# Patient Record
Sex: FEMALE | Race: WHITE | NOT HISPANIC OR LATINO | Employment: OTHER | URBAN - METROPOLITAN AREA
[De-identification: names, ages, dates, MRNs, and addresses within clinical notes are randomized per-mention and may not be internally consistent; named-entity substitution may affect disease eponyms.]

---

## 2018-06-04 ENCOUNTER — RECORDS - HEALTHEAST (OUTPATIENT)
Dept: LAB | Facility: CLINIC | Age: 73
End: 2018-06-04

## 2018-06-05 LAB
ANION GAP SERPL CALCULATED.3IONS-SCNC: 10 MMOL/L (ref 5–18)
BUN SERPL-MCNC: 24 MG/DL (ref 8–28)
CALCIUM SERPL-MCNC: 10 MG/DL (ref 8.5–10.5)
CHLORIDE BLD-SCNC: 106 MMOL/L (ref 98–107)
CO2 SERPL-SCNC: 22 MMOL/L (ref 22–31)
CREAT SERPL-MCNC: 0.79 MG/DL (ref 0.6–1.1)
GFR SERPL CREATININE-BSD FRML MDRD: >60 ML/MIN/1.73M2
GLUCOSE BLD-MCNC: 78 MG/DL (ref 70–125)
HGB BLD-MCNC: 11.2 G/DL (ref 12–16)
POTASSIUM BLD-SCNC: 4.4 MMOL/L (ref 3.5–5)
SODIUM SERPL-SCNC: 138 MMOL/L (ref 136–145)

## 2018-06-22 ENCOUNTER — RECORDS - HEALTHEAST (OUTPATIENT)
Dept: LAB | Facility: CLINIC | Age: 73
End: 2018-06-22

## 2018-06-25 LAB
ANION GAP SERPL CALCULATED.3IONS-SCNC: 8 MMOL/L (ref 5–18)
BUN SERPL-MCNC: 18 MG/DL (ref 8–28)
CALCIUM SERPL-MCNC: 9.8 MG/DL (ref 8.5–10.5)
CHLORIDE BLD-SCNC: 108 MMOL/L (ref 98–107)
CO2 SERPL-SCNC: 24 MMOL/L (ref 22–31)
CREAT SERPL-MCNC: 0.71 MG/DL (ref 0.6–1.1)
ERYTHROCYTE [DISTWIDTH] IN BLOOD BY AUTOMATED COUNT: 14.6 % (ref 11–14.5)
GFR SERPL CREATININE-BSD FRML MDRD: >60 ML/MIN/1.73M2
GLUCOSE BLD-MCNC: 88 MG/DL (ref 70–125)
HCT VFR BLD AUTO: 38.9 % (ref 35–47)
HGB BLD-MCNC: 12.1 G/DL (ref 12–16)
MCH RBC QN AUTO: 28.5 PG (ref 27–34)
MCHC RBC AUTO-ENTMCNC: 31.1 G/DL (ref 32–36)
MCV RBC AUTO: 92 FL (ref 80–100)
PLATELET # BLD AUTO: 301 THOU/UL (ref 140–440)
PMV BLD AUTO: 11 FL (ref 8.5–12.5)
POTASSIUM BLD-SCNC: 3.9 MMOL/L (ref 3.5–5)
RBC # BLD AUTO: 4.24 MILL/UL (ref 3.8–5.4)
SODIUM SERPL-SCNC: 140 MMOL/L (ref 136–145)
WBC: 7.2 THOU/UL (ref 4–11)

## 2018-07-20 ENCOUNTER — RECORDS - HEALTHEAST (OUTPATIENT)
Dept: LAB | Facility: CLINIC | Age: 73
End: 2018-07-20

## 2018-07-20 LAB
ANION GAP SERPL CALCULATED.3IONS-SCNC: 11 MMOL/L (ref 5–18)
BUN SERPL-MCNC: 15 MG/DL (ref 8–28)
CALCIUM SERPL-MCNC: 9.6 MG/DL (ref 8.5–10.5)
CHLORIDE BLD-SCNC: 103 MMOL/L (ref 98–107)
CO2 SERPL-SCNC: 21 MMOL/L (ref 22–31)
CREAT SERPL-MCNC: 0.62 MG/DL (ref 0.6–1.1)
ERYTHROCYTE [DISTWIDTH] IN BLOOD BY AUTOMATED COUNT: 14.7 % (ref 11–14.5)
GFR SERPL CREATININE-BSD FRML MDRD: >60 ML/MIN/1.73M2
GLUCOSE BLD-MCNC: 97 MG/DL (ref 70–125)
HCT VFR BLD AUTO: 41.4 % (ref 35–47)
HGB BLD-MCNC: 12.6 G/DL (ref 12–16)
MCH RBC QN AUTO: 26.5 PG (ref 27–34)
MCHC RBC AUTO-ENTMCNC: 30.4 G/DL (ref 32–36)
MCV RBC AUTO: 87 FL (ref 80–100)
PLATELET # BLD AUTO: 485 THOU/UL (ref 140–440)
PMV BLD AUTO: 11.2 FL (ref 8.5–12.5)
POTASSIUM BLD-SCNC: 4.3 MMOL/L (ref 3.5–5)
RBC # BLD AUTO: 4.75 MILL/UL (ref 3.8–5.4)
SODIUM SERPL-SCNC: 135 MMOL/L (ref 136–145)
WBC: 21.5 THOU/UL (ref 4–11)

## 2018-07-21 ENCOUNTER — RECORDS - HEALTHEAST (OUTPATIENT)
Dept: LAB | Facility: CLINIC | Age: 73
End: 2018-07-21

## 2018-07-21 LAB
ALBUMIN UR-MCNC: ABNORMAL MG/DL
APPEARANCE UR: ABNORMAL
BASOPHILS # BLD AUTO: 0 THOU/UL (ref 0–0.2)
BASOPHILS NFR BLD AUTO: 0 % (ref 0–2)
BILIRUB UR QL STRIP: NEGATIVE
COLOR UR AUTO: YELLOW
EOSINOPHIL # BLD AUTO: 0.2 THOU/UL (ref 0–0.4)
EOSINOPHIL NFR BLD AUTO: 1 % (ref 0–6)
ERYTHROCYTE [DISTWIDTH] IN BLOOD BY AUTOMATED COUNT: 14.7 % (ref 11–14.5)
GLUCOSE UR STRIP-MCNC: NEGATIVE MG/DL
HCT VFR BLD AUTO: 34.1 % (ref 35–47)
HGB BLD-MCNC: 11.2 G/DL (ref 12–16)
HGB UR QL STRIP: ABNORMAL
KETONES UR STRIP-MCNC: ABNORMAL MG/DL
LEUKOCYTE ESTERASE UR QL STRIP: ABNORMAL
LYMPHOCYTES # BLD AUTO: 2.3 THOU/UL (ref 0.8–4.4)
LYMPHOCYTES NFR BLD AUTO: 18 % (ref 20–40)
MCH RBC QN AUTO: 27.5 PG (ref 27–34)
MCHC RBC AUTO-ENTMCNC: 32.8 G/DL (ref 32–36)
MCV RBC AUTO: 84 FL (ref 80–100)
MONOCYTES # BLD AUTO: 0.9 THOU/UL (ref 0–0.9)
MONOCYTES NFR BLD AUTO: 8 % (ref 2–10)
NEUTROPHILS # BLD AUTO: 9.1 THOU/UL (ref 2–7.7)
NEUTROPHILS NFR BLD AUTO: 73 % (ref 50–70)
NITRATE UR QL: NEGATIVE
PH UR STRIP: 6 [PH] (ref 4.5–8)
PLATELET # BLD AUTO: 401 THOU/UL (ref 140–440)
PMV BLD AUTO: 10.7 FL (ref 8.5–12.5)
RBC # BLD AUTO: 4.08 MILL/UL (ref 3.8–5.4)
SP GR UR STRIP: 1.04 (ref 1–1.03)
UROBILINOGEN UR STRIP-ACNC: ABNORMAL
WBC: 12.6 THOU/UL (ref 4–11)

## 2018-07-22 LAB — BACTERIA SPEC CULT: ABNORMAL

## 2018-07-24 ENCOUNTER — RECORDS - HEALTHEAST (OUTPATIENT)
Dept: LAB | Facility: CLINIC | Age: 73
End: 2018-07-24

## 2018-07-24 LAB — WBC: 8.5 THOU/UL (ref 4–11)

## 2018-07-27 ENCOUNTER — ASSISTED LIVING VISIT (OUTPATIENT)
Dept: GERIATRICS | Facility: CLINIC | Age: 73
End: 2018-07-27
Payer: COMMERCIAL

## 2018-07-27 DIAGNOSIS — G35 MULTIPLE SCLEROSIS, PRIMARY PROGRESSIVE (H): ICD-10-CM

## 2018-07-27 DIAGNOSIS — T83.511D URINARY TRACT INFECTION ASSOCIATED WITH INDWELLING URETHRAL CATHETER, SUBSEQUENT ENCOUNTER: ICD-10-CM

## 2018-07-27 DIAGNOSIS — G82.50 SPASTIC QUADRIPLEGIA (H): ICD-10-CM

## 2018-07-27 DIAGNOSIS — D64.89 ANEMIA DUE TO OTHER CAUSE, NOT CLASSIFIED: ICD-10-CM

## 2018-07-27 DIAGNOSIS — N31.9 NEUROGENIC BLADDER: ICD-10-CM

## 2018-07-27 DIAGNOSIS — Z76.89 ENCOUNTER TO ESTABLISH CARE: Primary | ICD-10-CM

## 2018-07-27 DIAGNOSIS — N39.0 URINARY TRACT INFECTION ASSOCIATED WITH INDWELLING URETHRAL CATHETER, SUBSEQUENT ENCOUNTER: ICD-10-CM

## 2018-07-27 DIAGNOSIS — R53.81 PHYSICAL DECONDITIONING: ICD-10-CM

## 2018-07-27 DIAGNOSIS — L89.322 DECUBITUS ULCER OF LEFT BUTTOCK, STAGE 2 (H): ICD-10-CM

## 2018-07-27 DIAGNOSIS — K59.03 DRUG-INDUCED CONSTIPATION: ICD-10-CM

## 2018-07-27 DIAGNOSIS — M80.00XD AGE-RELATED OSTEOPOROSIS WITH CURRENT PATHOLOGICAL FRACTURE WITH ROUTINE HEALING, SUBSEQUENT ENCOUNTER: ICD-10-CM

## 2018-07-27 DIAGNOSIS — S82.141D CLOSED FRACTURE OF RIGHT TIBIAL PLATEAU WITH ROUTINE HEALING, SUBSEQUENT ENCOUNTER: ICD-10-CM

## 2018-07-27 PROBLEM — S82.141A CLOSED FRACTURE OF RIGHT TIBIAL PLATEAU: Status: ACTIVE | Noted: 2018-07-09

## 2018-07-27 PROBLEM — Z74.09 IMMOBILITY: Status: ACTIVE | Noted: 2018-06-08

## 2018-07-27 PROBLEM — R68.89 HEAT INTOLERANCE: Status: ACTIVE | Noted: 2018-05-26

## 2018-07-27 PROBLEM — S72.001A CLOSED FRACTURE OF NECK OF RIGHT FEMUR (H): Status: ACTIVE | Noted: 2018-05-20

## 2018-07-27 PROBLEM — D62 ACUTE BLOOD LOSS ANEMIA: Status: ACTIVE | Noted: 2018-06-19

## 2018-07-27 PROBLEM — K59.09 CHRONIC CONSTIPATION: Status: ACTIVE | Noted: 2018-07-12

## 2018-07-27 RX ORDER — CALCIUM CARBONATE 500 MG/1
1 TABLET, CHEWABLE ORAL DAILY
COMMUNITY
End: 2018-08-01

## 2018-07-27 RX ORDER — POLYETHYLENE GLYCOL 3350 17 G/17G
17 POWDER, FOR SOLUTION ORAL DAILY PRN
COMMUNITY
End: 2019-07-30

## 2018-07-27 RX ORDER — PYRIDOXINE HCL (VITAMIN B6) 100 MG
1 TABLET ORAL DAILY
COMMUNITY
End: 2018-08-01

## 2018-07-27 RX ORDER — LAXATIVE 927; 618 MG/3.7G; MG/3.7G
SUPPOSITORY RECTAL DAILY PRN
COMMUNITY
End: 2022-11-20

## 2018-07-27 RX ORDER — MULTIVITAMIN WITH FOLIC ACID 400 MCG
1 TABLET ORAL DAILY
COMMUNITY
End: 2018-08-01

## 2018-07-27 RX ORDER — AMOXICILLIN 250 MG
1 CAPSULE ORAL 2 TIMES DAILY
COMMUNITY
End: 2018-07-30

## 2018-07-27 RX ORDER — CETIRIZINE HYDROCHLORIDE 10 MG/1
10 TABLET ORAL DAILY PRN
COMMUNITY
End: 2019-01-11

## 2018-07-27 NOTE — Clinical Note
Kenney Dumas, This patient requested the new shingles vaccination as well as reclast injections for osteoporosis. Can you please let me know what you think on those? Thanks,  Kimberlee

## 2018-07-27 NOTE — PROGRESS NOTES
[facility]  PRIMARY CARE PROVIDER AND CLINIC:  Kimberlee Castro [address] / [place] [address]  Chief Complaint   Patient presents with     [facility] Care     [facility] Medical Record Number:  [id number]    HPI:    Cristina Stevens is a 73 year old  (1945),admitted to the [facility]  from [facility].  Admitted to this facility for  medical management and nursing care.  HPI information obtained from: facility chart records, facility staff, patient report, [employer] chart review and Care [facility] chart review.     Cristina Stevens is a 72 y.o. female with a past medical history of multiple sclerosis, neurogenic bladder with chronic ruby catheter and frequent UTI's. She was admitted to [facility] 5/19-5/26/2018 with a right femoral neck fracture and underwent hemiarthroplasty on 5/20. She discharged to [facility] but due to the elevated temperature of the building was sent back to the ED for placement. TCU stay from 5/29-7/2/18 and was discharged back to [facility] where she developed right leg pain and found to have closed fracture of right tibial plateau and was hospitalized from 7/8-7/10/18 then returned to [place] from 7/10-7/24/18 for rehab. She was admitted to [facility] care suites for further medical management.     She was seen in the ED frequently in the past several weeks:   6/2/18-ED for increased right hip pain: sent back to TCU with nonsteroidal course  6/14/18--ED for UTI, was started on cipro which was changed to amoxicillin to cover enteroccocus.   7/7/18- ED visit for increased right knee pain. Xrays were negative. Flexeril was prescribed.     Ms. Stevens was visited today while in her room, her care attendant is present for the visit. Her main concern today is the newly acquired pressure ulcer to the buttocks area. The pain to the RLE is present but is improving. She has  been trying to take the oxycodone only twice daily. She has been having regular bowel movements. Edwards is in place and states that she is currently on abx for UTI. RLE spasms have improved after receiving botox injections by PM&R. She would like to start reclast injections.    CODE STATUS/ADVANCE DIRECTIVES DISCUSSION:   DNR  Patient's living condition: lives in an assisted living facility    ALLERGIES:Macrobid [nitrofuran derivatives] and Sulfa drugs  PAST MEDICAL HISTORY:  has a past medical history of Acute blood loss anemia (6/19/2018); Anemia (9/25/2012); At risk for impaired skin integrity (12/14/2014); Chronic constipation (7/12/2018); Chronic pain (9/25/2012); Closed fracture of neck of right femur (H) (5/20/2018); Closed fracture of right tibial plateau (7/9/2018); Frequent UTI (9/25/2012); Heat intolerance (5/26/2018); Immobility (6/8/2018); Kidney stone (6/19/2014); MDRO (multiple drug resistant organisms) resistance (5/31/2013); Multiple sclerosis (H) (5/10/2011); Neurogenic bladder (6/8/2018); Neuromuscular respiratory weakness (9/26/2014); OAB (overactive bladder) (6/2/2017); Osteoporosis (6/16/2006); Scoliosis associated with other condition (9/10/2014); Screening for osteoporosis (7/10/2018); 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  PAST SURGICAL HISTORY:  has no past surgical history on file.  FAMILY HISTORY: family history is not on file.  SOCIAL HISTORY:  reports that she quit smoking about 37 years ago. She has never used smokeless tobacco.    Post Discharge Medication Reconciliation Status: discharge medications reconciled, continue medications without change.  Current Outpatient Prescriptions   Medication Sig Dispense Refill     Acetaminophen (TYLENOL PO) Take 1,000 mg by mouth 3 times daily       Ascorbic Acid (VITAMIN C PO) Take 500 mg by mouth daily       ASPIRIN EC PO Take 325 mg by mouth daily       baclofen (LIORESAL) 10 MG tablet Take 5 tablets by mouth 2  times daily Also give 5mg po q4h prn       calcium carbonate (BRENDA-GEST ANTACID) 500 MG chewable tablet Take 1 chew tab by mouth daily       cetirizine (ZYRTEC) 10 MG tablet Take 10 mg by mouth daily as needed for allergies       Cholecalciferol (VITAMIN D-3 PO) Take 4,000 Units by mouth daily       CIPROFLOXACIN PO Take 500 mg by mouth every 12 hours       CO2-Releasing (-TWO) SUPP Place rectally daily as needed       Cranberry (CRANBERRY CONCENTRATE) 500 MG CAPS Take 1 capsule by mouth daily       IBUPROFEN PO Take 400 mg by mouth daily Also give 400mg po qday prn       Mirabegron (MYRBETRIQ PO) Take 25 mg by mouth daily       Multiple Vitamin (TAB-A-DAWSON) TABS Take 1 tablet by mouth daily       oxyCODONE IR (ROXICODONE) 5 MG tablet Take 1 tablet (5 mg) by mouth every 3 hours as needed for severe pain 6 tablet 0     polyethylene glycol (MIRALAX/GLYCOLAX) powder Take 17 g by mouth daily as needed for constipation       Probiotic Product (PROBIOTIC FORMULA) CAPS Take 1 capsule by mouth daily       senna-docusate (SENOKOT-S;PERICOLACE) 8.6-50 MG per tablet Take 1 tablet by mouth 2 times daily And BID  tablet      UNABLE TO FIND 100 mg daily MEDICATION NAME: Trimethoprim HCL 50mg/5ml soln -       VITAMIN D, CHOLECALCIFEROL, PO Take 5,000 Units by mouth every 7 days       ARMODAFINIL PO Take 150 mg by mouth daily         ROS:  10 point ROS of systems including Constitutional, Eyes, Respiratory, Cardiovascular, Gastroenterology, Genitourinary, Integumentary, Muscularskeletal, Psychiatric were all negative except for pertinent positives noted in my HPI.    Exam:  GENERAL APPEARANCE:  Alert, in no distress, pleasant, cooperative, oriented x 4  EYES:  EOM, lids, pupils and irises normal, sclera clear and conjunctiva normal, no discharge or mattering on lids or lashes noted  ENT:  Mouth normal, moist mucous membranes, nose without drainage or crusting, external ears without lesions, hearing acuity intact, speaks  softly.  NECK:  Nontender, supple, symmetrical  RESP:  respiratory effort and palpation of chest normal, no chest wall tenderness, no respiratory distress, Lung sounds clear, patient is on room air  CV:  Palpation and auscultation of heart done, rate and rhythm regular, no murmur, no rub or gallop. Edema trace in RLE. VASCULAR: warm extremities.   ABDOMEN:  normal bowel sounds, soft, nontender.  M/S:   Gait and station wheelchair bound, tenderness to right ankle and knee. R knee slightly contracted. Global weakness, able to move arms slightly and minimally wiggle toes.   : catheter in place. Clear yellow urine.   SKIN:  Inspection and palpation of skin and subcutaneous tissue: skin o warm, dry and intact without rashes  NEURO: cranial nerves 2-12 grossly intact, no facial asymmetry, no speech deficits and able to follow directions, moves all extremities symmetrically  PSYCH:  insight and judgement intact, memory intact, affect and mood normal    Lab/Diagnostic data:    Last Basic Metabolic Panel:          ASSESSMENT/PLAN:  ((U12.342D) Closed fracture of right tibial plateau with routine healing, subsequent encounter (primary encounter diagnosis)  Comment: has been decreasing the amount of oxycodone. Will need repeat xrays at the clinic prior to follow up ortho appt.   Plan: ibuprofen 400 mg daily and daily PRN (ortho okay with this). Oxycodone 5 mg q3h PRN, tylenol 1000 mg TID. Asa 325 mg daily for prophylaxis. Mobilize as tolerated in the brace. Follow up with ortho as scheduled in three weeks.       (L89.322) Decubitus ulcer of left buttock, stage 2  Comment: secondary to immobility and more time in bed after sustaining fracture.   Plan: mepilex to ulcer, change every three days. [facility] RN to monitor.     (G35) Multiple sclerosis, primary progressive (H)  Comment: chronic, progressive. Severe. Wheelchair bound and requires assistance with all cares. Has a van that PCA's drive to get her to appts and out in the  community. Previously followed by [facility] and now follows with [facility]. She is unsure of last appt but believes she is due for appt which was put off due to fractures and recent hospitalizations.   Plan: Follow up with neurology. Staff to assist with cares.     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 Richardson, Had a botox injection to right thigh on 7/17, spasms and pain have improved.  Plan: follow up with Dr. Richardson as scheduled. botox injections every 6 months. Continue with baclofen 5 mg BID and q4h PRN.     (N31.9) Neurogenic bladder  Comment: ruby last changed out one week ago. Will need monthly cath changes.   Plan: good wendi care given frequent UTI's. [facility] RN to change out catheter every 30 days.     (T83.511D,  N39.0) Urinary tract infection associated with indwelling urethral catheter, subsequent encounter  Comment: frequent. On trimethoprim prophylaxis.     Plan: continue with cipro 500 mg q12h--last dose today. Restart Trimetherine 100 mg now that cipro is complete. Monitor for further s/s of UTI.     (D64.89) Anemia due to other cause, not classified  Comment: due to chronic disease. HGB prior to right hip fx was around 12 which decreased to 9.2 post op. Now 11.2.  Plan: repeat HGB on next lab day to ensure stability.     (M80.00XD) Age-related osteoporosis with current pathological fracture with routine healing, subsequent encounter  Comment: interested in reclast injections. Will reach out to pharmacy about recommendations. May need to have further dexa scan completed.   Plan: continue with vitamin D 4000 units daily and calcium carbonate 500 mg daily.     (K59.03) Drug-induced constipation  Comment: having regular bowel movements.   Plan: continue with senna-docsate 1 tab BID and BID PRN    (R53.81) Physical deconditioning  Comment: secondary to underlying medical conditions and recent fractures.  Plan: [facility]  PT/OT    Orders:  HGB on next lab day.     Total time with a new patient visit in the assisted livin minutes including discussions about the POC and care coordination with the patient, caregiver and teaching staff about wound care and mepilex. Greater than 50% of total time spent with counseling and coordinating care due to chart review, new resident to assisted living, new pressure ulcer, pain contoll.    Electronically signed by:  CAROL Dupont CNP

## 2018-07-27 NOTE — LETTER
7/27/2018        RE: Cristina Stevens  [facility]  [address]  [place] [address]        [facility]  PRIMARY CARE PROVIDER AND CLINIC:  Kimberlee Castro [address] [facility] [address] / [place] [address]  Chief Complaint   Patient presents with     [facility] Care     [place] Medical Record Number:  [id number]    HPI:    Cristina Stevens is a 73 year old  (1945),ad[facility] the [facility]  Admitted to this facility for  medical management and nursing care.  HPI information obtained from: facility chart records, facility staff, patient report, [facility] chart review and Care Everywhere [facility] chart review.     Cristina Stevens is a 72 y.o. female with a past medical history of multiple sclerosis, neurogenic bladder with chronic ruby catheter and frequent UTI's. She was admitted to [facility] 5/19-5/26/2018 with a right femoral neck fracture and underwent hemiarthroplasty on 5/20. She discharged to [facility] but due to the elevated temperature of the building was sent back to the ED for placement. TCU stay from 5/29-7/2/18 and was discharged back to [facility] where she developed right leg pain and found to have closed fracture of right tibial plateau and was hospitalized from 7/8-7/10/18 then returned to [place] from 7/10-7/24/18 for rehab. She was admitted to [facility] care suites for further medical management.     She was seen in the ED frequently in the past several weeks:   6/2/18-ED for increased right hip pain: sent back to TCU with nonsteroidal course  6/14/18--ED for UTI, was started on cipro which was changed to amoxicillin to cover enteroccocus.   7/7/18- ED visit for increased right knee pain. Xrays were negative. Flexeril was prescribed.     Ms. Stevens was visited today while in her room, her care attendant is present for the visit. Her main concern  today is the newly acquired pressure ulcer to the buttocks area. The pain to the RLE is present but is improving. She has been trying to take the oxycodone only twice daily. She has been having regular bowel movements. Edwards is in place and states that she is currently on abx for UTI. RLE spasms have improved after receiving botox injections by PM&R. She would like to start reclast injections.    CODE STATUS/ADVANCE DIRECTIVES DISCUSSION:   DNR  Patient's living condition: lives in an assisted living facility    ALLERGIES:Macrobid [nitrofuran derivatives] and Sulfa drugs  PAST MEDICAL HISTORY:  has a past medical history of Acute blood loss anemia (6/19/2018); Anemia (9/25/2012); At risk for impaired skin integrity (12/14/2014); Chronic constipation (7/12/2018); Chronic pain (9/25/2012); Closed fracture of neck of right femur (H) (5/20/2018); Closed fracture of right tibial plateau (7/9/2018); Frequent UTI (9/25/2012); Heat intolerance (5/26/2018); Immobility (6/8/2018); Kidney stone (6/19/2014); MDRO (multiple drug resistant organisms) resistance (5/31/2013); Multiple sclerosis (H) (5/10/2011); Neurogenic bladder (6/8/2018); Neuromuscular respiratory weakness (9/26/2014); OAB (overactive bladder) (6/2/2017); Osteoporosis (6/16/2006); Scoliosis associated with other condition (9/10/2014); Screening for osteoporosis (7/10/2018); 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  PAST SURGICAL HISTORY:  has no past surgical history on file.  FAMILY HISTORY: family history is not on file.  SOCIAL HISTORY:  reports that she quit smoking about 37 years ago. She has never used smokeless tobacco.    Post Discharge Medication Reconciliation Status: discharge medications reconciled, continue medications without change.  Current Outpatient Prescriptions   Medication Sig Dispense Refill     Acetaminophen (TYLENOL PO) Take 1,000 mg by mouth 3 times daily       Ascorbic Acid (VITAMIN C PO) Take 500 mg by mouth  daily       ASPIRIN EC PO Take 325 mg by mouth daily       baclofen (LIORESAL) 10 MG tablet Take 5 tablets by mouth 2 times daily Also give 5mg po q4h prn       calcium carbonate (BRENDA-GEST ANTACID) 500 MG chewable tablet Take 1 chew tab by mouth daily       cetirizine (ZYRTEC) 10 MG tablet Take 10 mg by mouth daily as needed for allergies       Cholecalciferol (VITAMIN D-3 PO) Take 4,000 Units by mouth daily       CIPROFLOXACIN PO Take 500 mg by mouth every 12 hours       CO2-Releasing (-TWO) SUPP Place rectally daily as needed       Cranberry (CRANBERRY CONCENTRATE) 500 MG CAPS Take 1 capsule by mouth daily       IBUPROFEN PO Take 400 mg by mouth daily Also give 400mg po qday prn       Mirabegron (MYRBETRIQ PO) Take 25 mg by mouth daily       Multiple Vitamin (TAB-A-DAWSON) TABS Take 1 tablet by mouth daily       oxyCODONE IR (ROXICODONE) 5 MG tablet Take 1 tablet (5 mg) by mouth every 3 hours as needed for severe pain 6 tablet 0     polyethylene glycol (MIRALAX/GLYCOLAX) powder Take 17 g by mouth daily as needed for constipation       Probiotic Product (PROBIOTIC FORMULA) CAPS Take 1 capsule by mouth daily       senna-docusate (SENOKOT-S;PERICOLACE) 8.6-50 MG per tablet Take 1 tablet by mouth 2 times daily And BID  tablet      UNABLE TO FIND 100 mg daily MEDICATION NAME: Trimethoprim HCL 50mg/5ml soln -       VITAMIN D, CHOLECALCIFEROL, PO Take 5,000 Units by mouth every 7 days       ARMODAFINIL PO Take 150 mg by mouth daily         ROS:  10 point ROS of systems including Constitutional, Eyes, Respiratory, Cardiovascular, Gastroenterology, Genitourinary, Integumentary, Muscularskeletal, Psychiatric were all negative except for pertinent positives noted in my HPI.    Exam:  GENERAL APPEARANCE:  Alert, in no distress, pleasant, cooperative, oriented x 4  EYES:  EOM, lids, pupils and irises normal, sclera clear and conjunctiva normal, no discharge or mattering on lids or lashes noted  ENT:  Mouth normal,  moist mucous membranes, nose without drainage or crusting, external ears without lesions, hearing acuity intact, speaks softly.  NECK:  Nontender, supple, symmetrical  RESP:  respiratory effort and palpation of chest normal, no chest wall tenderness, no respiratory distress, Lung sounds clear, patient is on room air  CV:  Palpation and auscultation of heart done, rate and rhythm regular, no murmur, no rub or gallop. Edema trace in RLE. VASCULAR: warm extremities.   ABDOMEN:  normal bowel sounds, soft, nontender.  M/S:   Gait and station wheelchair bound, tenderness to right ankle and knee. R knee slightly contracted. Global weakness, able to move arms slightly and minimally wiggle toes.   : catheter in place. Clear yellow urine.   SKIN:  Inspection and palpation of skin and subcutaneous tissue: skin o warm, dry and intact without rashes  NEURO: cranial nerves 2-12 grossly intact, no facial asymmetry, no speech deficits and able to follow directions, moves all extremities symmetrically  PSYCH:  insight and judgement intact, memory intact, affect and mood normal    Lab/Diagnostic data:    Last Basic Metabolic Panel:          ASSESSMENT/PLAN:  ((S82.649D) Closed fracture of right tibial plateau with routine healing, subsequent encounter (primary encounter diagnosis)  Comment: has been decreasing the amount of oxycodone. Will need repeat xrays at the clinic prior to follow up ortho appt.   Plan: ibuprofen 400 mg daily and daily PRN (ortho okay with this). Oxycodone 5 mg q3h PRN, tylenol 1000 mg TID. Asa 325 mg daily for prophylaxis. Mobilize as tolerated in the brace. Follow up with ortho as scheduled in three weeks.       (L89.322) Decubitus ulcer of left buttock, stage 2  Comment: secondary to immobility and more time in bed after sustaining fracture.   Plan: mepilex to ulcer, change every three days. [facility] RN to monitor.     (G35) Multiple sclerosis, primary progressive (H)  Comment: chronic, progressive. Severe.  Wheelchair bound and requires assistance with all cares. Has a van that PCA's drive to get her to appts and out in the community. Previously followed by [facility] and now follows with [facility]. She is unsure of last appt but believes she is due for appt which was put off due to fractures and recent hospitalizations.   Plan: Follow up with neurology. Staff to assist with cares.     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 Richardson, Had a botox injection to right thigh on 7/17, spasms and pain have improved.  Plan: follow up with Dr. Richardson as scheduled. botox injections every 6 months. Continue with baclofen 5 mg BID and q4h PRN.     (N31.9) Neurogenic bladder  Comment: ruby last changed out one week ago. Will need monthly cath changes.   Plan: good wendi care given frequent UTI's. [facility] RN to change out catheter every 30 days.     (T83.511D,  N39.0) Urinary tract infection associated with indwelling urethral catheter, subsequent encounter  Comment: frequent. On trimethoprim prophylaxis.     Plan: continue with cipro 500 mg q12h--last dose today. Restart Trimetherine 100 mg now that cipro is complete. Monitor for further s/s of UTI.     (D64.89) Anemia due to other cause, not classified  Comment: due to chronic disease. HGB prior to right hip fx was around 12 which decreased to 9.2 post op. Now 11.2.  Plan: repeat HGB on next lab day to ensure stability.     (M80.00XD) Age-related osteoporosis with current pathological fracture with routine healing, subsequent encounter  Comment: interested in reclast injections. Will reach out to pharmacy about recommendations. May need to have further dexa scan completed.   Plan: continue with vitamin D 4000 units daily and calcium carbonate 500 mg daily.     (K59.03) Drug-induced constipation  Comment: having regular bowel movements.   Plan: continue with senna-docsate 1 tab BID and BID PRN    (R53.81) Physical  deconditioning  Comment: secondary to underlying medical conditions and recent fractures.  Plan: [facility] PT/OT    Orders:  HGB on next lab day.     Total time with a new patient visit in the assisted livin minutes including discussions about the POC and care coordination with the patient, caregiver and teaching staff about wound care and mepilex. Greater than 50% of total time spent with counseling and coordinating care due to chart review, new resident to assisted living, new pressure ulcer, pain contoll.    Electronically signed by:  CAROL Dupont CNP                  Sincerely,        CAROL Dupont CNP

## 2018-07-30 ENCOUNTER — RECORDS - HEALTHEAST (OUTPATIENT)
Dept: LAB | Facility: CLINIC | Age: 73
End: 2018-07-30

## 2018-07-30 RX ORDER — AMOXICILLIN 250 MG
1 CAPSULE ORAL 2 TIMES DAILY
Qty: 100 TABLET | COMMUNITY
Start: 2018-07-30 | End: 2018-08-27

## 2018-07-30 RX ORDER — OXYCODONE HYDROCHLORIDE 5 MG/1
2.5 TABLET ORAL 2 TIMES DAILY PRN
Qty: 6 TABLET | Refills: 0 | COMMUNITY
Start: 2018-07-30 | End: 2019-07-23

## 2018-07-31 LAB — HGB BLD-MCNC: 11.4 G/DL (ref 12–16)

## 2018-08-04 ENCOUNTER — TELEPHONE (OUTPATIENT)
Dept: GERIATRICS | Facility: CLINIC | Age: 73
End: 2018-08-04

## 2018-08-04 DIAGNOSIS — R30.0 DYSURIA: Primary | ICD-10-CM

## 2018-08-04 NOTE — TELEPHONE ENCOUNTER
8/4/2018     Situation:  -home care patient with new onset of dysuria per home care nurse  Background:  -patient with frequent UTIs  Assessment:  -dysuria with abd pain  Plan:  -OK for UA/C per home care nurse     Mariela Anand CNP   [facility]  [phone] cell

## 2018-08-14 VITALS
RESPIRATION RATE: 20 BRPM | DIASTOLIC BLOOD PRESSURE: 92 MMHG | HEART RATE: 71 BPM | TEMPERATURE: 97.2 F | SYSTOLIC BLOOD PRESSURE: 155 MMHG

## 2018-08-14 NOTE — PROGRESS NOTES
[facility]    Chief Complaint   Patient presents with     KELLEY       [facility] Record Number:  [id number]    HPI:    Cristina Stevens is a 73 year old  (1945), who is being seen today for an episodic care visit at [facility] .      HPI information obtained from: facility chart records, facility staff and patient report.    Today's concern is:  Multiple sclerosis, primary progressive (H)  Spastic quadriplegia (H)  Spasms are controlled with current baclofen and botox injections.     Neurogenic bladder  Need for prophylaxis against urinary tract infection  Chronic ruby catheter in place. Had bypassing earlier this week. Catheter was flushed Monday, then changed by home care on Tuesday which improved bypassing. Denies fever, body aches, or chills.     Age-related osteoporosis with current pathological fracture with routine healing, subsequent encounter  Closed fracture of right tibial plateau with routine healing, subsequent encounter  Would like to receive a reclast infusion. Spoke with geriatric pharmacist who did not recommend this medication as resident is non weight bearing. Resident states there was a physician in a prior TCU that recommended it may help with current fractures. She would like to pursue this.      BP Readings from Last 3 Encounters:   08/14/18 (!) 155/92   05/10/11 131/76     Pulse Readings from Last 4 Encounters:   08/14/18 71   05/10/11 64     ALLERGIES: Macrobid [nitrofuran derivatives] and Sulfa drugs  Past Medical, Surgical, Family and Social History reviewed and updated in EPIC.    Current Outpatient Prescriptions   Medication Sig Dispense Refill     ARMODAFINIL PO Take 150 mg by mouth daily       ascorbic acid (VITAMIN C) 500 MG tablet TAKE 1 TABLET BY MOUTH ONCE DAILY 31 tablet 11     aspirin 325 MG EC tablet TAKE 1 TABLET BY MOUTH ONCE DAILY 31 tablet 11     baclofen (LIORESAL) 10 MG tablet TAKE ONE-HALF TABLET (5MG) BY  MOUTH TWICE DAILY;AMD TAKE ONE-HALF TABLET (5MG) BY MOUTH EVERY 4 HOURS AS NEEDED 31 tablet 11     BRENDA-GEST ANTACID 500 MG chewable tablet TAKE 1 TABLET BY MOUTH ONCE DAILY 31 tablet 11     cetirizine (ZYRTEC) 10 MG tablet Take 10 mg by mouth daily as needed for allergies       Cholecalciferol (VITAMIN D3) 2000 units CAPS TAKE TWO CAPSULES (4000 UNITS) BY MOUTH DAILY 62 capsule 11     CO2-Releasing (-TWO) SUPP Place rectally daily as needed       CRANBERRY CONCENTRATE 500 MG CAPS TAKE 1 CAPSULE BY MOUTH ONCE DAILY 31 capsule 11     ibuprofen (ADVIL/MOTRIN) 400 MG tablet TAKE 1 TABLET BY MOUTH ONCE DAILY;TAKE 1 TABLET BY MOUTH ONCE DAILY AS NEEDED 31 tablet 11     Multiple Vitamin (TAB-A-DAWSON) TABS TAKE 1 TABLET BY MOUTH ONCE DAILY 31 tablet 11     MYRBETRIQ 25 MG 24 hr tablet TAKE 1 TABLET BY MOUTH ONCE DAILY 31 tablet 11     oxyCODONE IR (ROXICODONE) 5 MG tablet Take 1 tablet (5 mg) by mouth every 3 hours as needed for severe pain 6 tablet 0     polyethylene glycol (MIRALAX/GLYCOLAX) powder Take 17 g by mouth daily as needed for constipation       Probiotic Product (PROBIOTIC FORMULA) CAPS Take 1 capsule by mouth daily       senna-docusate (SENOKOT-S;PERICOLACE) 8.6-50 MG per tablet Take 1 tablet by mouth 2 times daily And BID  tablet      trimethoprim (TRIMPEX) 100 MG tablet TAKE 1 TABLET BY MOUTH ONCE DAILY 31 tablet 11     VITAMIN D, CHOLECALCIFEROL, PO Take 5,000 Units by mouth every 7 days       Medications reviewed:  Medications reconciled to facility chart and changes were made to reflect current medications as identified as above med list. Below are the changes that were made:   Medications stopped since last EPIC medication reconciliation:   There are no discontinued medications.    Medications started since last [facility] medication reconciliation:  No orders of the defined types were placed in this encounter.    REVIEW OF SYSTEMS:  4 point ROS including Respiratory, CV, GI and , other than that  noted in the HPI,  is negative    Physical Exam:  BP (!) 155/92  Pulse 71  Temp 97.2  F (36.2  C)  Resp 20  GENERAL APPEARANCE:  Alert, in no distress, pleasant, cooperative, oriented x 4  EYES:  EOM, lids, pupils and irises normal, sclera clear and conjunctiva normal, no discharge or mattering on lids or lashes noted  ENT:  Mouth normal, moist mucous membranes, nose without drainage or crusting, external ears without lesions, hearing acuity intact, speaks softly.  NECK:  Nontender, supple, symmetrical  RESP:  respiratory effort and palpation of chest normal, no chest wall tenderness, no respiratory distress, Lung sounds clear, patient is on room air  CV:  Palpation and auscultation of heart done, rate and rhythm regular, no murmur, no rub or gallop. Edema trace in RLE. VASCULAR: warm extremities.   ABDOMEN:  normal bowel sounds, soft, nontender.  M/S:   Gait and station wheelchair bound, tenderness to right ankle and knee. R knee slightly contracted. Global weakness, able to move arms, minimally wiggle toes.    : catheter in place. Clear yellow urine.   SKIN:  Inspection and palpation of skin and subcutaneous tissue: skin warm, dry and intact without rashes  NEURO: cranial nerves 2-12 grossly intact, no facial asymmetry, no speech deficits and able to follow directions, moves all extremities symmetrically  PSYCH:  insight and judgement intact, memory intact, affect and mood normal    Recent Labs:   Last Basic Metabolic Panel:          Assessment/Plan:  (G35) Multiple sclerosis, primary progressive (H)  (primary encounter diagnosis)  (G82.50) Spastic quadriplegia (H)  Comment: followed by PM&R MD, Dr. Richardson, Had a botox injection to right thigh on 7/17, spasms and pain have improved.  Plan: follow up with Dr. Richardson as scheduled. botox injections every 6 months. Continue with baclofen 5 mg BID and q4h PRN.     (N31.9) Neurogenic bladder  (Z29.8) Need for prophylaxis against urinary tract infection  Comment:  Bypassing improved with flushing and changing of catheter so likely sediment that was causing the bypassing. No need for UA as the bypassing has resolved and there are no s/s of UTI.   Plan: good wendi care given frequent UTI's. [facility] RN to change out catheter every 30 days. Continue with trimethoprim prophylaxis.     (M80.00XD) Age-related osteoporosis with current pathological fracture with routine healing, subsequent encounter  Comment/Plan: would like reclast infusion to prevent further fractures. Will consult with MD for further recommendations.     (S82.141D) Closed fracture of right tibial plateau with routine healing, subsequent encounter  Comment: has been decreasing the amount of oxycodone as pain has been improving. Will need repeat xrays at the clinic prior to follow up ortho appt next week.   Plan: ibuprofen 400 mg daily and daily PRN (ortho okay with this). Oxycodone 5 mg q3h PRN, tylenol 1000 mg TID. Asa 325 mg daily for prophylaxis. Mobilize as tolerated in the brace. Follow up with ortho as scheduled in next week.         Electronically signed by  CAROL Dupont CNP

## 2018-08-15 ENCOUNTER — ASSISTED LIVING VISIT (OUTPATIENT)
Dept: GERIATRICS | Facility: CLINIC | Age: 73
End: 2018-08-15
Payer: COMMERCIAL

## 2018-08-15 DIAGNOSIS — G82.50 SPASTIC QUADRIPLEGIA (H): ICD-10-CM

## 2018-08-15 DIAGNOSIS — M80.00XD AGE-RELATED OSTEOPOROSIS WITH CURRENT PATHOLOGICAL FRACTURE WITH ROUTINE HEALING, SUBSEQUENT ENCOUNTER: ICD-10-CM

## 2018-08-15 DIAGNOSIS — N31.9 NEUROGENIC BLADDER: ICD-10-CM

## 2018-08-15 DIAGNOSIS — S82.141D CLOSED FRACTURE OF RIGHT TIBIAL PLATEAU WITH ROUTINE HEALING, SUBSEQUENT ENCOUNTER: ICD-10-CM

## 2018-08-15 DIAGNOSIS — Z29.89 NEED FOR PROPHYLAXIS AGAINST URINARY TRACT INFECTION: ICD-10-CM

## 2018-08-15 DIAGNOSIS — G35 MULTIPLE SCLEROSIS, PRIMARY PROGRESSIVE (H): Primary | ICD-10-CM

## 2018-08-15 NOTE — LETTER
8/15/2018        RE: Cristina Stevens  [facility]  [address] [place] [id number]        [facility]    Chief Complaint   Patient presents with     KELLEY       [facility] Medical Record Number:  [id number]    HPI:    Cristina Stevens is a 73 year old  (1945), who is being seen today for an episodic care visit at [facility] .      HPI information obtained from: facility chart records, facility staff and patient report.    Today's concern is:  Multiple sclerosis, primary progressive (H)  Spastic quadriplegia (H)  Spasms are controlled with current baclofen and botox injections.     Neurogenic bladder  Need for prophylaxis against urinary tract infection  Chronic ruby catheter in place. Had bypassing earlier this week. Catheter was flushed Monday, then changed by home care on Tuesday which improved bypassing. Denies fever, body aches, or chills.     Age-related osteoporosis with current pathological fracture with routine healing, subsequent encounter  Closed fracture of right tibial plateau with routine healing, subsequent encounter  Would like to receive a reclast infusion. Spoke with geriatric pharmacist who did not recommend this medication as resident is non weight bearing. Resident states there was a physician in a prior TCU that recommended it may help with current fractures. She would like to pursue this.      BP Readings from Last 3 Encounters:   08/14/18 (!) 155/92   05/10/11 131/76     Pulse Readings from Last 4 Encounters:   08/14/18 71   05/10/11 64     ALLERGIES: Macrobid [nitrofuran derivatives] and Sulfa drugs  Past Medical, Surgical, Family and Social History reviewed and updated in [facility].    Current Outpatient Prescriptions   Medication Sig Dispense Refill     ARMODAFINIL PO Take 150 mg by mouth daily       ascorbic acid (VITAMIN C) 500 MG tablet TAKE 1 TABLET BY MOUTH ONCE DAILY 31 tablet 11     aspirin 325 MG EC tablet  TAKE 1 TABLET BY MOUTH ONCE DAILY 31 tablet 11     baclofen (LIORESAL) 10 MG tablet TAKE ONE-HALF TABLET (5MG) BY MOUTH TWICE DAILY;AMD TAKE ONE-HALF TABLET (5MG) BY MOUTH EVERY 4 HOURS AS NEEDED 31 tablet 11     BRENDA-GEST ANTACID 500 MG chewable tablet TAKE 1 TABLET BY MOUTH ONCE DAILY 31 tablet 11     cetirizine (ZYRTEC) 10 MG tablet Take 10 mg by mouth daily as needed for allergies       Cholecalciferol (VITAMIN D3) 2000 units CAPS TAKE TWO CAPSULES (4000 UNITS) BY MOUTH DAILY 62 capsule 11     CO2-Releasing (-TWO) SUPP Place rectally daily as needed       CRANBERRY CONCENTRATE 500 MG CAPS TAKE 1 CAPSULE BY MOUTH ONCE DAILY 31 capsule 11     ibuprofen (ADVIL/MOTRIN) 400 MG tablet TAKE 1 TABLET BY MOUTH ONCE DAILY;TAKE 1 TABLET BY MOUTH ONCE DAILY AS NEEDED 31 tablet 11     Multiple Vitamin (TAB-A-DAWSON) TABS TAKE 1 TABLET BY MOUTH ONCE DAILY 31 tablet 11     MYRBETRIQ 25 MG 24 hr tablet TAKE 1 TABLET BY MOUTH ONCE DAILY 31 tablet 11     oxyCODONE IR (ROXICODONE) 5 MG tablet Take 1 tablet (5 mg) by mouth every 3 hours as needed for severe pain 6 tablet 0     polyethylene glycol (MIRALAX/GLYCOLAX) powder Take 17 g by mouth daily as needed for constipation       Probiotic Product (PROBIOTIC FORMULA) CAPS Take 1 capsule by mouth daily       senna-docusate (SENOKOT-S;PERICOLACE) 8.6-50 MG per tablet Take 1 tablet by mouth 2 times daily And BID  tablet      trimethoprim (TRIMPEX) 100 MG tablet TAKE 1 TABLET BY MOUTH ONCE DAILY 31 tablet 11     VITAMIN D, CHOLECALCIFEROL, PO Take 5,000 Units by mouth every 7 days       Medications reviewed:  Medications reconciled to facility chart and changes were made to reflect current medications as identified as above med list. Below are the changes that were made:   Medications stopped since last EPIC medication reconciliation:   There are no discontinued medications.    Medications started since last [facility] medication reconciliation:  No orders of the defined types were  placed in this encounter.    REVIEW OF SYSTEMS:  4 point ROS including Respiratory, CV, GI and , other than that noted in the HPI,  is negative    Physical Exam:  BP (!) 155/92  Pulse 71  Temp 97.2  F (36.2  C)  Resp 20  GENERAL APPEARANCE:  Alert, in no distress, pleasant, cooperative, oriented x 4  EYES:  EOM, lids, pupils and irises normal, sclera clear and conjunctiva normal, no discharge or mattering on lids or lashes noted  ENT:  Mouth normal, moist mucous membranes, nose without drainage or crusting, external ears without lesions, hearing acuity intact, speaks softly.  NECK:  Nontender, supple, symmetrical  RESP:  respiratory effort and palpation of chest normal, no chest wall tenderness, no respiratory distress, Lung sounds clear, patient is on room air  CV:  Palpation and auscultation of heart done, rate and rhythm regular, no murmur, no rub or gallop. Edema trace in RLE. VASCULAR: warm extremities.   ABDOMEN:  normal bowel sounds, soft, nontender.  M/S:   Gait and station wheelchair bound, tenderness to right ankle and knee. R knee slightly contracted. Global weakness, able to move arms, minimally wiggle toes.    : catheter in place. Clear yellow urine.   SKIN:  Inspection and palpation of skin and subcutaneous tissue: skin warm, dry and intact without rashes  NEURO: cranial nerves 2-12 grossly intact, no facial asymmetry, no speech deficits and able to follow directions, moves all extremities symmetrically  PSYCH:  insight and judgement intact, memory intact, affect and mood normal    Recent Labs:   Last Basic Metabolic Panel:          Assessment/Plan:  (G35) Multiple sclerosis, primary progressive (H)  (primary encounter diagnosis)  (G82.50) Spastic quadriplegia (H)  Comment: followed by PM&R MD, Dr. Richardson, Had a botox injection to right thigh on 7/17, spasms and pain have improved.  Plan: follow up with Dr. Richardson as scheduled. botox injections every 6 months. Continue with baclofen 5 mg BID  and q4h PRN.     (N31.9) Neurogenic bladder  (Z29.8) Need for prophylaxis against urinary tract infection  Comment: Bypassing improved with flushing and changing of catheter so likely sediment that was causing the bypassing. No need for UA as the bypassing has resolved and there are no s/s of UTI.   Plan: good wendi care given frequent UTI's. [facility] RN to change out catheter every 30 days. Continue with trimethoprim prophylaxis.     (M80.00XD) Age-related osteoporosis with current pathological fracture with routine healing, subsequent encounter  Comment/Plan: would like reclast infusion to prevent further fractures. Will consult with MD for further recommendations.     (S82.141D) Closed fracture of right tibial plateau with routine healing, subsequent encounter  Comment: has been decreasing the amount of oxycodone as pain has been improving. Will need repeat xrays at the clinic prior to follow up ortho appt next week.   Plan: ibuprofen 400 mg daily and daily PRN (ortho okay with this). Oxycodone 5 mg q3h PRN, tylenol 1000 mg TID. Asa 325 mg daily for prophylaxis. Mobilize as tolerated in the brace. Follow up with ortho as scheduled in next week.         Electronically signed by  CAROL Dupont CNP                      Sincerely,        CAROL Dupont CNP

## 2018-08-25 NOTE — PROGRESS NOTES
[facility]  ASSISTED LIVING INITIAL VISIT NOTE  August 27, 2018    PRIMARY CARE PROVIDER AND CLINIC:  Kimberlee Castro [address] [facility] [address] / [facility] [address]    Chief Complaint   Patient presents with     Establish Care       HPI:    Cristina Stevens is a 73 year old  (1945) female who was seen at [facility] [place] on August 27, 2018 for an initial visit. Medical history is notable for MS with spastic quadriplegia, neurogenic bladder, recurrent UTIs and chronic pain. She follows with the [facility] as well as Dr. Richardson with PM&R at [facility].  She receives Botox injections for spacticity that is recalcitrant to conservative management. She has had multiple ER visits/hospitalizations this year. She was hospitalized at [facility] from 5/19/18 to 5/26/18 where she presented with right hip pain (no hx of trauma or fall) and was found to have a R femoral neck fracture s/p hemiarthroplasty. She discharged to a TCU, but felt the temperature was too warm so was again hospitalized at [facility] from 5/26/18 to 5/29/18 for placement into a different TCU. She was seen in the [facility] ER on 6/2/18 with hip pain and in the [facility] in [place] on 6/14/18 where she was given antibiotics for Enterococcus UTI. She was hospitalized at [facility] from 7/8/18 to 7/10/18 where she presented with acute on chronic R knee pain (again no hx of trauma or fall) and was found to have a R tibeal plateau fracture. Again discharged to a TCU until 7/24/18 from where she admitted to this assisted living.     Today, Ms. Stevens is seen in her room. She was just getting going for the day and was leaving for an all day appointment to get her power chair adjusted. We talked about her osteoporosis and she was able to verify the information in the A/P below re: prior treatment. She is very concerned about new fractures and would like to do something to try to mitigate her  "risk. No chest pain or dyspnea. Does note her L arm is sometimes \"going to sleep\" which is why she is getting her WC adjusted today. Sleeping OK. Has two caregivers and her own van for transportation. She's a Pb lift. Has a power chair to get around. [facility] RN also there for a visit today. Per facility nursing, the sore on her coccyx has healed.     CODE STATUS: DNR    ALLERGIES:     Allergies   Allergen Reactions     Macrobid [Nitrofuran Derivatives]      Sulfa Drugs        PAST MEDICAL HISTORY:   Past Medical History:   Diagnosis Date     Acute blood loss anemia 6/19/2018     Anemia 9/25/2012     At risk for impaired skin integrity 12/14/2014    Overview:   Bilateral lower extremity plegia, truncal weakness, right arm profoundly weak.       Chronic constipation 7/12/2018     Chronic pain 9/25/2012    Overview:  Right arm and shoulder- botox helpful but short lasting. - Acupuncture once weekly. --Standing frame 5d/week for 30min.     Closed fracture of neck of right femur (H) 5/20/2018     Closed fracture of right tibial plateau 7/9/2018     Frequent UTI 9/25/2012    Overview:  --UTI avg q 2weeks - has ruby cath, only tx if symptomatic per urology- fever, chills, hematuria, mentation changes. --Tiazidine not helpful in past. --has had Urodynamic studies in the past --Seen at [facility] in the past- started Vesicare.     Heat intolerance 5/26/2018     Immobility 6/8/2018     Kidney stone 6/19/2014    Overview:  7/2014- lithotripsy.-Dr Zazueta.     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   Results not reproducible thus may " not be accurate. No airway obstruction. FEV1 and FVC both significantly decreased. There was a significant response to bronchodilator. FEV1 improved by 15% after bronchodilator. Slow vital capacity  severely decreased at 44% predicted.DLCO severely decreased at 16% predicted.   NIF  severely decreased at -34.     OAB (overactive bladder) 6/2/2017    Overview:  Added automatically from request for surgery [id number]     Osteoporosis 6/16/2006    Overview:  Epic      Scoliosis associated with other condition 9/10/2014    Overview:  Weakness of right worse than left axial muscles, left leaning thoracic kyphoscoliosis     Screening for osteoporosis 7/10/2018    Overview:  [id number]     Spastic quadriplegia (H) 10/10/2011     Spasticity 10/10/2011       PAST SURGICAL HISTORY:   No past surgical history on file.    FAMILY HISTORY:   Reviewed and non-contributory    SOCIAL HISTORY:   Lives in AL facility. Has a caregiver.     MEDICATIONS:  Current Outpatient Prescriptions   Medication Sig Dispense Refill     ARMODAFINIL PO Take 150 mg by mouth daily       ascorbic acid (VITAMIN C) 500 MG tablet TAKE 1 TABLET BY MOUTH ONCE DAILY 31 tablet 11     aspirin 325 MG EC tablet TAKE 1 TABLET BY MOUTH ONCE DAILY 31 tablet 11     Bacillus Coagulans-Inulin (PROBIOTIC FORMULA) 1-250 BILLION-MG CAPS TAKE 1 CAPSULE BY MOUTH ONCE DAILY 30 capsule 11     baclofen (LIORESAL) 10 MG tablet TAKE ONE-HALF TABLET (5MG) BY MOUTH TWICE DAILY;AMD TAKE ONE-HALF TABLET (5MG) BY MOUTH EVERY 4 HOURS AS NEEDED 31 tablet 11     BRENDA-GEST ANTACID 500 MG chewable tablet TAKE 1 TABLET BY MOUTH ONCE DAILY 31 tablet 11     cetirizine (ZYRTEC) 10 MG tablet Take 10 mg by mouth daily as needed for allergies       Cholecalciferol (VITAMIN D3) 2000 units CAPS TAKE TWO CAPSULES (4000 UNITS) BY MOUTH DAILY 62 capsule 11     CO2-Releasing (-TWO) SUPP Place rectally daily as needed       CRANBERRY CONCENTRATE 500 MG CAPS TAKE 1 CAPSULE BY MOUTH ONCE DAILY 31  capsule 11     ibuprofen (ADVIL/MOTRIN) 400 MG tablet TAKE 1 TABLET BY MOUTH ONCE DAILY;TAKE 1 TABLET BY MOUTH ONCE DAILY AS NEEDED 31 tablet 11     Multiple Vitamin (TAB-A-DAWSON) TABS TAKE 1 TABLET BY MOUTH ONCE DAILY 31 tablet 11     MYRBETRIQ 25 MG 24 hr tablet TAKE 1 TABLET BY MOUTH ONCE DAILY 31 tablet 11     oxyCODONE IR (ROXICODONE) 5 MG tablet Take 1 tablet (5 mg) by mouth every 3 hours as needed for severe pain 6 tablet 0     polyethylene glycol (MIRALAX/GLYCOLAX) powder Take 17 g by mouth daily as needed for constipation       Probiotic Product (PROBIOTIC FORMULA) CAPS Take 1 capsule by mouth daily       senna-docusate (SENOKOT-S;PERICOLACE) 8.6-50 MG per tablet Take 1 tablet by mouth 2 times daily And BID  tablet      trimethoprim (TRIMPEX) 100 MG tablet TAKE 1 TABLET BY MOUTH ONCE DAILY 31 tablet 11     VITAMIN D, CHOLECALCIFEROL, PO Take 5,000 Units by mouth every 7 days         Post Discharge Medication Reconciliation Status: medication reconcilation previously completed during another office visit.    ROS:  10 point ROS neg other than the symptoms noted above in the HPI.    PHYSICAL EXAM:  BP (!) 155/92  Pulse 72  Temp 97.2  F (36.2  C)  Resp 20   Gen: sitting in power chair, alert, cooperative and in no acute distress  HEENT: normocephalic; oropharynx clear  Resp: breathing non labored  GI: abdomen soft, not-tender  : Edwards in place; leg bag was empty   MSK: decreased muscle tone, no LE edema  Neuro: CX II-XII grossly in tact; quadraplegia; very soft voice   Psych: alert and oriented x3; normal affect    LABORATORY/IMAGING DATA:  Reviewed as per Epic    ASSESSMENT/PLAN:    Right Femoral Neck Fracture s/p Hemiarthroplasty (5/20/18)  Right Tibial Plateau Fracture (7/8/18)  Pathologic fractures in setting of osteoporosis (see below). Was seen by Dr. Stubbs (ortho) on 8/20/18  -- NWB to RLE  -- OK for gentle range of motion while in soft splint  -- follow up with orthopedic surgery as  scheduled in September    Multiple Sclerosis  Spastic Quadriplegia  Follows with [facility] and with [facility]&R (Dr. Whitehead). Receives botox for spacticity recalcitrant to conservative management.   -- continues on armodafinil 150 mg daily and baclofen 5 mg BID and q4h PRN  -- follow up with neurology as PM&R as per them     Neurogenic Bladder w/ Chronic Edwards Catheter and Recurrent UTIs  Secondary to above.   Follows with Dr. Zazueta with [facility]. Has received Botox injections in her bladder.   -- routine Edwards cares   -- continues on mirabegron 25 mg daily  -- continues on trimethoprim 100 mg daily for ppx as well as cranberry caps    Osteoporosis  DEXA (11) demonstrating severe osteoporosis (results in [facility]):  AP spine (L1-L4) right hip (neck) right hip (total)  BMD (gm/cm2) 0.567 0.450 0.362    T score -4.4 -3.6 -4.8    Z score -2.6 -2.1 -3.5    % change from previous scan -18.6   I don't have access to results/date of prior DEXA, though diagnosis appears to go back to . Per chart review, appears to have been on alendronate from 4510-8191.   -- continues on cholecalciferol  -- will get her contact information for Dr. Erica Abrams at the [facility] re: consultation for her osteoporosis   -- she would like to have her caregiver, who does her transportation, make the    appointment    Chronic Pain  In setting of MS and spasticity.   -- continues on ibuprofen 400 mg daily and daily PRN as well as oxycodone 5 mg q3h PRN    Slow Transit Constipation  -- continues on Miralax 17g daily and Senna-S 1 tab BID and BID PRN  -- adjust bowel regimen as needed      FGS TIME SPENT: Total time with an established patient visit in the assisted livin minutes including discussions about the POC and care coordination with the patient and home care RN and NP. Greater than 50% of total time spent with counseling and coordinating care due to complex patient with multiple  pathologic fractures in setting of osteporosis; facilitation of specialty consultation at the [facility]; MS; spastic quadriplegia; neurogenic bladder     Electronically signed by:  Kiley Paul MD

## 2018-08-27 ENCOUNTER — ASSISTED LIVING VISIT (OUTPATIENT)
Dept: GERIATRICS | Facility: CLINIC | Age: 73
End: 2018-08-27
Payer: COMMERCIAL

## 2018-08-27 VITALS
TEMPERATURE: 97.2 F | SYSTOLIC BLOOD PRESSURE: 155 MMHG | DIASTOLIC BLOOD PRESSURE: 92 MMHG | HEART RATE: 72 BPM | RESPIRATION RATE: 20 BRPM

## 2018-08-27 DIAGNOSIS — K59.01 SLOW TRANSIT CONSTIPATION: ICD-10-CM

## 2018-08-27 DIAGNOSIS — G82.50 SPASTIC QUADRIPLEGIA (H): ICD-10-CM

## 2018-08-27 DIAGNOSIS — G35 MS (MULTIPLE SCLEROSIS) (H): ICD-10-CM

## 2018-08-27 DIAGNOSIS — N31.9 NEUROGENIC BLADDER: ICD-10-CM

## 2018-08-27 DIAGNOSIS — S82.141D CLOSED FRACTURE OF RIGHT TIBIAL PLATEAU WITH ROUTINE HEALING, SUBSEQUENT ENCOUNTER: ICD-10-CM

## 2018-08-27 DIAGNOSIS — M80.00XD OSTEOPOROSIS WITH CURRENT PATHOLOGICAL FRACTURE WITH ROUTINE HEALING, UNSPECIFIED OSTEOPOROSIS TYPE, SUBSEQUENT ENCOUNTER: ICD-10-CM

## 2018-08-27 DIAGNOSIS — S72.001D CLOSED FRACTURE OF NECK OF RIGHT FEMUR WITH ROUTINE HEALING, SUBSEQUENT ENCOUNTER: Primary | ICD-10-CM

## 2018-08-27 NOTE — LETTER
8/27/2018        RE: Cristina Stevens  [address] [place] [place] [facility] [address]        [facility]  ASSISTED LIVING INITIAL VISIT NOTE  August 27, 2018    PRIMARY CARE PROVIDER AND CLINIC:  Kimberlee Castro [address] [place] [address] / [place] [address]    Chief Complaint   Patient presents with     [facility] [place] Care       HPI:    Cristina Stevens is a 73 year old  (1945) female who was seen at [facility] on August 27, 2018 for an initial visit. Medical history is notable for MS with spastic quadriplegia, neurogenic bladder, recurrent UTIs and chronic pain. She follows with the [facility] as well as Dr. Richardson with PM&R at [facility].  She receives Botox injections for spacticity that is recalcitrant to conservative management. She has had multiple ER visits/hospitalizations this year. She was hospitalized at [facility] from 5/19/18 to 5/26/18 where she presented with right hip pain (no hx of trauma or fall) and was found to have a R femoral neck fracture s/p hemiarthroplasty. She discharged to a TCU, but felt the temperature was too warm so was again hospitalized at [facility] from 5/26/18 to 5/29/18 for placement into a different TCU. She was seen in the [facility] ER on 6/2/18 with hip pain and in the [facility] in [place] on 6/14/18 where she was given antibiotics for Enterococcus UTI. She was hospitalized at [facility] from 7/8/18 to 7/10/18 where she presented with acute on chronic R knee pain (again no hx of trauma or fall) and was found to have a R tibeal plateau fracture. Again discharged to a TCU until 7/24/18 from where she admitted to this assisted living.     Today, Ms. Stevens is seen in her room. She was just getting going for the day and was leaving for an all day appointment to get her power chair adjusted. We talked about her osteoporosis and she was able to verify the information in the A/P below  "re: prior treatment. She is very concerned about new fractures and would like to do something to try to mitigate her risk. No chest pain or dyspnea. Does note her L arm is sometimes \"going to sleep\" which is why she is getting her WC adjusted today. Sleeping OK. Has two caregivers and her own van for transportation. She's a Pb lift. Has a power chair to get around. [facility] RN also there for a visit today. Per facility nursing, the sore on her coccyx has healed.     CODE STATUS: DNR    ALLERGIES:     Allergies   Allergen Reactions     Macrobid [Nitrofuran Derivatives]      Sulfa Drugs        PAST MEDICAL HISTORY:   Past Medical History:   Diagnosis Date     Acute blood loss anemia 6/19/2018     Anemia 9/25/2012     At risk for impaired skin integrity 12/14/2014    Overview:   Bilateral lower extremity plegia, truncal weakness, right arm profoundly weak.       Chronic constipation 7/12/2018     Chronic pain 9/25/2012    Overview:  Right arm and shoulder- botox helpful but short lasting. - Acupuncture once weekly. --Standing frame 5d/week for 30min.     Closed fracture of neck of right femur (H) 5/20/2018     Closed fracture of right tibial plateau 7/9/2018     Frequent UTI 9/25/2012    Overview:  --UTI avg q 2weeks - has ruby cath, only tx if symptomatic per urology- fever, chills, hematuria, mentation changes. --Tiazidine not helpful in past. --has had Urodynamic studies in the past --Seen at [facility] in the past- started Vesicare.     Heat intolerance 5/26/2018     Immobility 6/8/2018     Kidney stone 6/19/2014    Overview:  7/2014- lithotripsy.-Dr Zazueta.     MDRO (multiple drug resistant organisms) resistance 5/31/2013    Overview:  MRSA 5/27/2013 Site: Urine [facility]  Positive MRSA Urine 7/10/2013 [facility]  Urine 12/19/2014 5/14/2014 MRSA ISOLATED Urine [facility]     Multiple sclerosis (H) 5/10/2011     Neurogenic bladder 6/8/2018     Neuromuscular respiratory weakness " 9/26/2014    Overview:  PFTs Sept 2014:  difficulty performing all PFT maneuvers.   Results not reproducible thus may not be accurate. No airway obstruction. FEV1 and FVC both significantly decreased. There was a significant response to bronchodilator. FEV1 improved by 15% after bronchodilator. Slow vital capacity  severely decreased at 44% predicted.DLCO severely decreased at 16% predicted.   NIF  severely decreased at -34.     OAB (overactive bladder) 6/2/2017    Overview:  Added automatically from request for surgery [id number]     Osteoporosis 6/16/2006    Overview:  Epic      Scoliosis associated with other condition 9/10/2014    Overview:  Weakness of right worse than left axial muscles, left leaning thoracic kyphoscoliosis     Screening for osteoporosis 7/10/2018    Overview:  [id number]     Spastic quadriplegia (H) 10/10/2011     Spasticity 10/10/2011       PAST SURGICAL HISTORY:   No past surgical history on file.    FAMILY HISTORY:   Reviewed and non-contributory    SOCIAL HISTORY:   Lives in AL facility. Has a caregiver.     MEDICATIONS:  Current Outpatient Prescriptions   Medication Sig Dispense Refill     ARMODAFINIL PO Take 150 mg by mouth daily       ascorbic acid (VITAMIN C) 500 MG tablet TAKE 1 TABLET BY MOUTH ONCE DAILY 31 tablet 11     aspirin 325 MG EC tablet TAKE 1 TABLET BY MOUTH ONCE DAILY 31 tablet 11     Bacillus Coagulans-Inulin (PROBIOTIC FORMULA) 1-250 BILLION-MG CAPS TAKE 1 CAPSULE BY MOUTH ONCE DAILY 30 capsule 11     baclofen (LIORESAL) 10 MG tablet TAKE ONE-HALF TABLET (5MG) BY MOUTH TWICE DAILY;AMD TAKE ONE-HALF TABLET (5MG) BY MOUTH EVERY 4 HOURS AS NEEDED 31 tablet 11     BRENDA-GEST ANTACID 500 MG chewable tablet TAKE 1 TABLET BY MOUTH ONCE DAILY 31 tablet 11     cetirizine (ZYRTEC) 10 MG tablet Take 10 mg by mouth daily as needed for allergies       Cholecalciferol (VITAMIN D3) 2000 units CAPS TAKE TWO CAPSULES (4000 UNITS) BY MOUTH DAILY 62 capsule 11     CO2-Releasing  (-TWO) SUPP Place rectally daily as needed       CRANBERRY CONCENTRATE 500 MG CAPS TAKE 1 CAPSULE BY MOUTH ONCE DAILY 31 capsule 11     ibuprofen (ADVIL/MOTRIN) 400 MG tablet TAKE 1 TABLET BY MOUTH ONCE DAILY;TAKE 1 TABLET BY MOUTH ONCE DAILY AS NEEDED 31 tablet 11     Multiple Vitamin (TAB-A-DAWSON) TABS TAKE 1 TABLET BY MOUTH ONCE DAILY 31 tablet 11     MYRBETRIQ 25 MG 24 hr tablet TAKE 1 TABLET BY MOUTH ONCE DAILY 31 tablet 11     oxyCODONE IR (ROXICODONE) 5 MG tablet Take 1 tablet (5 mg) by mouth every 3 hours as needed for severe pain 6 tablet 0     polyethylene glycol (MIRALAX/GLYCOLAX) powder Take 17 g by mouth daily as needed for constipation       Probiotic Product (PROBIOTIC FORMULA) CAPS Take 1 capsule by mouth daily       senna-docusate (SENOKOT-S;PERICOLACE) 8.6-50 MG per tablet Take 1 tablet by mouth 2 times daily And BID  tablet      trimethoprim (TRIMPEX) 100 MG tablet TAKE 1 TABLET BY MOUTH ONCE DAILY 31 tablet 11     VITAMIN D, CHOLECALCIFEROL, PO Take 5,000 Units by mouth every 7 days         Post Discharge Medication Reconciliation Status: medication reconcilation previously completed during another office visit.    ROS:  10 point ROS neg other than the symptoms noted above in the HPI.    PHYSICAL EXAM:  BP (!) 155/92  Pulse 72  Temp 97.2  F (36.2  C)  Resp 20   Gen: sitting in power chair, alert, cooperative and in no acute distress  HEENT: normocephalic; oropharynx clear  Resp: breathing non labored  GI: abdomen soft, not-tender  : Edwards in place; leg bag was empty   MSK: decreased muscle tone, no LE edema  Neuro: CX II-XII grossly in tact; quadraplegia; very soft voice   Psych: alert and oriented x3; normal affect    LABORATORY/IMAGING DATA:  Reviewed as per Epic    ASSESSMENT/PLAN:    Right Femoral Neck Fracture s/p Hemiarthroplasty (5/20/18)  Right Tibial Plateau Fracture (7/8/18)  Pathologic fractures in setting of osteoporosis (see below). Was seen by Dr. Stubbs (ortho) on  18  -- NWB to RLE  -- OK for gentle range of motion while in soft splint  -- follow up with orthopedic surgery as scheduled in September    Multiple Sclerosis  Spastic Quadriplegia  Follows with [facility] and with [facility] PM&R (Dr. Whitehead). Receives botox for spacticity recalcitrant to conservative management.   -- continues on armodafinil 150 mg daily and baclofen 5 mg BID and q4h PRN  -- follow up with neurology as PM&R as per them     Neurogenic Bladder w/ Chronic Edwards Catheter and Recurrent UTIs  Secondary to above.   Follows with Dr. Zazueta with [facility]. Has received Botox injections in her bladder.   -- routine Edwards cares   -- continues on mirabegron 25 mg daily  -- continues on trimethoprim 100 mg daily for ppx as well as cranberry caps    Osteoporosis  DEXA (11) demonstrating severe osteoporosis (results in [facility]):  AP spine (L1-L4) right hip (neck) right hip (total)  BMD (gm/cm2) 0.567 0.450 0.362    T score -4.4 -3.6 -4.8    Z score -2.6 -2.1 -3.5    % change from previous scan -18.6   I don't have access to results/date of prior DEXA, though diagnosis appears to go back to . Per chart review, appears to have been on alendronate from [id number].   -- continues on cholecalciferol  -- will get her contact information for Dr. Erica Abrams at the [facility] re: consultation for her osteoporosis   -- she would like to have her caregiver, who does her transportation, make the    appointment    Chronic Pain  In setting of MS and spasticity.   -- continues on ibuprofen 400 mg daily and daily PRN as well as oxycodone 5 mg q3h PRN    Slow Transit Constipation  -- continues on Miralax 17g daily and Senna-S 1 tab BID and BID PRN  -- adjust bowel regimen as needed      FGS TIME SPENT: Total time with an established patient visit in the assisted livin minutes including discussions about the POC and care coordination with the patient and home care RN and  NP. Greater than 50% of total time spent with counseling and coordinating care due to complex patient with multiple pathologic fractures in setting of osteporosis; facilitation of specialty consultation at the [facility]; MS; spastic quadriplegia; neurogenic bladder     Electronically signed by:  Kiley Paul MD                  Sincerely,        Kiley Paul MD

## 2018-09-24 ENCOUNTER — OFFICE VISIT (OUTPATIENT)
Dept: FAMILY MEDICINE | Facility: CLINIC | Age: 73
End: 2018-09-24
Attending: FAMILY MEDICINE
Payer: MEDICARE

## 2018-09-24 VITALS — SYSTOLIC BLOOD PRESSURE: 139 MMHG | DIASTOLIC BLOOD PRESSURE: 83 MMHG | HEART RATE: 71 BPM

## 2018-09-24 DIAGNOSIS — M81.0 SENILE OSTEOPOROSIS: ICD-10-CM

## 2018-09-24 DIAGNOSIS — M81.0 SENILE OSTEOPOROSIS: Primary | ICD-10-CM

## 2018-09-24 LAB
ANION GAP SERPL CALCULATED.3IONS-SCNC: 8 MMOL/L (ref 3–14)
BUN SERPL-MCNC: 14 MG/DL (ref 7–30)
CALCIUM SERPL-MCNC: 9.4 MG/DL (ref 8.5–10.1)
CHLORIDE SERPL-SCNC: 103 MMOL/L (ref 94–109)
CO2 SERPL-SCNC: 25 MMOL/L (ref 20–32)
CREAT SERPL-MCNC: 0.74 MG/DL (ref 0.52–1.04)
GFR SERPL CREATININE-BSD FRML MDRD: 78 ML/MIN/1.7M2
GLUCOSE SERPL-MCNC: 101 MG/DL (ref 70–99)
MAGNESIUM SERPL-MCNC: 2 MG/DL (ref 1.6–2.3)
PHOSPHATE SERPL-MCNC: 3.1 MG/DL (ref 2.5–4.5)
POTASSIUM SERPL-SCNC: 4.4 MMOL/L (ref 3.4–5.3)
PTH-INTACT SERPL-MCNC: 51 PG/ML (ref 18–80)
SODIUM SERPL-SCNC: 136 MMOL/L (ref 133–144)

## 2018-09-24 PROCEDURE — 83735 ASSAY OF MAGNESIUM: CPT | Performed by: FAMILY MEDICINE

## 2018-09-24 PROCEDURE — 82306 VITAMIN D 25 HYDROXY: CPT | Performed by: FAMILY MEDICINE

## 2018-09-24 PROCEDURE — 84100 ASSAY OF PHOSPHORUS: CPT | Performed by: FAMILY MEDICINE

## 2018-09-24 PROCEDURE — 36415 COLL VENOUS BLD VENIPUNCTURE: CPT | Performed by: FAMILY MEDICINE

## 2018-09-24 PROCEDURE — 80048 BASIC METABOLIC PNL TOTAL CA: CPT | Performed by: FAMILY MEDICINE

## 2018-09-24 PROCEDURE — 84080 ASSAY ALKALINE PHOSPHATASES: CPT | Performed by: FAMILY MEDICINE

## 2018-09-24 PROCEDURE — 83970 ASSAY OF PARATHORMONE: CPT | Performed by: FAMILY MEDICINE

## 2018-09-24 PROCEDURE — G0463 HOSPITAL OUTPT CLINIC VISIT: HCPCS | Mod: ZF

## 2018-09-24 RX ORDER — DIAZEPAM 2 MG
TABLET ORAL
Refills: 0 | COMMUNITY
Start: 2018-07-03 | End: 2018-09-24

## 2018-09-24 ASSESSMENT — PAIN SCALES - GENERAL: PAINLEVEL: NO PAIN (0)

## 2018-09-24 NOTE — LETTER
9/27/2018         Cristina Stevens   [address] [address]        Dear Ms. Stevens:    The results of your recent test(s) listed below were upper limits of normal    Results for orders placed or performed in visit on 09/24/18   Bone specific alk phosphatase   Result Value Ref Range    Bone Spec Alk Phosphatase 21.7 ug/L         Please note that test explanations are brief and do not reflect all diagnostic uses.  If you have any questions or concerns, please call the clinic at [phone].      Sincerely,      Erica Abrams MD, PhD

## 2018-09-24 NOTE — PROGRESS NOTES
"Cristina is a  73 year old female post menopausal] [GR0, P0] that presents today for osteoporosis consult. Is in assisted living at [facility]. Pt has MS and is wheel chair bound.  Had a right hip fx 5/2018 and a knee fx 6/2018  And left hip fx in 2009. She is not currently on any bone medication.     Referring Physician: Kiley Paul MD    HPI     Have you ever had a bone density test? Yes  Where = [facility]   When = 2007 and 2011  Spine Tscore = L1-4 T=-4.4  Z= -2.6  Left neck Tscore = NA   Total left hip Tscore = NA  Right neck Tscore = -3.6 Z= -2.1  Total Right hip Tscore = -4.8  Z=-3.5  Have you received any x-ray dye or contrast in the last ten days? No  How many servings of dairy products do you consume per day? 1 Type: cheese  Do you take a multi-vitamin daily? Yes  Do you take a vitamin D supplement? Yes 4000IU/day  Take 5000IU every Friday   Do you take a calcium supplement daily? Calcium carbonate 500mg/day  1 daily   Do you take a supplement containing strontium? No  Are you exposed to natural sunlight at least 20 minutes three times a week? Yes  Have you broken any bones during your adult life? Yes. Details: R, hip and tibial fracture 2018, L hip fracture 2009  How tall were you at age 26? 5'9\"  Have you gone through menopause? Yes  Did your menopause occur before age 46? No  Have you taken hormone therapy? Yes. Details: can't remember what is was called' oral medication that came out with a warning that you shouldn't take it.    Social History   reports that she quit smoking about 37 years ago. She has never used smokeless tobacco.  Do you smoke cigarettes? No  Do you exercise? Yes. Details: did go to PT 3 times before hip and tibeal fracture  Do you drink alcohol? No    Medication History  Have you taken any of the following medications?   Blood thinner (Coumadin or Heparin): No   Chemotherapy (ex: Lupron, Arimidex): No   Depo Provera: No   Anti-Seizure (ex: Dilantin, " Depakote): No   Steroids (ex: Prednisone, Cortisone): several times for approx 1 month at a time    Thyroid (ex: Synthroid): No  Have you used any of the following medications?   Actonel (Risedronate): No   Aredia (Pamidronate): No   Boniva (Ibindronate): No   Didronil (Etidronate): No   Evista (Raloxifene): No   Fosamax (Alendronate): 2010 or 2011 - ? Length    Forteo (Parathyroid hormone) injections: No   HCTZ (Thiazide): No   Calcitonin nasal spray: No   Reclast or Zometa (Zolendronate): No   Prolia (Denosumab): No   HT: for 2 yrs in 1996-98  Current Outpatient Prescriptions   Medication Sig Dispense Refill     armodafinil (NUVIGIL) 150 MG TABS tablet TAKE 1 TABLET BY MOUTH ONCE DAILY 30 tablet 4     ascorbic acid (VITAMIN C) 500 MG tablet TAKE 1 TABLET BY MOUTH ONCE DAILY 31 tablet 11     aspirin 325 MG EC tablet TAKE 1 TABLET BY MOUTH ONCE DAILY 31 tablet 11     Bacillus Coagulans-Inulin (PROBIOTIC FORMULA) 1-250 BILLION-MG CAPS TAKE 1 CAPSULE BY MOUTH ONCE DAILY 30 capsule 11     baclofen (LIORESAL) 10 MG tablet TAKE ONE-HALF TABLET (5MG) BY MOUTH TWICE DAILY;AMD TAKE ONE-HALF TABLET (5MG) BY MOUTH EVERY 4 HOURS AS NEEDED 31 tablet 11     BRENDA-GEST ANTACID 500 MG chewable tablet TAKE 1 TABLET BY MOUTH ONCE DAILY 31 tablet 11     cetirizine (ZYRTEC) 10 MG tablet Take 10 mg by mouth daily as needed for allergies       Cholecalciferol (VITAMIN D3) 2000 units CAPS TAKE TWO CAPSULES (4000 UNITS) BY MOUTH DAILY 62 capsule 11     CRANBERRY CONCENTRATE 500 MG CAPS TAKE 1 CAPSULE BY MOUTH ONCE DAILY 31 capsule 11     ibuprofen (ADVIL/MOTRIN) 400 MG tablet TAKE 1 TABLET BY MOUTH ONCE DAILY;TAKE 1 TABLET BY MOUTH ONCE DAILY AS NEEDED 31 tablet 11     Multiple Vitamin (TAB-A-DAWSON) TABS TAKE 1 TABLET BY MOUTH ONCE DAILY 31 tablet 11     MYRBETRIQ 25 MG 24 hr tablet TAKE 1 TABLET BY MOUTH ONCE DAILY 31 tablet 11     polyethylene glycol (MIRALAX/GLYCOLAX) powder Take 17 g by mouth daily as needed for constipation        SENEXON-S 8.6-50 MG per tablet TAKE 1 TABLET BY MOUTH TWICE DAILY;AND MAY TAKE 1 TABLET BY MOUTH TWICE DAILY AS NEEDED FOR CONSTIPATION 62 tablet 12     trimethoprim (TRIMPEX) 100 MG tablet TAKE 1 TABLET BY MOUTH ONCE DAILY 31 tablet 11     vitamin D (ERGOCALCIFEROL) 43622 UNIT capsule TAKE ONE CAPSULE BY MOUTH DAILY ON FRIDAYS 5 capsule 11     VITAMIN D, CHOLECALCIFEROL, PO Take 5,000 Units by mouth every 7 days       CO2-Releasing (-TWO) SUPP Place rectally daily as needed       DESITIN 13 % CREA APPLY TOPICALLY TO AREAS OF REDNESS OR RASH WITH EACH BRIEF CHANGE 113 g 3     oxyCODONE IR (ROXICODONE) 5 MG tablet Take 2.5 mg by mouth every 8 hours as needed for severe pain  6 tablet 0          Allergies   Allergen Reactions     Tetracyclines GI Disturbance     Ampicillin GI Disturbance     Cefadroxil Muscle Pain (Myalgia)     Cephalexin Diarrhea     Levofloxacin Other (See Comments)     Phlebitis with IV infusion     Macrobid [Nitrofuran Derivatives]      Sulfa Drugs      Sulfasalazine Nausea and Vomiting     Other reaction(s): Gastrointestinal  Added per pt request 5/20/18       Past Medical History  Do you have or have you had any of the following?   Celiac sprue (wheat intolerance):No   Chronic low back problems (ex: scohosis, arthritis): has scoliosis   Diabetes mellitus: No   High blood calcium level: No   Hip or spine injury: 2 hip surgeries    History of an eating disorder: No   History of gastric bypass: No   Hyperparathyroidism: No   Inflammatory bowel disease (ex: Crohn's, ulcerative colitis): No   Kidney disease: No   Kidney stones: yes   Liver disease: No   Lupus: No   Overactive thyroid gland: No   Rhuematoid arthritis: No    Have you had any of the following?   Hysterectomy: No   Ovaries removed: No   Breast cancer: No   Family history of breast cancer: No    No family history on file.  Is there a family history of osteoporosis? No  Did either parent have a hip fracture? No    ROS:  General:  none  Head/Eyes: none  Ears/Nose/Throat: none  Cardiovascular: none  Respiratory: none  Gastrointestinal: none  Breast: none  Genitourinary: none  Sexual Function: none  Musculoskeletal: none  Skin: none  Neurological: weakness, speech difficulty   Mental Health: none  Endocrine: none    Clinic Measurements  Vitals: /83  Pulse 71  BMI= There is no height or weight on file to calculate BMI.    Physical exam  Constitutional: chronically ill appearing woman in no acute distress. In wheelchair and here with her assistant   Psychological: appropriate mood. Slow and halting speech  Neck: No thyroidmegaly. no carotid bruits.  Cardiovascular: regular rate and rhythm, normal S1 and S2, no murmurs, rubs or gallops,    Respiratory: diminished bs at bases,  clear to auscultation, no wheezes or crackles, normal breath sounds.  Gastrointestinal: positive bowel sounds, nontender, no hepatosplenomegaly, no masses. No guarding or rebound.  Musculoskeletal: no edema  Minimal ROM  Spine: unable to assess   Skin: no concerning lesions, no jaundice.  Neurological: cranial nerves intact, 3-4/5 strength and sensation, reflexes at patella and biceps 2/4, wheel chair bound     LAB  Vertebra; Fracture Assessment: not indicated  Dexa Scan: 2011      FRAX Assessment Tool: N/A, could not get height or weight  Risk Factors: T scores, hx of several  fractures, sedentary, hx of MS,     ASSESSMENT:  This is a PM female with multiple sclerosis who has OP by T scores and hx of fracture who is at high risk for future fracture.  She has sustained several fractures which increases her risk, she has low T scores.  Her Z scores suggest a secondary cause. Will do several lab tests and correct any secondary cause.  Unable to do FRAX - discussed in detail calcium which she should increase.  Would recommend Forteo or Tymlos. Will have her contact [employer] to get prior authorization.       PLAN:  Patient should take 1200mg of calcium/day in  divided doses diet and all supplements and vitamin D3 4000IU/day and 5000IU every Friday  Dietary calcium is best    You need to increase your calcium intake - to 1200-1500mg /day  You only absorb 5-600mg at a time  If you need a supplement you should use calcium citrate 315/200 2 pills = 630mg calcium   With hx of kidney stones you should not use tums  Schedule a DXA at the [facility] on [address]    Blood work today  Call [employer] about the forteo/tymlos - someone will have to be taught the daily injections - we need to get prior authorization from the insurance first  See me in 6 months      I spent a total of 50 minutes face-to-face with the patient during today's office visit.  Over 50% of this time was spent counseling the patient and/or coordinating care regarding DXA, calcium and vitamin D, medication of Forteo or Tymlos.  See note for details.          Thank you for allowing me to participate in the care of your patient.  Please do not hesitate to call with questions or concerns.    Sincerely,  Erica Abrams MD, PhD  CC Le Paul MD

## 2018-09-24 NOTE — MR AVS SNAPSHOT
After Visit Summary   9/24/2018    Cristina Stevens    MRN: [id number]           Patient Information     Date Of Birth          1945        Visit Information        Provider Department      9/24/2018 3:30 PM Erica Abrams MD PhD [facility]        Today's Diagnoses     Senile osteoporosis    -  1      Care Instructions    Patient should take 1200mg of calcium/day in divided doses diet and all supplements and vitamin D3 4000IU/day and 5000IU every Friday  Dietary calcium is best    You need to increase your calcium intake - to 1200-1500mg /day  You only absorb 5-600mg at a time  If you need a supplement you should use calcium citrate 315/200 2 pills = 630mg calcium   With hx of kidney stones you should not use tums  [facility]  [phone]  Blood work today  Call [employer] about the forteo/tymlos - someone will have to be taught the daily injections - we need to get prior authorization from the insurance first  See me in 6 months              Follow-ups after your visit        Follow-up notes from your care team     Return in about 6 months (around 3/24/2019).      Future tests that were ordered for you today     Open Future Orders        Priority Expected Expires Ordered    Dexa Hip, Pelvis, Spine Routine  9/24/2019 9/24/2018    Calcium Routine  9/24/2019 9/24/2018    Magnesium Routine  9/24/2019 9/24/2018    Phosphorus Routine  9/24/2019 9/24/2018    Parathyroid Hormone Intact Routine  9/24/2019 9/24/2018    25- OH-Vitamin D Routine  9/24/2019 9/24/2018            Who to contact     Please call your clinic at [phone] to:    Ask questions about your health    Make or cancel appointments    Discuss your medicines    Learn about your test results    Speak to your doctor            Additional Information About Your Visit        [employer] Information     [employer]View is an electronic gateway that provides easy, online access to  your medical records. With [employer], you can request a clinic appointment, read your test results, renew a prescription or communicate with your care team.     To sign up for [employer] Software visit the website at [email or web address]   You will be asked to enter the access code listed below, as well as some personal information. Please follow the directions to create your username and password.     Your access code is: [email or web address]  Expires: 2018  6:30 AM     Your access code will  in 90 days. If you need help or a new code, please contact your [facility] or call [phone] for assistance.        Care EveryWhere ID     This is your Care EveryWhere ID. This could be used by other organizations to access your [place] medical records  [id number]        Your Vitals Were     Pulse                   71            Blood Pressure from Last 3 Encounters:   18 139/83   18 (!) 155/92   18 (!) 155/92    Weight from Last 3 Encounters:   No data found for Wt              We Performed the Following     Bone specific alk phosphatase        Primary Care Provider Office Phone # Fax #    Kimberlee Santacruz Matthew, APRN [phone]       [address] [address]        Equal Access to Services     MAYRA STEVE AH: Hadii aad ku hadasho Soomaali, waaxda luqadaha, qaybta kaalmada adeegyada, waxay idiin hayaan jamison kharasabina dos santos . So [facility] [phone].    ATENCIÓN: Si habla español, tiene a sandoval disposición servicios gratuitos de asistencia lingüística. Llame al [phone].    We comply with applicable federal civil rights laws and [place] laws. We do not discriminate on the basis of race, color, national origin, age, disability, sex, sexual orientation, or gender identity.            Thank you!     Thank you for choosing [facility]  for your care. Our goal is always to provide you with excellent care. Hearing back from our  patients is one way we can continue to improve our services. Please take a few minutes to complete the written survey that you may receive in the mail after your visit with us. Thank you!             Your Updated Medication List - Protect others around you: Learn how to safely use, store and throw away your medicines at www.disposemymeds.org.          This list is accurate as of 9/24/18  4:47 PM.  Always use your most recent med list.                   Brand Name Dispense Instructions for use Diagnosis    armodafinil 150 MG Tabs tablet    NUVIGIL    30 tablet    TAKE 1 TABLET BY MOUTH ONCE DAILY    Narcolepsy due to underlying condition without cataplexy       ascorbic acid 500 MG tablet    VITAMIN C    31 tablet    TAKE 1 TABLET BY MOUTH ONCE DAILY    Takes dietary supplements       aspirin 325 MG EC tablet     31 tablet    TAKE 1 TABLET BY MOUTH ONCE DAILY    Closed fracture of right tibial plateau with routine healing, subsequent encounter       baclofen 10 MG tablet    LIORESAL    31 tablet    TAKE ONE-HALF TABLET (5MG) BY MOUTH TWICE DAILY;AMD TAKE ONE-HALF TABLET (5MG) BY MOUTH EVERY 4 HOURS AS NEEDED    Spasm of muscle       BRENDA-GEST ANTACID 500 MG chewable tablet   Generic drug:  calcium carbonate     31 tablet    TAKE 1 TABLET BY MOUTH ONCE DAILY    Gastroesophageal reflux disease without esophagitis       -TWO Supp      Place rectally daily as needed        cetirizine 10 MG tablet    zyrTEC     Take 10 mg by mouth daily as needed for allergies        CRANBERRY CONCENTRATE 500 MG Caps   Generic drug:  Cranberry     31 capsule    TAKE 1 CAPSULE BY MOUTH ONCE DAILY    Need for prophylaxis against urinary tract infection       DESITIN 13 % Crea   Generic drug:  Zinc Oxide     113 g    APPLY TOPICALLY TO AREAS OF REDNESS OR RASH WITH EACH BRIEF CHANGE    Rash       ibuprofen 400 MG tablet    ADVIL/MOTRIN    31 tablet    TAKE 1 TABLET BY MOUTH ONCE DAILY;TAKE 1 TABLET BY MOUTH ONCE DAILY AS NEEDED    Closed  fracture of right tibial plateau with routine healing, subsequent encounter       MYRBETRIQ 25 MG 24 hr tablet   Generic drug:  mirabegron     31 tablet    TAKE 1 TABLET BY MOUTH ONCE DAILY    Overactive bladder       oxyCODONE IR 5 MG tablet    ROXICODONE    6 tablet    Take 2.5 mg by mouth every 8 hours as needed for severe pain        polyethylene glycol powder    MIRALAX/GLYCOLAX     Take 17 g by mouth daily as needed for constipation        PROBIOTIC FORMULA 1-250 BILLION-MG Caps     30 capsule    TAKE 1 CAPSULE BY MOUTH ONCE DAILY    Takes dietary supplements       SENEXON-S 8.6-50 MG per tablet   Generic drug:  senna-docusate     62 tablet    TAKE 1 TABLET BY MOUTH TWICE DAILY;AND MAY TAKE 1 TABLET BY MOUTH TWICE DAILY AS NEEDED FOR CONSTIPATION    Chronic idiopathic constipation       TAB-A-DAWSON Tabs     31 tablet    TAKE 1 TABLET BY MOUTH ONCE DAILY    Takes dietary supplements       trimethoprim 100 MG tablet    TRIMPEX    31 tablet    TAKE 1 TABLET BY MOUTH ONCE DAILY    Need for prophylaxis against urinary tract infection       * VITAMIN D (CHOLECALCIFEROL) PO      Take 5,000 Units by mouth every 7 days        * vitamin D3 2000 units Caps     62 capsule    TAKE TWO CAPSULES (4000 UNITS) BY MOUTH DAILY    Takes dietary supplements       vitamin D 95276 UNIT capsule    ERGOCALCIFEROL    5 capsule    TAKE ONE CAPSULE BY MOUTH DAILY ON FRIDAYS    Vitamin D deficiency       * Notice:  This list has 2 medication(s) that are the same as other medications prescribed for you. Read the directions carefully, and ask your doctor or other care provider to review them with you.

## 2018-09-24 NOTE — PATIENT INSTRUCTIONS
Patient should take 1200mg of calcium/day in divided doses diet and all supplements and vitamin D3 4000IU/day and 5000IU every Friday  Dietary calcium is best    You need to increase your calcium intake - to 1200-1500mg /day  You only absorb 5-600mg at a time  If you need a supplement you should use calcium citrate 315/200 2 pills = 630mg calcium   With hx of kidney stones you should not use tums  Schedule a DXA at the [facility] on [address]  [phone]  Blood work today  Call [employer] about the forteo/tymlos - someone will have to be taught the daily injections - we need to get prior authorization from the insurance first  See me in 6 months

## 2018-09-25 LAB — DEPRECATED CALCIDIOL+CALCIFEROL SERPL-MC: 99 UG/L (ref 20–75)

## 2018-09-26 ENCOUNTER — TELEPHONE (OUTPATIENT)
Dept: PHARMACY | Facility: CLINIC | Age: 73
End: 2018-09-26

## 2018-09-26 DIAGNOSIS — M80.00XD AGE-RELATED OSTEOPOROSIS WITH CURRENT PATHOLOGICAL FRACTURE WITH ROUTINE HEALING, SUBSEQUENT ENCOUNTER: Primary | ICD-10-CM

## 2018-09-26 LAB — ALP BONE SERPL-MCNC: 21.7 UG/L

## 2018-09-26 NOTE — TELEPHONE ENCOUNTER
Received VM from caretaker for patient requesting the details on the prescription for osteoporosis.  I will send an Rx for Tymlos per Dr. Abrams to get PA and approval process started.    [facility], Pharm.D., BCPS

## 2018-09-27 DIAGNOSIS — G35 MULTIPLE SCLEROSIS (H): Primary | ICD-10-CM

## 2018-09-27 DIAGNOSIS — E67.3 HYPERVITAMINOSIS D: ICD-10-CM

## 2018-09-27 NOTE — TELEPHONE ENCOUNTER
[facility] and spoke with Renetta.  They are actively working on a PA for the Tymlos prescription for this patient.      I also had a VM from Camilla, a caregiver with Cristina, requesting that the nursing staff at [facility], be notified.  Their phone number is [phone].  Left VM with this nursing office that prescription is in process with  [facility].        [facility], Pharm.D., BCPS

## 2018-09-27 NOTE — PROGRESS NOTES
Phone call: vit D is high 99 - left message to stop the weekly vit D 5000IU weekl and recheck in 1 month  Erica Abrams MD, PhD  9.27.18

## 2018-10-01 NOTE — TELEPHONE ENCOUNTER
Another one of patient's caregivers, Rubi, called me on Friday, 9/28.  Updated Rubi on information below that Rx had been sent to [facility] and that the PA should be in process.  Patient was present with Rubi and both were updated.    [facility] [employer], Pharm.D., BCPS

## 2018-10-05 ENCOUNTER — RADIANT APPOINTMENT (OUTPATIENT)
Dept: BONE DENSITY | Facility: CLINIC | Age: 73
End: 2018-10-05
Attending: FAMILY MEDICINE
Payer: MEDICARE

## 2018-10-05 DIAGNOSIS — M81.0 SENILE OSTEOPOROSIS: ICD-10-CM

## 2018-10-05 PROCEDURE — 77080 DXA BONE DENSITY AXIAL: CPT | Mod: XS

## 2018-10-05 PROCEDURE — 77081 DXA BONE DENSITY APPENDICULR: CPT

## 2018-10-08 DIAGNOSIS — R23.8 SKIN IRRITATION: Primary | ICD-10-CM

## 2018-10-08 RX ORDER — ANORECTAL OINTMENT 15.7; .44; 24; 20.6 G/100G; G/100G; G/100G; G/100G
OINTMENT TOPICAL
Qty: 113 G | Refills: 3 | Status: SHIPPED | OUTPATIENT
Start: 2018-10-08 | End: 2019-04-02

## 2018-10-12 ENCOUNTER — TELEPHONE (OUTPATIENT)
Dept: OBGYN | Facility: CLINIC | Age: 73
End: 2018-10-12

## 2018-10-12 NOTE — TELEPHONE ENCOUNTER
----- Message from Lisa Elizondo sent at 10/12/2018 10:53 AM CDT -----  Regarding: Need Orders for administering meds  Rubi from [facility] living calling [phone] needs orders from Dr. Abrams to administer meds at [facility].  Fax [phone]        I placed order for signature on Dr. Miguel desk for signature-0

## 2018-10-15 ENCOUNTER — MEDICAL CORRESPONDENCE (OUTPATIENT)
Dept: HEALTH INFORMATION MANAGEMENT | Facility: CLINIC | Age: 73
End: 2018-10-15

## 2018-10-16 ENCOUNTER — TELEPHONE (OUTPATIENT)
Dept: OBGYN | Facility: CLINIC | Age: 73
End: 2018-10-16

## 2018-10-16 NOTE — TELEPHONE ENCOUNTER
----- Message from Natali Marroquin RN sent at 10/15/2018  3:43 PM CDT -----  Regarding: FW: Need Orders for administering meds      ----- Message -----     From: Tiffany Zaragoza LPN     Sent: 10/12/2018   1:51 PM       To: s Rn-[facility]  Subject: FW: Need Orders for administering meds           I placed order on Dr. Abrams's desk for signature.  ----- Message -----     From: Lisa Elizondo     Sent: 10/12/2018  10:53 AM       To: s Rn-[facility]  Subject: Need Orders for administering meds               Rubi from [facility] living calling [phone] needs orders from Dr. Abrams to administer meds at [facility].  Fax [phone]

## 2018-10-16 NOTE — TELEPHONE ENCOUNTER
Orders for administering Tymlos signed by Dr. Abrams and faxed to Hernán. Placed in RN outbasket for scanning.

## 2018-10-24 ENCOUNTER — ASSISTED LIVING VISIT (OUTPATIENT)
Dept: GERIATRICS | Facility: CLINIC | Age: 73
End: 2018-10-24
Payer: COMMERCIAL

## 2018-10-24 VITALS
RESPIRATION RATE: 18 BRPM | SYSTOLIC BLOOD PRESSURE: 146 MMHG | HEART RATE: 66 BPM | TEMPERATURE: 97.8 F | DIASTOLIC BLOOD PRESSURE: 69 MMHG

## 2018-10-24 DIAGNOSIS — Z01.818 PRE-OP EXAM: Primary | ICD-10-CM

## 2018-10-24 RX ORDER — ZINC OXIDE 13 %
1 CREAM (GRAM) TOPICAL DAILY
COMMUNITY
End: 2019-07-20

## 2018-10-24 NOTE — LETTER
10/24/2018        RE: Cristina Stevens  [address]  [facility] [address]        PRE-OP EVALUATION:    [place] Medical Record Number:  [id number]  Place of Service where encounter took place:  [facility] [[id number]]  Today's date: 2018    [facility]  [address]  [address]  [place] [id number] [phone]  Dept: [phone]    PRE-OP EVALUATION:  Today's date: 10/24/2018    Cristina Stevens (: 1945) presents for pre-operative evaluation assessment as requested by Dr. Zazueta.  She requires evaluation and anesthesia risk assessment prior to undergoing surgery/procedure for treatment of neurogenic bladder with chronic bladder spasms..    Proposed Surgery/ Procedure: CYSTOSCOPIC INJECTION BLADDER BOTOX   Date of Surgery/ Procedure: 2018  Time of Surgery/ Procedure: [facility]  Hospital/Surgical Facility: [facility]  Primary Physician: Kimberlee Castro  Type of Anesthesia Anticipated: to be determined    1. NO - Do you have a history of heart attack, stroke, stent, bypass or surgery on an artery in the head, neck, heart or legs?  2. NO - Do you ever have any pain or discomfort in your chest?  3. NO - Do you have a history of  Heart Failure?  4. NO - Are you troubled by shortness of breath when: walking on the level, up a slight hill or at night?  5. NO - Do you currently have a cold, bronchitis or other respiratory infection?  6. NO - Do you have a cough, shortness of breath or wheezing?  7. NO - Do you sometimes get pains in the calves of your legs when you walk?  8. NO - Do you or anyone in your family have previous history of blood clots?  9. NO - Do you or does anyone in your family have a serious bleeding problem such as prolonged bleeding following surgeries or cuts?  10. NO - Have you ever had problems with anemia or been told to take iron pills?  11. NO - Have you had any abnormal blood loss  "such as black, tarry or bloody stools, or abnormal vaginal bleeding?  12. NO - Have you ever had a blood transfusion?  13. NO - Have you or any of your relatives ever had problems with anesthesia?  14. NO - Do you have sleep apnea, excessive snoring or daytime drowsiness?  15. NO - Do you have any prosthetic heart valves?  16. NO - Do you have prosthetic joints?  17. NO - Is there any chance that you may be pregnant?      HPI:     HPI related to upcoming procedure:   Cristina Stevens is a 73 year old  (1945) female with a medical history notable for MS with spastic quadriplegia, neurogenic bladder, recurrent UTIs and chronic pain. She has a chronic catheter in place and has recently noted an increase of bypassing urine which is typical when an injection is due. Muna reports that she generally has the botox injections every 4-6 months. In the past, she has developed \"20 seconds of bee-stinging pain\" after the injections so she will go to the hospital for the injection for better pain control post procedure. She feels well today and has not had fever, body aches, or chills. Last catheter change was 4 weeks ago and has deferred current catheter change as it will be changed out during the procedure.         MEDICAL HISTORY:     Patient Active Problem List    Diagnosis Date Noted     Chronic constipation 07/12/2018     Priority: Medium     Screening for osteoporosis 07/10/2018     Priority: Medium     Overview:   [id number]       Closed fracture of right tibial plateau 07/09/2018     Priority: Medium     Acute blood loss anemia 06/19/2018     Priority: Medium     Neurogenic bladder 06/08/2018     Priority: Medium     Immobility 06/08/2018     Priority: Medium     Heat intolerance 05/26/2018     Priority: Medium     Closed fracture of neck of right femur (H) 05/20/2018     Priority: Medium     OAB (overactive bladder) 06/02/2017     Priority: Medium     Overview:   Added automatically from request for surgery " [id number]       At risk for impaired skin integrity 12/14/2014     Priority: Medium     Overview:    Bilateral lower extremity plegia, truncal weakness, right arm profoundly weak.         Neuromuscular respiratory weakness 09/26/2014     Priority: Medium     Overview:   PFTs Sept 2014:  difficulty performing all PFT maneuvers.   Results not reproducible thus may not be accurate. No airway obstruction. FEV1 and FVC both significantly decreased. There was a significant response to bronchodilator. FEV1 improved by 15% after bronchodilator. Slow vital capacity  severely decreased at 44% predicted.DLCO severely decreased at 16% predicted.   NIF  severely decreased at -34.       Scoliosis associated with other condition 09/10/2014     Priority: Medium     Overview:   Weakness of right worse than left axial muscles, left leaning thoracic kyphoscoliosis       Kidney stone 06/19/2014     Priority: Medium     Overview:   7/2014- lithotripsy.-Dr Zazueta.       MDRO (multiple drug resistant organisms) resistance 05/31/2013     Priority: Medium     Overview:   MRSA  5/27/2013  Site: Urine  [facility]    Positive MRSA  Urine  7/10/2013  [place] [facility]    Urine  12/19/2014 5/14/2014  MRSA ISOLATED  Urine  [facility]       Anemia 09/25/2012     Priority: Medium     Chronic pain 09/25/2012     Priority: Medium     Overview:   Right arm and shoulder- botox helpful but short lasting.  - Acupuncture once weekly.  --Standing frame 5d/week for 30min.       Frequent UTI 09/25/2012     Priority: Medium     Overview:   --UTI avg q 2weeks - has ruby cath, only tx if symptomatic per urology- fever, chills, hematuria, mentation changes.  --Tiazidine not helpful in past.  --has had Urodynamic studies in the past  --Seen at [facility] in the past- started Vesicare.       Spasticity 10/10/2011     Priority: Medium     Spastic quadriplegia (H) 10/10/2011     Priority: Medium     Multiple sclerosis (H) 05/10/2011     Priority: Medium      Osteoporosis 06/16/2006     Priority: Medium     Overview:   [facility]         Past Medical History:   Diagnosis Date     Acute blood loss anemia 6/19/2018     Anemia 9/25/2012     At risk for impaired skin integrity 12/14/2014    Overview:   Bilateral lower extremity plegia, truncal weakness, right arm profoundly weak.       Chronic constipation 7/12/2018     Chronic pain 9/25/2012    Overview:  Right arm and shoulder- botox helpful but short lasting. - Acupuncture once weekly. --Standing frame 5d/week for 30min.     Closed fracture of neck of right femur (H) 5/20/2018     Closed fracture of right tibial plateau 7/9/2018     Frequent UTI 9/25/2012    Overview:  --UTI avg q 2weeks - has ruby cath, only tx if symptomatic per urology- fever, chills, hematuria, mentation changes. --Tiazidine not helpful in past. --has had Urodynamic studies in the past --Seen at [facility] in the past- started Vesicare.     Heat intolerance 5/26/2018     Immobility 6/8/2018     Kidney stone 6/19/2014    Overview:  7/2014- lithotripsy.-Dr Zazueta.     MDRO (multiple drug resistant organisms) resistance 5/31/2013    Overview:  MRSA 5/27/2013 Site: Urine [facility]  Positive MRSA Urine 7/10/2013 [facility]  Urine 12/19/2014 5/14/2014 MRSA ISOLATED Urine [facility]     Multiple sclerosis (H) 5/10/2011     Neurogenic bladder 6/8/2018     Neuromuscular respiratory weakness 9/26/2014    Overview:  PFTs Sept 2014:  difficulty performing all PFT maneuvers.   Results not reproducible thus may not be accurate. No airway obstruction. FEV1 and FVC both significantly decreased. There was a significant response to bronchodilator. FEV1 improved by 15% after bronchodilator. Slow vital capacity  severely decreased at 44% predicted.DLCO severely decreased at 16% predicted.   NIF  severely decreased at -34.     OAB (overactive bladder) 6/2/2017    Overview:  Added automatically from request for surgery [id number]     Osteoporosis 6/16/2006     Overview:  Epic      Scoliosis associated with other condition 9/10/2014    Overview:  Weakness of right worse than left axial muscles, left leaning thoracic kyphoscoliosis     Screening for osteoporosis 7/10/2018    Overview:  [id number]     Spastic quadriplegia (H) 10/10/2011     Spasticity 10/10/2011     No past surgical history on file.  Current Outpatient Prescriptions   Medication Sig Dispense Refill     Abaloparatide 3120 MCG/1.56ML SOPN injection Inject 0.04 mLs (80 mcg) Subcutaneous daily 1.56 mL 11     baclofen (LIORESAL) 10 MG tablet TAKE ONE-HALF TABLET (5MG) BY MOUTH TWICE DAILY;AMD TAKE ONE-HALF TABLET (5MG) BY MOUTH EVERY 4 HOURS AS NEEDED 31 tablet 11     BRENDA-GEST ANTACID 500 MG chewable tablet TAKE 1 TABLET BY MOUTH ONCE DAILY 31 tablet 11     CALMOSEPTINE 0.44-20.6 % OINT APPLY TOPICALLY TO BUTTOCKS AS NEEDED WITH BRIEF CHANGES 113 g 3     cetirizine (ZYRTEC) 10 MG tablet Take 10 mg by mouth daily as needed for allergies       Cholecalciferol (VITAMIN D3) 2000 units CAPS TAKE TWO CAPSULES (4000 UNITS) BY MOUTH DAILY 62 capsule 11     CO2-Releasing (-TWO) SUPP Place rectally daily as needed       CRANBERRY CONCENTRATE 500 MG CAPS TAKE 1 CAPSULE BY MOUTH ONCE DAILY 31 capsule 11     DESITIN 13 % CREA APPLY TOPICALLY TO AREAS OF REDNESS OR RASH WITH EACH BRIEF CHANGE 113 g 3     Diaper Rash Products (DESITIN) OINT Externally apply topically as needed       ibuprofen (ADVIL/MOTRIN) 400 MG tablet TAKE 1 TABLET BY MOUTH ONCE DAILY;TAKE 1 TABLET BY MOUTH ONCE DAILY AS NEEDED 31 tablet 11     insulin pen needle (B-D U/F) 31G X 5 MM Use once daily as directed with Tymlos 100 each 4     Multiple Vitamin (TAB-A-DAWSON) TABS TAKE 1 TABLET BY MOUTH ONCE DAILY 31 tablet 11     MYRBETRIQ 25 MG 24 hr tablet TAKE 1 TABLET BY MOUTH ONCE DAILY 31 tablet 11     oxyCODONE IR (ROXICODONE) 5 MG tablet Take 2.5 mg by mouth 2 times daily as needed for severe pain  6 tablet 0     polyethylene glycol  "(MIRALAX/GLYCOLAX) powder Take 17 g by mouth daily as needed for constipation       Probiotic Product (PROBIOTIC DAILY) CAPS Take 1 capsule by mouth daily       SENEXON-S 8.6-50 MG per tablet TAKE 1 TABLET BY MOUTH TWICE DAILY;AND MAY TAKE 1 TABLET BY MOUTH TWICE DAILY AS NEEDED FOR CONSTIPATION 62 tablet 12     trimethoprim (TRIMPEX) 100 MG tablet TAKE 1 TABLET BY MOUTH ONCE DAILY 31 tablet 11     vitamin D (ERGOCALCIFEROL) 93036 UNIT capsule TAKE ONE CAPSULE BY MOUTH DAILY ON FRIDAYS 5 capsule 11     VITAMIN D, CHOLECALCIFEROL, PO Take 5,000 Units by mouth every 7 days       OTC products: {OTC ANALGESICS:844066}    Allergies   Allergen Reactions     Tetracyclines GI Disturbance     Ampicillin GI Disturbance     Cefadroxil Muscle Pain (Myalgia)     Cephalexin Diarrhea     Levofloxacin Other (See Comments)     Phlebitis with IV infusion     Macrobid [Nitrofuran Derivatives]      Sulfa Drugs      Sulfasalazine Nausea and Vomiting     Other reaction(s): Gastrointestinal  Added per pt request 5/20/18      Latex Allergy: {YES/NO WITH DEFAULT:297197::\"NO\"}    Social History   Substance Use Topics     Smoking status: Former Smoker     Quit date: 5/10/1981     Smokeless tobacco: Never Used     Alcohol use Not on file     History   Drug Use Not on file       REVIEW OF SYSTEMS:   10 point ROS of systems including Constitutional, Eyes, Respiratory, Cardiovascular, Gastroenterology, Genitourinary, Integumentary, Musculoskeletal, Psychiatric were all negative except for pertinent positives noted in my HPI.    EXAM:   /69  Pulse 66  Temp 97.8  F (36.6  C)  Resp 18  GENERAL APPEARANCE:  Alert, in no distress, pleasant, cooperative, oriented x 4  EYES:  EOM, lids, pupils and irises normal, sclera clear and conjunctiva normal, no discharge or mattering on lids or lashes noted  ENT:  Mouth normal, moist mucous membranes, nose without drainage or crusting, external ears without lesions, hearing acuity intact, speaks " softly.  NECK:  Nontender, supple, symmetrical  RESP:  respiratory effort and palpation of chest normal, no chest wall tenderness, no respiratory distress, Lung sounds clear, patient is on room air  CV:  Palpation and auscultation of heart done, rate and rhythm regular, no murmur, no rub or gallop. Edema none in bilateral lower extremities. VASCULAR: warm extremities.   ABDOMEN:  normal bowel sounds, soft, nontender.  M/S:   Gait and station wheelchair bound, tenderness to right ankle and knee. R knee slightly contracted. Global weakness, able to move arms, minimally wiggle toes.    : catheter in place. Clear yellow urine.   SKIN:  Inspection and palpation of skin and subcutaneous tissue: skin warm, dry and intact without rashes  NEURO: cranial nerves 2-12 grossly intact, no facial asymmetry, no speech deficits and able to follow directions, moves all extremities symmetrically  PSYCH:  insight and judgement intact, memory intact, affect and mood normal    DIAGNOSTICS:   No labs or EKG required for low risk surgery (cataract, skin procedure, breast biopsy, etc)    Recent Labs   Lab Test  09/24/18   1705   NA  136   POTASSIUM  4.4   CR  0.74        IMPRESSION:   Reason for surgery/procedure: neurogenic bladder with bladder spasms  The proposed surgical procedure is considered LOW risk.    REVISED CARDIAC RISK INDEX  The patient has the following serious cardiovascular risks for perioperative complications such as (MI, PE, VFib and 3  AV Block):  No serious cardiac risks  INTERPRETATION: 0 risks: Class I (very low risk - 0.4% complication rate)    The patient has the following additional risks for perioperative complications:  No identified additional risks    No diagnosis found.    ORDERS:        Medications: HOLD IBUPROFEN x 7 days prior to procedure    APPROVAL GIVEN to proceed with proposed procedure, without further diagnostic evaluation       Signed Electronically by: CAROL Dupont CNP    Copy of this  evaluation report is provided to requesting physician.    [place] Preop Guidelines    Revised Cardiac Risk Index          Sincerely,        CAROL Dupont CNP

## 2018-10-24 NOTE — LETTER
10/24/2018        RE: Cristina Stevens  [address]  [facility] [address]        PRE-OP EVALUATION:    [facility] Record Number:  [id number]  Place of Service where encounter took place:  [facility] [[id number]]  Today's date: 2018    [facility]  [address]  [address]  [place] [id number] [phone]  Dept: [phone]    PRE-OP EVALUATION:  Today's date: 10/24/2018    Cristina Stevens (: 1945) presents for pre-operative evaluation assessment as requested by Dr. Zazueta.  She requires evaluation and anesthesia risk assessment prior to undergoing surgery/procedure for treatment of neurogenic bladder with chronic bladder spasms..    Proposed Surgery/ Procedure: CYSTOSCOPIC INJECTION BLADDER BOTOX   Date of Surgery/ Procedure: 2018  Time of Surgery/ Procedure: [facility]  Hospital/Surgical Facility: [facility]  Primary Physician: Kimberlee Castro  Type of Anesthesia Anticipated: to be determined    1. NO - Do you have a history of heart attack, stroke, stent, bypass or surgery on an artery in the head, neck, heart or legs?  2. NO - Do you ever have any pain or discomfort in your chest?  3. NO - Do you have a history of  Heart Failure?  4. NO - Are you troubled by shortness of breath when: walking on the level, up a slight hill or at night?  5. NO - Do you currently have a cold, bronchitis or other respiratory infection?  6. NO - Do you have a cough, shortness of breath or wheezing?  7. NO - Do you sometimes get pains in the calves of your legs when you walk?  8. NO - Do you or anyone in your family have previous history of blood clots?  9. NO - Do you or does anyone in your family have a serious bleeding problem such as prolonged bleeding following surgeries or cuts?  10. NO - Have you ever had problems with anemia or been told to take iron pills?  11. NO - Have you had any abnormal blood loss  "such as black, tarry or bloody stools, or abnormal vaginal bleeding?  12. NO - Have you ever had a blood transfusion?  13. NO - Have you or any of your relatives ever had problems with anesthesia?  14. NO - Do you have sleep apnea, excessive snoring or daytime drowsiness?  15. NO - Do you have any prosthetic heart valves?  16. NO - Do you have prosthetic joints?  17. NO - Is there any chance that you may be pregnant?      HPI:     HPI related to upcoming procedure:   Cristina Stevens is a 73 year old  (1945) female with a medical history notable for MS with spastic quadriplegia, neurogenic bladder, recurrent UTIs and chronic pain. She has a chronic catheter in place and has recently noted an increase of bypassing urine which is typical when an injection is due. Muna reports that she generally has the botox injections every 4-6 months. In the past, she has developed \"20 seconds of bee-stinging pain\" after the injections so she will go to the hospital for the injection for better pain control post procedure. She feels well today and has not had fever, body aches, or chills. Last catheter change was 4 weeks ago and has deferred current catheter change as it will be changed out during the procedure.         MEDICAL HISTORY:     Patient Active Problem List    Diagnosis Date Noted     Chronic constipation 07/12/2018     Priority: Medium     Screening for osteoporosis 07/10/2018     Priority: Medium     Overview:   [id number]       Closed fracture of right tibial plateau 07/09/2018     Priority: Medium     Acute blood loss anemia 06/19/2018     Priority: Medium     Neurogenic bladder 06/08/2018     Priority: Medium     Immobility 06/08/2018     Priority: Medium     Heat intolerance 05/26/2018     Priority: Medium     Closed fracture of neck of right femur (H) 05/20/2018     Priority: Medium     OAB (overactive bladder) 06/02/2017     Priority: Medium     Overview:   Added automatically from request for surgery " [id number]       At risk for impaired skin integrity 12/14/2014     Priority: Medium     Overview:    Bilateral lower extremity plegia, truncal weakness, right arm profoundly weak.         Neuromuscular respiratory weakness 09/26/2014     Priority: Medium     Overview:   PFTs Sept 2014:  difficulty performing all PFT maneuvers.   Results not reproducible thus may not be accurate. No airway obstruction. FEV1 and FVC both significantly decreased. There was a significant response to bronchodilator. FEV1 improved by 15% after bronchodilator. Slow vital capacity  severely decreased at 44% predicted.DLCO severely decreased at 16% predicted.   NIF  severely decreased at -34.       Scoliosis associated with other condition 09/10/2014     Priority: Medium     Overview:   Weakness of right worse than left axial muscles, left leaning thoracic kyphoscoliosis       Kidney stone 06/19/2014     Priority: Medium     Overview:   7/2014- lithotripsy.-Dr Zazueta.       MDRO (multiple drug resistant organisms) resistance 05/31/2013     Priority: Medium     Overview:   MRSA  5/27/2013  Site: Urine  [facility]    Positive MRSA  Urine  7/10/2013  [facility]    Urine  12/19/2014 5/14/2014  MRSA ISOLATED  Urine  [facility]       Anemia 09/25/2012     Priority: Medium     Chronic pain 09/25/2012     Priority: Medium     Overview:   Right arm and shoulder- botox helpful but short lasting.  - Acupuncture once weekly.  --Standing frame 5d/week for 30min.       Frequent UTI 09/25/2012     Priority: Medium     Overview:   --UTI avg q 2weeks - has ruby cath, only tx if symptomatic per urology- fever, chills, hematuria, mentation changes.  --Tiazidine not helpful in past.  --has had Urodynamic studies in the past  --Seen at [facility] in the past- started Vesicare.       Spasticity 10/10/2011     Priority: Medium     Spastic quadriplegia (H) 10/10/2011     Priority: Medium     Multiple sclerosis (H) 05/10/2011     Priority: Medium      Osteoporosis 06/16/2006     Priority: Medium     Overview:   [facility]         Past Medical History:   Diagnosis Date     Acute blood loss anemia 6/19/2018     Anemia 9/25/2012     At risk for impaired skin integrity 12/14/2014    Overview:   Bilateral lower extremity plegia, truncal weakness, right arm profoundly weak.       Chronic constipation 7/12/2018     Chronic pain 9/25/2012    Overview:  Right arm and shoulder- botox helpful but short lasting. - Acupuncture once weekly. --Standing frame 5d/week for 30min.     Closed fracture of neck of right femur (H) 5/20/2018     Closed fracture of right tibial plateau 7/9/2018     Frequent UTI 9/25/2012    Overview:  --UTI avg q 2weeks - has ruby cath, only tx if symptomatic per urology- fever, chills, hematuria, mentation changes. --Tiazidine not helpful in past. --has had Urodynamic studies in the past --Seen at [facility] in the past- started Vesicare.     Heat intolerance 5/26/2018     Immobility 6/8/2018     Kidney stone 6/19/2014    Overview:  7/2014- lithotripsy.-Dr Zazueta.     MDRO (multiple drug resistant organisms) resistance 5/31/2013    Overview:  MRSA 5/27/2013 Site: Urine [facility]  Positive MRSA Urine 7/10/2013 [facility]  Urine 12/19/2014 5/14/2014 MRSA ISOLATED Urine [facility]     Multiple sclerosis (H) 5/10/2011     Neurogenic bladder 6/8/2018     Neuromuscular respiratory weakness 9/26/2014    Overview:  PFTs Sept 2014:  difficulty performing all PFT maneuvers.   Results not reproducible thus may not be accurate. No airway obstruction. FEV1 and FVC both significantly decreased. There was a significant response to bronchodilator. FEV1 improved by 15% after bronchodilator. Slow vital capacity  severely decreased at 44% predicted.DLCO severely decreased at 16% predicted.   NIF  severely decreased at -34.     OAB (overactive bladder) 6/2/2017    Overview:  Added automatically from request for surgery [id number]     Osteoporosis 6/16/2006     Overview:  Epic      Scoliosis associated with other condition 9/10/2014    Overview:  Weakness of right worse than left axial muscles, left leaning thoracic kyphoscoliosis     Screening for osteoporosis 7/10/2018    Overview:  [id number]     Spastic quadriplegia (H) 10/10/2011     Spasticity 10/10/2011     No past surgical history on file.  Current Outpatient Prescriptions   Medication Sig Dispense Refill     Abaloparatide 3120 MCG/1.56ML SOPN injection Inject 0.04 mLs (80 mcg) Subcutaneous daily 1.56 mL 11     baclofen (LIORESAL) 10 MG tablet TAKE ONE-HALF TABLET (5MG) BY MOUTH TWICE DAILY;AMD TAKE ONE-HALF TABLET (5MG) BY MOUTH EVERY 4 HOURS AS NEEDED 31 tablet 11     BRENDA-GEST ANTACID 500 MG chewable tablet TAKE 1 TABLET BY MOUTH ONCE DAILY 31 tablet 11     CALMOSEPTINE 0.44-20.6 % OINT APPLY TOPICALLY TO BUTTOCKS AS NEEDED WITH BRIEF CHANGES 113 g 3     cetirizine (ZYRTEC) 10 MG tablet Take 10 mg by mouth daily as needed for allergies       Cholecalciferol (VITAMIN D3) 2000 units CAPS TAKE TWO CAPSULES (4000 UNITS) BY MOUTH DAILY 62 capsule 11     CO2-Releasing (-TWO) SUPP Place rectally daily as needed       CRANBERRY CONCENTRATE 500 MG CAPS TAKE 1 CAPSULE BY MOUTH ONCE DAILY 31 capsule 11     DESITIN 13 % CREA APPLY TOPICALLY TO AREAS OF REDNESS OR RASH WITH EACH BRIEF CHANGE 113 g 3     Diaper Rash Products (DESITIN) OINT Externally apply topically as needed       ibuprofen (ADVIL/MOTRIN) 400 MG tablet TAKE 1 TABLET BY MOUTH ONCE DAILY;TAKE 1 TABLET BY MOUTH ONCE DAILY AS NEEDED 31 tablet 11     insulin pen needle (B-D U/F) 31G X 5 MM Use once daily as directed with Tymlos 100 each 4     Multiple Vitamin (TAB-A-DAWSON) TABS TAKE 1 TABLET BY MOUTH ONCE DAILY 31 tablet 11     MYRBETRIQ 25 MG 24 hr tablet TAKE 1 TABLET BY MOUTH ONCE DAILY 31 tablet 11     oxyCODONE IR (ROXICODONE) 5 MG tablet Take 2.5 mg by mouth 2 times daily as needed for severe pain  6 tablet 0     polyethylene glycol  (MIRALAX/GLYCOLAX) powder Take 17 g by mouth daily as needed for constipation       Probiotic Product (PROBIOTIC DAILY) CAPS Take 1 capsule by mouth daily       SENEXON-S 8.6-50 MG per tablet TAKE 1 TABLET BY MOUTH TWICE DAILY;AND MAY TAKE 1 TABLET BY MOUTH TWICE DAILY AS NEEDED FOR CONSTIPATION 62 tablet 12     trimethoprim (TRIMPEX) 100 MG tablet TAKE 1 TABLET BY MOUTH ONCE DAILY 31 tablet 11     vitamin D (ERGOCALCIFEROL) 32604 UNIT capsule TAKE ONE CAPSULE BY MOUTH DAILY ON FRIDAYS 5 capsule 11     VITAMIN D, CHOLECALCIFEROL, PO Take 5,000 Units by mouth every 7 days       OTC products: as noted above.     Allergies   Allergen Reactions     Tetracyclines GI Disturbance     Ampicillin GI Disturbance     Cefadroxil Muscle Pain (Myalgia)     Cephalexin Diarrhea     Levofloxacin Other (See Comments)     Phlebitis with IV infusion     Macrobid [Nitrofuran Derivatives]      Sulfa Drugs      Sulfasalazine Nausea and Vomiting     Other reaction(s): Gastrointestinal  Added per pt request 5/20/18      Latex Allergy: NO    Social History   Substance Use Topics     Smoking status: Former Smoker     Quit date: 5/10/1981     Smokeless tobacco: Never Used     Alcohol use Not on file     History   Drug Use Not on file       REVIEW OF SYSTEMS:   10 point ROS of systems including Constitutional, Eyes, Respiratory, Cardiovascular, Gastroenterology, Genitourinary, Integumentary, Musculoskeletal, Psychiatric were all negative except for pertinent positives noted in my HPI.    EXAM:   /69  Pulse 66  Temp 97.8  F (36.6  C)  Resp 18  GENERAL APPEARANCE:  Alert, in no distress, pleasant, cooperative, oriented x 4  EYES:  EOM, lids, pupils and irises normal, sclera clear and conjunctiva normal, no discharge or mattering on lids or lashes noted  ENT:  Mouth normal, moist mucous membranes, nose without drainage or crusting, external ears without lesions, hearing acuity intact, speaks softly.  NECK:  Nontender, supple,  symmetrical  RESP:  respiratory effort and palpation of chest normal, no chest wall tenderness, no respiratory distress, Lung sounds clear, patient is on room air  CV:  Palpation and auscultation of heart done, rate and rhythm regular, no murmur, no rub or gallop. Edema none in bilateral lower extremities. VASCULAR: warm extremities.   ABDOMEN:  normal bowel sounds, soft, nontender.  M/S:   Gait and station wheelchair bound, tenderness to right ankle and knee. R knee slightly contracted. Global weakness, able to move arms, minimally wiggle toes.    : catheter in place. Clear yellow urine.   SKIN:  Inspection and palpation of skin and subcutaneous tissue: skin warm, dry and intact without rashes  NEURO: cranial nerves 2-12 grossly intact, no facial asymmetry, no speech deficits and able to follow directions, moves all extremities symmetrically  PSYCH:  insight and judgement intact, memory intact, affect and mood normal    DIAGNOSTICS:   No labs or EKG required for low risk surgery (cataract, skin procedure, breast biopsy, etc)    Recent Labs   Lab Test  09/24/18   1705   NA  136   POTASSIUM  4.4   CR  0.74        IMPRESSION:   Reason for surgery/procedure: neurogenic bladder with bladder spasms  The proposed surgical procedure is considered LOW risk.    REVISED CARDIAC RISK INDEX  The patient has the following serious cardiovascular risks for perioperative complications such as (MI, PE, VFib and 3  AV Block):  No serious cardiac risks  INTERPRETATION: 0 risks: Class I (very low risk - 0.4% complication rate)    The patient has the following additional risks for perioperative complications:  No identified additional risks    No diagnosis found.    ORDERS:        Medications: HOLD IBUPROFEN x 7 days prior to procedure    APPROVAL GIVEN to proceed with proposed procedure, without further diagnostic evaluation       Signed Electronically by: CAROL Dupont CNP    Copy of this evaluation report is provided to  requesting physician.    [place] Preop Guidelines    Revised Cardiac Risk Index          Sincerely,        CAROL Dupont CNP

## 2018-10-24 NOTE — PROGRESS NOTES
PRE-OP EVALUATION:    [facility]avour Medical Record Number:  [id number]  Place of Service where encounter took place:  [facility] [[id number]]  Today's date: 2018    [facility]  [address]  [facility] [address]  [place] [address] [phone]  Dept: [phone]    PRE-OP EVALUATION:  Today's date: 10/24/2018    Cristina Stevens (: 1945) presents for pre-operative evaluation assessment as requested by Dr. Zazueta.  She requires evaluation and anesthesia risk assessment prior to undergoing surgery/procedure for treatment of neurogenic bladder with chronic bladder spasms..    Proposed Surgery/ Procedure: CYSTOSCOPIC INJECTION BLADDER BOTOX   Date of Surgery/ Procedure: 2018  Time of Surgery/ Procedure: [facility]  Hospital/Surgical Facility: [facility]  Primary Physician: Kimberlee Castro  Type of Anesthesia Anticipated: to be determined    1. NO - Do you have a history of heart attack, stroke, stent, bypass or surgery on an artery in the head, neck, heart or legs?  2. NO - Do you ever have any pain or discomfort in your chest?  3. NO - Do you have a history of  Heart Failure?  4. NO - Are you troubled by shortness of breath when: walking on the level, up a slight hill or at night?  5. NO - Do you currently have a cold, bronchitis or other respiratory infection?  6. NO - Do you have a cough, shortness of breath or wheezing?  7. NO - Do you sometimes get pains in the calves of your legs when you walk?  8. NO - Do you or anyone in your family have previous history of blood clots?  9. NO - Do you or does anyone in your family have a serious bleeding problem such as prolonged bleeding following surgeries or cuts?  10. NO - Have you ever had problems with anemia or been told to take iron pills?  11. NO - Have you had any abnormal blood loss such as black, tarry or bloody stools, or abnormal vaginal bleeding?  12. NO - Have you ever had a  "blood transfusion?  13. NO - Have you or any of your relatives ever had problems with anesthesia?  14. NO - Do you have sleep apnea, excessive snoring or daytime drowsiness?  15. NO - Do you have any prosthetic heart valves?  16. NO - Do you have prosthetic joints?  17. NO - Is there any chance that you may be pregnant?      HPI:     HPI related to upcoming procedure:   Cristina Stevens is a 73 year old  (1945) female with a medical history notable for MS with spastic quadriplegia, neurogenic bladder, recurrent UTIs and chronic pain. She has a chronic catheter in place and has recently noted an increase of bypassing urine which is typical when an injection is due. Muna reports that she generally has the botox injections every 4-6 months. In the past, she has developed \"20 seconds of bee-stinging pain\" after the injections so she will go to the hospital for the injection for better pain control post procedure. She feels well today and has not had fever, body aches, or chills. Last catheter change was 4 weeks ago and has deferred current catheter change as it will be changed out during the procedure.         MEDICAL HISTORY:     Patient Active Problem List    Diagnosis Date Noted     Chronic constipation 07/12/2018     Priority: Medium     Screening for osteoporosis 07/10/2018     Priority: Medium     Overview:   [id number]       Closed fracture of right tibial plateau 07/09/2018     Priority: Medium     Acute blood loss anemia 06/19/2018     Priority: Medium     Neurogenic bladder 06/08/2018     Priority: Medium     Immobility 06/08/2018     Priority: Medium     Heat intolerance 05/26/2018     Priority: Medium     Closed fracture of neck of right femur (H) 05/20/2018     Priority: Medium     OAB (overactive bladder) 06/02/2017     Priority: Medium     Overview:   Added automatically from request for surgery [id number]       At risk for impaired skin integrity 12/14/2014     Priority: Medium     Overview:    " Bilateral lower extremity plegia, truncal weakness, right arm profoundly weak.         Neuromuscular respiratory weakness 09/26/2014     Priority: Medium     Overview:   PFTs Sept 2014:  difficulty performing all PFT maneuvers.   Results not reproducible thus may not be accurate. No airway obstruction. FEV1 and FVC both significantly decreased. There was a significant response to bronchodilator. FEV1 improved by 15% after bronchodilator. Slow vital capacity  severely decreased at 44% predicted.DLCO severely decreased at 16% predicted.   NIF  severely decreased at -34.       Scoliosis associated with other condition 09/10/2014     Priority: Medium     Overview:   Weakness of right worse than left axial muscles, left leaning thoracic kyphoscoliosis       Kidney stone 06/19/2014     Priority: Medium     Overview:   7/2014- lithotripsy.-Dr Zazueta.       MDRO (multiple drug resistant organisms) resistance 05/31/2013     Priority: Medium     Overview:   MRSA  5/27/2013  Site: Urine  [facility]    Positive MRSA  Urine  7/10/2013  [facility]    Urine  12/19/2014 5/14/2014  MRSA ISOLATED  Urine  [facility]       Anemia 09/25/2012     Priority: Medium     Chronic pain 09/25/2012     Priority: Medium     Overview:   Right arm and shoulder- botox helpful but short lasting.  - Acupuncture once weekly.  --Standing frame 5d/week for 30min.       Frequent UTI 09/25/2012     Priority: Medium     Overview:   --UTI avg q 2weeks - has ruby cath, only tx if symptomatic per urology- fever, chills, hematuria, mentation changes.  --Tiazidine not helpful in past.  --has had Urodynamic studies in the past  --Seen at [facility] in the past- started Vesicare.       Spasticity 10/10/2011     Priority: Medium     Spastic quadriplegia (H) 10/10/2011     Priority: Medium     Multiple sclerosis (H) 05/10/2011     Priority: Medium     Osteoporosis 06/16/2006     Priority: Medium     Overview:   Epic         Past Medical  History:   Diagnosis Date     Acute blood loss anemia 6/19/2018     Anemia 9/25/2012     At risk for impaired skin integrity 12/14/2014    Overview:   Bilateral lower extremity plegia, truncal weakness, right arm profoundly weak.       Chronic constipation 7/12/2018     Chronic pain 9/25/2012    Overview:  Right arm and shoulder- botox helpful but short lasting. - Acupuncture once weekly. --Standing frame 5d/week for 30min.     Closed fracture of neck of right femur (H) 5/20/2018     Closed fracture of right tibial plateau 7/9/2018     Frequent UTI 9/25/2012    Overview:  --UTI avg q 2weeks - has ruby cath, only tx if symptomatic per urology- fever, chills, hematuria, mentation changes. --Tiazidine not helpful in past. --has had Urodynamic studies in the past --Seen at [facility] in the past- started Vesicare.     Heat intolerance 5/26/2018     Immobility 6/8/2018     Kidney stone 6/19/2014    Overview:  7/2014- lithotripsy.-Dr Zazueta.     MDRO (multiple drug resistant organisms) resistance 5/31/2013    Overview:  MRSA 5/27/2013 Site: Urine [facility]  Positive MRSA Urine 7/10/2013 [facility]  Urine 12/19/2014 5/14/2014 MRSA ISOLATED Urine [facility]     Multiple sclerosis (H) 5/10/2011     Neurogenic bladder 6/8/2018     Neuromuscular respiratory weakness 9/26/2014    Overview:  PFTs Sept 2014:  difficulty performing all PFT maneuvers.   Results not reproducible thus may not be accurate. No airway obstruction. FEV1 and FVC both significantly decreased. There was a significant response to bronchodilator. FEV1 improved by 15% after bronchodilator. Slow vital capacity  severely decreased at 44% predicted.DLCO severely decreased at 16% predicted.   NIF  severely decreased at -34.     OAB (overactive bladder) 6/2/2017    Overview:  Added automatically from request for surgery [id number]     Osteoporosis 6/16/2006    Overview:  Epic      Scoliosis associated with other condition 9/10/2014    Overview:   Weakness of right worse than left axial muscles, left leaning thoracic kyphoscoliosis     Screening for osteoporosis 7/10/2018    Overview:  [id number]     Spastic quadriplegia (H) 10/10/2011     Spasticity 10/10/2011     No past surgical history on file.  Current Outpatient Prescriptions   Medication Sig Dispense Refill     Abaloparatide 3120 MCG/1.56ML SOPN injection Inject 0.04 mLs (80 mcg) Subcutaneous daily 1.56 mL 11     baclofen (LIORESAL) 10 MG tablet TAKE ONE-HALF TABLET (5MG) BY MOUTH TWICE DAILY;AMD TAKE ONE-HALF TABLET (5MG) BY MOUTH EVERY 4 HOURS AS NEEDED 31 tablet 11     BRENDA-GEST ANTACID 500 MG chewable tablet TAKE 1 TABLET BY MOUTH ONCE DAILY 31 tablet 11     CALMOSEPTINE 0.44-20.6 % OINT APPLY TOPICALLY TO BUTTOCKS AS NEEDED WITH BRIEF CHANGES 113 g 3     cetirizine (ZYRTEC) 10 MG tablet Take 10 mg by mouth daily as needed for allergies       Cholecalciferol (VITAMIN D3) 2000 units CAPS TAKE TWO CAPSULES (4000 UNITS) BY MOUTH DAILY 62 capsule 11     CO2-Releasing (-TWO) SUPP Place rectally daily as needed       CRANBERRY CONCENTRATE 500 MG CAPS TAKE 1 CAPSULE BY MOUTH ONCE DAILY 31 capsule 11     DESITIN 13 % CREA APPLY TOPICALLY TO AREAS OF REDNESS OR RASH WITH EACH BRIEF CHANGE 113 g 3     Diaper Rash Products (DESITIN) OINT Externally apply topically as needed       ibuprofen (ADVIL/MOTRIN) 400 MG tablet TAKE 1 TABLET BY MOUTH ONCE DAILY;TAKE 1 TABLET BY MOUTH ONCE DAILY AS NEEDED 31 tablet 11     insulin pen needle (B-D U/F) 31G X 5 MM Use once daily as directed with Tymlos 100 each 4     Multiple Vitamin (TAB-A-DAWSON) TABS TAKE 1 TABLET BY MOUTH ONCE DAILY 31 tablet 11     MYRBETRIQ 25 MG 24 hr tablet TAKE 1 TABLET BY MOUTH ONCE DAILY 31 tablet 11     oxyCODONE IR (ROXICODONE) 5 MG tablet Take 2.5 mg by mouth 2 times daily as needed for severe pain  6 tablet 0     polyethylene glycol (MIRALAX/GLYCOLAX) powder Take 17 g by mouth daily as needed for constipation       Probiotic Product  (PROBIOTIC DAILY) CAPS Take 1 capsule by mouth daily       SENEXON-S 8.6-50 MG per tablet TAKE 1 TABLET BY MOUTH TWICE DAILY;AND MAY TAKE 1 TABLET BY MOUTH TWICE DAILY AS NEEDED FOR CONSTIPATION 62 tablet 12     trimethoprim (TRIMPEX) 100 MG tablet TAKE 1 TABLET BY MOUTH ONCE DAILY 31 tablet 11     vitamin D (ERGOCALCIFEROL) 06125 UNIT capsule TAKE ONE CAPSULE BY MOUTH DAILY ON FRIDAYS 5 capsule 11     VITAMIN D, CHOLECALCIFEROL, PO Take 5,000 Units by mouth every 7 days       OTC products: as noted above.     Allergies   Allergen Reactions     Tetracyclines GI Disturbance     Ampicillin GI Disturbance     Cefadroxil Muscle Pain (Myalgia)     Cephalexin Diarrhea     Levofloxacin Other (See Comments)     Phlebitis with IV infusion     Macrobid [Nitrofuran Derivatives]      Sulfa Drugs      Sulfasalazine Nausea and Vomiting     Other reaction(s): Gastrointestinal  Added per pt request 5/20/18      Latex Allergy: NO    Social History   Substance Use Topics     Smoking status: Former Smoker     Quit date: 5/10/1981     Smokeless tobacco: Never Used     Alcohol use Not on file     History   Drug Use Not on file       REVIEW OF SYSTEMS:   10 point ROS of systems including Constitutional, Eyes, Respiratory, Cardiovascular, Gastroenterology, Genitourinary, Integumentary, Musculoskeletal, Psychiatric were all negative except for pertinent positives noted in my HPI.    EXAM:   /69  Pulse 66  Temp 97.8  F (36.6  C)  Resp 18  GENERAL APPEARANCE:  Alert, in no distress, pleasant, cooperative, oriented x 4  EYES:  EOM, lids, pupils and irises normal, sclera clear and conjunctiva normal, no discharge or mattering on lids or lashes noted  ENT:  Mouth normal, moist mucous membranes, nose without drainage or crusting, external ears without lesions, hearing acuity intact, speaks softly.  NECK:  Nontender, supple, symmetrical  RESP:  respiratory effort and palpation of chest normal, no chest wall tenderness, no respiratory  distress, Lung sounds clear, patient is on room air  CV:  Palpation and auscultation of heart done, rate and rhythm regular, no murmur, no rub or gallop. Edema none in bilateral lower extremities. VASCULAR: warm extremities.   ABDOMEN:  normal bowel sounds, soft, nontender.  M/S:   Gait and station wheelchair bound, tenderness to right ankle and knee. R knee slightly contracted. Global weakness, able to move arms, minimally wiggle toes.    : catheter in place. Clear yellow urine.   SKIN:  Inspection and palpation of skin and subcutaneous tissue: skin warm, dry and intact without rashes  NEURO: cranial nerves 2-12 grossly intact, no facial asymmetry, no speech deficits and able to follow directions, moves all extremities symmetrically  PSYCH:  insight and judgement intact, memory intact, affect and mood normal    DIAGNOSTICS:   No labs or EKG required for low risk surgery (cataract, skin procedure, breast biopsy, etc)    Recent Labs   Lab Test  09/24/18   1705   NA  136   POTASSIUM  4.4   CR  0.74        IMPRESSION:   Reason for surgery/procedure: neurogenic bladder with bladder spasms  The proposed surgical procedure is considered LOW risk.    REVISED CARDIAC RISK INDEX  The patient has the following serious cardiovascular risks for perioperative complications such as (MI, PE, VFib and 3  AV Block):  No serious cardiac risks  INTERPRETATION: 0 risks: Class I (very low risk - 0.4% complication rate)    The patient has the following additional risks for perioperative complications:  No identified additional risks    No diagnosis found.    ORDERS:        Medications: HOLD IBUPROFEN x 7 days prior to procedure    APPROVAL GIVEN to proceed with proposed procedure, without further diagnostic evaluation       Signed Electronically by: CAROL Dupont CNP    Copy of this evaluation report is provided to requesting physician.    [place] Preop Guidelines    Revised Cardiac Risk Index

## 2018-10-24 NOTE — LETTER
10/24/2018        RE: Cristina Stevens  [address]  [facility] [address]        PRE-OP EVALUATION:    [place] Medical Record Number:  [id number]  Place of Service where encounter took place:  [facility]) [[id number]]  Today's date: 2018    [facility]  [address]  [address]  [place] [id number] [phone]  Dept: [phone]    PRE-OP EVALUATION:  Today's date: 10/24/2018    Cristina Stevens (: 1945) presents for pre-operative evaluation assessment as requested by Dr. Zazueta.  She requires evaluation and anesthesia risk assessment prior to undergoing surgery/procedure for treatment of neurogenic bladder with chronic bladder spasms..    Proposed Surgery/ Procedure: CYSTOSCOPIC INJECTION BLADDER BOTOX   Date of Surgery/ Procedure: 2018  Time of Surgery/ Procedure: [facility]  Hospital/Surgical Facility: [facility]  Primary Physician: Kimberlee Castro  Type of Anesthesia Anticipated: to be determined    1. NO - Do you have a history of heart attack, stroke, stent, bypass or surgery on an artery in the head, neck, heart or legs?  2. NO - Do you ever have any pain or discomfort in your chest?  3. NO - Do you have a history of  Heart Failure?  4. NO - Are you troubled by shortness of breath when: walking on the level, up a slight hill or at night?  5. NO - Do you currently have a cold, bronchitis or other respiratory infection?  6. NO - Do you have a cough, shortness of breath or wheezing?  7. NO - Do you sometimes get pains in the calves of your legs when you walk?  8. NO - Do you or anyone in your family have previous history of blood clots?  9. NO - Do you or does anyone in your family have a serious bleeding problem such as prolonged bleeding following surgeries or cuts?  10. NO - Have you ever had problems with anemia or been told to take iron pills?  11. NO - Have you had any abnormal blood loss  "such as black, tarry or bloody stools, or abnormal vaginal bleeding?  12. NO - Have you ever had a blood transfusion?  13. NO - Have you or any of your relatives ever had problems with anesthesia?  14. NO - Do you have sleep apnea, excessive snoring or daytime drowsiness?  15. NO - Do you have any prosthetic heart valves?  16. NO - Do you have prosthetic joints?  17. NO - Is there any chance that you may be pregnant?      HPI:     HPI related to upcoming procedure:   Cristina Stevens is a 73 year old  (1945) female with a medical history notable for MS with spastic quadriplegia, neurogenic bladder, recurrent UTIs and chronic pain. She has a chronic catheter in place and has recently noted an increase of bypassing urine which is typical when an injection is due. Muna reports that she generally has the botox injections every 4-6 months. In the past, she has developed \"20 seconds of bee-stinging pain\" after the injections so she will go to the hospital for the injection for better pain control post procedure. She feels well today and has not had fever, body aches, or chills. Last catheter change was 4 weeks ago and has deferred current catheter change as it will be changed out during the procedure.         MEDICAL HISTORY:     Patient Active Problem List    Diagnosis Date Noted     Chronic constipation 07/12/2018     Priority: Medium     Screening for osteoporosis 07/10/2018     Priority: Medium     Overview:   [id number]       Closed fracture of right tibial plateau 07/09/2018     Priority: Medium     Acute blood loss anemia 06/19/2018     Priority: Medium     Neurogenic bladder 06/08/2018     Priority: Medium     Immobility 06/08/2018     Priority: Medium     Heat intolerance 05/26/2018     Priority: Medium     Closed fracture of neck of right femur (H) 05/20/2018     Priority: Medium     OAB (overactive bladder) 06/02/2017     Priority: Medium     Overview:   Added automatically from request for surgery " [id number]       At risk for impaired skin integrity 12/14/2014     Priority: Medium     Overview:    Bilateral lower extremity plegia, truncal weakness, right arm profoundly weak.         Neuromuscular respiratory weakness 09/26/2014     Priority: Medium     Overview:   PFTs Sept 2014:  difficulty performing all PFT maneuvers.   Results not reproducible thus may not be accurate. No airway obstruction. FEV1 and FVC both significantly decreased. There was a significant response to bronchodilator. FEV1 improved by 15% after bronchodilator. Slow vital capacity  severely decreased at 44% predicted.DLCO severely decreased at 16% predicted.   NIF  severely decreased at -34.       Scoliosis associated with other condition 09/10/2014     Priority: Medium     Overview:   Weakness of right worse than left axial muscles, left leaning thoracic kyphoscoliosis       Kidney stone 06/19/2014     Priority: Medium     Overview:   7/2014- lithotripsy.-Dr Zazueta.       MDRO (multiple drug resistant organisms) resistance 05/31/2013     Priority: Medium     Overview:   MRSA  5/27/2013  Site: Urine  [facility]    Positive MRSA  Urine  7/10/2013  [facility]    Urine  12/19/2014 5/14/2014  MRSA ISOLATED  Urine  [facility]       Anemia 09/25/2012     Priority: Medium     Chronic pain 09/25/2012     Priority: Medium     Overview:   Right arm and shoulder- botox helpful but short lasting.  - Acupuncture once weekly.  --Standing frame 5d/week for 30min.       Frequent UTI 09/25/2012     Priority: Medium     Overview:   --UTI avg q 2weeks - has ruby cath, only tx if symptomatic per urology- fever, chills, hematuria, mentation changes.  --Tiazidine not helpful in past.  --has had Urodynamic studies in the past  --Seen at [facility] in the past- started Vesicare.       Spasticity 10/10/2011     Priority: Medium     Spastic quadriplegia (H) 10/10/2011     Priority: Medium     Multiple sclerosis (H) 05/10/2011     Priority: Medium      Osteoporosis 06/16/2006     Priority: Medium     Overview:   [facility]         Past Medical History:   Diagnosis Date     Acute blood loss anemia 6/19/2018     Anemia 9/25/2012     At risk for impaired skin integrity 12/14/2014    Overview:   Bilateral lower extremity plegia, truncal weakness, right arm profoundly weak.       Chronic constipation 7/12/2018     Chronic pain 9/25/2012    Overview:  Right arm and shoulder- botox helpful but short lasting. - Acupuncture once weekly. --Standing frame 5d/week for 30min.     Closed fracture of neck of right femur (H) 5/20/2018     Closed fracture of right tibial plateau 7/9/2018     Frequent UTI 9/25/2012    Overview:  --UTI avg q 2weeks - has ruby cath, only tx if symptomatic per urology- fever, chills, hematuria, mentation changes. --Tiazidine not helpful in past. --has had Urodynamic studies in the past --Seen at [facility] in the past- started Vesicare.     Heat intolerance 5/26/2018     Immobility 6/8/2018     Kidney stone 6/19/2014    Overview:  7/2014- lithotripsy.-Dr Zazueta.     MDRO (multiple drug resistant organisms) resistance 5/31/2013    Overview:  MRSA 5/27/2013 Site: Urine [facility]  Positive MRSA Urine 7/10/2013 [facility]  Urine 12/19/2014 5/14/2014 MRSA ISOLATED Urine [facility]     Multiple sclerosis (H) 5/10/2011     Neurogenic bladder 6/8/2018     Neuromuscular respiratory weakness 9/26/2014    Overview:  PFTs Sept 2014:  difficulty performing all PFT maneuvers.   Results not reproducible thus may not be accurate. No airway obstruction. FEV1 and FVC both significantly decreased. There was a significant response to bronchodilator. FEV1 improved by 15% after bronchodilator. Slow vital capacity  severely decreased at 44% predicted.DLCO severely decreased at 16% predicted.   NIF  severely decreased at -34.     OAB (overactive bladder) 6/2/2017    Overview:  Added automatically from request for surgery [id number]     Osteoporosis 6/16/2006     Overview:  Epic      Scoliosis associated with other condition 9/10/2014    Overview:  Weakness of right worse than left axial muscles, left leaning thoracic kyphoscoliosis     Screening for osteoporosis 7/10/2018    Overview:  [id number]     Spastic quadriplegia (H) 10/10/2011     Spasticity 10/10/2011     No past surgical history on file.  Current Outpatient Prescriptions   Medication Sig Dispense Refill     Abaloparatide 3120 MCG/1.56ML SOPN injection Inject 0.04 mLs (80 mcg) Subcutaneous daily 1.56 mL 11     baclofen (LIORESAL) 10 MG tablet TAKE ONE-HALF TABLET (5MG) BY MOUTH TWICE DAILY;AMD TAKE ONE-HALF TABLET (5MG) BY MOUTH EVERY 4 HOURS AS NEEDED 31 tablet 11     BRENDA-GEST ANTACID 500 MG chewable tablet TAKE 1 TABLET BY MOUTH ONCE DAILY 31 tablet 11     CALMOSEPTINE 0.44-20.6 % OINT APPLY TOPICALLY TO BUTTOCKS AS NEEDED WITH BRIEF CHANGES 113 g 3     cetirizine (ZYRTEC) 10 MG tablet Take 10 mg by mouth daily as needed for allergies       Cholecalciferol (VITAMIN D3) 2000 units CAPS TAKE TWO CAPSULES (4000 UNITS) BY MOUTH DAILY 62 capsule 11     CO2-Releasing (-TWO) SUPP Place rectally daily as needed       CRANBERRY CONCENTRATE 500 MG CAPS TAKE 1 CAPSULE BY MOUTH ONCE DAILY 31 capsule 11     DESITIN 13 % CREA APPLY TOPICALLY TO AREAS OF REDNESS OR RASH WITH EACH BRIEF CHANGE 113 g 3     Diaper Rash Products (DESITIN) OINT Externally apply topically as needed       ibuprofen (ADVIL/MOTRIN) 400 MG tablet TAKE 1 TABLET BY MOUTH ONCE DAILY;TAKE 1 TABLET BY MOUTH ONCE DAILY AS NEEDED 31 tablet 11     insulin pen needle (B-D U/F) 31G X 5 MM Use once daily as directed with Tymlos 100 each 4     Multiple Vitamin (TAB-A-DAWSON) TABS TAKE 1 TABLET BY MOUTH ONCE DAILY 31 tablet 11     MYRBETRIQ 25 MG 24 hr tablet TAKE 1 TABLET BY MOUTH ONCE DAILY 31 tablet 11     oxyCODONE IR (ROXICODONE) 5 MG tablet Take 2.5 mg by mouth 2 times daily as needed for severe pain  6 tablet 0     polyethylene glycol  "(MIRALAX/GLYCOLAX) powder Take 17 g by mouth daily as needed for constipation       Probiotic Product (PROBIOTIC DAILY) CAPS Take 1 capsule by mouth daily       SENEXON-S 8.6-50 MG per tablet TAKE 1 TABLET BY MOUTH TWICE DAILY;AND MAY TAKE 1 TABLET BY MOUTH TWICE DAILY AS NEEDED FOR CONSTIPATION 62 tablet 12     trimethoprim (TRIMPEX) 100 MG tablet TAKE 1 TABLET BY MOUTH ONCE DAILY 31 tablet 11     vitamin D (ERGOCALCIFEROL) 33704 UNIT capsule TAKE ONE CAPSULE BY MOUTH DAILY ON FRIDAYS 5 capsule 11     VITAMIN D, CHOLECALCIFEROL, PO Take 5,000 Units by mouth every 7 days       OTC products: as noted above.     Allergies   Allergen Reactions     Tetracyclines GI Disturbance     Ampicillin GI Disturbance     Cefadroxil Muscle Pain (Myalgia)     Cephalexin Diarrhea     Levofloxacin Other (See Comments)     Phlebitis with IV infusion     Macrobid [Nitrofuran Derivatives]      Sulfa Drugs      Sulfasalazine Nausea and Vomiting     Other reaction(s): Gastrointestinal  Added per pt request 5/20/18      Latex Allergy: {YES/NO WITH DEFAULT:552088::\"NO\"}    Social History   Substance Use Topics     Smoking status: Former Smoker     Quit date: 5/10/1981     Smokeless tobacco: Never Used     Alcohol use Not on file     History   Drug Use Not on file       REVIEW OF SYSTEMS:   10 point ROS of systems including Constitutional, Eyes, Respiratory, Cardiovascular, Gastroenterology, Genitourinary, Integumentary, Musculoskeletal, Psychiatric were all negative except for pertinent positives noted in my HPI.    EXAM:   /69  Pulse 66  Temp 97.8  F (36.6  C)  Resp 18  GENERAL APPEARANCE:  Alert, in no distress, pleasant, cooperative, oriented x 4  EYES:  EOM, lids, pupils and irises normal, sclera clear and conjunctiva normal, no discharge or mattering on lids or lashes noted  ENT:  Mouth normal, moist mucous membranes, nose without drainage or crusting, external ears without lesions, hearing acuity intact, speaks softly.  NECK: "  Nontender, supple, symmetrical  RESP:  respiratory effort and palpation of chest normal, no chest wall tenderness, no respiratory distress, Lung sounds clear, patient is on room air  CV:  Palpation and auscultation of heart done, rate and rhythm regular, no murmur, no rub or gallop. Edema none in bilateral lower extremities. VASCULAR: warm extremities.   ABDOMEN:  normal bowel sounds, soft, nontender.  M/S:   Gait and station wheelchair bound, tenderness to right ankle and knee. R knee slightly contracted. Global weakness, able to move arms, minimally wiggle toes.    : catheter in place. Clear yellow urine.   SKIN:  Inspection and palpation of skin and subcutaneous tissue: skin warm, dry and intact without rashes  NEURO: cranial nerves 2-12 grossly intact, no facial asymmetry, no speech deficits and able to follow directions, moves all extremities symmetrically  PSYCH:  insight and judgement intact, memory intact, affect and mood normal    DIAGNOSTICS:   No labs or EKG required for low risk surgery (cataract, skin procedure, breast biopsy, etc)    Recent Labs   Lab Test  09/24/18   1705   NA  136   POTASSIUM  4.4   CR  0.74        IMPRESSION:   Reason for surgery/procedure: neurogenic bladder with bladder spasms  The proposed surgical procedure is considered LOW risk.    REVISED CARDIAC RISK INDEX  The patient has the following serious cardiovascular risks for perioperative complications such as (MI, PE, VFib and 3  AV Block):  No serious cardiac risks  INTERPRETATION: 0 risks: Class I (very low risk - 0.4% complication rate)    The patient has the following additional risks for perioperative complications:  No identified additional risks    No diagnosis found.    ORDERS:        Medications: HOLD IBUPROFEN x 7 days prior to procedure    APPROVAL GIVEN to proceed with proposed procedure, without further diagnostic evaluation       Signed Electronically by: CAROL Dupont CNP    Copy of this evaluation  report is provided to requesting physician.    [place] Preop Guidelines    Revised Cardiac Risk Index          Sincerely,        CAROL Dupont CNP

## 2018-12-11 ENCOUNTER — ASSISTED LIVING VISIT (OUTPATIENT)
Dept: GERIATRICS | Facility: CLINIC | Age: 73
End: 2018-12-11
Payer: COMMERCIAL

## 2018-12-11 ENCOUNTER — TRANSFERRED RECORDS (OUTPATIENT)
Dept: HEALTH INFORMATION MANAGEMENT | Facility: CLINIC | Age: 73
End: 2018-12-11

## 2018-12-11 ENCOUNTER — RECORDS - HEALTHEAST (OUTPATIENT)
Dept: LAB | Facility: CLINIC | Age: 73
End: 2018-12-11

## 2018-12-11 VITALS
HEART RATE: 66 BPM | DIASTOLIC BLOOD PRESSURE: 69 MMHG | RESPIRATION RATE: 18 BRPM | TEMPERATURE: 97.8 F | SYSTOLIC BLOOD PRESSURE: 146 MMHG

## 2018-12-11 DIAGNOSIS — T83.9XXA PROBLEM WITH FOLEY CATHETER, INITIAL ENCOUNTER (H): ICD-10-CM

## 2018-12-11 DIAGNOSIS — G35 MS (MULTIPLE SCLEROSIS) (H): ICD-10-CM

## 2018-12-11 DIAGNOSIS — N31.9 NEUROGENIC BLADDER: Primary | ICD-10-CM

## 2018-12-11 RX ORDER — ASPIRIN 325 MG
325 TABLET ORAL DAILY
COMMUNITY
End: 2019-07-23

## 2018-12-11 NOTE — PROGRESS NOTES
[facility]    Chief Complaint   Patient presents with     KELLEY       [place] Medical Record Number:  [id number]  Place of Service where encounter took place:  [facility] [[id number]]    HPI:    Cristina Stevens is a 73 year old  (1945), who is being seen today for an episodic care visit.  HPI information obtained from: facility chart records, facility staff and patient report.    Resident was visited today due to urinary retention secondary to clogged catheter. Home care RN notified NP today that resident called her about bypassing urine all evening which was noted to have foul order. RN attempted to irrigate the ruby and was unable to after many attempts. Last ruby change out was one week ago and took 3-4 attempts to get placed. Resident denies pain related to catheter, bladder pressure or body aches. She has noted that 3 times this week she has woke with hot flashes in which she has not had before. BP today 130/69, HR 80, RR 16, T 97.1, Oxygen 94% on room air.     ALLERGIES: Tetracyclines; Ampicillin; Cefadroxil; Cephalexin; Levofloxacin; Macrobid [nitrofuran derivatives]; Sulfa drugs; and Sulfasalazine  Past Medical, Surgical, Family and Social History reviewed and updated in [facility].    Current Outpatient Medications   Medication Sig Dispense Refill     Abaloparatide 3120 MCG/1.56ML SOPN injection Inject 0.04 mLs (80 mcg) Subcutaneous daily 1.56 mL 11     baclofen (LIORESAL) 10 MG tablet TAKE ONE-HALF TABLET (5MG) BY MOUTH TWICE DAILY;AMD TAKE ONE-HALF TABLET (5MG) BY MOUTH EVERY 4 HOURS AS NEEDED 31 tablet 11     BRENDA-GEST ANTACID 500 MG chewable tablet TAKE 1 TABLET BY MOUTH ONCE DAILY 31 tablet 11     CALMOSEPTINE 0.44-20.6 % OINT APPLY TOPICALLY TO BUTTOCKS AS NEEDED WITH BRIEF CHANGES 113 g 3     cetirizine (ZYRTEC) 10 MG tablet Take 10 mg by mouth daily as needed for allergies       Cholecalciferol (VITAMIN D3) 2000 units CAPS TAKE TWO  CAPSULES (4000 UNITS) BY MOUTH DAILY 62 capsule 11     CO2-Releasing (-TWO) SUPP Place rectally daily as needed       CRANBERRY CONCENTRATE 500 MG CAPS TAKE 1 CAPSULE BY MOUTH ONCE DAILY 31 capsule 11     DESITIN 13 % CREA APPLY TOPICALLY TO AREAS OF REDNESS OR RASH WITH EACH BRIEF CHANGE 113 g 3     Diaper Rash Products (DESITIN) OINT Externally apply topically as needed       ibuprofen (ADVIL/MOTRIN) 400 MG tablet TAKE 1 TABLET BY MOUTH ONCE DAILY;TAKE 1 TABLET BY MOUTH ONCE DAILY AS NEEDED 31 tablet 11     insulin pen needle (B-D U/F) 31G X 5 MM Use once daily as directed with Tymlos 100 each 4     Multiple Vitamin (TAB-A-DAWSON) TABS TAKE 1 TABLET BY MOUTH ONCE DAILY 31 tablet 11     MYRBETRIQ 25 MG 24 hr tablet TAKE 1 TABLET BY MOUTH ONCE DAILY 31 tablet 11     oxyCODONE IR (ROXICODONE) 5 MG tablet Take 2.5 mg by mouth 2 times daily as needed for severe pain  6 tablet 0     polyethylene glycol (MIRALAX/GLYCOLAX) powder Take 17 g by mouth daily as needed for constipation       Probiotic Product (PROBIOTIC DAILY) CAPS Take 1 capsule by mouth daily       SENEXON-S 8.6-50 MG per tablet TAKE 1 TABLET BY MOUTH TWICE DAILY;AND MAY TAKE 1 TABLET BY MOUTH TWICE DAILY AS NEEDED FOR CONSTIPATION 62 tablet 12     trimethoprim (TRIMPEX) 100 MG tablet TAKE 1 TABLET BY MOUTH ONCE DAILY 31 tablet 11     vitamin D (ERGOCALCIFEROL) 73928 UNIT capsule TAKE ONE CAPSULE BY MOUTH DAILY ON FRIDAYS 5 capsule 11     VITAMIN D, CHOLECALCIFEROL, PO Take 5,000 Units by mouth every 7 days       Medications reviewed:  Medications reconciled to facility chart and changes were made to reflect current medications as identified as above med list. Below are the changes that were made:   Medications stopped since last EPIC medication reconciliation:   There are no discontinued medications.    Medications started since last [facility] medication reconciliation:  No orders of the defined types were placed in this encounter.    REVIEW OF SYSTEMS:  4  point ROS including Respiratory, CV, GI and , other than that noted in the HPI,  is negative    Physical Exam:  /69   Pulse 66   Temp 97.8  F (36.6  C)   Resp 18   GENERAL APPEARANCE:  Alert, in no distress, pleasant, cooperative, oriented x 4  EYES:  EOM, lids, pupils and irises normal, sclera clear and conjunctiva normal, no discharge or mattering on lids or lashes noted  ENT:  Mouth normal, moist mucous membranes, nose without drainage or crusting, external ears without lesions, hearing acuity intact, speaks softly.  NECK:  Nontender, supple, symmetrical  RESP:  respiratory effort and palpation of chest normal, no chest wall tenderness, no respiratory distress, Lung sounds clear, patient is on room air  CV:  Palpation and auscultation of heart done, rate and rhythm regular, no murmur, no rub or gallop. Edema none in bilateral lower extremities. VASCULAR: warm extremities.   ABDOMEN:  normal bowel sounds, soft, nontender.  M/S:   Gait and station wheelchair bound.  : catheter in place. Clear yellow urine.   SKIN:  Inspection and palpation of skin and subcutaneous tissue: skin warm, dry and intact without rashes  NEURO: cranial nerves 2-12 grossly intact, no facial asymmetry, no speech deficits and able to follow directions, moves all extremities symmetrically  PSYCH:  insight and judgement intact, memory intact, affect and mood normal    Recent Labs:   Last Basic Metabolic Panel:  Recent Labs   Lab Test 09/24/18  1705      POTASSIUM 4.4   CHLORIDE 103   BRENDA 9.4   CO2 25   BUN 14   CR 0.74   *       Assessment/Plan:  (N31.9) Neurogenic bladder  (primary encounter diagnosis)  (G35) MS (multiple sclerosis) (H)  (T83.9XXA) Problem with Ruby catheter, initial encounter (H)  Comment: last ruby changed out one week ago but required 3-4 different attempts before placed, now with bypassing urine which is likely a large piece of sediment blocking. Bypassing urine was malodorous and due to history  of requent UTI's will start antibiotic after ruby is changed out.   Plan: send UC only. Start ciprofloxacin 250 mg BID x 5 days. Await culture results.     Electronically signed by  CAROL Sr CNP

## 2018-12-11 NOTE — LETTER
12/11/2018        RE: Cristina Stevens  [address]  [facility] [address]        [place] [facility]    Chief Complaint   Patient presents with     GINECK       [place] Medical Record Number:  [id number]  Place of Service where encounter took place:  [facility] [[id number]]    HPI:    Cristina Stevens is a 73 year old  (1945), who is being seen today for an episodic care visit.  HPI information obtained from: facility chart records, facility staff and patient report.    Resident was visited today due to urinary retention secondary to clogged catheter. Home care RN notified NP today that resident called her about bypassing urine all evening which was noted to have foul order. RN attempted to irrigate the ruby and was unable to after many attempts. Last ruby change out was one week ago and took 3-4 attempts to get placed. Resident denies pain related to catheter, bladder pressure or body aches. She has noted that 3 times this week she has woke with hot flashes in which she has not had before. BP today 130/69, HR 80, RR 16, T 97.1, Oxygen 94% on room air.     ALLERGIES: Tetracyclines; Ampicillin; Cefadroxil; Cephalexin; Levofloxacin; Macrobid [nitrofuran derivatives]; Sulfa drugs; and Sulfasalazine  Past Medical, Surgical, Family and Social History reviewed and updated in [facility].    Current Outpatient Medications   Medication Sig Dispense Refill     Abaloparatide 3120 MCG/1.56ML SOPN injection Inject 0.04 mLs (80 mcg) Subcutaneous daily 1.56 mL 11     baclofen (LIORESAL) 10 MG tablet TAKE ONE-HALF TABLET (5MG) BY MOUTH TWICE DAILY;AMD TAKE ONE-HALF TABLET (5MG) BY MOUTH EVERY 4 HOURS AS NEEDED 31 tablet 11     BRENDA-GEST ANTACID 500 MG chewable tablet TAKE 1 TABLET BY MOUTH ONCE DAILY 31 tablet 11     CALMOSEPTINE 0.44-20.6 % OINT APPLY TOPICALLY TO BUTTOCKS AS NEEDED WITH BRIEF CHANGES 113 g 3     cetirizine (ZYRTEC) 10 MG tablet Take 10 mg by  mouth daily as needed for allergies       Cholecalciferol (VITAMIN D3) 2000 units CAPS TAKE TWO CAPSULES (4000 UNITS) BY MOUTH DAILY 62 capsule 11     CO2-Releasing (-TWO) SUPP Place rectally daily as needed       CRANBERRY CONCENTRATE 500 MG CAPS TAKE 1 CAPSULE BY MOUTH ONCE DAILY 31 capsule 11     DESITIN 13 % CREA APPLY TOPICALLY TO AREAS OF REDNESS OR RASH WITH EACH BRIEF CHANGE 113 g 3     Diaper Rash Products (DESITIN) OINT Externally apply topically as needed       ibuprofen (ADVIL/MOTRIN) 400 MG tablet TAKE 1 TABLET BY MOUTH ONCE DAILY;TAKE 1 TABLET BY MOUTH ONCE DAILY AS NEEDED 31 tablet 11     insulin pen needle (B-D U/F) 31G X 5 MM Use once daily as directed with Tymlos 100 each 4     Multiple Vitamin (TAB-A-DAWSON) TABS TAKE 1 TABLET BY MOUTH ONCE DAILY 31 tablet 11     MYRBETRIQ 25 MG 24 hr tablet TAKE 1 TABLET BY MOUTH ONCE DAILY 31 tablet 11     oxyCODONE IR (ROXICODONE) 5 MG tablet Take 2.5 mg by mouth 2 times daily as needed for severe pain  6 tablet 0     polyethylene glycol (MIRALAX/GLYCOLAX) powder Take 17 g by mouth daily as needed for constipation       Probiotic Product (PROBIOTIC DAILY) CAPS Take 1 capsule by mouth daily       SENEXON-S 8.6-50 MG per tablet TAKE 1 TABLET BY MOUTH TWICE DAILY;AND MAY TAKE 1 TABLET BY MOUTH TWICE DAILY AS NEEDED FOR CONSTIPATION 62 tablet 12     trimethoprim (TRIMPEX) 100 MG tablet TAKE 1 TABLET BY MOUTH ONCE DAILY 31 tablet 11     vitamin D (ERGOCALCIFEROL) 46510 UNIT capsule TAKE ONE CAPSULE BY MOUTH DAILY ON FRIDAYS 5 capsule 11     VITAMIN D, CHOLECALCIFEROL, PO Take 5,000 Units by mouth every 7 days       Medications reviewed:  Medications reconciled to facility chart and changes were made to reflect current medications as identified as above med list. Below are the changes that were made:   Medications stopped since last EPIC medication reconciliation:   There are no discontinued medications.    Medications started since last EPIC medication  reconciliation:  No orders of the defined types were placed in this encounter.    REVIEW OF SYSTEMS:  4 point ROS including Respiratory, CV, GI and , other than that noted in the HPI,  is negative    Physical Exam:  /69   Pulse 66   Temp 97.8  F (36.6  C)   Resp 18   GENERAL APPEARANCE:  Alert, in no distress, pleasant, cooperative, oriented x 4  EYES:  EOM, lids, pupils and irises normal, sclera clear and conjunctiva normal, no discharge or mattering on lids or lashes noted  ENT:  Mouth normal, moist mucous membranes, nose without drainage or crusting, external ears without lesions, hearing acuity intact, speaks softly.  NECK:  Nontender, supple, symmetrical  RESP:  respiratory effort and palpation of chest normal, no chest wall tenderness, no respiratory distress, Lung sounds clear, patient is on room air  CV:  Palpation and auscultation of heart done, rate and rhythm regular, no murmur, no rub or gallop. Edema none in bilateral lower extremities. VASCULAR: warm extremities.   ABDOMEN:  normal bowel sounds, soft, nontender.  M/S:   Gait and station wheelchair bound.  : catheter in place. Clear yellow urine.   SKIN:  Inspection and palpation of skin and subcutaneous tissue: skin warm, dry and intact without rashes  NEURO: cranial nerves 2-12 grossly intact, no facial asymmetry, no speech deficits and able to follow directions, moves all extremities symmetrically  PSYCH:  insight and judgement intact, memory intact, affect and mood normal    Recent Labs:   Last Basic Metabolic Panel:  Recent Labs   Lab Test 09/24/18  1705      POTASSIUM 4.4   CHLORIDE 103   BRENDA 9.4   CO2 25   BUN 14   CR 0.74   *       Assessment/Plan:  (N31.9) Neurogenic bladder  (primary encounter diagnosis)  (G35) MS (multiple sclerosis) (H)  (T83.9XXA) Problem with Ruby catheter, initial encounter (H)  Comment: last ruby changed out one week ago but required 3-4 different attempts before placed, now with bypassing  urine which is likely a large piece of sediment blocking. Bypassing urine was malodorous and due to history of requent UTI's will start antibiotic after ruby is changed out.   Plan: send UC only. Start ciprofloxacin 250 mg BID x 5 days. Await culture results.     Electronically signed by  CAROL Sr CNP                    Sincerely,        CAROL Dupont CNP

## 2018-12-13 LAB
BACTERIA SPEC CULT: ABNORMAL
BACTERIA SPEC CULT: ABNORMAL

## 2019-01-24 RX ORDER — AMOXICILLIN 250 MG
250 TABLET,CHEWABLE ORAL DAILY PRN
COMMUNITY
End: 2019-07-30

## 2019-01-24 RX ORDER — ARMODAFINIL 150 MG/1
150 TABLET ORAL DAILY
COMMUNITY
End: 2019-03-19

## 2019-01-24 RX ORDER — BACLOFEN 10 MG/1
5 TABLET ORAL DAILY
COMMUNITY
End: 2020-01-21

## 2019-01-24 RX ORDER — CIPROFLOXACIN 500 MG/1
500 TABLET, FILM COATED ORAL 2 TIMES DAILY
COMMUNITY
End: 2019-07-23

## 2019-01-25 ENCOUNTER — ASSISTED LIVING VISIT (OUTPATIENT)
Dept: GERIATRICS | Facility: CLINIC | Age: 74
End: 2019-01-25

## 2019-01-25 VITALS
HEART RATE: 66 BPM | DIASTOLIC BLOOD PRESSURE: 69 MMHG | RESPIRATION RATE: 18 BRPM | SYSTOLIC BLOOD PRESSURE: 146 MMHG | TEMPERATURE: 97.8 F

## 2019-01-25 DIAGNOSIS — G82.50 SPASTIC QUADRIPLEGIA (H): ICD-10-CM

## 2019-01-25 DIAGNOSIS — Z13.6 SCREENING FOR HYPERTENSION: ICD-10-CM

## 2019-01-25 DIAGNOSIS — M81.0 SENILE OSTEOPOROSIS: ICD-10-CM

## 2019-01-25 DIAGNOSIS — N39.0 URINARY TRACT INFECTION ASSOCIATED WITH INDWELLING URETHRAL CATHETER, SUBSEQUENT ENCOUNTER: ICD-10-CM

## 2019-01-25 DIAGNOSIS — N31.9 NEUROGENIC BLADDER: ICD-10-CM

## 2019-01-25 DIAGNOSIS — Z00.00 ANNUAL PHYSICAL EXAM: Primary | ICD-10-CM

## 2019-01-25 DIAGNOSIS — Z97.8 FOLEY CATHETER IN PLACE: ICD-10-CM

## 2019-01-25 DIAGNOSIS — G35 MS (MULTIPLE SCLEROSIS) (H): ICD-10-CM

## 2019-01-25 DIAGNOSIS — T83.511D URINARY TRACT INFECTION ASSOCIATED WITH INDWELLING URETHRAL CATHETER, SUBSEQUENT ENCOUNTER: ICD-10-CM

## 2019-01-25 RX ORDER — ASCORBIC ACID 500 MG
500 TABLET ORAL DAILY
COMMUNITY
End: 2019-08-08

## 2019-01-25 NOTE — LETTER
2019        RE: Cristina Stevens  [address]  [facility] [address]        [place] [facility]  Chief Complaint   Patient presents with     Annual Comprehensive Exam Assisted Living       [place] Medical Record Number:  [id number]  Place of Service where encounter took place:  [facility] [place] [facility] [[id number]]      HPI:    Cristina Stevnes is a 73 year old  (1945), who is being seen today for an annual comprehensive visit.  HPI information obtained from: facility chart records, facility staff and patient report.      Multiple sclerosis, primary progressive (H)  Spastic quadriplegia (H)  Spasms are controlled with current baclofen and botox injections.  Had an injection by neurology yesterday. Uses a motorized wheelchair and has private duty help intermittently throughout the week. She has a van that has a lift for her wheelchair that she uses to go out.      Neurogenic bladder  Current urinary tract infection  Need for prophylaxis against urinary tract infection  Chronic ruby catheter in place that will now be changed by her urology clinic. She went to clinic this week and was found to have a UTI and is currently being treated with cipro. Denies fever, body aches, or chills.     Osteoporosis  She has a history of right femur fracture and right tibial plateu fracture.   DEXA (10/5/18) demonstrating severe osteoporosis:  Lumbar spine T-score in region of L1-L4 = -3.6    Radius 33% T-score = -3.7    Constipation  Currently on a bowel program, has regular bowel movements.    Screening for hypertension  Based on JNC-8 goals,  patients age of 73 year old, no presence of diabetes or CKD, and goals of care goal BP is  <140/90 mm Hg. Patient is stable and continue without pharmacological invention with routine assessment..  BP Readin/72  HR:  66-76    ALLERGIES: Tetracyclines; Ampicillin; Cefadroxil; Cephalexin; Levofloxacin; Macrobid [nitrofuran  derivatives]; Sulfa drugs; and Sulfasalazine  PROBLEM LIST:  Patient Active Problem List   Diagnosis     Multiple sclerosis (H)     Acute blood loss anemia     At risk for impaired skin integrity     Anemia     Chronic constipation     Chronic pain     Closed fracture of neck of right femur (H)     Spasticity     Spastic quadriplegia (H)     Screening for osteoporosis     Scoliosis associated with other condition     Osteoporosis     OAB (overactive bladder)     Neuromuscular respiratory weakness     Neurogenic bladder     MDRO (multiple drug resistant organisms) resistance     Kidney stone     Immobility     Heat intolerance     Frequent UTI     Closed fracture of right tibial plateau     PAST MEDICAL HISTORY:  has a past medical history of Acute blood loss anemia (6/19/2018), Anemia (9/25/2012), At risk for impaired skin integrity (12/14/2014), Chronic constipation (7/12/2018), Chronic pain (9/25/2012), Closed fracture of neck of right femur (H) (5/20/2018), Closed fracture of right tibial plateau (7/9/2018), Frequent UTI (9/25/2012), Heat intolerance (5/26/2018), Immobility (6/8/2018), Kidney stone (6/19/2014), MDRO (multiple drug resistant organisms) resistance (5/31/2013), Multiple sclerosis (H) (5/10/2011), Neurogenic bladder (6/8/2018), Neuromuscular respiratory weakness (9/26/2014), OAB (overactive bladder) (6/2/2017), Osteoporosis (6/16/2006), Scoliosis associated with other condition (9/10/2014), Screening for osteoporosis (7/10/2018), 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  PAST SURGICAL HISTORY:  has no past surgical history on file.  FAMILY HISTORY: family history is not on file.  SOCIAL HISTORY:  reports that she quit smoking about 37 years ago. she has never used smokeless tobacco.  IMMUNIZATIONS:  Most Recent Immunizations   Administered Date(s) Administered     FLU 6-35 months 10/26/2009     Flu, Unspecified 10/13/2014     Influenza (High Dose) 3 valent vaccine  11/03/2017     Influenza (IIV3) PF 09/27/2010     Pneumo Conj 13-V (2010&after) 02/24/2016     Pneumococcal 23 valent 11/12/2013     TDAP Vaccine (Adacel) 04/19/2010     Above immunizations pulled from [facility]. [facility] and facility records also reconciled. Outstanding information sent to  to update [facility].  Future immunizations are not needed at this point as all recommended immunizations are up to date.   MEDICATIONS:  Current Outpatient Medications   Medication Sig Dispense Refill     Abaloparatide 3120 MCG/1.56ML SOPN injection Inject 0.04 mLs (80 mcg) Subcutaneous daily 1.56 mL 11     amoxicillin (AMOXIL) 250 MG chewable tablet Take 250 mg by mouth daily as needed       armodafinil (NUVIGIL) 150 MG TABS tablet Take 150 mg by mouth daily       aspirin (ASA) 325 MG tablet Take 325 mg by mouth daily       baclofen (LIORESAL) 10 MG tablet Take 5 mg by mouth daily AND 5 mg every 4 hours as needed       CALMOSEPTINE 0.44-20.6 % OINT APPLY TOPICALLY TO BUTTOCKS AS NEEDED WITH BRIEF CHANGES 113 g 3     cetirizine (ZYRTEC) 10 MG tablet TAKE 1 TABLET BY MOUTH ONCE DAILY 31 tablet 11     Cholecalciferol (VITAMIN D3) 2000 units CAPS TAKE TWO CAPSULES (4000 UNITS) BY MOUTH DAILY 62 capsule 11     ciprofloxacin (CIPRO) 500 MG tablet Take 500 mg by mouth 2 times daily       CO2-Releasing (-TWO) SUPP Place rectally daily as needed       CRANBERRY CONCENTRATE 500 MG CAPS TAKE 1 CAPSULE BY MOUTH ONCE DAILY 31 capsule 11     DESITIN 13 % CREA APPLY TOPICALLY TO AREAS OF REDNESS OR RASH WITH EACH BRIEF CHANGE 113 g 3     Diaper Rash Products (DESITIN) OINT Externally apply topically as needed       ibuprofen (ADVIL/MOTRIN) 400 MG tablet TAKE 1 TABLET BY MOUTH ONCE DAILY;TAKE 1 TABLET BY MOUTH ONCE DAILY AS NEEDED 31 tablet 11     Multiple Vitamin (TAB-A-DAWSON) TABS TAKE 1 TABLET BY MOUTH ONCE DAILY 31 tablet 11     MYRBETRIQ 25 MG 24 hr tablet TAKE 1 TABLET BY MOUTH ONCE DAILY 31 tablet 11     oxyCODONE  IR (ROXICODONE) 5 MG tablet Take 2.5 mg by mouth 2 times daily as needed for severe pain  6 tablet 0     polyethylene glycol (MIRALAX/GLYCOLAX) powder Take 17 g by mouth daily as needed for constipation       Probiotic Product (PROBIOTIC DAILY) CAPS Take 1 capsule by mouth daily       SENEXON-S 8.6-50 MG per tablet TAKE 1 TABLET BY MOUTH TWICE DAILY;AND MAY TAKE 1 TABLET BY MOUTH TWICE DAILY AS NEEDED FOR CONSTIPATION 62 tablet 12     trimethoprim (TRIMPEX) 100 MG tablet TAKE 1 TABLET BY MOUTH ONCE DAILY 31 tablet 11     vitamin C (ASCORBIC ACID) 500 MG tablet Take 500 mg by mouth daily       baclofen (LIORESAL) 10 MG tablet TAKE ONE-HALF TABLET (5MG) BY MOUTH TWICE DAILY;AMD TAKE ONE-HALF TABLET (5MG) BY MOUTH EVERY 4 HOURS AS NEEDED (Patient not taking: Reported on 1/24/2019) 31 tablet 11     BRENDA-GEST ANTACID 500 MG chewable tablet TAKE 1 TABLET BY MOUTH ONCE DAILY (Patient not taking: Reported on 1/25/2019) 31 tablet 11     insulin pen needle (B-D U/F) 31G X 5 MM Use once daily as directed with Tymlos (Patient not taking: Reported on 1/25/2019) 100 each 4     vitamin D (ERGOCALCIFEROL) 84290 UNIT capsule TAKE ONE CAPSULE BY MOUTH DAILY ON FRIDAYS (Patient not taking: Reported on 1/25/2019) 5 capsule 11     VITAMIN D, CHOLECALCIFEROL, PO Take 5,000 Units by mouth every 7 days        Medications reviewed:  Medications reconciled to facility chart and changes were made to reflect current medications as identified as above med list. Below are the changes that were made:   Medications stopped since last EPIC medication reconciliation:   There are no discontinued medications.    Medications started since last [facility] medication reconciliation:  Orders Placed This Encounter   Medications     ciprofloxacin (CIPRO) 500 MG tablet     Sig: Take 500 mg by mouth 2 times daily     baclofen (LIORESAL) 10 MG tablet     Sig: Take 5 mg by mouth daily AND 5 mg every 4 hours as needed     amoxicillin (AMOXIL) 250 MG chewable tablet      Sig: Take 250 mg by mouth daily as needed     armodafinil (NUVIGIL) 150 MG TABS tablet     Sig: Take 150 mg by mouth daily     vitamin C (ASCORBIC ACID) 500 MG tablet     Sig: Take 500 mg by mouth daily     Case Management:  I have reviewed the Assisted Living care plan, current immunizations and preventive care/cancer screening.. Patient's desire to return to the community is not present. Current Level of Care is appropriate.    Advance Directive Discussion:    I reviewed the current advanced directives as reflected in EPIC, the POLST and the facility chart, and verified the congruency of orders. I did review the advance directives with the resident.     Team Discussion:  I communicated with the appropriate disciplines involved with the Plan of Care:   Nursing      Patient Goal:  Patient's goal is pain control and comfort.    Information reviewed:  Medications, vital signs, orders, and nursing notes.    ROS:  10 point ROS of systems including Constitutional, Eyes, Respiratory, Cardiovascular, Gastroenterology, Genitourinary, Integumentary, Musculoskeletal, Psychiatric were all negative except for pertinent positives noted in my HPI.    Exam:  /69   Pulse 66   Temp 97.8  F (36.6  C)   Resp 18   GENERAL APPEARANCE:  Alert, in no distress, pleasant, cooperative, oriented x 4  EYES:  EOM, lids, pupils and irises normal, sclera clear and conjunctiva normal, no discharge or mattering on lids or lashes noted  ENT:  Mouth normal, moist mucous membranes, nose without drainage or crusting, external ears without lesions, hearing acuity intact, speaks softly.  NECK:  Nontender, supple, symmetrical  RESP:  respiratory effort and palpation of chest normal, no chest wall tenderness, no respiratory distress, Lung sounds clear, patient is on room air  CV:  Palpation and auscultation of heart done, rate and rhythm regular, no murmur, no rub or gallop. Edema none VASCULAR: warm extremities.   ABDOMEN:  normal bowel sounds,  soft, nontender.  M/S:   Gait and station wheelchair bound, R knee slightly contracted. Global weakness, able to move arms, minimally wiggle toes.    : catheter in place. Clear yellow urine.   SKIN:  Inspection and palpation of skin and subcutaneous tissue: skin warm, dry and intact without rashes  NEURO: cranial nerves 2-12 grossly intact, no facial asymmetry, no speech deficits and able to follow directions, moves all extremities symmetrically  PSYCH:  insight and judgement intact, memory intact, affect and mood normal    Lab/Diagnostic data:   Last Basic Metabolic Panel:  Recent Labs   Lab Test 09/24/18  1705      POTASSIUM 4.4   CHLORIDE 103   BRENDA 9.4   CO2 25   BUN 14   CR 0.74   *       HGB on 7/31/18--11.4    ASSESSMENT/PLAN  Multiple Sclerosis  Spastic Quadriplegia  Follows with [facility] and with Brigido Morrell PM&R (Dr. Whitehead). Receives botox for spacticity-- last injection was on 1/24/19 and notes significant improvement to spasms.   -- continues on armodafinil 150 mg daily and baclofen 5 mg daily and q4h PRN  -- follow up with neurology as PM&R as per them      Neurogenic Bladder w/ Chronic Edwards Catheter and Recurrent UTIs  Secondary to above.   Follows with Dr. Zazueta with [facility] beds at the [facility]. Has received Botox injections in her bladder, last on 11/1/18. Had a follow up appt this week and found to have urinary tract infection and was started on cipro.  --continue with cipro 500 mg BID x 5 days--last dose on 1/28/19.  -- routine Edwards cares. Follow up with urology q30 days for catheter change.    -- continues on mirabegron 25 mg daily  -- continues on trimethoprim 100 mg daily for ppx as well as cranberry caps     Osteoporosis  Hx of R tibial plateau and R femur fracture. Follows with Dr. Erica Abrams for osteo and was started on Tymos in November.   --continues with abaloparatide 3120 mcg injection daily.  -- continues on cholecalciferol  4000 units daily and 5000  units once weekly.  --follow up with Dr. Abrams as scheduled.      Chronic Pain  In setting of MS and spasticity.   -- continues on ibuprofen 400 mg daily and daily PRN as well as oxycodone 2.5 mg BID PRN     Slow Transit Constipation  -- continues on Miralax 17g daily and Senna-S 1 tab BID and BID PRN  -- adjust bowel regimen as needed    Total time with patient visit 60 minutes including discussions about the POC and care coordination with the patient. Greater 50% of the time was spent coordinating care.     Electronically signed by:  CAROL Sr CNP        Sincerely,        CAROL Dupont CNP

## 2019-01-25 NOTE — PROGRESS NOTES
[place] [facility]  Chief Complaint   Patient presents with     Annual Comprehensive Exam Assisted Living       [facility] Medical Record Number:  [id number]  Place of Service where encounter took place:  [facility] [[id number]]      HPI:    Cristina Stevens is a 73 year old  (1945), who is being seen today for an annual comprehensive visit.  HPI information obtained from: facility chart records, facility staff and patient report.      Multiple sclerosis, primary progressive (H)  Spastic quadriplegia (H)  Spasms are controlled with current baclofen and botox injections. Had an injection by neurology yesterday. Uses a motorized wheelchair and has private duty help intermittently throughout the week. She has a van that has a lift for her wheelchair that she uses to go out.      Neurogenic bladder  Current urinary tract infection  Need for prophylaxis against urinary tract infection  Chronic ruby catheter in place that will now be changed by her urology clinic. She went to clinic this week and was found to have a UTI and is currently being treated with cipro. Denies fever, body aches, or chills.     Osteoporosis  She has a history of right femur fracture and right tibial plateu fracture.   DEXA (10/5/18) demonstrating severe osteoporosis:  Lumbar spine T-score in region of L1-L4 = -3.6    Radius 33% T-score = -3.7    Constipation  Currently on a bowel program, has regular bowel movements.    Screening for hypertension  Based on JNC-8 goals,  patients age of 73 year old, no presence of diabetes or CKD, and goals of care goal BP is  <140/90 mm Hg. Patient is stable and continue without pharmacological invention with routine assessment..  BP Readin/72  HR:  66-76    ALLERGIES: Tetracyclines; Ampicillin; Cefadroxil; Cephalexin; Levofloxacin; Macrobid [nitrofuran derivatives]; Sulfa drugs; and Sulfasalazine  PROBLEM LIST:  Patient Active Problem List    Diagnosis     Multiple sclerosis (H)     Acute blood loss anemia     At risk for impaired skin integrity     Anemia     Chronic constipation     Chronic pain     Closed fracture of neck of right femur (H)     Spasticity     Spastic quadriplegia (H)     Screening for osteoporosis     Scoliosis associated with other condition     Osteoporosis     OAB (overactive bladder)     Neuromuscular respiratory weakness     Neurogenic bladder     MDRO (multiple drug resistant organisms) resistance     Kidney stone     Immobility     Heat intolerance     Frequent UTI     Closed fracture of right tibial plateau     PAST MEDICAL HISTORY:  has a past medical history of Acute blood loss anemia (6/19/2018), Anemia (9/25/2012), At risk for impaired skin integrity (12/14/2014), Chronic constipation (7/12/2018), Chronic pain (9/25/2012), Closed fracture of neck of right femur (H) (5/20/2018), Closed fracture of right tibial plateau (7/9/2018), Frequent UTI (9/25/2012), Heat intolerance (5/26/2018), Immobility (6/8/2018), Kidney stone (6/19/2014), MDRO (multiple drug resistant organisms) resistance (5/31/2013), Multiple sclerosis (H) (5/10/2011), Neurogenic bladder (6/8/2018), Neuromuscular respiratory weakness (9/26/2014), OAB (overactive bladder) (6/2/2017), Osteoporosis (6/16/2006), Scoliosis associated with other condition (9/10/2014), Screening for osteoporosis (7/10/2018), 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  PAST SURGICAL HISTORY:  has no past surgical history on file.  FAMILY HISTORY: family history is not on file.  SOCIAL HISTORY:  reports that she quit smoking about 37 years ago. she has never used smokeless tobacco.  IMMUNIZATIONS:  Most Recent Immunizations   Administered Date(s) Administered     FLU 6-35 months 10/26/2009     Flu, Unspecified 10/13/2014     Influenza (High Dose) 3 valent vaccine 11/03/2017     Influenza (IIV3) PF 09/27/2010     Pneumo Conj 13-V (2010&after) 02/24/2016      Pneumococcal 23 valent 11/12/2013     TDAP Vaccine (Adacel) 04/19/2010     Above immunizations pulled from [facility]. [facility] and facility records also reconciled. Outstanding information sent to  to update [facility].  Future immunizations are not needed at this point as all recommended immunizations are up to date.   MEDICATIONS:  Current Outpatient Medications   Medication Sig Dispense Refill     Abaloparatide 3120 MCG/1.56ML SOPN injection Inject 0.04 mLs (80 mcg) Subcutaneous daily 1.56 mL 11     amoxicillin (AMOXIL) 250 MG chewable tablet Take 250 mg by mouth daily as needed       armodafinil (NUVIGIL) 150 MG TABS tablet Take 150 mg by mouth daily       aspirin (ASA) 325 MG tablet Take 325 mg by mouth daily       baclofen (LIORESAL) 10 MG tablet Take 5 mg by mouth daily AND 5 mg every 4 hours as needed       CALMOSEPTINE 0.44-20.6 % OINT APPLY TOPICALLY TO BUTTOCKS AS NEEDED WITH BRIEF CHANGES 113 g 3     cetirizine (ZYRTEC) 10 MG tablet TAKE 1 TABLET BY MOUTH ONCE DAILY 31 tablet 11     Cholecalciferol (VITAMIN D3) 2000 units CAPS TAKE TWO CAPSULES (4000 UNITS) BY MOUTH DAILY 62 capsule 11     ciprofloxacin (CIPRO) 500 MG tablet Take 500 mg by mouth 2 times daily       CO2-Releasing (-TWO) SUPP Place rectally daily as needed       CRANBERRY CONCENTRATE 500 MG CAPS TAKE 1 CAPSULE BY MOUTH ONCE DAILY 31 capsule 11     DESITIN 13 % CREA APPLY TOPICALLY TO AREAS OF REDNESS OR RASH WITH EACH BRIEF CHANGE 113 g 3     Diaper Rash Products (DESITIN) OINT Externally apply topically as needed       ibuprofen (ADVIL/MOTRIN) 400 MG tablet TAKE 1 TABLET BY MOUTH ONCE DAILY;TAKE 1 TABLET BY MOUTH ONCE DAILY AS NEEDED 31 tablet 11     Multiple Vitamin (TAB-A-DAWSON) TABS TAKE 1 TABLET BY MOUTH ONCE DAILY 31 tablet 11     MYRBETRIQ 25 MG 24 hr tablet TAKE 1 TABLET BY MOUTH ONCE DAILY 31 tablet 11     oxyCODONE IR (ROXICODONE) 5 MG tablet Take 2.5 mg by mouth 2 times daily as needed for severe pain  6  tablet 0     polyethylene glycol (MIRALAX/GLYCOLAX) powder Take 17 g by mouth daily as needed for constipation       Probiotic Product (PROBIOTIC DAILY) CAPS Take 1 capsule by mouth daily       SENEXON-S 8.6-50 MG per tablet TAKE 1 TABLET BY MOUTH TWICE DAILY;AND MAY TAKE 1 TABLET BY MOUTH TWICE DAILY AS NEEDED FOR CONSTIPATION 62 tablet 12     trimethoprim (TRIMPEX) 100 MG tablet TAKE 1 TABLET BY MOUTH ONCE DAILY 31 tablet 11     vitamin C (ASCORBIC ACID) 500 MG tablet Take 500 mg by mouth daily       baclofen (LIORESAL) 10 MG tablet TAKE ONE-HALF TABLET (5MG) BY MOUTH TWICE DAILY;AMD TAKE ONE-HALF TABLET (5MG) BY MOUTH EVERY 4 HOURS AS NEEDED (Patient not taking: Reported on 1/24/2019) 31 tablet 11     BRENDA-GEST ANTACID 500 MG chewable tablet TAKE 1 TABLET BY MOUTH ONCE DAILY (Patient not taking: Reported on 1/25/2019) 31 tablet 11     insulin pen needle (B-D U/F) 31G X 5 MM Use once daily as directed with Tymlos (Patient not taking: Reported on 1/25/2019) 100 each 4     vitamin D (ERGOCALCIFEROL) 75037 UNIT capsule TAKE ONE CAPSULE BY MOUTH DAILY ON FRIDAYS (Patient not taking: Reported on 1/25/2019) 5 capsule 11     VITAMIN D, CHOLECALCIFEROL, PO Take 5,000 Units by mouth every 7 days        Medications reviewed:  Medications reconciled to facility chart and changes were made to reflect current medications as identified as above med list. Below are the changes that were made:   Medications stopped since last EPIC medication reconciliation:   There are no discontinued medications.    Medications started since last [facility] medication reconciliation:  Orders Placed This Encounter   Medications     ciprofloxacin (CIPRO) 500 MG tablet     Sig: Take 500 mg by mouth 2 times daily     baclofen (LIORESAL) 10 MG tablet     Sig: Take 5 mg by mouth daily AND 5 mg every 4 hours as needed     amoxicillin (AMOXIL) 250 MG chewable tablet     Sig: Take 250 mg by mouth daily as needed     armodafinil (NUVIGIL) 150 MG TABS tablet      Sig: Take 150 mg by mouth daily     vitamin C (ASCORBIC ACID) 500 MG tablet     Sig: Take 500 mg by mouth daily     Case Management:  I have reviewed the Assisted Living care plan, current immunizations and preventive care/cancer screening.. Patient's desire to return to the community is not present. Current Level of Care is appropriate.    Advance Directive Discussion:    I reviewed the current advanced directives as reflected in EPIC, the POLST and the facility chart, and verified the congruency of orders. I did review the advance directives with the resident.     Team Discussion:  I communicated with the appropriate disciplines involved with the Plan of Care:   Nursing      Patient Goal:  Patient's goal is pain control and comfort.    Information reviewed:  Medications, vital signs, orders, and nursing notes.    ROS:  10 point ROS of systems including Constitutional, Eyes, Respiratory, Cardiovascular, Gastroenterology, Genitourinary, Integumentary, Musculoskeletal, Psychiatric were all negative except for pertinent positives noted in my HPI.    Exam:  /69   Pulse 66   Temp 97.8  F (36.6  C)   Resp 18   GENERAL APPEARANCE:  Alert, in no distress, pleasant, cooperative, oriented x 4  EYES:  EOM, lids, pupils and irises normal, sclera clear and conjunctiva normal, no discharge or mattering on lids or lashes noted  ENT:  Mouth normal, moist mucous membranes, nose without drainage or crusting, external ears without lesions, hearing acuity intact, speaks softly.  NECK:  Nontender, supple, symmetrical  RESP:  respiratory effort and palpation of chest normal, no chest wall tenderness, no respiratory distress, Lung sounds clear, patient is on room air  CV:  Palpation and auscultation of heart done, rate and rhythm regular, no murmur, no rub or gallop. Edema none VASCULAR: warm extremities.   ABDOMEN:  normal bowel sounds, soft, nontender.  M/S:   Gait and station wheelchair bound, R knee slightly contracted.  Global weakness, able to move arms, minimally wiggle toes.    : catheter in place. Clear yellow urine.   SKIN:  Inspection and palpation of skin and subcutaneous tissue: skin warm, dry and intact without rashes  NEURO: cranial nerves 2-12 grossly intact, no facial asymmetry, no speech deficits and able to follow directions, moves all extremities symmetrically  PSYCH:  insight and judgement intact, memory intact, affect and mood normal    Lab/Diagnostic data:   Last Basic Metabolic Panel:  Recent Labs   Lab Test 09/24/18  1705      POTASSIUM 4.4   CHLORIDE 103   BRENDA 9.4   CO2 25   BUN 14   CR 0.74   *       HGB on 7/31/18--11.4    ASSESSMENT/PLAN  Multiple Sclerosis  Spastic Quadriplegia  Follows with [facility] and with [facility] (Dr. Whitehead). Receives botox for spacticity-- last injection was on 1/24/19 and notes significant improvement to spasms.   -- continues on armodafinil 150 mg daily and baclofen 5 mg daily and q4h PRN  -- follow up with neurology as PM&R as per them      Neurogenic Bladder w/ Chronic Edwards Catheter and Recurrent UTIs  Secondary to above.   Follows with Dr. Zazueta with [facility]. Has received Botox injections in her bladder, last on 11/1/18. Had a follow up appt this week and found to have urinary tract infection and was started on cipro.  --continue with cipro 500 mg BID x 5 days--last dose on 1/28/19.  -- routine Edwards cares. Follow up with urology q30 days for catheter change.    -- continues on mirabegron 25 mg daily  -- continues on trimethoprim 100 mg daily for ppx as well as cranberry caps     Osteoporosis  Hx of R tibial plateau and R femur fracture. Follows with Dr. Erica Abrams for osteo and was started on Tymos in November.   --continues with abaloparatide 3120 mcg injection daily.  -- continues on cholecalciferol 4000 units daily and 5000 units once weekly.  --follow up with Dr. Abrams as scheduled.      Chronic Pain  In  setting of MS and spasticity.   -- continues on ibuprofen 400 mg daily and daily PRN as well as oxycodone 2.5 mg BID PRN     Slow Transit Constipation  -- continues on Miralax 17g daily and Senna-S 1 tab BID and BID PRN  -- adjust bowel regimen as needed    Total time with patient visit 60 minutes including discussions about the POC and care coordination with the patient. Greater 50% of the time was spent coordinating care.     Electronically signed by:  CAROL Sr CNP

## 2019-02-08 ENCOUNTER — ASSISTED LIVING VISIT (OUTPATIENT)
Dept: GERIATRICS | Facility: CLINIC | Age: 74
End: 2019-02-08

## 2019-02-08 DIAGNOSIS — T14.8XXA DEEP TISSUE INJURY: Primary | ICD-10-CM

## 2019-02-08 NOTE — LETTER
2/8/2019        RE: Cristina Stevens  [address]  [place] [address]        [place] [facility]    Chief Complaint   Patient presents with     custodial Acute       [place] Medical Record Number:  [id number]  [facility] [place] [place] [facility] [[id number]]    HPI:    Cristina Stevens is a 73 year old  (1945), who is being seen today for an episodic care visit.  HPI information obtained from: facility chart records, facility staff and patient report.     Deep tissue injury  Residents assistant noted a purple area to the left heel last week. Since that time, a foam dressing was placed over the area and now has a foam boot in place rather than a shoe. She denies pain or drainage to the area. She has been working on pressure reduction techniques while in bed. She has had pressure ulcers to her buttocks in the past but not heels    REVIEW OF SYSTEMS:  4 point ROS including Respiratory, CV, GI and , other than that noted in the HPI,  is negative    GENERAL APPEARANCE:  Alert, in no distress  RESP: regular rate and depth of respirations. On room air.  CARD: no edema  SKIN: left heel with a small area of light purple, able to feel callous underneath. No drainage. Skin is intact without surrounding redness.   PSYCH: alert and oriented, calm.     ASSESSMENT/PLAN:  (T14.8XXA) Deep tissue injury  (primary encounter diagnosis)  Comment: likely from pressure put in place from foot rest on the wheelchairs. I saw a picture of the deep tissue injury from one week ago and compared to assessment today, there is great improvement in size and appearance of the skin. There was previously surrounding purple which has now resolved and the center has a lighter color purple and appears to be healing well. Suspect that this will continue to heal well with current treatment regimen.   Plan:   --assistants to use skin prep pad to the area  and place a foam dressing over the area every three days.   --Apply the foam boot for protection.   --off load heels while in bed.   --assess in one week.     Electronically signed by:  CAROL Sr CNP      Sincerely,        CAROL Dupont CNP

## 2019-02-08 NOTE — PROGRESS NOTES
[place] [facility]    Chief Complaint   Patient presents with     retirement Acute       [place] Medical Record Number:  [id number]  Place of Service where encounter took place:  [facility] [[id number]]    HPI:    Cristina Stevens is a 73 year old  (1945), who is being seen today for an episodic care visit.  HPI information obtained from: facility chart records, facility staff and patient report.     Deep tissue injury  Residents assistant noted a purple area to the left heel last week. Since that time, a foam dressing was placed over the area and now has a foam boot in place rather than a shoe. She denies pain or drainage to the area. She has been working on pressure reduction techniques while in bed. She has had pressure ulcers to her buttocks in the past but not heels    REVIEW OF SYSTEMS:  4 point ROS including Respiratory, CV, GI and , other than that noted in the HPI,  is negative    GENERAL APPEARANCE:  Alert, in no distress  RESP: regular rate and depth of respirations. On room air.  CARD: no edema  SKIN: left heel with a small area of light purple, able to feel callous underneath. No drainage. Skin is intact without surrounding redness.   PSYCH: alert and oriented, calm.     ASSESSMENT/PLAN:  (T14.8XXA) Deep tissue injury  (primary encounter diagnosis)  Comment: likely from pressure put in place from foot rest on the wheelchairs. I saw a picture of the deep tissue injury from one week ago and compared to assessment today, there is great improvement in size and appearance of the skin. There was previously surrounding purple which has now resolved and the center has a lighter color purple and appears to be healing well. Suspect that this will continue to heal well with current treatment regimen.   Plan:   --assistants to use skin prep pad to the area and place a foam dressing over the area every three days.   --Apply the foam boot for protection.    --off load heels while in bed.   --assess in one week.     Electronically signed by:  CAROL Sr CNP

## 2019-03-15 ENCOUNTER — TELEPHONE (OUTPATIENT)
Dept: GERIATRICS | Facility: CLINIC | Age: 74
End: 2019-03-15

## 2019-03-15 DIAGNOSIS — T83.511A URINARY TRACT INFECTION ASSOCIATED WITH INDWELLING URETHRAL CATHETER, INITIAL ENCOUNTER (H): Primary | ICD-10-CM

## 2019-03-15 DIAGNOSIS — N39.0 URINARY TRACT INFECTION ASSOCIATED WITH INDWELLING URETHRAL CATHETER, INITIAL ENCOUNTER (H): Primary | ICD-10-CM

## 2019-03-15 RX ORDER — CIPROFLOXACIN 500 MG/1
500 TABLET, FILM COATED ORAL 2 TIMES DAILY
Qty: 10 TABLET | Refills: 0 | Status: SHIPPED | OUTPATIENT
Start: 2019-03-15 | End: 2019-07-23

## 2019-03-15 NOTE — TELEPHONE ENCOUNTER
Received a phone call from patient today that she is having a large amount of urine bypassing the ruby catheter. She denies fever, body aches or chills. Ruby is due to be changed out on 3/18 by primary urologist.     A/P:  Historically bypassing has been indicative of UTI. Difficult to replace catheter at the Assisted Living facility and unable to obtain a urine specimen today so will start on antibiotics today and have a ruby change out at urology as scheduled on 3/18. Last urine culture was susceptible to cipro.  --Cipro 500 mg PO q12h x 5 days.  --Follow up with urology on 3/18.  --[facility] RN to irrigate catheter if possible.

## 2019-03-18 ENCOUNTER — RECORDS - HEALTHEAST (OUTPATIENT)
Dept: LAB | Facility: CLINIC | Age: 74
End: 2019-03-18

## 2019-03-19 ENCOUNTER — TRANSFERRED RECORDS (OUTPATIENT)
Dept: HEALTH INFORMATION MANAGEMENT | Facility: CLINIC | Age: 74
End: 2019-03-19

## 2019-03-19 LAB
HEMOGLOBIN: 12.6 G/DL (ref 12–16)
HGB BLD-MCNC: 12.6 G/DL (ref 12–16)
TSH SERPL DL<=0.005 MIU/L-ACNC: 2.58 UIU/ML (ref 0.3–5)
TSH SERPL-ACNC: 2.58 UIU/ML (ref 0.3–5)

## 2019-03-29 ENCOUNTER — ASSISTED LIVING VISIT (OUTPATIENT)
Dept: GERIATRICS | Facility: CLINIC | Age: 74
End: 2019-03-29

## 2019-03-29 VITALS
DIASTOLIC BLOOD PRESSURE: 72 MMHG | SYSTOLIC BLOOD PRESSURE: 128 MMHG | RESPIRATION RATE: 18 BRPM | TEMPERATURE: 97.4 F | HEART RATE: 78 BPM

## 2019-03-29 DIAGNOSIS — G35 MS (MULTIPLE SCLEROSIS) (H): ICD-10-CM

## 2019-03-29 DIAGNOSIS — Z97.8 FOLEY CATHETER IN PLACE: ICD-10-CM

## 2019-03-29 DIAGNOSIS — N31.9 NEUROGENIC BLADDER: ICD-10-CM

## 2019-03-29 DIAGNOSIS — G82.50 SPASTIC QUADRIPLEGIA (H): ICD-10-CM

## 2019-03-29 DIAGNOSIS — Z01.818 ENCOUNTER FOR PRE-OPERATIVE EXAMINATION: Primary | ICD-10-CM

## 2019-03-29 NOTE — PROGRESS NOTES
PRE-OP EVALUATION:    [place] Medical Record Number:  [id number]  Place of Service where encounter took place:  [facility] [[id number]]  Today's date: 2019    [facility]  [address]  [facility] [address]  [place] [address] [phone]  Dept: [phone]    PRE-OP EVALUATION:  Today's date: 3/29/2019    Cristina Stevens (: 1945) presents for pre-operative evaluation assessment as requested by Dr. Zazueta.  She requires evaluation and anesthesia risk assessment prior to undergoing surgery/procedure for treatment of neurogenic bladder with chronic bladder spasms..    Proposed Surgery/ Procedure: CYSTOSCOPIC INJECTION BLADDER BOTOX   Date of Surgery/ Procedure: 2019  Time of Surgery/ Procedure: [facility]  Hospital/Surgical Facility: [facility] [place]  Primary Physician: Kimberlee Castro  Type of Anesthesia Anticipated: to be determined    1. NO - Do you have a history of heart attack, stroke, stent, bypass or surgery on an artery in the head, neck, heart or legs?  2. NO - Do you ever have any pain or discomfort in your chest?  3. NO - Do you have a history of  Heart Failure?  4. NO - Are you troubled by shortness of breath when: walking on the level, up a slight hill or at night?  5. NO - Do you currently have a cold, bronchitis or other respiratory infection?  6. NO - Do you have a cough, shortness of breath or wheezing?  7. NO - Do you sometimes get pains in the calves of your legs when you walk?  8. NO - Do you or anyone in your family have previous history of blood clots?  9. NO - Do you or does anyone in your family have a serious bleeding problem such as prolonged bleeding following surgeries or cuts?  10. NO - Have you ever had problems with anemia or been told to take iron pills?  11. NO - Have you had any abnormal blood loss such as black, tarry or bloody stools, or abnormal vaginal bleeding?  12. NO - Have you ever had a blood  "transfusion?  13. NO - Have you or any of your relatives ever had problems with anesthesia?  14. NO - Do you have sleep apnea, excessive snoring or daytime drowsiness?  15. NO - Do you have any prosthetic heart valves?  16. NO - Do you have prosthetic joints?  17. NO - Is there any chance that you may be pregnant?    HPI:     Cristina Stevens is a 73 year old  (1945) female with a medical history notable for MS with spastic quadriplegia, neurogenic bladder, recurrent UTIs and chronic pain. She has a chronic catheter in place and has recently completed a course of ciprofloxacin for urinary tract infection. Muna reports that she generally has the botox injections every 4-6 months. In the past, she has developed \"20 seconds of bee-stinging pain\" after the injections so she will go to the hospital for the injection for better pain control post procedure. She feels well today and has not had fever, body aches, or chills. Last catheter change was on 3/18/19 and will be changed out again during the procedure.     MEDICAL HISTORY:     Patient Active Problem List    Diagnosis Date Noted     Chronic constipation 07/12/2018     Priority: Medium     Screening for osteoporosis 07/10/2018     Priority: Medium     Overview:   [id number]       Closed fracture of right tibial plateau 07/09/2018     Priority: Medium     Acute blood loss anemia 06/19/2018     Priority: Medium     Neurogenic bladder 06/08/2018     Priority: Medium     Immobility 06/08/2018     Priority: Medium     Heat intolerance 05/26/2018     Priority: Medium     Closed fracture of neck of right femur (H) 05/20/2018     Priority: Medium     OAB (overactive bladder) 06/02/2017     Priority: Medium     Overview:   Added automatically from request for surgery [id number]       At risk for impaired skin integrity 12/14/2014     Priority: Medium     Overview:    Bilateral lower extremity plegia, truncal weakness, right arm profoundly weak.         Neuromuscular " respiratory weakness 09/26/2014     Priority: Medium     Overview:   PFTs Sept 2014:  difficulty performing all PFT maneuvers.   Results not reproducible thus may not be accurate. No airway obstruction. FEV1 and FVC both significantly decreased. There was a significant response to bronchodilator. FEV1 improved by 15% after bronchodilator. Slow vital capacity  severely decreased at 44% predicted.DLCO severely decreased at 16% predicted.   NIF  severely decreased at -34.       Scoliosis associated with other condition 09/10/2014     Priority: Medium     Overview:   Weakness of right worse than left axial muscles, left leaning thoracic kyphoscoliosis       Kidney stone 06/19/2014     Priority: Medium     Overview:   7/2014- lithotripsy.-Dr Zazueta.       MDRO (multiple drug resistant organisms) resistance 05/31/2013     Priority: Medium     Overview:   MRSA  5/27/2013  Site: Urine  [facility]    Positive MRSA  Urine  7/10/2013  [facility]    Urine  12/19/2014 5/14/2014  MRSA ISOLATED  Urine  [facility]       Anemia 09/25/2012     Priority: Medium     Chronic pain 09/25/2012     Priority: Medium     Overview:   Right arm and shoulder- botox helpful but short lasting.  - Acupuncture once weekly.  --Standing frame 5d/week for 30min.       Frequent UTI 09/25/2012     Priority: Medium     Overview:   --UTI avg q 2weeks - has ruby cath, only tx if symptomatic per urology- fever, chills, hematuria, mentation changes.  --Tiazidine not helpful in past.  --has had Urodynamic studies in the past  --Seen at [facility] in the past- started Vesicare.       Spasticity 10/10/2011     Priority: Medium     Spastic quadriplegia (H) 10/10/2011     Priority: Medium     Multiple sclerosis (H) 05/10/2011     Priority: Medium     Osteoporosis 06/16/2006     Priority: Medium     Overview:   Epic         Past Medical History:   Diagnosis Date     Acute blood loss anemia 6/19/2018     Anemia 9/25/2012     At risk for  impaired skin integrity 12/14/2014    Overview:   Bilateral lower extremity plegia, truncal weakness, right arm profoundly weak.       Chronic constipation 7/12/2018     Chronic pain 9/25/2012    Overview:  Right arm and shoulder- botox helpful but short lasting. - Acupuncture once weekly. --Standing frame 5d/week for 30min.     Closed fracture of neck of right femur (H) 5/20/2018     Closed fracture of right tibial plateau 7/9/2018     Frequent UTI 9/25/2012    Overview:  --UTI avg q 2weeks - has ruby cath, only tx if symptomatic per urology- fever, chills, hematuria, mentation changes. --Tiazidine not helpful in past. --has had Urodynamic studies in the past --Seen at [facility] in the past- started Vesicare.     Heat intolerance 5/26/2018     Immobility 6/8/2018     Kidney stone 6/19/2014    Overview:  7/2014- lithotripsy.-Dr Zazueta.     MDRO (multiple drug resistant organisms) resistance 5/31/2013    Overview:  MRSA 5/27/2013 Site: Urine [facility]  Positive MRSA Urine 7/10/2013 [facility]  Urine 12/19/2014 5/14/2014 MRSA ISOLATED Urine [facility]     Multiple sclerosis (H) 5/10/2011     Neurogenic bladder 6/8/2018     Neuromuscular respiratory weakness 9/26/2014    Overview:  PFTs Sept 2014:  difficulty performing all PFT maneuvers.   Results not reproducible thus may not be accurate. No airway obstruction. FEV1 and FVC both significantly decreased. There was a significant response to bronchodilator. FEV1 improved by 15% after bronchodilator. Slow vital capacity  severely decreased at 44% predicted.DLCO severely decreased at 16% predicted.   NIF  severely decreased at -34.     OAB (overactive bladder) 6/2/2017    Overview:  Added automatically from request for surgery [id number]     Osteoporosis 6/16/2006    Overview:  Epic      Scoliosis associated with other condition 9/10/2014    Overview:  Weakness of right worse than left axial muscles, left leaning thoracic kyphoscoliosis     Screening  for osteoporosis 7/10/2018    Overview:  [id number]     Spastic quadriplegia (H) 10/10/2011     Spasticity 10/10/2011     No past surgical history on file.  Current Outpatient Medications   Medication Sig Dispense Refill     Abaloparatide 3120 MCG/1.56ML SOPN injection Inject 0.04 mLs (80 mcg) Subcutaneous daily 1.56 mL 11     amoxicillin (AMOXIL) 250 MG chewable tablet Take 250 mg by mouth daily as needed       armodafinil (NUVIGIL) 150 MG TABS tablet TAKE 1 TABLET BY MOUTH ONCE DAILY 30 tablet 4     baclofen (LIORESAL) 10 MG tablet Take 5 mg by mouth daily AND 5 mg every 4 hours as needed       CALMOSEPTINE 0.44-20.6 % OINT APPLY TOPICALLY TO BUTTOCKS AS NEEDED WITH BRIEF CHANGES 113 g 3     cetirizine (ZYRTEC) 10 MG tablet TAKE 1 TABLET BY MOUTH ONCE DAILY 31 tablet 11     Cholecalciferol (VITAMIN D3) 2000 units CAPS TAKE TWO CAPSULES (4000 UNITS) BY MOUTH DAILY 62 capsule 11     CO2-Releasing (-TWO) SUPP Place rectally daily as needed       CRANBERRY CONCENTRATE 500 MG CAPS TAKE 1 CAPSULE BY MOUTH ONCE DAILY 31 capsule 11     DESITIN 13 % CREA APPLY TOPICALLY TO AREAS OF REDNESS OR RASH WITH EACH BRIEF CHANGE 113 g 3     Diaper Rash Products (DESITIN) OINT Externally apply topically as needed       ibuprofen (ADVIL/MOTRIN) 400 MG tablet TAKE 1 TABLET BY MOUTH ONCE DAILY;TAKE 1 TABLET BY MOUTH ONCE DAILY AS NEEDED 31 tablet 11     Multiple Vitamin (TAB-A-DAWSON) TABS TAKE 1 TABLET BY MOUTH ONCE DAILY 31 tablet 11     MYRBETRIQ 25 MG 24 hr tablet TAKE 1 TABLET BY MOUTH ONCE DAILY 31 tablet 11     polyethylene glycol (MIRALAX/GLYCOLAX) powder Take 17 g by mouth daily as needed for constipation       Probiotic Product (PROBIOTIC DAILY) CAPS Take 1 capsule by mouth daily       SENEXON-S 8.6-50 MG per tablet TAKE 1 TABLET BY MOUTH TWICE DAILY;AND MAY TAKE 1 TABLET BY MOUTH TWICE DAILY AS NEEDED FOR CONSTIPATION 62 tablet 12     trimethoprim (TRIMPEX) 100 MG tablet TAKE 1 TABLET BY MOUTH ONCE DAILY 31 tablet  11     vitamin C (ASCORBIC ACID) 500 MG tablet Take 500 mg by mouth daily       aspirin (ASA) 325 MG tablet Take 325 mg by mouth daily       baclofen (LIORESAL) 10 MG tablet TAKE ONE-HALF TABLET (5MG) BY MOUTH ONCE DAILY;AND TAKE ONE-HALF TABLET (5MG) EVERY 4 HOURS AS NEEDED (Patient not taking: Reported on 3/29/2019) 16 tablet 11     ciprofloxacin (CIPRO) 500 MG tablet Take 500 mg by mouth 2 times daily       insulin pen needle (B-D U/F) 31G X 5 MM Use once daily as directed with Tymlos (Patient not taking: Reported on 2019) 100 each 4     oxyCODONE IR (ROXICODONE) 5 MG tablet Take 2.5 mg by mouth 2 times daily as needed for severe pain  6 tablet 0     VITAMIN D, CHOLECALCIFEROL, PO Take 5,000 Units by mouth every 7 days        OTC products: None, except as noted above    Allergies   Allergen Reactions     Tetracyclines GI Disturbance     Ampicillin GI Disturbance     Cefadroxil Muscle Pain (Myalgia)     Cephalexin Diarrhea     Levofloxacin Other (See Comments)     Phlebitis with IV infusion     Macrobid [Nitrofuran Derivatives]      Sulfa Drugs      Sulfasalazine Nausea and Vomiting     Other reaction(s): Gastrointestinal  Added per pt request 18      Latex Allergy: NO    Social History     Tobacco Use     Smoking status: Former Smoker     Last attempt to quit: 5/10/1981     Years since quittin.9     Smokeless tobacco: Never Used   Substance Use Topics     Alcohol use: Not on file     History   Drug Use Not on file       REVIEW OF SYSTEMS:   10 point ROS of systems including Constitutional, Eyes, Respiratory, Cardiovascular, Gastroenterology, Genitourinary, Integumentary, Musculoskeletal, Psychiatric were all negative except for pertinent positives noted in my HPI.    EXAM:   /72   Pulse 78   Temp 97.4  F (36.3  C)   Resp 18   GENERAL APPEARANCE:  Alert, in no distress, pleasant, cooperative, oriented x 4  EYES:  EOM, lids, pupils and irises normal, sclera clear and conjunctiva normal, no  discharge or mattering on lids or lashes noted  ENT:  Mouth normal, moist mucous membranes, nose without drainage or crusting, external ears without lesions, hearing acuity intact, speaks softly.  NECK:  Nontender, supple, symmetrical  RESP:  respiratory effort and palpation of chest normal, no chest wall tenderness, no respiratory distress, Lung sounds clear, patient is on room air  CV:  Palpation and auscultation of heart done, rate and rhythm regular, no murmur, no rub or gallop. Edema none in bilateral lower extremities. VASCULAR: warm extremities.   ABDOMEN:  normal bowel sounds, soft, nontender.  M/S:   Gait and station wheelchair bound, tenderness to right ankle and knee. R knee slightly contracted. Global weakness, able to move arms, minimally wiggle toes.    : catheter in place. Clear yellow urine.   SKIN:  Inspection and palpation of skin and subcutaneous tissue: skin warm, dry and intact without rashes  NEURO: cranial nerves 2-12 grossly intact, no facial asymmetry, no speech deficits and able to follow directions, moves all extremities symmetrically  PSYCH:  insight and judgement intact, memory intact, affect and mood normal    DIAGNOSTICS:   No labs or EKG required for low risk surgery (cataract, skin procedure, breast biopsy, etc)    Recent Labs   Lab Test 03/19/19 09/24/18  1705   HGB 12.6  --    NA  --  136   POTASSIUM  --  4.4   CR  --  0.74        IMPRESSION:   Reason for surgery/procedure: neurogenic bladder with bladder spasms  The proposed surgical procedure is considered LOW risk.     REVISED CARDIAC RISK INDEX  The patient has the following serious cardiovascular risks for perioperative complications such as (MI, PE, VFib and 3  AV Block):  No serious cardiac risks  INTERPRETATION: 0 risks: Class I (very low risk - 0.4% complication rate)     The patient has the following additional risks for perioperative complications:  No identified additional risks     ORDERS:      Medications: NO PRN  IBUPROFEN x 7 days prior to procedure     APPROVAL GIVEN to proceed with proposed procedure, without further diagnostic evaluation       Signed Electronically by: CAROL Dupont CNP    Copy of this evaluation report is provided to requesting physician.

## 2019-03-29 NOTE — LETTER
3/29/2019        RE: Cristina Stevens  [address]  [facility] [address]        PRE-OP EVALUATION:    [facility] Record Number:  [id number]  Place of Service where encounter took place:  [facility] [[id number]]  Today's date: 2019    [facility]  [address]  [address]  [place] [id number] [phone]  Dept: [phone]    PRE-OP EVALUATION:  Today's date: 3/29/2019    Cristina Stevens (: 1945) presents for pre-operative evaluation assessment as requested by Dr. Zazueta.  She requires evaluation and anesthesia risk assessment prior to undergoing surgery/procedure for treatment of neurogenic bladder with chronic bladder spasms..    Proposed Surgery/ Procedure: CYSTOSCOPIC INJECTION BLADDER BOTOX   Date of Surgery/ Procedure: 2019  Time of Surgery/ Procedure: [facility]  Hospital/Surgical Facility: [facility]  Primary Physician: Kimberlee Castro  Type of Anesthesia Anticipated: to be determined    1. NO - Do you have a history of heart attack, stroke, stent, bypass or surgery on an artery in the head, neck, heart or legs?  2. NO - Do you ever have any pain or discomfort in your chest?  3. NO - Do you have a history of  Heart Failure?  4. NO - Are you troubled by shortness of breath when: walking on the level, up a slight hill or at night?  5. NO - Do you currently have a cold, bronchitis or other respiratory infection?  6. NO - Do you have a cough, shortness of breath or wheezing?  7. NO - Do you sometimes get pains in the calves of your legs when you walk?  8. NO - Do you or anyone in your family have previous history of blood clots?  9. NO - Do you or does anyone in your family have a serious bleeding problem such as prolonged bleeding following surgeries or cuts?  10. NO - Have you ever had problems with anemia or been told to take iron pills?  11. NO - Have you had any abnormal blood loss such  "as black, tarry or bloody stools, or abnormal vaginal bleeding?  12. NO - Have you ever had a blood transfusion?  13. NO - Have you or any of your relatives ever had problems with anesthesia?  14. NO - Do you have sleep apnea, excessive snoring or daytime drowsiness?  15. NO - Do you have any prosthetic heart valves?  16. NO - Do you have prosthetic joints?  17. NO - Is there any chance that you may be pregnant?    HPI:     Cristina Stevens is a 73 year old  (1945) female with a medical history notable for MS with spastic quadriplegia, neurogenic bladder, recurrent UTIs and chronic pain. She has a chronic catheter in place and has recently completed a course of ciprofloxacin for urinary tract infection. Muna reports that she generally has the botox injections every 4-6 months. In the past, she has developed \"20 seconds of bee-stinging pain\" after the injections so she will go to the hospital for the injection for better pain control post procedure. She feels well today and has not had fever, body aches, or chills. Last catheter change was on 3/18/19 and will be changed out again during the procedure.     MEDICAL HISTORY:     Patient Active Problem List    Diagnosis Date Noted     Chronic constipation 07/12/2018     Priority: Medium     Screening for osteoporosis 07/10/2018     Priority: Medium     Overview:   [id number]       Closed fracture of right tibial plateau 07/09/2018     Priority: Medium     Acute blood loss anemia 06/19/2018     Priority: Medium     Neurogenic bladder 06/08/2018     Priority: Medium     Immobility 06/08/2018     Priority: Medium     Heat intolerance 05/26/2018     Priority: Medium     Closed fracture of neck of right femur (H) 05/20/2018     Priority: Medium     OAB (overactive bladder) 06/02/2017     Priority: Medium     Overview:   Added automatically from request for surgery [id number]       At risk for impaired skin integrity 12/14/2014     Priority: Medium     Overview:    " Bilateral lower extremity plegia, truncal weakness, right arm profoundly weak.         Neuromuscular respiratory weakness 09/26/2014     Priority: Medium     Overview:   PFTs Sept 2014:  difficulty performing all PFT maneuvers.   Results not reproducible thus may not be accurate. No airway obstruction. FEV1 and FVC both significantly decreased. There was a significant response to bronchodilator. FEV1 improved by 15% after bronchodilator. Slow vital capacity  severely decreased at 44% predicted.DLCO severely decreased at 16% predicted.   NIF  severely decreased at -34.       Scoliosis associated with other condition 09/10/2014     Priority: Medium     Overview:   Weakness of right worse than left axial muscles, left leaning thoracic kyphoscoliosis       Kidney stone 06/19/2014     Priority: Medium     Overview:   7/2014- lithotripsy.-Dr Zazueta.       MDRO (multiple drug resistant organisms) resistance 05/31/2013     Priority: Medium     Overview:   MRSA  5/27/2013  Site: Urine  [facility]    Positive MRSA  Urine  7/10/2013  [place] [facility]    Urine  12/19/2014 5/14/2014  MRSA ISOLATED  Urine  [facility]       Anemia 09/25/2012     Priority: Medium     Chronic pain 09/25/2012     Priority: Medium     Overview:   Right arm and shoulder- botox helpful but short lasting.  - Acupuncture once weekly.  --Standing frame 5d/week for 30min.       Frequent UTI 09/25/2012     Priority: Medium     Overview:   --UTI avg q 2weeks - has ruby cath, only tx if symptomatic per urology- fever, chills, hematuria, mentation changes.  --Tiazidine not helpful in past.  --has had Urodynamic studies in the past  --Seen at [facility] in the past- started Vesicare.       Spasticity 10/10/2011     Priority: Medium     Spastic quadriplegia (H) 10/10/2011     Priority: Medium     Multiple sclerosis (H) 05/10/2011     Priority: Medium     Osteoporosis 06/16/2006     Priority: Medium     Overview:   Epic         Past Medical  History:   Diagnosis Date     Acute blood loss anemia 6/19/2018     Anemia 9/25/2012     At risk for impaired skin integrity 12/14/2014    Overview:   Bilateral lower extremity plegia, truncal weakness, right arm profoundly weak.       Chronic constipation 7/12/2018     Chronic pain 9/25/2012    Overview:  Right arm and shoulder- botox helpful but short lasting. - Acupuncture once weekly. --Standing frame 5d/week for 30min.     Closed fracture of neck of right femur (H) 5/20/2018     Closed fracture of right tibial plateau 7/9/2018     Frequent UTI 9/25/2012    Overview:  --UTI avg q 2weeks - has ruby cath, only tx if symptomatic per urology- fever, chills, hematuria, mentation changes. --Tiazidine not helpful in past. --has had Urodynamic studies in the past --Seen at [facility] in the past- started Vesicare.     Heat intolerance 5/26/2018     Immobility 6/8/2018     Kidney stone 6/19/2014    Overview:  7/2014- lithotripsy.-Dr Zazueta.     MDRO (multiple drug resistant organisms) resistance 5/31/2013    Overview:  MRSA 5/27/2013 Site: Urine [facility]  Positive MRSA Urine 7/10/2013 [place] [facility]  Urine 12/19/2014 5/14/2014 MRSA ISOLATED Urine [facility]     Multiple sclerosis (H) 5/10/2011     Neurogenic bladder 6/8/2018     Neuromuscular respiratory weakness 9/26/2014    Overview:  PFTs Sept 2014:  difficulty performing all PFT maneuvers.   Results not reproducible thus may not be accurate. No airway obstruction. FEV1 and FVC both significantly decreased. There was a significant response to bronchodilator. FEV1 improved by 15% after bronchodilator. Slow vital capacity  severely decreased at 44% predicted.DLCO severely decreased at 16% predicted.   NIF  severely decreased at -34.     OAB (overactive bladder) 6/2/2017    Overview:  Added automatically from request for surgery [id number]     Osteoporosis 6/16/2006    Overview:  Epic      Scoliosis associated with other condition 9/10/2014    Overview:   Weakness of right worse than left axial muscles, left leaning thoracic kyphoscoliosis     Screening for osteoporosis 7/10/2018    Overview:  [id number]     Spastic quadriplegia (H) 10/10/2011     Spasticity 10/10/2011     No past surgical history on file.  Current Outpatient Medications   Medication Sig Dispense Refill     Abaloparatide 3120 MCG/1.56ML SOPN injection Inject 0.04 mLs (80 mcg) Subcutaneous daily 1.56 mL 11     amoxicillin (AMOXIL) 250 MG chewable tablet Take 250 mg by mouth daily as needed       armodafinil (NUVIGIL) 150 MG TABS tablet TAKE 1 TABLET BY MOUTH ONCE DAILY 30 tablet 4     baclofen (LIORESAL) 10 MG tablet Take 5 mg by mouth daily AND 5 mg every 4 hours as needed       CALMOSEPTINE 0.44-20.6 % OINT APPLY TOPICALLY TO BUTTOCKS AS NEEDED WITH BRIEF CHANGES 113 g 3     cetirizine (ZYRTEC) 10 MG tablet TAKE 1 TABLET BY MOUTH ONCE DAILY 31 tablet 11     Cholecalciferol (VITAMIN D3) 2000 units CAPS TAKE TWO CAPSULES (4000 UNITS) BY MOUTH DAILY 62 capsule 11     CO2-Releasing (-TWO) SUPP Place rectally daily as needed       CRANBERRY CONCENTRATE 500 MG CAPS TAKE 1 CAPSULE BY MOUTH ONCE DAILY 31 capsule 11     DESITIN 13 % CREA APPLY TOPICALLY TO AREAS OF REDNESS OR RASH WITH EACH BRIEF CHANGE 113 g 3     Diaper Rash Products (DESITIN) OINT Externally apply topically as needed       ibuprofen (ADVIL/MOTRIN) 400 MG tablet TAKE 1 TABLET BY MOUTH ONCE DAILY;TAKE 1 TABLET BY MOUTH ONCE DAILY AS NEEDED 31 tablet 11     Multiple Vitamin (TAB-A-DAWSON) TABS TAKE 1 TABLET BY MOUTH ONCE DAILY 31 tablet 11     MYRBETRIQ 25 MG 24 hr tablet TAKE 1 TABLET BY MOUTH ONCE DAILY 31 tablet 11     polyethylene glycol (MIRALAX/GLYCOLAX) powder Take 17 g by mouth daily as needed for constipation       Probiotic Product (PROBIOTIC DAILY) CAPS Take 1 capsule by mouth daily       SENEXON-S 8.6-50 MG per tablet TAKE 1 TABLET BY MOUTH TWICE DAILY;AND MAY TAKE 1 TABLET BY MOUTH TWICE DAILY AS NEEDED FOR CONSTIPATION  62 tablet 12     trimethoprim (TRIMPEX) 100 MG tablet TAKE 1 TABLET BY MOUTH ONCE DAILY 31 tablet 11     vitamin C (ASCORBIC ACID) 500 MG tablet Take 500 mg by mouth daily       aspirin (ASA) 325 MG tablet Take 325 mg by mouth daily       baclofen (LIORESAL) 10 MG tablet TAKE ONE-HALF TABLET (5MG) BY MOUTH ONCE DAILY;AND TAKE ONE-HALF TABLET (5MG) EVERY 4 HOURS AS NEEDED (Patient not taking: Reported on 3/29/2019) 16 tablet 11     ciprofloxacin (CIPRO) 500 MG tablet Take 500 mg by mouth 2 times daily       insulin pen needle (B-D U/F) 31G X 5 MM Use once daily as directed with Tymlos (Patient not taking: Reported on 2019) 100 each 4     oxyCODONE IR (ROXICODONE) 5 MG tablet Take 2.5 mg by mouth 2 times daily as needed for severe pain  6 tablet 0     VITAMIN D, CHOLECALCIFEROL, PO Take 5,000 Units by mouth every 7 days        OTC products: None, except as noted above    Allergies   Allergen Reactions     Tetracyclines GI Disturbance     Ampicillin GI Disturbance     Cefadroxil Muscle Pain (Myalgia)     Cephalexin Diarrhea     Levofloxacin Other (See Comments)     Phlebitis with IV infusion     Macrobid [Nitrofuran Derivatives]      Sulfa Drugs      Sulfasalazine Nausea and Vomiting     Other reaction(s): Gastrointestinal  Added per pt request 18      Latex Allergy: NO    Social History     Tobacco Use     Smoking status: Former Smoker     Last attempt to quit: 5/10/1981     Years since quittin.9     Smokeless tobacco: Never Used   Substance Use Topics     Alcohol use: Not on file     History   Drug Use Not on file       REVIEW OF SYSTEMS:   10 point ROS of systems including Constitutional, Eyes, Respiratory, Cardiovascular, Gastroenterology, Genitourinary, Integumentary, Musculoskeletal, Psychiatric were all negative except for pertinent positives noted in my HPI.    EXAM:   /72   Pulse 78   Temp 97.4  F (36.3  C)   Resp 18   GENERAL APPEARANCE:  Alert, in no distress, pleasant, cooperative,  oriented x 4  EYES:  EOM, lids, pupils and irises normal, sclera clear and conjunctiva normal, no discharge or mattering on lids or lashes noted  ENT:  Mouth normal, moist mucous membranes, nose without drainage or crusting, external ears without lesions, hearing acuity intact, speaks softly.  NECK:  Nontender, supple, symmetrical  RESP:  respiratory effort and palpation of chest normal, no chest wall tenderness, no respiratory distress, Lung sounds clear, patient is on room air  CV:  Palpation and auscultation of heart done, rate and rhythm regular, no murmur, no rub or gallop. Edema none in bilateral lower extremities. VASCULAR: warm extremities.   ABDOMEN:  normal bowel sounds, soft, nontender.  M/S:   Gait and station wheelchair bound, tenderness to right ankle and knee. R knee slightly contracted. Global weakness, able to move arms, minimally wiggle toes.    : catheter in place. Clear yellow urine.   SKIN:  Inspection and palpation of skin and subcutaneous tissue: skin warm, dry and intact without rashes  NEURO: cranial nerves 2-12 grossly intact, no facial asymmetry, no speech deficits and able to follow directions, moves all extremities symmetrically  PSYCH:  insight and judgement intact, memory intact, affect and mood normal    DIAGNOSTICS:   No labs or EKG required for low risk surgery (cataract, skin procedure, breast biopsy, etc)    Recent Labs   Lab Test 03/19/19 09/24/18  1705   HGB 12.6  --    NA  --  136   POTASSIUM  --  4.4   CR  --  0.74        IMPRESSION:   Reason for surgery/procedure: neurogenic bladder with bladder spasms  The proposed surgical procedure is considered LOW risk.     REVISED CARDIAC RISK INDEX  The patient has the following serious cardiovascular risks for perioperative complications such as (MI, PE, VFib and 3  AV Block):  No serious cardiac risks  INTERPRETATION: 0 risks: Class I (very low risk - 0.4% complication rate)     The patient has the following additional risks for  perioperative complications:  No identified additional risks     ORDERS:      Medications:  NO PRN IBUPROFEN x 7 days prior to procedure     APPROVAL GIVEN to proceed with proposed procedure, without further diagnostic evaluation       Signed Electronically by: CAROL Dupont CNP    Copy of this evaluation report is provided to requesting physician.            Sincerely,        CAROL Dupont CNP

## 2019-04-02 DIAGNOSIS — R23.8 SKIN IRRITATION: ICD-10-CM

## 2019-04-08 RX ORDER — ANORECTAL OINTMENT 15.7; .44; 24; 20.6 G/100G; G/100G; G/100G; G/100G
OINTMENT TOPICAL
Qty: 113 G | Refills: 11 | Status: SHIPPED | OUTPATIENT
Start: 2019-04-08 | End: 2020-05-07

## 2019-07-23 ENCOUNTER — ASSISTED LIVING VISIT (OUTPATIENT)
Dept: GERIATRICS | Facility: CLINIC | Age: 74
End: 2019-07-23
Payer: MEDICARE

## 2019-07-23 ENCOUNTER — RECORDS - HEALTHEAST (OUTPATIENT)
Dept: LAB | Facility: CLINIC | Age: 74
End: 2019-07-23

## 2019-07-23 ENCOUNTER — TRANSFERRED RECORDS (OUTPATIENT)
Dept: HEALTH INFORMATION MANAGEMENT | Facility: CLINIC | Age: 74
End: 2019-07-23

## 2019-07-23 VITALS
RESPIRATION RATE: 18 BRPM | TEMPERATURE: 97.4 F | HEART RATE: 78 BPM | SYSTOLIC BLOOD PRESSURE: 128 MMHG | DIASTOLIC BLOOD PRESSURE: 72 MMHG

## 2019-07-23 DIAGNOSIS — Z97.8 FOLEY CATHETER IN PLACE: ICD-10-CM

## 2019-07-23 DIAGNOSIS — G35 MS (MULTIPLE SCLEROSIS) (H): ICD-10-CM

## 2019-07-23 DIAGNOSIS — N31.9 NEUROGENIC BLADDER: Primary | ICD-10-CM

## 2019-07-23 DIAGNOSIS — G82.50 SPASTIC QUADRIPLEGIA (H): ICD-10-CM

## 2019-07-23 LAB
ALBUMIN UR-MCNC: NEGATIVE MG/DL
APPEARANCE UR: CLEAR
BACTERIA #/AREA URNS HPF: ABNORMAL HPF
BILIRUB UR QL STRIP: NEGATIVE
COLOR UR AUTO: YELLOW
GLUCOSE UR STRIP-MCNC: NEGATIVE MG/DL
HGB UR QL STRIP: NEGATIVE
KETONES UR STRIP-MCNC: NEGATIVE MG/DL
LEUKOCYTE ESTERASE UR QL STRIP: ABNORMAL
NITRATE UR QL: NEGATIVE
PH UR STRIP: 6.5 [PH] (ref 4.5–8)
RBC #/AREA URNS AUTO: ABNORMAL HPF
SP GR UR STRIP: 1.01 (ref 1–1.03)
SQUAMOUS #/AREA URNS AUTO: ABNORMAL LPF
UROBILINOGEN UR STRIP-ACNC: ABNORMAL
WBC #/AREA URNS AUTO: ABNORMAL HPF
YEAST #/AREA URNS HPF: ABNORMAL HPF

## 2019-07-23 PROCEDURE — 99207 ZZC CDG-MDM COMPONENT: MEETS LOW - DOWN CODED: CPT | Performed by: NURSE PRACTITIONER

## 2019-07-23 PROCEDURE — 51702 INSERT TEMP BLADDER CATH: CPT | Performed by: NURSE PRACTITIONER

## 2019-07-23 RX ORDER — CEPHALEXIN 250 MG/1
250 TABLET ORAL 4 TIMES DAILY
COMMUNITY
End: 2019-10-19 | Stop reason: CLARIF

## 2019-07-23 NOTE — LETTER
7/23/2019        RE: Cristina Stevens  [address]  [facility] [address]        [facility]  [place] Medical Record Number:  [id number]  Place of Service where encounter took place:  [facility] [[id number]]  Chief Complaint   Patient presents with     RECHECK       HPI:    Cristina Stevens  is a 74 year old (1945), who is being seen today for an episodic care visit.  HPI information obtained from: facility chart records, facility staff, patient report and [facility] chart review. Today's concern is:  1. Neurogenic bladder    2. Edwards catheter in place    3. MS (multiple sclerosis) (H)    4. Spastic quadriplegia (H)      Ms. Stevens was visited this morning secondary to bypassing urine. She reports that she went out for a catheter change yesterday and it was placed without complication. As soon as returning back to the assisted living yesterday, she began bypassing urine and continued to do so throughout the night. She denies pain or discomfort; does not feel a UTI is present. Denies bodyaches or chills. Currently being treated with cehalexin Had a BM this morning x 2. Has been sleeping well.     Past Medical and Surgical History reviewed in Epic today.    MEDICATIONS:  Current Outpatient Medications   Medication Sig Dispense Refill     Abaloparatide 3120 MCG/1.56ML SOPN injection Inject 0.04 mLs (80 mcg) Subcutaneous daily 1.56 mL 11     amoxicillin (AMOXIL) 250 MG chewable tablet Take 250 mg by mouth daily as needed       armodafinil (NUVIGIL) 150 MG TABS tablet TAKE 1 TABLET BY MOUTH ONCE DAILY 30 tablet 4     baclofen (LIORESAL) 10 MG tablet Take 5 mg by mouth daily AND 5 mg every 4 hours as needed       CALMOSEPTINE 0.44-20.6 % OINT ointment APPLY TOPICALLY TO BUTTOCKS AS NEEDED WITH BRIEF CHANGES 113 g 11     cephalexin 250 MG TABS Take 250 mg by mouth 4 times daily       cetirizine (ZYRTEC) 10 MG tablet TAKE 1 TABLET BY MOUTH  ONCE DAILY 31 tablet 11     Cholecalciferol (VITAMIN D3) 2000 units CAPS TAKE TWO CAPSULES (4000 UNITS) BY MOUTH DAILY 62 capsule 11     CO2-Releasing (-TWO) SUPP Place rectally daily as needed       CRANBERRY CONCENTRATE 500 MG CAPS TAKE 1 CAPSULE BY MOUTH ONCE DAILY 31 capsule 11     DESITIN 13 % CREA APPLY TOPICALLY TO AREAS OF REDNESS OR RASH WITH EACH BRIEF CHANGE 113 g 11     Diaper Rash Products (DESITIN) OINT Externally apply topically as needed       ibuprofen (ADVIL/MOTRIN) 400 MG tablet TAKE 1 TABLET BY MOUTH ONCE DAILY;TAKE 1 TABLET BY MOUTH ONCE DAILY AS NEEDED 31 tablet 11     Multiple Vitamin (TAB-A-DAWSON) TABS TAKE 1 TABLET BY MOUTH ONCE DAILY 31 tablet 11     MYRBETRIQ 25 MG 24 hr tablet TAKE 1 TABLET BY MOUTH ONCE DAILY 31 tablet 11     polyethylene glycol (MIRALAX/GLYCOLAX) powder Take 17 g by mouth daily as needed for constipation       Probiotic Product (PROBIOTIC DAILY) CAPS Take 1 capsule by mouth daily 28 capsule 98     SENEXON-S 8.6-50 MG per tablet TAKE 1 TABLET BY MOUTH TWICE DAILY;AND MAY TAKE 1 TABLET BY MOUTH TWICE DAILY AS NEEDED FOR CONSTIPATION 62 tablet 12     vitamin C (ASCORBIC ACID) 500 MG tablet Take 500 mg by mouth daily       REVIEW OF SYSTEMS:  4 point ROS including Respiratory, CV, GI and , other than that noted in the HPI,  is negative    Objective:  /72   Pulse 78   Temp 97.4  F (36.3  C)   Resp 18   Exam:  GENERAL APPEARANCE:  Alert, in no distress, pleasant, cooperative, oriented x 4  RESP:  no respiratory distress, Lung sounds clear, patient is on room air  CV:  rate and rhythm regular, no murmur, no rub or gallop. Edema none in bilateral lower extremities.   ABDOMEN:  normal bowel sounds, soft, nontender.  M/S:   Gait and station wheelchair bound, tenderness to right ankle and knee. R knee slightly contracted. Global weakness, able to move arms, minimally wiggle toes.    : catheter in place. Clear yellow urine.   SKIN:  Inspection and palpation of skin  and subcutaneous tissue: skin warm, dry and intact without rashes  NEURO: no facial asymmetry, no speech deficits and able to follow directions  PSYCH:  insight and judgement intact, memory intact, affect and mood normal    Labs:   CBC RESULTS:   Recent Labs   Lab Test 03/19/19   HGB 12.6       Last Basic Metabolic Panel:  Recent Labs   Lab Test 09/24/18  1705      POTASSIUM 4.4   CHLORIDE 103   BRENDA 9.4   CO2 25   BUN 14   CR 0.74   *       Liver Function Studies - No results for input(s): PROTTOTAL, ALBUMIN, BILITOTAL, ALKPHOS, AST, ALT, BILIDIRECT in the last 95594 hours.    TSH   Date Value Ref Range Status   03/19/2019 2.58 0.30 - 5.00 uIU/mL Final     ASSESSMENT/PLAN:  (N31.9) Neurogenic bladder  (primary encounter diagnosis)  (Z96.0) Edwards catheter in place  Comment: cath change out yesterday and now with bypassing which is unusual for her this soon after cath change, she generally has bypassing around 3-4 weeks after cath change. Cath changed out today and there was not sediment blocking the tip of the catheter so unclear reason for bypassing. No further bypassing after catheter was changed out but will send culture to ensure that a UTI is not present. Currently being treated with keflex but may not be susceptible.   Plan: continue with cephalexin 250 mg QID  --send culture today  --follow up with urology in one month for change out.     (G35) MS (multiple sclerosis) (H)  (G82.50) Spastic quadriplegia (H)  Follows with [facility] and with r[facility] PM&R (Dr. Whitehead). Receives botox for spacticity  -- continues on armodafinil 150 mg daily and baclofen 5 mg daily and q4h PRN  -- follow up with neurology as PM&R as per them   Electronically signed by:  CAROL Dupont CNP           Sincerely,        CAROL Dupont CNP

## 2019-07-23 NOTE — PROGRESS NOTES
[facility]  [place] Medical Record Number:  [id number]  Place of Service where encounter took place:  [facility] [[id number]]  Chief Complaint   Patient presents with     RECHECK       HPI:    Cristina Stevens  is a 74 year old (1945), who is being seen today for an episodic care visit.  HPI information obtained from: facility chart records, facility staff, patient report and [facility] chart review. Today's concern is:  1. Neurogenic bladder    2. Edwards catheter in place    3. MS (multiple sclerosis) (H)    4. Spastic quadriplegia (H)      Ms. Stevens was visited this morning secondary to bypassing urine. She reports that she went out for a catheter change yesterday and it was placed without complication. As soon as returning back to the assisted living yesterday, she began bypassing urine and continued to do so throughout the night. She denies pain or discomfort; does not feel a UTI is present. Denies bodyaches or chills. Currently being treated with cehalexin Had a BM this morning x 2. Has been sleeping well.     Past Medical and Surgical History reviewed in Epic today.    MEDICATIONS:  Current Outpatient Medications   Medication Sig Dispense Refill     Abaloparatide 3120 MCG/1.56ML SOPN injection Inject 0.04 mLs (80 mcg) Subcutaneous daily 1.56 mL 11     amoxicillin (AMOXIL) 250 MG chewable tablet Take 250 mg by mouth daily as needed       armodafinil (NUVIGIL) 150 MG TABS tablet TAKE 1 TABLET BY MOUTH ONCE DAILY 30 tablet 4     baclofen (LIORESAL) 10 MG tablet Take 5 mg by mouth daily AND 5 mg every 4 hours as needed       CALMOSEPTINE 0.44-20.6 % OINT ointment APPLY TOPICALLY TO BUTTOCKS AS NEEDED WITH BRIEF CHANGES 113 g 11     cephalexin 250 MG TABS Take 250 mg by mouth 4 times daily       cetirizine (ZYRTEC) 10 MG tablet TAKE 1 TABLET BY MOUTH ONCE DAILY 31 tablet 11     Cholecalciferol (VITAMIN D3) 2000 units CAPS TAKE TWO CAPSULES (4000  UNITS) BY MOUTH DAILY 62 capsule 11     CO2-Releasing (-TWO) SUPP Place rectally daily as needed       CRANBERRY CONCENTRATE 500 MG CAPS TAKE 1 CAPSULE BY MOUTH ONCE DAILY 31 capsule 11     DESITIN 13 % CREA APPLY TOPICALLY TO AREAS OF REDNESS OR RASH WITH EACH BRIEF CHANGE 113 g 11     Diaper Rash Products (DESITIN) OINT Externally apply topically as needed       ibuprofen (ADVIL/MOTRIN) 400 MG tablet TAKE 1 TABLET BY MOUTH ONCE DAILY;TAKE 1 TABLET BY MOUTH ONCE DAILY AS NEEDED 31 tablet 11     Multiple Vitamin (TAB-A-DAWSON) TABS TAKE 1 TABLET BY MOUTH ONCE DAILY 31 tablet 11     MYRBETRIQ 25 MG 24 hr tablet TAKE 1 TABLET BY MOUTH ONCE DAILY 31 tablet 11     polyethylene glycol (MIRALAX/GLYCOLAX) powder Take 17 g by mouth daily as needed for constipation       Probiotic Product (PROBIOTIC DAILY) CAPS Take 1 capsule by mouth daily 28 capsule 98     SENEXON-S 8.6-50 MG per tablet TAKE 1 TABLET BY MOUTH TWICE DAILY;AND MAY TAKE 1 TABLET BY MOUTH TWICE DAILY AS NEEDED FOR CONSTIPATION 62 tablet 12     vitamin C (ASCORBIC ACID) 500 MG tablet Take 500 mg by mouth daily       REVIEW OF SYSTEMS:  4 point ROS including Respiratory, CV, GI and , other than that noted in the HPI,  is negative    Objective:  /72   Pulse 78   Temp 97.4  F (36.3  C)   Resp 18   Exam:  GENERAL APPEARANCE:  Alert, in no distress, pleasant, cooperative, oriented x 4  RESP:  no respiratory distress, Lung sounds clear, patient is on room air  CV:  rate and rhythm regular, no murmur, no rub or gallop. Edema none in bilateral lower extremities.   ABDOMEN:  normal bowel sounds, soft, nontender.  M/S:   Gait and station wheelchair bound, tenderness to right ankle and knee. R knee slightly contracted. Global weakness, able to move arms, minimally wiggle toes.    : catheter in place. Clear yellow urine.   SKIN:  Inspection and palpation of skin and subcutaneous tissue: skin warm, dry and intact without rashes  NEURO: no facial asymmetry, no  speech deficits and able to follow directions  PSYCH:  insight and judgement intact, memory intact, affect and mood normal    Labs:   CBC RESULTS:   Recent Labs   Lab Test 19   HGB 12.6       Last Basic Metabolic Panel:  Recent Labs   Lab Test 18  1705      POTASSIUM 4.4   CHLORIDE 103   BRENDA 9.4   CO2 25   BUN 14   CR 0.74   *       Liver Function Studies - No results for input(s): PROTTOTAL, ALBUMIN, BILITOTAL, ALKPHOS, AST, ALT, BILIDIRECT in the last 15052 hours.    TSH   Date Value Ref Range Status   2019 2.58 0.30 - 5.00 uIU/mL Final     ASSESSMENT/PLAN:  (N31.9) Neurogenic bladder  (primary encounter diagnosis)  (Z96.0) Edwards catheter in place  Comment: cath change out yesterday and now with bypassing which is unusual for her this soon after cath change, she generally has bypassing around 3-4 weeks after cath change. Cath changed out today and there was not sediment blocking the tip of the catheter so unclear reason for bypassing. No further bypassing after catheter was changed out but will send culture to ensure that a UTI is not present. Currently being treated with keflex but may not be susceptible. Will order more catheter supplies as Muna will likely need to have catheter changed out more frequently than once per month.  Plan: continue with cephalexin 250 mg QID  --send culture today  --follow up with urology in one month for change out.     (G35) MS (multiple sclerosis) (H)  (G82.50) Spastic quadriplegia (H)  Follows with [facility] and with [facility] (Dr. Whitehead). Receives botox for spacticity  -- continues on armodafinil 150 mg daily and baclofen 5 mg daily and q4h PRN  -- follow up with neurology as PM&R as per them     Electronically signed by:  CAROL Dupont CNP       Face to Face and Medical Necessity Statement for DME Provider visit    Demographic Information on Cristina Stevens:  Gender: female  : 1945  [address]    [facility] [address]  [phone] (home)     Medical Record: [id number]  Social Security Number: [id number]  Primary Care Provider: Kimberlee Castro  Insurance: Payor: MEDICARE / Plan: MEDICARE / Product Type: Medicare /     HPI:   Cristina Stevens is a 74 year old  (1945), who is being seen today for a face to face provider visit at [facility]; medical necessity statement for DME included. This patient requires the following:  DME Ordered and Medical Necessity Statement   The patient does  require positioning of the body in ways not feasible with an ordinary bed due to a medical condition that is expected to last at least 1 month due to multiple sclerosis   The patient does  require, for the alleviation of pain, postioning of the body in ways not feasible with an ordinary bed.   The patient does not require the head of bed elevated more than 30* most of the time due to CHF, chronic pulmonary disease or aspiration.  The patient does  require traction that can only be attached to a hospital bed.  The patient does  require a bed height different than a fixed height hospital bed to permit tranfers to wheelchair or standing position.   The patient does  require frequent or immediate changes in body position due to high risk for pressure ulcer.     Pt needing above DME with expected length of need of 99 due to medical necessity associated with following diagnosis:     Neurogenic bladder  Edwards catheter in place  MS (multiple sclerosis) (H)  Spastic quadriplegia (H)      PMH   has a past medical history of Acute blood loss anemia (6/19/2018), Anemia (9/25/2012), At risk for impaired skin integrity (12/14/2014), Chronic constipation (7/12/2018), Chronic pain (9/25/2012), Closed fracture of neck of right femur (H) (5/20/2018), Closed fracture of right tibial plateau (7/9/2018), Frequent UTI (9/25/2012), Heat intolerance (5/26/2018), Immobility (6/8/2018), Kidney stone (6/19/2014),  MDRO (multiple drug resistant organisms) resistance (5/31/2013), Multiple sclerosis (H) (5/10/2011), Neurogenic bladder (6/8/2018), Neuromuscular respiratory weakness (9/26/2014), OAB (overactive bladder) (6/2/2017), Osteoporosis (6/16/2006), Scoliosis associated with other condition (9/10/2014), Screening for osteoporosis (7/10/2018), 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    ROS:  Please see above    EXAM  Vitals: /72   Pulse 78   Temp 97.4  F (36.3  C)   Resp 18 ;BMI= There is no height or weight on file to calculate BMI.  Please see above    ASSESSMENT/PLAN:  1. Neurogenic bladder    2. Edwards catheter in place    3. MS (multiple sclerosis) (H)    4. Spastic quadriplegia (H)        Orders:  1. Facility staff/TC to contact [employer] company to get their order form for provider to fill out    ELECTRONICALLY SIGNED BY JEAN CLAUDE CERTIFIED PROVIDER:  CAROL Dupont CNP   NPI: [id number]  [facility]  [address] [place] [address], [address]  [place] [address]

## 2019-07-23 NOTE — LETTER
7/23/2019        RE: Cristina Stevens  [address]  [facility] [address]        [facility]  [place] Medical Record Number:  [id number]  Place of Service where encounter took place:  [facility] [[id number]]  Chief Complaint   Patient presents with     RECHECK       HPI:    Cristina Stevens  is a 74 year old (1945), who is being seen today for an episodic care visit.  HPI information obtained from: facility chart records, facility staff, patient report and [facility] chart review. Today's concern is:  1. Neurogenic bladder    2. Edwards catheter in place    3. MS (multiple sclerosis) (H)    4. Spastic quadriplegia (H)      Ms. Stevens was visited this morning secondary to bypassing urine. She reports that she went out for a catheter change yesterday and it was placed without complication. As soon as returning back to the assisted living yesterday, she began bypassing urine and continued to do so throughout the night. She denies pain or discomfort; does not feel a UTI is present. Denies bodyaches or chills. Currently being treated with cehalexin Had a BM this morning x 2. Has been sleeping well.     Past Medical and Surgical History reviewed in Epic today.    MEDICATIONS:  Current Outpatient Medications   Medication Sig Dispense Refill     Abaloparatide 3120 MCG/1.56ML SOPN injection Inject 0.04 mLs (80 mcg) Subcutaneous daily 1.56 mL 11     amoxicillin (AMOXIL) 250 MG chewable tablet Take 250 mg by mouth daily as needed       armodafinil (NUVIGIL) 150 MG TABS tablet TAKE 1 TABLET BY MOUTH ONCE DAILY 30 tablet 4     baclofen (LIORESAL) 10 MG tablet Take 5 mg by mouth daily AND 5 mg every 4 hours as needed       CALMOSEPTINE 0.44-20.6 % OINT ointment APPLY TOPICALLY TO BUTTOCKS AS NEEDED WITH BRIEF CHANGES 113 g 11     cephalexin 250 MG TABS Take 250 mg by mouth 4 times daily       cetirizine (ZYRTEC) 10 MG tablet TAKE 1 TABLET BY MOUTH  ONCE DAILY 31 tablet 11     Cholecalciferol (VITAMIN D3) 2000 units CAPS TAKE TWO CAPSULES (4000 UNITS) BY MOUTH DAILY 62 capsule 11     CO2-Releasing (-TWO) SUPP Place rectally daily as needed       CRANBERRY CONCENTRATE 500 MG CAPS TAKE 1 CAPSULE BY MOUTH ONCE DAILY 31 capsule 11     DESITIN 13 % CREA APPLY TOPICALLY TO AREAS OF REDNESS OR RASH WITH EACH BRIEF CHANGE 113 g 11     Diaper Rash Products (DESITIN) OINT Externally apply topically as needed       ibuprofen (ADVIL/MOTRIN) 400 MG tablet TAKE 1 TABLET BY MOUTH ONCE DAILY;TAKE 1 TABLET BY MOUTH ONCE DAILY AS NEEDED 31 tablet 11     Multiple Vitamin (TAB-A-DAWSON) TABS TAKE 1 TABLET BY MOUTH ONCE DAILY 31 tablet 11     MYRBETRIQ 25 MG 24 hr tablet TAKE 1 TABLET BY MOUTH ONCE DAILY 31 tablet 11     polyethylene glycol (MIRALAX/GLYCOLAX) powder Take 17 g by mouth daily as needed for constipation       Probiotic Product (PROBIOTIC DAILY) CAPS Take 1 capsule by mouth daily 28 capsule 98     SENEXON-S 8.6-50 MG per tablet TAKE 1 TABLET BY MOUTH TWICE DAILY;AND MAY TAKE 1 TABLET BY MOUTH TWICE DAILY AS NEEDED FOR CONSTIPATION 62 tablet 12     vitamin C (ASCORBIC ACID) 500 MG tablet Take 500 mg by mouth daily       REVIEW OF SYSTEMS:  4 point ROS including Respiratory, CV, GI and , other than that noted in the HPI,  is negative    Objective:  /72   Pulse 78   Temp 97.4  F (36.3  C)   Resp 18   Exam:  GENERAL APPEARANCE:  Alert, in no distress, pleasant, cooperative, oriented x 4  RESP:  no respiratory distress, Lung sounds clear, patient is on room air  CV:  rate and rhythm regular, no murmur, no rub or gallop. Edema none in bilateral lower extremities.   ABDOMEN:  normal bowel sounds, soft, nontender.  M/S:   Gait and station wheelchair bound, tenderness to right ankle and knee. R knee slightly contracted. Global weakness, able to move arms, minimally wiggle toes.    : catheter in place. Clear yellow urine.   SKIN:  Inspection and palpation of skin  and subcutaneous tissue: skin warm, dry and intact without rashes  NEURO: no facial asymmetry, no speech deficits and able to follow directions  PSYCH:  insight and judgement intact, memory intact, affect and mood normal    Labs:   CBC RESULTS:   Recent Labs   Lab Test 19   HGB 12.6       Last Basic Metabolic Panel:  Recent Labs   Lab Test 18  1705      POTASSIUM 4.4   CHLORIDE 103   BRENDA 9.4   CO2 25   BUN 14   CR 0.74   *       Liver Function Studies - No results for input(s): PROTTOTAL, ALBUMIN, BILITOTAL, ALKPHOS, AST, ALT, BILIDIRECT in the last 68862 hours.    TSH   Date Value Ref Range Status   2019 2.58 0.30 - 5.00 uIU/mL Final     ASSESSMENT/PLAN:  (N31.9) Neurogenic bladder  (primary encounter diagnosis)  (Z96.0) Edwards catheter in place  Comment: cath change out yesterday and now with bypassing which is unusual for her this soon after cath change, she generally has bypassing around 3-4 weeks after cath change. Cath changed out today and there was not sediment blocking the tip of the catheter so unclear reason for bypassing. No further bypassing after catheter was changed out but will send culture to ensure that a UTI is not present. Currently being treated with keflex but may not be susceptible.   Plan: continue with cephalexin 250 mg QID  --send culture today  --follow up with urology in one month for change out.     (G35) MS (multiple sclerosis) (H)  (G82.50) Spastic quadriplegia (H)  Follows with [facility] and with [facility] (Dr. Whitehead). Receives botox for spacticity  -- continues on armodafinil 150 mg daily and baclofen 5 mg daily and q4h PRN  -- follow up with neurology as PM&R as per them     Electronically signed by:  CAROL Dupont CNP       Face to Face and Medical Necessity Statement for DME Provider visit    Demographic Information on Cristina Stevens:  Gender: female  : 1945  [address]   [facility]  [address]  [phone] (home)     Medical Record: [id number]  Social Security Number: [id number]  Primary Care Provider: Kimberlee Castro  Insurance: Payor: MEDICARE / Plan: MEDICARE / Product Type: Medicare /     HPI:   Cristina Stevens is a 74 year old  (1945), who is being seen today for a face to face provider visit at [facility]; medical necessity statement for DME included. This patient requires the following:  DME Ordered and Medical Necessity Statement   The patient does  require positioning of the body in ways not feasible with an ordinary bed due to a medical condition that is expected to last at least 1 month due to multiple sclerosis   The patient does  require, for the alleviation of pain, postioning of the body in ways not feasible with an ordinary bed.   The patient does not require the head of bed elevated more than 30* most of the time due to CHF, chronic pulmonary disease or aspiration.  The patient does  require traction that can only be attached to a hospital bed.  The patient does  require a bed height different than a fixed height hospital bed to permit tranfers to wheelchair or standing position.   The patient does  require frequent or immediate changes in body position due to high risk for pressure ulcer.     Pt needing above DME with expected length of need of 99 due to medical necessity associated with following diagnosis:     Neurogenic bladder  Edwards catheter in place  MS (multiple sclerosis) (H)  Spastic quadriplegia (H)      PMH   has a past medical history of Acute blood loss anemia (6/19/2018), Anemia (9/25/2012), At risk for impaired skin integrity (12/14/2014), Chronic constipation (7/12/2018), Chronic pain (9/25/2012), Closed fracture of neck of right femur (H) (5/20/2018), Closed fracture of right tibial plateau (7/9/2018), Frequent UTI (9/25/2012), Heat intolerance (5/26/2018), Immobility (6/8/2018), Kidney stone (6/19/2014), MDRO (multiple drug  resistant organisms) resistance (5/31/2013), Multiple sclerosis (H) (5/10/2011), Neurogenic bladder (6/8/2018), Neuromuscular respiratory weakness (9/26/2014), OAB (overactive bladder) (6/2/2017), Osteoporosis (6/16/2006), Scoliosis associated with other condition (9/10/2014), Screening for osteoporosis (7/10/2018), 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    ROS:  Please see above    EXAM  Vitals: /72   Pulse 78   Temp 97.4  F (36.3  C)   Resp 18 ;BMI= There is no height or weight on file to calculate BMI.  Please see above    ASSESSMENT/PLAN:  1. Neurogenic bladder    2. Edwards catheter in place    3. MS (multiple sclerosis) (H)    4. Spastic quadriplegia (H)        Orders:  1. Facility staff/TC to contact [employer] company to get their order form for provider to fill out    ELECTRONICALLY SIGNED BY JEAN CLAUDE CERTIFIED PROVIDER:  CAROL Dupont CNP   NPI: [id number]  [facility]  [address] [place] [address], [address]  [place] [address]            Sincerely,        CAROL Dupont CNP

## 2019-07-24 LAB — BACTERIA SPEC CULT: NO GROWTH

## 2019-08-20 ENCOUNTER — ASSISTED LIVING VISIT (OUTPATIENT)
Dept: GERIATRICS | Facility: CLINIC | Age: 74
End: 2019-08-20
Payer: MEDICARE

## 2019-08-20 VITALS
SYSTOLIC BLOOD PRESSURE: 128 MMHG | DIASTOLIC BLOOD PRESSURE: 72 MMHG | TEMPERATURE: 97.4 F | RESPIRATION RATE: 18 BRPM | HEART RATE: 78 BPM

## 2019-08-20 DIAGNOSIS — Z97.8 FOLEY CATHETER IN PLACE: ICD-10-CM

## 2019-08-20 DIAGNOSIS — Z01.818 ENCOUNTER FOR PRE-OPERATIVE EXAMINATION: Primary | ICD-10-CM

## 2019-08-20 DIAGNOSIS — N31.9 NEUROGENIC BLADDER: ICD-10-CM

## 2019-08-20 NOTE — LETTER
2019        RE: Cristina Stevens  [address]  [facility] [address]        PRE-OP EVALUATION:    [facility] Medical Record Number:  [id number]  Place of Service where encounter took place:  [facility] [[id number]]  Today's date: 2019    [facility]  [address]  [address]  [place] [id number] [phone]  Dept: [phone]    PRE-OP EVALUATION:  Today's date: 2019    Cristina Stevens (: 1945) presents for pre-operative evaluation assessment as requested by Dr. Zazueta.  She requires evaluation and anesthesia risk assessment prior to undergoing surgery/procedure for treatment of neurogenic bladder with chronic bladder spasms..     Proposed Surgery/ Procedure: CYSTOSCOPIC INJECTION BLADDER BOTOX   Date of Surgery/ Procedure: A ugust 2019  Time of Surgery/ Procedure: [facility]  Hospital/Surgical Facility: Critical access hospital  Primary Physician: Kimberlee Castro  Type of Anesthesia Anticipated: to be determined     1. NO - Do you have a history of heart attack, stroke, stent, bypass or surgery on an artery in the head, neck, heart or legs?  2. NO - Do you ever have any pain or discomfort in your chest?  3. NO - Do you have a history of  Heart Failure?  4. NO - Are you troubled by shortness of breath when: walking on the level, up a slight hill or at night?  5. NO - Do you currently have a cold, bronchitis or other respiratory infection?  6. NO - Do you have a cough, shortness of breath or wheezing?  7. NO - Do you sometimes get pains in the calves of your legs when you walk?  8. NO - Do you or anyone in your family have previous history of blood clots?  9. NO - Do you or does anyone in your family have a serious bleeding problem such as prolonged bleeding following surgeries or cuts?  10. NO - Have you ever had problems with anemia or been told to take iron pills?  11. NO - Have you had any abnormal blood loss  "such as black, tarry or bloody stools, or abnormal vaginal bleeding?  12. NO - Have you ever had a blood transfusion?  13. NO - Have you or any of your relatives ever had problems with anesthesia?  14. NO - Do you have sleep apnea, excessive snoring or daytime drowsiness?  15. NO - Do you have any prosthetic heart valves?  16. NO - Do you have prosthetic joints?  17. NO - Is there any chance that you may be pregnant?         HPI:     Cristina Stevens is a 73 year old  (1945) female with a medical history notable for MS with spastic quadriplegia, neurogenic bladder, recurrent UTIs and chronic pain. She has a chronic catheter in place and is currently on prophylactic cephalexin due to frequent UTIs. Muna reports that she generally has the botox injections every 4-6 months. In the past, she has developed \"20 seconds of bee-stinging pain\" after the injections so she will go to the hospital for the injection for better pain control post procedure. She feels well today and has not had fever, body aches, or chills. Last catheter change was on 8/19/19 and she noted that her urine smelled very strong and a specimen was taken. She will have the catheter changed out again during the procedure.     MEDICAL HISTORY:     Patient Active Problem List    Diagnosis Date Noted     Chronic constipation 07/12/2018     Priority: Medium     Screening for osteoporosis 07/10/2018     Priority: Medium     Overview:   [id number]       Closed fracture of right tibial plateau 07/09/2018     Priority: Medium     Acute blood loss anemia 06/19/2018     Priority: Medium     Neurogenic bladder 06/08/2018     Priority: Medium     Immobility 06/08/2018     Priority: Medium     Heat intolerance 05/26/2018     Priority: Medium     Closed fracture of neck of right femur (H) 05/20/2018     Priority: Medium     OAB (overactive bladder) 06/02/2017     Priority: Medium     Overview:   Added automatically from request for surgery [id number]       At " risk for impaired skin integrity 12/14/2014     Priority: Medium     Overview:    Bilateral lower extremity plegia, truncal weakness, right arm profoundly weak.         Neuromuscular respiratory weakness 09/26/2014     Priority: Medium     Overview:   PFTs Sept 2014:  difficulty performing all PFT maneuvers.   Results not reproducible thus may not be accurate. No airway obstruction. FEV1 and FVC both significantly decreased. There was a significant response to bronchodilator. FEV1 improved by 15% after bronchodilator. Slow vital capacity  severely decreased at 44% predicted.DLCO severely decreased at 16% predicted.   NIF  severely decreased at -34.       Scoliosis associated with other condition 09/10/2014     Priority: Medium     Overview:   Weakness of right worse than left axial muscles, left leaning thoracic kyphoscoliosis       Kidney stone 06/19/2014     Priority: Medium     Overview:   7/2014- lithotripsy.-Dr Zazueta.       MDRO (multiple drug resistant organisms) resistance 05/31/2013     Priority: Medium     Overview:   MRSA  5/27/2013  Site: Urine  [facility]    Positive MRSA  Urine  7/10/2013  [facility]    Urine  12/19/2014 5/14/2014  MRSA ISOLATED  Urine  [facility]       Anemia 09/25/2012     Priority: Medium     Chronic pain 09/25/2012     Priority: Medium     Overview:   Right arm and shoulder- botox helpful but short lasting.  - Acupuncture once weekly.  --Standing frame 5d/week for 30min.       Frequent UTI 09/25/2012     Priority: Medium     Overview:   --UTI avg q 2weeks - has ruby cath, only tx if symptomatic per urology- fever, chills, hematuria, mentation changes.  --Tiazidine not helpful in past.  --has had Urodynamic studies in the past  --Seen at [facility] in the past- started Vesicare.       Spasticity 10/10/2011     Priority: Medium     Spastic quadriplegia (H) 10/10/2011     Priority: Medium     Multiple sclerosis (H) 05/10/2011     Priority: Medium     Osteoporosis  06/16/2006     Priority: Medium     Overview:   [facility]         Past Medical History:   Diagnosis Date     Acute blood loss anemia 6/19/2018     Anemia 9/25/2012     At risk for impaired skin integrity 12/14/2014    Overview:   Bilateral lower extremity plegia, truncal weakness, right arm profoundly weak.       Chronic constipation 7/12/2018     Chronic pain 9/25/2012    Overview:  Right arm and shoulder- botox helpful but short lasting. - Acupuncture once weekly. --Standing frame 5d/week for 30min.     Closed fracture of neck of right femur (H) 5/20/2018     Closed fracture of right tibial plateau 7/9/2018     Frequent UTI 9/25/2012    Overview:  --UTI avg q 2weeks - has ruby cath, only tx if symptomatic per urology- fever, chills, hematuria, mentation changes. --Tiazidine not helpful in past. --has had Urodynamic studies in the past --Seen at [facility] in the past- started Vesicare.     Heat intolerance 5/26/2018     Immobility 6/8/2018     Kidney stone 6/19/2014    Overview:  7/2014- lithotripsy.-Dr Zazueta.     MDRO (multiple drug resistant organisms) resistance 5/31/2013    Overview:  MRSA 5/27/2013 Site: Urine [facility]  Positive MRSA Urine 7/10/2013 [facility]  Urine 12/19/2014 5/14/2014 MRSA ISOLATED Urine [facility]     Multiple sclerosis (H) 5/10/2011     Neurogenic bladder 6/8/2018     Neuromuscular respiratory weakness 9/26/2014    Overview:  PFTs Sept 2014:  difficulty performing all PFT maneuvers.   Results not reproducible thus may not be accurate. No airway obstruction. FEV1 and FVC both significantly decreased. There was a significant response to bronchodilator. FEV1 improved by 15% after bronchodilator. Slow vital capacity  severely decreased at 44% predicted.DLCO severely decreased at 16% predicted.   NIF  severely decreased at -34.     OAB (overactive bladder) 6/2/2017    Overview:  Added automatically from request for surgery [id number]     Osteoporosis 6/16/2006    Overview:   Epic      Scoliosis associated with other condition 9/10/2014    Overview:  Weakness of right worse than left axial muscles, left leaning thoracic kyphoscoliosis     Screening for osteoporosis 7/10/2018    Overview:  [id number]     Spastic quadriplegia (H) 10/10/2011     Spasticity 10/10/2011     No past surgical history on file.  Current Outpatient Medications   Medication Sig Dispense Refill     Abaloparatide 3120 MCG/1.56ML SOPN injection Inject 0.04 mLs (80 mcg) Subcutaneous daily 1.56 mL 11     amoxicillin (AMOXIL) 250 MG chewable tablet CHEW AND SWALLOW 8 TABLETS BY MOUTH 1 HOUR PRIOR TO DENTAL 8 tablet 1     armodafinil (NUVIGIL) 150 MG TABS tablet TAKE 1 TABLET BY MOUTH EVERY MORNING 30 tablet 5     baclofen (LIORESAL) 10 MG tablet Take 5 mg by mouth daily AND 5 mg every 4 hours as needed       CALMOSEPTINE 0.44-20.6 % OINT ointment APPLY TOPICALLY TO BUTTOCKS AS NEEDED WITH BRIEF CHANGES 113 g 11     cephALEXin (KEFLEX) 250 MG capsule TAKE 1 CAPSULE BY MOUTH ONCE DAILY FOR PREVENTIVE 30 capsule 11     cetirizine (ZYRTEC) 10 MG tablet TAKE 1 TABLET BY MOUTH ONCE DAILY 31 tablet 11     Cholecalciferol (VITAMIN D3) 2000 units CAPS TAKE TWO CAPSULES (4000 UNITS) BY MOUTH DAILY 62 capsule 11     CO2-Releasing (-TWO) SUPP Place rectally daily as needed       CRANBERRY CONCENTRATE 500 MG CAPS TAKE 1 CAPSULE BY MOUTH ONCE DAILY 31 capsule 11     DESITIN 13 % CREA APPLY TOPICALLY TO AREAS OF REDNESS OR RASH WITH EACH BRIEF CHANGE 113 g 11     Diaper Rash Products (DESITIN) OINT Externally apply topically as needed       ibuprofen (ADVIL/MOTRIN) 400 MG tablet TAKE 1 TABLET BY MOUTH ONCE DAILY;TAKE 1 TABLET BY MOUTH ONCE DAILY AS NEEDED 31 tablet 11     Multiple Vitamin (TAB-A-DAWSON) TABS TAKE 1 TABLET BY MOUTH ONCE DAILY 31 tablet 11     MYRBETRIQ 25 MG 24 hr tablet TAKE 1 TABLET BY MOUTH ONCE DAILY 31 tablet 11     polyethylene glycol (MIRALAX/GLYCOLAX) powder MIX 17GM OF POWDER IN 8OZ OF WATER UNTIL  COMPLETELY DISSOLVED. DRINK SOLUTION DAILY AS NEEDED 527 g 98     Probiotic Product (PROBIOTIC DAILY) CAPS Take 1 capsule by mouth daily 28 capsule 98     SENEXON-S 8.6-50 MG per tablet TAKE 1 TABLET BY MOUTH TWICE DAILY;AND MAY TAKE 1 TABLET BY MOUTH TWICE DAILY AS NEEDED FOR CONSTIPATION 62 tablet 12     vitamin C (ASCORBIC ACID) 500 MG tablet Take 1 tablet (500 mg) by mouth daily 31 tablet 98     cephalexin 250 MG TABS Take 250 mg by mouth 4 times daily       vitamin C (ASCORBIC ACID) 500 MG tablet TAKE 1 TABLET BY MOUTH ONCE DAILY (Patient not taking: Reported on 2019) 31 tablet 98     OTC products: None, except as noted above    Allergies   Allergen Reactions     Tetracyclines GI Disturbance     Ampicillin GI Disturbance     Cefadroxil Muscle Pain (Myalgia)     Cephalexin Diarrhea     Levofloxacin Other (See Comments)     Phlebitis with IV infusion     Macrobid [Nitrofuran Derivatives]      Sulfa Drugs      Sulfasalazine Nausea and Vomiting     Other reaction(s): Gastrointestinal  Added per pt request 18      Latex Allergy: NO    Social History     Tobacco Use     Smoking status: Former Smoker     Last attempt to quit: 5/10/1981     Years since quittin.3     Smokeless tobacco: Never Used   Substance Use Topics     Alcohol use: Not on file     History   Drug Use Not on file       REVIEW OF SYSTEMS:   4 point ROS including Respiratory, CV, GI and , other than that noted in the HPI,  is negative    EXAM:   /72   Pulse 78   Temp 97.4  F (36.3  C)   Resp 18   GENERAL APPEARANCE:  Alert, in no distress, pleasant, cooperative, oriented x 4  EYES:  EOM, lids, pupils and irises normal, sclera clear and conjunctiva normal, no discharge or mattering on lids or lashes noted  ENT:  Mouth normal, moist mucous membranes, nose without drainage or crusting, external ears without lesions, hearing acuity intact, speaks softly.  NECK:  Nontender, supple, symmetrical  RESP:  respiratory effort and  palpation of chest normal, no chest wall tenderness, no respiratory distress, Lung sounds clear, patient is on room air  CV:  Palpation and auscultation of heart done, rate and rhythm regular, no murmur, no rub or gallop. Edema none in bilateral lower extremities.   VASCULAR: warm extremities.   ABDOMEN:  normal bowel sounds, soft, nontender.  M/S:   Gait and station wheelchair bound, tenderness to right ankle and knee. R knee slightly contracted. Global weakness, able to move arms, minimally wiggle toes.    : catheter in place. Clear yellow urine.   SKIN:  Inspection and palpation of skin and subcutaneous tissue: skin warm, dry and intact without rashes  NEURO: no facial asymmetry, no speech deficits and able to follow directions  PSYCH:  insight and judgement intact, memory intact, affect and mood normal    DIAGNOSTICS:   No labs or EKG required for low risk surgery (cataract, skin procedure, breast biopsy, etc)    Recent Labs   Lab Test 03/19/19 09/24/18  1705   HGB 12.6  --    NA  --  136   POTASSIUM  --  4.4   CR  --  0.74        IMPRESSION:   Reason for surgery/procedure: neurogenic bladder with bladder spasms  The proposed surgical procedure is considered LOW risk.     REVISED CARDIAC RISK INDEX  The patient has the following serious cardiovascular risks for perioperative complications such as (MI, PE, VFib and 3  AV Block):  No serious cardiac risks  INTERPRETATION: 0 risks: Class I (very low risk - 0.4% complication rate)     The patient has the following additional risks for perioperative complications:  No identified additional risks      ICD-10-CM    1. Encounter for pre-operative examination Z01.818    2. Neurogenic bladder N31.9    3. Edwards catheter in place Z96.0        ORDERS:       --Patient is to take all scheduled medications on the day of surgery EXCEPT for ibuprofen which will need to be held from now until after the injection.     APPROVAL GIVEN to proceed with proposed procedure, without  further diagnostic evaluation       Signed Electronically by: CAROL Dupont CNP    Copy of this evaluation report is provided to requesting physician.    [facility] Preop Guidelines    Revised Cardiac Risk Index          Sincerely,        CAROL Dupont CNP

## 2019-08-20 NOTE — PROGRESS NOTES
PRE-OP EVALUATION:    [place] Medical Record Number:  [id number]  Place of Service where encounter took place:  [facility]) [[id number]]  Today's date: 2019    [facility]  [address]  [facility] [address]  [place] [address] [phone]  Dept: [phone]    PRE-OP EVALUATION:  Today's date: 2019    Cristina Stevens (: 1945) presents for pre-operative evaluation assessment as requested by Dr. Zazueta.  She requires evaluation and anesthesia risk assessment prior to undergoing surgery/procedure for treatment of neurogenic bladder with chronic bladder spasms..     Proposed Surgery/ Procedure: CYSTOSCOPIC INJECTION BLADDER BOTOX   Date of Surgery/ Procedure: 2019  Time of Surgery/ Procedure: [facility]  Hospital/Surgical Facility: [facility]  Primary Physician: Kimberlee Castro  Type of Anesthesia Anticipated: to be determined     1. NO - Do you have a history of heart attack, stroke, stent, bypass or surgery on an artery in the head, neck, heart or legs?  2. NO - Do you ever have any pain or discomfort in your chest?  3. NO - Do you have a history of  Heart Failure?  4. NO - Are you troubled by shortness of breath when: walking on the level, up a slight hill or at night?  5. NO - Do you currently have a cold, bronchitis or other respiratory infection?  6. NO - Do you have a cough, shortness of breath or wheezing?  7. NO - Do you sometimes get pains in the calves of your legs when you walk?  8. NO - Do you or anyone in your family have previous history of blood clots?  9. NO - Do you or does anyone in your family have a serious bleeding problem such as prolonged bleeding following surgeries or cuts?  10. NO - Have you ever had problems with anemia or been told to take iron pills?  11. NO - Have you had any abnormal blood loss such as black, tarry or bloody stools, or abnormal vaginal bleeding?  12. NO - Have you ever had a  "blood transfusion?  13. NO - Have you or any of your relatives ever had problems with anesthesia?  14. NO - Do you have sleep apnea, excessive snoring or daytime drowsiness?  15. NO - Do you have any prosthetic heart valves?  16. NO - Do you have prosthetic joints?  17. NO - Is there any chance that you may be pregnant?         HPI:     Cristina Stevens is a 73 year old  (1945) female with a medical history notable for MS with spastic quadriplegia, neurogenic bladder, recurrent UTIs and chronic pain. She has a chronic catheter in place and is currently on prophylactic cephalexin due to frequent UTIs. Muna reports that she generally has the botox injections every 4-6 months. In the past, she has developed \"20 seconds of bee-stinging pain\" after the injections so she will go to the hospital for the injection for better pain control post procedure. She feels well today and has not had fever, body aches, or chills. Last catheter change was on 8/19/19 and she noted that her urine smelled very strong and a specimen was taken. She will have the catheter changed out again during the procedure.     MEDICAL HISTORY:     Patient Active Problem List    Diagnosis Date Noted     Chronic constipation 07/12/2018     Priority: Medium     Screening for osteoporosis 07/10/2018     Priority: Medium     Overview:   [id number]       Closed fracture of right tibial plateau 07/09/2018     Priority: Medium     Acute blood loss anemia 06/19/2018     Priority: Medium     Neurogenic bladder 06/08/2018     Priority: Medium     Immobility 06/08/2018     Priority: Medium     Heat intolerance 05/26/2018     Priority: Medium     Closed fracture of neck of right femur (H) 05/20/2018     Priority: Medium     OAB (overactive bladder) 06/02/2017     Priority: Medium     Overview:   Added automatically from request for surgery [id number]       At risk for impaired skin integrity 12/14/2014     Priority: Medium     Overview:    Bilateral lower " extremity plegia, truncal weakness, right arm profoundly weak.         Neuromuscular respiratory weakness 09/26/2014     Priority: Medium     Overview:   PFTs Sept 2014:  difficulty performing all PFT maneuvers.   Results not reproducible thus may not be accurate. No airway obstruction. FEV1 and FVC both significantly decreased. There was a significant response to bronchodilator. FEV1 improved by 15% after bronchodilator. Slow vital capacity  severely decreased at 44% predicted.DLCO severely decreased at 16% predicted.   NIF  severely decreased at -34.       Scoliosis associated with other condition 09/10/2014     Priority: Medium     Overview:   Weakness of right worse than left axial muscles, left leaning thoracic kyphoscoliosis       Kidney stone 06/19/2014     Priority: Medium     Overview:   7/2014- lithotripsy.-Dr Zazueta.       MDRO (multiple drug resistant organisms) resistance 05/31/2013     Priority: Medium     Overview:   MRSA  5/27/2013  Site: Urine  [facility]    Positive MRSA  Urine  7/10/2013  [place] [facility]    Urine  12/19/2014 5/14/2014  MRSA ISOLATED  Urine  [facility]       Anemia 09/25/2012     Priority: Medium     Chronic pain 09/25/2012     Priority: Medium     Overview:   Right arm and shoulder- botox helpful but short lasting.  - Acupuncture once weekly.  --Standing frame 5d/week for 30min.       Frequent UTI 09/25/2012     Priority: Medium     Overview:   --UTI avg q 2weeks - has ruby cath, only tx if symptomatic per urology- fever, chills, hematuria, mentation changes.  --Tiazidine not helpful in past.  --has had Urodynamic studies in the past  --Seen at [facility] in the past- started Vesicare.       Spasticity 10/10/2011     Priority: Medium     Spastic quadriplegia (H) 10/10/2011     Priority: Medium     Multiple sclerosis (H) 05/10/2011     Priority: Medium     Osteoporosis 06/16/2006     Priority: Medium     Overview:   Epic         Past Medical History:   Diagnosis  Date     Acute blood loss anemia 6/19/2018     Anemia 9/25/2012     At risk for impaired skin integrity 12/14/2014    Overview:   Bilateral lower extremity plegia, truncal weakness, right arm profoundly weak.       Chronic constipation 7/12/2018     Chronic pain 9/25/2012    Overview:  Right arm and shoulder- botox helpful but short lasting. - Acupuncture once weekly. --Standing frame 5d/week for 30min.     Closed fracture of neck of right femur (H) 5/20/2018     Closed fracture of right tibial plateau 7/9/2018     Frequent UTI 9/25/2012    Overview:  --UTI avg q 2weeks - has ruby cath, only tx if symptomatic per urology- fever, chills, hematuria, mentation changes. --Tiazidine not helpful in past. --has had Urodynamic studies in the past --Seen at [facility] [place], operated by [facility] in the past- started Vesicare.     Heat intolerance 5/26/2018     Immobility 6/8/2018     Kidney stone 6/19/2014    Overview:  7/2014- lithotripsy.-Dr Zazueta.     MDRO (multiple drug resistant organisms) resistance 5/31/2013    Overview:  MRSA 5/27/2013 Site: Urine [facility]  Positive MRSA Urine 7/10/2013 [place] [facility]  Urine 12/19/2014 5/14/2014 MRSA ISOLATED Urine [facility]     Multiple sclerosis (H) 5/10/2011     Neurogenic bladder 6/8/2018     Neuromuscular respiratory weakness 9/26/2014    Overview:  PFTs Sept 2014:  difficulty performing all PFT maneuvers.   Results not reproducible thus may not be accurate. No airway obstruction. FEV1 and FVC both significantly decreased. There was a significant response to bronchodilator. FEV1 improved by 15% after bronchodilator. Slow vital capacity  severely decreased at 44% predicted.DLCO severely decreased at 16% predicted.   NIF  severely decreased at -34.     OAB (overactive bladder) 6/2/2017    Overview:  Added automatically from request for surgery [id number]     Osteoporosis 6/16/2006    Overview:  Epic      Scoliosis associated with other condition 9/10/2014    Overview:  Weakness of right  worse than left axial muscles, left leaning thoracic kyphoscoliosis     Screening for osteoporosis 7/10/2018    Overview:  [id number]     Spastic quadriplegia (H) 10/10/2011     Spasticity 10/10/2011     No past surgical history on file.  Current Outpatient Medications   Medication Sig Dispense Refill     Abaloparatide 3120 MCG/1.56ML SOPN injection Inject 0.04 mLs (80 mcg) Subcutaneous daily 1.56 mL 11     amoxicillin (AMOXIL) 250 MG chewable tablet CHEW AND SWALLOW 8 TABLETS BY MOUTH 1 HOUR PRIOR TO DENTAL 8 tablet 1     armodafinil (NUVIGIL) 150 MG TABS tablet TAKE 1 TABLET BY MOUTH EVERY MORNING 30 tablet 5     baclofen (LIORESAL) 10 MG tablet Take 5 mg by mouth daily AND 5 mg every 4 hours as needed       CALMOSEPTINE 0.44-20.6 % OINT ointment APPLY TOPICALLY TO BUTTOCKS AS NEEDED WITH BRIEF CHANGES 113 g 11     cephALEXin (KEFLEX) 250 MG capsule TAKE 1 CAPSULE BY MOUTH ONCE DAILY FOR PREVENTIVE 30 capsule 11     cetirizine (ZYRTEC) 10 MG tablet TAKE 1 TABLET BY MOUTH ONCE DAILY 31 tablet 11     Cholecalciferol (VITAMIN D3) 2000 units CAPS TAKE TWO CAPSULES (4000 UNITS) BY MOUTH DAILY 62 capsule 11     CO2-Releasing (-TWO) SUPP Place rectally daily as needed       CRANBERRY CONCENTRATE 500 MG CAPS TAKE 1 CAPSULE BY MOUTH ONCE DAILY 31 capsule 11     DESITIN 13 % CREA APPLY TOPICALLY TO AREAS OF REDNESS OR RASH WITH EACH BRIEF CHANGE 113 g 11     Diaper Rash Products (DESITIN) OINT Externally apply topically as needed       ibuprofen (ADVIL/MOTRIN) 400 MG tablet TAKE 1 TABLET BY MOUTH ONCE DAILY;TAKE 1 TABLET BY MOUTH ONCE DAILY AS NEEDED 31 tablet 11     Multiple Vitamin (TAB-A-DAWSON) TABS TAKE 1 TABLET BY MOUTH ONCE DAILY 31 tablet 11     MYRBETRIQ 25 MG 24 hr tablet TAKE 1 TABLET BY MOUTH ONCE DAILY 31 tablet 11     polyethylene glycol (MIRALAX/GLYCOLAX) powder MIX 17GM OF POWDER IN 8OZ OF WATER UNTIL COMPLETELY DISSOLVED. DRINK SOLUTION DAILY AS NEEDED 527 g 98     Probiotic Product (PROBIOTIC  DAILY) CAPS Take 1 capsule by mouth daily 28 capsule 98     SENEXON-S 8.6-50 MG per tablet TAKE 1 TABLET BY MOUTH TWICE DAILY;AND MAY TAKE 1 TABLET BY MOUTH TWICE DAILY AS NEEDED FOR CONSTIPATION 62 tablet 12     vitamin C (ASCORBIC ACID) 500 MG tablet Take 1 tablet (500 mg) by mouth daily 31 tablet 98     cephalexin 250 MG TABS Take 250 mg by mouth 4 times daily       vitamin C (ASCORBIC ACID) 500 MG tablet TAKE 1 TABLET BY MOUTH ONCE DAILY (Patient not taking: Reported on 2019) 31 tablet 98     OTC products: None, except as noted above    Allergies   Allergen Reactions     Tetracyclines GI Disturbance     Ampicillin GI Disturbance     Cefadroxil Muscle Pain (Myalgia)     Cephalexin Diarrhea     Levofloxacin Other (See Comments)     Phlebitis with IV infusion     Macrobid [Nitrofuran Derivatives]      Sulfa Drugs      Sulfasalazine Nausea and Vomiting     Other reaction(s): Gastrointestinal  Added per pt request 18      Latex Allergy: NO    Social History     Tobacco Use     Smoking status: Former Smoker     Last attempt to quit: 5/10/1981     Years since quittin.3     Smokeless tobacco: Never Used   Substance Use Topics     Alcohol use: Not on file     History   Drug Use Not on file       REVIEW OF SYSTEMS:   4 point ROS including Respiratory, CV, GI and , other than that noted in the HPI,  is negative    EXAM:   /72   Pulse 78   Temp 97.4  F (36.3  C)   Resp 18   GENERAL APPEARANCE:  Alert, in no distress, pleasant, cooperative, oriented x 4  EYES:  EOM, lids, pupils and irises normal, sclera clear and conjunctiva normal, no discharge or mattering on lids or lashes noted  ENT:  Mouth normal, moist mucous membranes, nose without drainage or crusting, external ears without lesions, hearing acuity intact, speaks softly.  NECK:  Nontender, supple, symmetrical  RESP:  respiratory effort and palpation of chest normal, no chest wall tenderness, no respiratory distress, Lung sounds clear,  patient is on room air  CV:  Palpation and auscultation of heart done, rate and rhythm regular, no murmur, no rub or gallop. Edema none in bilateral lower extremities.   VASCULAR: warm extremities.   ABDOMEN:  normal bowel sounds, soft, nontender.  M/S:   Gait and station wheelchair bound, tenderness to right ankle and knee. R knee slightly contracted. Global weakness, able to move arms, minimally wiggle toes.    : catheter in place. Clear yellow urine.   SKIN:  Inspection and palpation of skin and subcutaneous tissue: skin warm, dry and intact without rashes  NEURO: no facial asymmetry, no speech deficits and able to follow directions  PSYCH:  insight and judgement intact, memory intact, affect and mood normal    DIAGNOSTICS:   No labs or EKG required for low risk surgery (cataract, skin procedure, breast biopsy, etc)    Recent Labs   Lab Test 03/19/19 09/24/18  1705   HGB 12.6  --    NA  --  136   POTASSIUM  --  4.4   CR  --  0.74        IMPRESSION:   Reason for surgery/procedure: neurogenic bladder with bladder spasms  The proposed surgical procedure is considered LOW risk.     REVISED CARDIAC RISK INDEX  The patient has the following serious cardiovascular risks for perioperative complications such as (MI, PE, VFib and 3  AV Block):  No serious cardiac risks  INTERPRETATION: 0 risks: Class I (very low risk - 0.4% complication rate)     The patient has the following additional risks for perioperative complications:  No identified additional risks      ICD-10-CM    1. Encounter for pre-operative examination Z01.818    2. Neurogenic bladder N31.9    3. Edwards catheter in place Z96.0        ORDERS:       --Patient is to take all scheduled medications on the day of surgery EXCEPT for ibuprofen which will need to be held from now until after the injection.     APPROVAL GIVEN to proceed with proposed procedure, without further diagnostic evaluation       Signed Electronically by: CAROL Dupont CNP    Copy of  this evaluation report is provided to requesting physician.    [facility] Preop Guidelines    Revised Cardiac Risk Index

## 2019-08-20 NOTE — LETTER
2019        RE: Cristina Stevens  [address]  [facility] [address]        PRE-OP EVALUATION:    [facility] Medical Record Number:  [id number]  Place of Service where encounter took place:  [facility] [[id number]]  Today's date: 2019    [facility]  [address]  [address]  [place] [id number] [phone]  Dept: [phone]    PRE-OP EVALUATION:  Today's date: 2019    Cristina Stevens (: 1945) presents for pre-operative evaluation assessment as requested by Dr. Zazueta.  She requires evaluation and anesthesia risk assessment prior to undergoing surgery/procedure for treatment of neurogenic bladder with chronic bladder spasms..     Proposed Surgery/ Procedure: CYSTOSCOPIC INJECTION BLADDER BOTOX   Date of Surgery/ Procedure: A ugust 2019  Time of Surgery/ Procedure: [facility]  Hospital/Surgical Facility: [facility]  Primary Physician: Kimberlee Castro  Type of Anesthesia Anticipated: to be determined     1. NO - Do you have a history of heart attack, stroke, stent, bypass or surgery on an artery in the head, neck, heart or legs?  2. NO - Do you ever have any pain or discomfort in your chest?  3. NO - Do you have a history of  Heart Failure?  4. NO - Are you troubled by shortness of breath when: walking on the level, up a slight hill or at night?  5. NO - Do you currently have a cold, bronchitis or other respiratory infection?  6. NO - Do you have a cough, shortness of breath or wheezing?  7. NO - Do you sometimes get pains in the calves of your legs when you walk?  8. NO - Do you or anyone in your family have previous history of blood clots?  9. NO - Do you or does anyone in your family have a serious bleeding problem such as prolonged bleeding following surgeries or cuts?  10. NO - Have you ever had problems with anemia or been told to take iron pills?  11. NO - Have you had any abnormal blood loss  "such as black, tarry or bloody stools, or abnormal vaginal bleeding?  12. NO - Have you ever had a blood transfusion?  13. NO - Have you or any of your relatives ever had problems with anesthesia?  14. NO - Do you have sleep apnea, excessive snoring or daytime drowsiness?  15. NO - Do you have any prosthetic heart valves?  16. NO - Do you have prosthetic joints?  17. NO - Is there any chance that you may be pregnant?         HPI:     Cristina Stevens is a 73 year old  (1945) female with a medical history notable for MS with spastic quadriplegia, neurogenic bladder, recurrent UTIs and chronic pain. She has a chronic catheter in place and is currently on prophylactic cephalexin due to frequent UTIs. Muna reports that she generally has the botox injections every 4-6 months. In the past, she has developed \"20 seconds of bee-stinging pain\" after the injections so she will go to the hospital for the injection for better pain control post procedure. She feels well today and has not had fever, body aches, or chills. Last catheter change was on 8/19/19 and she noted that her urine smelled very strong and a specimen was taken. She will have the catheter changed out again during the procedure.     MEDICAL HISTORY:     Patient Active Problem List    Diagnosis Date Noted     Chronic constipation 07/12/2018     Priority: Medium     Screening for osteoporosis 07/10/2018     Priority: Medium     Overview:   [id number]       Closed fracture of right tibial plateau 07/09/2018     Priority: Medium     Acute blood loss anemia 06/19/2018     Priority: Medium     Neurogenic bladder 06/08/2018     Priority: Medium     Immobility 06/08/2018     Priority: Medium     Heat intolerance 05/26/2018     Priority: Medium     Closed fracture of neck of right femur (H) 05/20/2018     Priority: Medium     OAB (overactive bladder) 06/02/2017     Priority: Medium     Overview:   Added automatically from request for surgery [id number]       At " risk for impaired skin integrity 12/14/2014     Priority: Medium     Overview:    Bilateral lower extremity plegia, truncal weakness, right arm profoundly weak.         Neuromuscular respiratory weakness 09/26/2014     Priority: Medium     Overview:   PFTs Sept 2014:  difficulty performing all PFT maneuvers.   Results not reproducible thus may not be accurate. No airway obstruction. FEV1 and FVC both significantly decreased. There was a significant response to bronchodilator. FEV1 improved by 15% after bronchodilator. Slow vital capacity  severely decreased at 44% predicted.DLCO severely decreased at 16% predicted.   NIF  severely decreased at -34.       Scoliosis associated with other condition 09/10/2014     Priority: Medium     Overview:   Weakness of right worse than left axial muscles, left leaning thoracic kyphoscoliosis       Kidney stone 06/19/2014     Priority: Medium     Overview:   7/2014- lithotripsy.-Dr Zazueta.       MDRO (multiple drug resistant organisms) resistance 05/31/2013     Priority: Medium     Overview:   MRSA  5/27/2013  Site: Urine  [facility]    Positive MRSA  Urine  7/10/2013  [facility]    Urine  12/19/2014 5/14/2014  MRSA ISOLATED  Urine  [facility]       Anemia 09/25/2012     Priority: Medium     Chronic pain 09/25/2012     Priority: Medium     Overview:   Right arm and shoulder- botox helpful but short lasting.  - Acupuncture once weekly.  --Standing frame 5d/week for 30min.       Frequent UTI 09/25/2012     Priority: Medium     Overview:   --UTI avg q 2weeks - has ruby cath, only tx if symptomatic per urology- fever, chills, hematuria, mentation changes.  --Tiazidine not helpful in past.  --has had Urodynamic studies in the past  --Seen at [facility] in the past- started Vesicare.       Spasticity 10/10/2011     Priority: Medium     Spastic quadriplegia (H) 10/10/2011     Priority: Medium     Multiple sclerosis (H) 05/10/2011     Priority: Medium     Osteoporosis  06/16/2006     Priority: Medium     Overview:   [facility]         Past Medical History:   Diagnosis Date     Acute blood loss anemia 6/19/2018     Anemia 9/25/2012     At risk for impaired skin integrity 12/14/2014    Overview:   Bilateral lower extremity plegia, truncal weakness, right arm profoundly weak.       Chronic constipation 7/12/2018     Chronic pain 9/25/2012    Overview:  Right arm and shoulder- botox helpful but short lasting. - Acupuncture once weekly. --Standing frame 5d/week for 30min.     Closed fracture of neck of right femur (H) 5/20/2018     Closed fracture of right tibial plateau 7/9/2018     Frequent UTI 9/25/2012    Overview:  --UTI avg q 2weeks - has ruby cath, only tx if symptomatic per urology- fever, chills, hematuria, mentation changes. --Tiazidine not helpful in past. --has had Urodynamic studies in the past --Seen at [facility] in the past- started Vesicare.     Heat intolerance 5/26/2018     Immobility 6/8/2018     Kidney stone 6/19/2014    Overview:  7/2014- lithotripsy.-Dr Zazueta.     MDRO (multiple drug resistant organisms) resistance 5/31/2013    Overview:  MRSA 5/27/2013 Site: Urine [facility]  Positive MRSA Urine 7/10/2013 [facility]  Urine 12/19/2014 5/14/2014 MRSA ISOLATED Urine [facility]     Multiple sclerosis (H) 5/10/2011     Neurogenic bladder 6/8/2018     Neuromuscular respiratory weakness 9/26/2014    Overview:  PFTs Sept 2014:  difficulty performing all PFT maneuvers.   Results not reproducible thus may not be accurate. No airway obstruction. FEV1 and FVC both significantly decreased. There was a significant response to bronchodilator. FEV1 improved by 15% after bronchodilator. Slow vital capacity  severely decreased at 44% predicted.DLCO severely decreased at 16% predicted.   NIF  severely decreased at -34.     OAB (overactive bladder) 6/2/2017    Overview:  Added automatically from request for surgery [id number]     Osteoporosis 6/16/2006    Overview:   Epic      Scoliosis associated with other condition 9/10/2014    Overview:  Weakness of right worse than left axial muscles, left leaning thoracic kyphoscoliosis     Screening for osteoporosis 7/10/2018    Overview:  [id number]     Spastic quadriplegia (H) 10/10/2011     Spasticity 10/10/2011     No past surgical history on file.  Current Outpatient Medications   Medication Sig Dispense Refill     Abaloparatide 3120 MCG/1.56ML SOPN injection Inject 0.04 mLs (80 mcg) Subcutaneous daily 1.56 mL 11     amoxicillin (AMOXIL) 250 MG chewable tablet CHEW AND SWALLOW 8 TABLETS BY MOUTH 1 HOUR PRIOR TO DENTAL 8 tablet 1     armodafinil (NUVIGIL) 150 MG TABS tablet TAKE 1 TABLET BY MOUTH EVERY MORNING 30 tablet 5     baclofen (LIORESAL) 10 MG tablet Take 5 mg by mouth daily AND 5 mg every 4 hours as needed       CALMOSEPTINE 0.44-20.6 % OINT ointment APPLY TOPICALLY TO BUTTOCKS AS NEEDED WITH BRIEF CHANGES 113 g 11     cephALEXin (KEFLEX) 250 MG capsule TAKE 1 CAPSULE BY MOUTH ONCE DAILY FOR PREVENTIVE 30 capsule 11     cetirizine (ZYRTEC) 10 MG tablet TAKE 1 TABLET BY MOUTH ONCE DAILY 31 tablet 11     Cholecalciferol (VITAMIN D3) 2000 units CAPS TAKE TWO CAPSULES (4000 UNITS) BY MOUTH DAILY 62 capsule 11     CO2-Releasing (-TWO) SUPP Place rectally daily as needed       CRANBERRY CONCENTRATE 500 MG CAPS TAKE 1 CAPSULE BY MOUTH ONCE DAILY 31 capsule 11     DESITIN 13 % CREA APPLY TOPICALLY TO AREAS OF REDNESS OR RASH WITH EACH BRIEF CHANGE 113 g 11     Diaper Rash Products (DESITIN) OINT Externally apply topically as needed       ibuprofen (ADVIL/MOTRIN) 400 MG tablet TAKE 1 TABLET BY MOUTH ONCE DAILY;TAKE 1 TABLET BY MOUTH ONCE DAILY AS NEEDED 31 tablet 11     Multiple Vitamin (TAB-A-DAWSON) TABS TAKE 1 TABLET BY MOUTH ONCE DAILY 31 tablet 11     MYRBETRIQ 25 MG 24 hr tablet TAKE 1 TABLET BY MOUTH ONCE DAILY 31 tablet 11     polyethylene glycol (MIRALAX/GLYCOLAX) powder MIX 17GM OF POWDER IN 8OZ OF WATER UNTIL  COMPLETELY DISSOLVED. DRINK SOLUTION DAILY AS NEEDED 527 g 98     Probiotic Product (PROBIOTIC DAILY) CAPS Take 1 capsule by mouth daily 28 capsule 98     SENEXON-S 8.6-50 MG per tablet TAKE 1 TABLET BY MOUTH TWICE DAILY;AND MAY TAKE 1 TABLET BY MOUTH TWICE DAILY AS NEEDED FOR CONSTIPATION 62 tablet 12     vitamin C (ASCORBIC ACID) 500 MG tablet Take 1 tablet (500 mg) by mouth daily 31 tablet 98     cephalexin 250 MG TABS Take 250 mg by mouth 4 times daily       vitamin C (ASCORBIC ACID) 500 MG tablet TAKE 1 TABLET BY MOUTH ONCE DAILY (Patient not taking: Reported on 2019) 31 tablet 98     OTC products: None, except as noted above    Allergies   Allergen Reactions     Tetracyclines GI Disturbance     Ampicillin GI Disturbance     Cefadroxil Muscle Pain (Myalgia)     Cephalexin Diarrhea     Levofloxacin Other (See Comments)     Phlebitis with IV infusion     Macrobid [Nitrofuran Derivatives]      Sulfa Drugs      Sulfasalazine Nausea and Vomiting     Other reaction(s): Gastrointestinal  Added per pt request 18      Latex Allergy: NO    Social History     Tobacco Use     Smoking status: Former Smoker     Last attempt to quit: 5/10/1981     Years since quittin.3     Smokeless tobacco: Never Used   Substance Use Topics     Alcohol use: Not on file     History   Drug Use Not on file       REVIEW OF SYSTEMS:   4 point ROS including Respiratory, CV, GI and , other than that noted in the HPI,  is negative    EXAM:   /72   Pulse 78   Temp 97.4  F (36.3  C)   Resp 18   GENERAL APPEARANCE:  Alert, in no distress, pleasant, cooperative, oriented x 4  EYES:  EOM, lids, pupils and irises normal, sclera clear and conjunctiva normal, no discharge or mattering on lids or lashes noted  ENT:  Mouth normal, moist mucous membranes, nose without drainage or crusting, external ears without lesions, hearing acuity intact, speaks softly.  NECK:  Nontender, supple, symmetrical  RESP:  respiratory effort and  palpation of chest normal, no chest wall tenderness, no respiratory distress, Lung sounds clear, patient is on room air  CV:  Palpation and auscultation of heart done, rate and rhythm regular, no murmur, no rub or gallop. Edema none in bilateral lower extremities.   VASCULAR: warm extremities.   ABDOMEN:  normal bowel sounds, soft, nontender.  M/S:   Gait and station wheelchair bound, tenderness to right ankle and knee. R knee slightly contracted. Global weakness, able to move arms, minimally wiggle toes.    : catheter in place. Clear yellow urine.   SKIN:  Inspection and palpation of skin and subcutaneous tissue: skin warm, dry and intact without rashes  NEURO: no facial asymmetry, no speech deficits and able to follow directions  PSYCH:  insight and judgement intact, memory intact, affect and mood normal    DIAGNOSTICS:   No labs or EKG required for low risk surgery (cataract, skin procedure, breast biopsy, etc)    Recent Labs   Lab Test 03/19/19 09/24/18  1705   HGB 12.6  --    NA  --  136   POTASSIUM  --  4.4   CR  --  0.74        IMPRESSION:   Reason for surgery/procedure: neurogenic bladder with bladder spasms  The proposed surgical procedure is considered LOW risk.     REVISED CARDIAC RISK INDEX  The patient has the following serious cardiovascular risks for perioperative complications such as (MI, PE, VFib and 3  AV Block):  No serious cardiac risks  INTERPRETATION: 0 risks: Class I (very low risk - 0.4% complication rate)     The patient has the following additional risks for perioperative complications:  No identified additional risks      ICD-10-CM    1. Encounter for pre-operative examination Z01.818    2. Neurogenic bladder N31.9    3. Edwards catheter in place Z96.0        ORDERS:       --Patient is to take all scheduled medications on the day of surgery EXCEPT for ibuprofen which will need to be held from now until after the injection.     APPROVAL GIVEN to proceed with proposed procedure, without  further diagnostic evaluation       Signed Electronically by: CAROL Dupont CNP    Copy of this evaluation report is provided to requesting physician.    [facility] Preop Guidelines    Revised Cardiac Risk Index          Sincerely,        CAROL Dupont CNP

## 2019-09-12 DIAGNOSIS — M80.00XD AGE-RELATED OSTEOPOROSIS WITH CURRENT PATHOLOGICAL FRACTURE WITH ROUTINE HEALING, SUBSEQUENT ENCOUNTER: ICD-10-CM

## 2019-09-16 RX ORDER — ABALOPARATIDE 2000 UG/ML
INJECTION, SOLUTION SUBCUTANEOUS
Qty: 1.56 ML | Refills: 5 | Status: SHIPPED | OUTPATIENT
Start: 2019-09-16 | End: 2021-05-17

## 2019-09-16 NOTE — TELEPHONE ENCOUNTER
[facility] agreed to refill Tymlos but patient does need to see Dr. Abrams. Spoke with Cristina and she states that her PCA Rubi makes her appts. Left message for Rbui to call and arrange follow up with Dr. Abrams.

## 2019-09-27 ENCOUNTER — ASSISTED LIVING VISIT (OUTPATIENT)
Dept: GERIATRICS | Facility: CLINIC | Age: 74
End: 2019-09-27
Payer: MEDICARE

## 2019-09-27 VITALS
HEART RATE: 78 BPM | SYSTOLIC BLOOD PRESSURE: 128 MMHG | RESPIRATION RATE: 18 BRPM | DIASTOLIC BLOOD PRESSURE: 72 MMHG | TEMPERATURE: 97.4 F

## 2019-09-27 DIAGNOSIS — Z46.6 URINARY CATHETER (FOLEY) CHANGE REQUIRED: ICD-10-CM

## 2019-09-27 DIAGNOSIS — N31.9 NEUROGENIC BLADDER: Primary | ICD-10-CM

## 2019-09-27 DIAGNOSIS — Z97.8 FOLEY CATHETER IN PLACE: ICD-10-CM

## 2019-09-27 RX ORDER — NYSTATIN 100000 [USP'U]/G
POWDER TOPICAL 2 TIMES DAILY
COMMUNITY
End: 2019-12-03

## 2019-09-27 NOTE — LETTER
9/27/2019        RE: Cristina Stevens  [address]  [facility] [address]        [facility]  [facility] Record Number:  [id number]  Place of Service where encounter took place:  [facility] [[id number]]  Chief Complaint   Patient presents with     RECHECK       HPI:    Cristina Stevens  is a 74 year old (1945), who is being seen today for an episodic care visit.  HPI information obtained from: facility chart records, facility staff, patient report and [facility] chart review.     Today's concern is:  1. Neurogenic bladder    2. Ruby catheter in place    3. Urinary catheter (Ruby) change required      Ms. Stevens was visited today secondary to the need for a catheter change. She has been getting a cath change every three weeks due to frequent UTIs and bypassing. It has been 2.5 weeks since last change and provider unavailable to complete a change next week so ruby to be changed today.     Denies fever, body aches, chills, bypassing of urine. Urine has been clear. No discomfort related to the ruby.     Past Medical and Surgical History reviewed in Epic today.    MEDICATIONS:  Current Outpatient Medications   Medication Sig Dispense Refill     amoxicillin (AMOXIL) 250 MG chewable tablet CHEW AND SWALLOW 8 TABLETS BY MOUTH 1 HOUR PRIOR TO DENTAL 8 tablet 1     armodafinil (NUVIGIL) 150 MG TABS tablet TAKE 1 TABLET BY MOUTH EVERY MORNING 30 tablet 5     baclofen (LIORESAL) 10 MG tablet Take 5 mg by mouth daily AND 5 mg every 4 hours as needed       CALMOSEPTINE 0.44-20.6 % OINT ointment APPLY TOPICALLY TO BUTTOCKS AS NEEDED WITH BRIEF CHANGES 113 g 11     cephALEXin (KEFLEX) 250 MG capsule TAKE 1 CAPSULE BY MOUTH ONCE DAILY FOR PREVENTIVE 30 capsule 11     cephalexin 250 MG TABS Take 250 mg by mouth 4 times daily       cetirizine (ZYRTEC) 10 MG tablet TAKE 1 TABLET BY MOUTH ONCE DAILY 31 tablet 11     Cholecalciferol (VITAMIN D3)  2000 units CAPS TAKE TWO CAPSULES (4000 UNITS) BY MOUTH DAILY 62 capsule 11     CO2-Releasing (-TWO) SUPP Place rectally daily as needed       CRANBERRY CONCENTRATE 500 MG CAPS TAKE 1 CAPSULE BY MOUTH ONCE DAILY 31 capsule 11     DESITIN 13 % CREA APPLY TOPICALLY TO AREAS OF REDNESS OR RASH WITH EACH BRIEF CHANGE 113 g 11     Diaper Rash Products (DESITIN) OINT Externally apply topically as needed       ibuprofen (ADVIL/MOTRIN) 400 MG tablet TAKE 1 TABLET BY MOUTH ONCE DAILY;TAKE 1 TABLET BY MOUTH ONCE DAILY AS NEEDED 31 tablet 11     Multiple Vitamin (TAB-A-DAWSON) TABS TAKE 1 TABLET BY MOUTH ONCE DAILY 31 tablet 11     MYRBETRIQ 25 MG 24 hr tablet TAKE 1 TABLET BY MOUTH ONCE DAILY 31 tablet 11     nystatin (MYCOSTATIN) 769159 UNIT/GM external powder Apply topically 2 times daily AND BID PRN       polyethylene glycol (MIRALAX/GLYCOLAX) powder MIX 17GM OF POWDER IN 8OZ OF WATER UNTIL COMPLETELY DISSOLVED. DRINK SOLUTION DAILY AS NEEDED 527 g 98     Probiotic Product (PROBIOTIC DAILY) CAPS Take 1 capsule by mouth daily 28 capsule 98     SENEXON-S 8.6-50 MG per tablet TAKE 1 TABLET BY MOUTH TWICE DAILY;AND MAY TAKE 1 TABLET BY MOUTH TWICE DAILY AS NEEDED FOR CONSTIPATION 62 tablet 12     TYMLOS 3120 MCG/1.56ML SOPN injection INJECT 0.04 ML (80MCG) SUBCUTANEOUSLY ONCE DAILY INTO ABDOMEN. ROTATE INJECTION SITES DAILY. 1.56 mL 5     vitamin C (ASCORBIC ACID) 500 MG tablet Take 1 tablet (500 mg) by mouth daily 31 tablet 98     vitamin C (ASCORBIC ACID) 500 MG tablet TAKE 1 TABLET BY MOUTH ONCE DAILY (Patient not taking: Reported on 8/19/2019) 31 tablet 98       REVIEW OF SYSTEMS:  4 point ROS including Respiratory, CV, GI and , other than that noted in the HPI,  is negative    Objective:  /72   Pulse 78   Temp 97.4  F (36.3  C)   Resp 18   Exam:  GENERAL APPEARANCE:  Alert, in no distress, pleasant, cooperative, oriented x 4  RESP:  respiratory effort normal, no respiratory distress, patient is on room  air  CV: Edema none in bilateral lower extremities.   VASCULAR: warm extremities.   ABDOMEN:  normal bowel sounds, soft, nontender.  M/S:   Gait and station wheelchair bound, tenderness to right ankle and knee. R knee slightly contracted. Global weakness, able to move arms, minimally wiggle toes.    : catheter in place. Clear yellow urine.   SKIN:  Inspection and palpation of skin and subcutaneous tissue: skin warm, dry and intact without rashes  NEURO: no facial asymmetry, no speech deficits and able to follow directions  PSYCH:  insight and judgement intact, memory intact, affect and mood normal    Labs:   CBC RESULTS:   Recent Labs   Lab Test 03/19/19   HGB 12.6       Last Basic Metabolic Panel:  Recent Labs   Lab Test 09/24/18  1705      POTASSIUM 4.4   CHLORIDE 103   BRENDA 9.4   CO2 25   BUN 14   CR 0.74   *       TSH   Date Value Ref Range Status   03/19/2019 2.58 0.30 - 5.00 uIU/mL Final       ASSESSMENT/PLAN:  (N31.9) Neurogenic bladder  (primary encounter diagnosis)  (Z96.0) Ruby catheter in place  (Z46.6) Urinary catheter (Ruby) change required  Comment: hx of frequent UTIs and requires the use of prophylactic abx. Also receives botox injections for overactive bladder. She tends to bypass urine frequently most notable when the catheter gets to be around 3 weeks. The bypassing has improved since doing ruby changes q3 weeks rather than 4 weeks. Would recommend caregiver to  3 sets of catheter supplies if she develop bypassing prior to ruby change out. Urby was changed today without complication.   Plan: continue with cephalexin 250 mg daily.   --ruby cath changes q3 weeks and PRN for bypassing.     Electronically signed by:  CAROL Dupont CNP               Sincerely,        CAROL Dupont CNP

## 2019-09-27 NOTE — PROGRESS NOTES
[place] [facility]  [place] Medical Record Number:  [id number]  Place of Service where encounter took place:  [facility] ([facility] [[id number]]  Chief Complaint   Patient presents with     RECHECK       HPI:    Cristina Stevens  is a 74 year old (1945), who is being seen today for an episodic care visit.  HPI information obtained from: facility chart records, facility staff, patient report and [facility] chart review.     Today's concern is:  1. Neurogenic bladder    2. Ruby catheter in place    3. Urinary catheter (Ruby) change required      Ms. Stevens was visited today secondary to the need for a catheter change. She has been getting a cath change every three weeks due to frequent UTIs and bypassing. It has been 2.5 weeks since last change and provider unavailable to complete a change next week so ruby to be changed today.     Denies fever, body aches, chills, bypassing of urine. Urine has been clear. No discomfort related to the ruby.     Past Medical and Surgical History reviewed in Epic today.    MEDICATIONS:  Current Outpatient Medications   Medication Sig Dispense Refill     amoxicillin (AMOXIL) 250 MG chewable tablet CHEW AND SWALLOW 8 TABLETS BY MOUTH 1 HOUR PRIOR TO DENTAL 8 tablet 1     armodafinil (NUVIGIL) 150 MG TABS tablet TAKE 1 TABLET BY MOUTH EVERY MORNING 30 tablet 5     baclofen (LIORESAL) 10 MG tablet Take 5 mg by mouth daily AND 5 mg every 4 hours as needed       CALMOSEPTINE 0.44-20.6 % OINT ointment APPLY TOPICALLY TO BUTTOCKS AS NEEDED WITH BRIEF CHANGES 113 g 11     cephALEXin (KEFLEX) 250 MG capsule TAKE 1 CAPSULE BY MOUTH ONCE DAILY FOR PREVENTIVE 30 capsule 11     cephalexin 250 MG TABS Take 250 mg by mouth 4 times daily       cetirizine (ZYRTEC) 10 MG tablet TAKE 1 TABLET BY MOUTH ONCE DAILY 31 tablet 11     Cholecalciferol (VITAMIN D3) 2000 units CAPS TAKE TWO CAPSULES (4000 UNITS) BY MOUTH DAILY 62 capsule 11     CO2-Releasing  (-TWO) SUPP Place rectally daily as needed       CRANBERRY CONCENTRATE 500 MG CAPS TAKE 1 CAPSULE BY MOUTH ONCE DAILY 31 capsule 11     DESITIN 13 % CREA APPLY TOPICALLY TO AREAS OF REDNESS OR RASH WITH EACH BRIEF CHANGE 113 g 11     Diaper Rash Products (DESITIN) OINT Externally apply topically as needed       ibuprofen (ADVIL/MOTRIN) 400 MG tablet TAKE 1 TABLET BY MOUTH ONCE DAILY;TAKE 1 TABLET BY MOUTH ONCE DAILY AS NEEDED 31 tablet 11     Multiple Vitamin (TAB-A-DAWSON) TABS TAKE 1 TABLET BY MOUTH ONCE DAILY 31 tablet 11     MYRBETRIQ 25 MG 24 hr tablet TAKE 1 TABLET BY MOUTH ONCE DAILY 31 tablet 11     nystatin (MYCOSTATIN) 804490 UNIT/GM external powder Apply topically 2 times daily AND BID PRN       polyethylene glycol (MIRALAX/GLYCOLAX) powder MIX 17GM OF POWDER IN 8OZ OF WATER UNTIL COMPLETELY DISSOLVED. DRINK SOLUTION DAILY AS NEEDED 527 g 98     Probiotic Product (PROBIOTIC DAILY) CAPS Take 1 capsule by mouth daily 28 capsule 98     SENEXON-S 8.6-50 MG per tablet TAKE 1 TABLET BY MOUTH TWICE DAILY;AND MAY TAKE 1 TABLET BY MOUTH TWICE DAILY AS NEEDED FOR CONSTIPATION 62 tablet 12     TYMLOS 3120 MCG/1.56ML SOPN injection INJECT 0.04 ML (80MCG) SUBCUTANEOUSLY ONCE DAILY INTO ABDOMEN. ROTATE INJECTION SITES DAILY. 1.56 mL 5     vitamin C (ASCORBIC ACID) 500 MG tablet Take 1 tablet (500 mg) by mouth daily 31 tablet 98     vitamin C (ASCORBIC ACID) 500 MG tablet TAKE 1 TABLET BY MOUTH ONCE DAILY (Patient not taking: Reported on 8/19/2019) 31 tablet 98       REVIEW OF SYSTEMS:  4 point ROS including Respiratory, CV, GI and , other than that noted in the HPI,  is negative    Objective:  /72   Pulse 78   Temp 97.4  F (36.3  C)   Resp 18   Exam:  GENERAL APPEARANCE:  Alert, in no distress, pleasant, cooperative, oriented x 4  RESP:  respiratory effort normal, no respiratory distress, patient is on room air  CV: Edema none in bilateral lower extremities.   VASCULAR: warm extremities.   ABDOMEN:  normal  bowel sounds, soft, nontender.  M/S:   Gait and station wheelchair bound, tenderness to right ankle and knee. R knee slightly contracted. Global weakness, able to move arms, minimally wiggle toes.    : catheter in place. Clear yellow urine.   SKIN:  Inspection and palpation of skin and subcutaneous tissue: skin warm, dry and intact without rashes  NEURO: no facial asymmetry, no speech deficits and able to follow directions  PSYCH:  insight and judgement intact, memory intact, affect and mood normal    Labs:   CBC RESULTS:   Recent Labs   Lab Test 03/19/19   HGB 12.6       Last Basic Metabolic Panel:  Recent Labs   Lab Test 09/24/18  1705      POTASSIUM 4.4   CHLORIDE 103   BRENDA 9.4   CO2 25   BUN 14   CR 0.74   *       TSH   Date Value Ref Range Status   03/19/2019 2.58 0.30 - 5.00 uIU/mL Final       ASSESSMENT/PLAN:  (N31.9) Neurogenic bladder  (primary encounter diagnosis)  (Z96.0) Ruby catheter in place  (Z46.6) Urinary catheter (Ruby) change required  Comment: hx of frequent UTIs and requires the use of prophylactic abx. Also receives botox injections for overactive bladder. She tends to bypass urine frequently most notable when the catheter gets to be around 3 weeks. The bypassing has improved since doing ruby changes q3 weeks rather than 4 weeks. Would recommend caregiver to  3 sets of catheter supplies if she develop bypassing prior to ruby change out. Ruby was changed today without complication.   Plan: continue with cephalexin 250 mg daily.   --ruby cath changes q3 weeks and PRN for bypassing.     Electronically signed by:  CAROL Dupont CNP

## 2019-10-15 ENCOUNTER — ASSISTED LIVING VISIT (OUTPATIENT)
Dept: GERIATRICS | Facility: CLINIC | Age: 74
End: 2019-10-15
Payer: MEDICARE

## 2019-10-15 VITALS
TEMPERATURE: 97.4 F | SYSTOLIC BLOOD PRESSURE: 128 MMHG | RESPIRATION RATE: 18 BRPM | HEART RATE: 78 BPM | DIASTOLIC BLOOD PRESSURE: 72 MMHG

## 2019-10-15 DIAGNOSIS — N31.9 NEUROGENIC BLADDER: Primary | ICD-10-CM

## 2019-10-15 DIAGNOSIS — Z46.6 URINARY CATHETER (FOLEY) CHANGE REQUIRED: ICD-10-CM

## 2019-10-15 NOTE — LETTER
10/15/2019        RE: Cristina Stevens  [address]  [facility] [address]        [place] [facility]  [place] Medical Record Number:  [id number]  Place of Service where encounter took place:  [facility] ([facility] [[id number]]  Chief Complaint   Patient presents with     Nursing Home Acute       HPI:    Cristina Stevens  is a 74 year old (1945), who is being seen today for an episodic care visit.  HPI information obtained from: facility chart records, facility staff, patient report and [facility] chart review. Today's concern is:  1. Neurogenic bladder    2. Urinary catheter (Ruby) change required      Ms. Stevens was visited today for a scheduled ruby catheter change out. Her catheter was leaking late last week, her care attendant added 2 ml of saline to ruby balloon and that has helped. She has not had fever, body aches or chills.     Past Medical and Surgical History reviewed in Epic today.    MEDICATIONS:  Current Outpatient Medications   Medication Sig Dispense Refill     armodafinil (NUVIGIL) 150 MG TABS tablet TAKE 1 TABLET BY MOUTH EVERY MORNING 30 tablet 5     baclofen (LIORESAL) 10 MG tablet Take 5 mg by mouth daily AND 5 mg every 4 hours as needed       CALMOSEPTINE 0.44-20.6 % OINT ointment APPLY TOPICALLY TO BUTTOCKS AS NEEDED WITH BRIEF CHANGES 113 g 11     cephALEXin (KEFLEX) 250 MG capsule TAKE 1 CAPSULE BY MOUTH ONCE DAILY FOR PREVENTIVE 30 capsule 11     cetirizine (ZYRTEC) 10 MG tablet TAKE 1 TABLET BY MOUTH ONCE DAILY 31 tablet 11     Cholecalciferol (VITAMIN D3) 2000 units CAPS TAKE TWO CAPSULES (4000 UNITS) BY MOUTH DAILY 62 capsule 11     CO2-Releasing (-TWO) SUPP Place rectally daily as needed       CRANBERRY CONCENTRATE 500 MG CAPS TAKE 1 CAPSULE BY MOUTH ONCE DAILY 31 capsule 11     DESITIN 13 % CREA APPLY TOPICALLY TO AREAS OF REDNESS OR RASH WITH EACH BRIEF CHANGE 113 g 11     Diaper Rash Products (DESITIN) OINT  Externally apply topically as needed       ibuprofen (ADVIL/MOTRIN) 400 MG tablet TAKE 1 TABLET BY MOUTH ONCE DAILY;TAKE 1 TABLET BY MOUTH ONCE DAILY AS NEEDED 31 tablet 11     Multiple Vitamin (TAB-A-DAWSON) TABS TAKE 1 TABLET BY MOUTH ONCE DAILY 31 tablet 11     MYRBETRIQ 25 MG 24 hr tablet TAKE 1 TABLET BY MOUTH ONCE DAILY 31 tablet 11     nystatin (MYCOSTATIN) 750849 UNIT/GM external powder Apply topically 2 times daily AND BID PRN       polyethylene glycol (MIRALAX/GLYCOLAX) powder MIX 17GM OF POWDER IN 8OZ OF WATER UNTIL COMPLETELY DISSOLVED. DRINK SOLUTION DAILY AS NEEDED 527 g 98     Probiotic Product (PROBIOTIC DAILY) CAPS Take 1 capsule by mouth daily 28 capsule 98     SENEXON-S 8.6-50 MG per tablet TAKE 1 TABLET BY MOUTH TWICE DAILY;AND MAY TAKE 1 TABLET BY MOUTH TWICE DAILY AS NEEDED FOR CONSTIPATION 62 tablet 12     TYMLOS 3120 MCG/1.56ML SOPN injection INJECT 0.04 ML (80MCG) SUBCUTANEOUSLY ONCE DAILY INTO ABDOMEN. ROTATE INJECTION SITES DAILY. 1.56 mL 5     vitamin C (ASCORBIC ACID) 500 MG tablet Take 1 tablet (500 mg) by mouth daily 31 tablet 98     amoxicillin (AMOXIL) 250 MG chewable tablet CHEW AND SWALLOW 8 TABLETS BY MOUTH 1 HOUR PRIOR TO DENTAL 8 tablet 1     REVIEW OF SYSTEMS:  4 point ROS including Respiratory, CV, GI and , other than that noted in the HPI,  is negative    Objective:  /72   Pulse 78   Temp 97.4  F (36.3  C)   Resp 18   Exam:  GENERAL APPEARANCE:  Alert, in no distress, pleasant, cooperative, oriented x 4  RESP:  respiratory effort normal, no respiratory distress, patient is on room air  CV: Edema none in bilateral lower extremities.   VASCULAR: warm extremities.   ABDOMEN:  normal bowel sounds, soft, nontender.  M/S:   Gait and station wheelchair bound, Global weakness, able to move arms, minimally wiggle toes.    : catheter in place. Clear yellow urine.   SKIN:  Inspection and palpation of skin and subcutaneous tissue: skin warm, dry and intact without  isauro  NEURO: no facial asymmetry, no speech deficits and able to follow directions  PSYCH:  insight and judgement intact, memory intact, affect and mood normal    ASSESSMENT/PLAN:  (N31.9) Neurogenic bladder  (primary encounter diagnosis)  (Z46.6) Urinary catheter (Ruby) change required  Comment: Sediment in catheter but no s/sx of UTI. Ruby changed today without complication.   Plan: plan for ruby change q3 weeks but will change PRN for bypassing.     Electronically signed by:  CAROL Dupont CNP             Sincerely,        CAROL Dupont CNP

## 2019-10-15 NOTE — PROGRESS NOTES
[place] [facility]  [place] Medical Record Number:  [id number]  Place of Service where encounter took place:  [facility]) [[id number]]  Chief Complaint   Patient presents with     Nursing Home Acute       HPI:    Cristina Stevens  is a 74 year old (1945), who is being seen today for an episodic care visit.  HPI information obtained from: facility chart records, facility staff, patient report and [facility] chart review. Today's concern is:  1. Neurogenic bladder    2. Urinary catheter (Ruby) change required      Ms. Stevens was visited today for a scheduled ruby catheter change out. Her catheter was leaking late last week, her care attendant added 2 ml of saline to ruby balloon and that has helped. She has not had fever, body aches or chills.     Past Medical and Surgical History reviewed in Epic today.    MEDICATIONS:  Current Outpatient Medications   Medication Sig Dispense Refill     armodafinil (NUVIGIL) 150 MG TABS tablet TAKE 1 TABLET BY MOUTH EVERY MORNING 30 tablet 5     baclofen (LIORESAL) 10 MG tablet Take 5 mg by mouth daily AND 5 mg every 4 hours as needed       CALMOSEPTINE 0.44-20.6 % OINT ointment APPLY TOPICALLY TO BUTTOCKS AS NEEDED WITH BRIEF CHANGES 113 g 11     cephALEXin (KEFLEX) 250 MG capsule TAKE 1 CAPSULE BY MOUTH ONCE DAILY FOR PREVENTIVE 30 capsule 11     cetirizine (ZYRTEC) 10 MG tablet TAKE 1 TABLET BY MOUTH ONCE DAILY 31 tablet 11     Cholecalciferol (VITAMIN D3) 2000 units CAPS TAKE TWO CAPSULES (4000 UNITS) BY MOUTH DAILY 62 capsule 11     CO2-Releasing (-TWO) SUPP Place rectally daily as needed       CRANBERRY CONCENTRATE 500 MG CAPS TAKE 1 CAPSULE BY MOUTH ONCE DAILY 31 capsule 11     DESITIN 13 % CREA APPLY TOPICALLY TO AREAS OF REDNESS OR RASH WITH EACH BRIEF CHANGE 113 g 11     Diaper Rash Products (DESITIN) OINT Externally apply topically as needed       ibuprofen (ADVIL/MOTRIN) 400 MG tablet TAKE 1 TABLET BY MOUTH  ONCE DAILY;TAKE 1 TABLET BY MOUTH ONCE DAILY AS NEEDED 31 tablet 11     Multiple Vitamin (TAB-A-DAWSON) TABS TAKE 1 TABLET BY MOUTH ONCE DAILY 31 tablet 11     MYRBETRIQ 25 MG 24 hr tablet TAKE 1 TABLET BY MOUTH ONCE DAILY 31 tablet 11     nystatin (MYCOSTATIN) 614224 UNIT/GM external powder Apply topically 2 times daily AND BID PRN       polyethylene glycol (MIRALAX/GLYCOLAX) powder MIX 17GM OF POWDER IN 8OZ OF WATER UNTIL COMPLETELY DISSOLVED. DRINK SOLUTION DAILY AS NEEDED 527 g 98     Probiotic Product (PROBIOTIC DAILY) CAPS Take 1 capsule by mouth daily 28 capsule 98     SENEXON-S 8.6-50 MG per tablet TAKE 1 TABLET BY MOUTH TWICE DAILY;AND MAY TAKE 1 TABLET BY MOUTH TWICE DAILY AS NEEDED FOR CONSTIPATION 62 tablet 12     TYMLOS 3120 MCG/1.56ML SOPN injection INJECT 0.04 ML (80MCG) SUBCUTANEOUSLY ONCE DAILY INTO ABDOMEN. ROTATE INJECTION SITES DAILY. 1.56 mL 5     vitamin C (ASCORBIC ACID) 500 MG tablet Take 1 tablet (500 mg) by mouth daily 31 tablet 98     amoxicillin (AMOXIL) 250 MG chewable tablet CHEW AND SWALLOW 8 TABLETS BY MOUTH 1 HOUR PRIOR TO DENTAL 8 tablet 1     REVIEW OF SYSTEMS:  4 point ROS including Respiratory, CV, GI and , other than that noted in the HPI,  is negative    Objective:  /72   Pulse 78   Temp 97.4  F (36.3  C)   Resp 18   Exam:  GENERAL APPEARANCE:  Alert, in no distress, pleasant, cooperative, oriented x 4  RESP:  respiratory effort normal, no respiratory distress, patient is on room air  CV: Edema none in bilateral lower extremities.   VASCULAR: warm extremities.   ABDOMEN:  normal bowel sounds, soft, nontender.  M/S:   Gait and station wheelchair bound, Global weakness, able to move arms, minimally wiggle toes.    : catheter in place. Clear yellow urine.   SKIN:  Inspection and palpation of skin and subcutaneous tissue: skin warm, dry and intact without rashes  NEURO: no facial asymmetry, no speech deficits and able to follow directions  PSYCH:  insight and judgement  intact, memory intact, affect and mood normal    ASSESSMENT/PLAN:  (N31.9) Neurogenic bladder  (primary encounter diagnosis)  (Z46.6) Urinary catheter (Ruby) change required  Comment: Sediment in catheter but no s/sx of UTI. Ruby changed today without complication.   Plan: plan for ruby change q3 weeks but will change PRN for bypassing.     Electronically signed by:  CAROL Dupont CNP

## 2019-10-22 ENCOUNTER — RECORDS - HEALTHEAST (OUTPATIENT)
Dept: LAB | Facility: CLINIC | Age: 74
End: 2019-10-22

## 2019-10-22 DIAGNOSIS — M80.00XD AGE-RELATED OSTEOPOROSIS WITH CURRENT PATHOLOGICAL FRACTURE WITH ROUTINE HEALING, SUBSEQUENT ENCOUNTER: ICD-10-CM

## 2019-10-22 LAB
HIV 1+2 AB+HIV1 P24 AG SERPL QL IA: NEGATIVE
HIV 1+2 AB+HIV1 P24 AG SERPL QL IA: NEGATIVE

## 2019-10-23 LAB
HBV SURFACE AG SERPL QL IA: NEGATIVE
HCV AB SERPL QL IA: NEGATIVE

## 2019-11-18 ENCOUNTER — OFFICE VISIT (OUTPATIENT)
Dept: FAMILY MEDICINE | Facility: CLINIC | Age: 74
End: 2019-11-18
Attending: FAMILY MEDICINE
Payer: MEDICARE

## 2019-11-18 VITALS — SYSTOLIC BLOOD PRESSURE: 147 MMHG | DIASTOLIC BLOOD PRESSURE: 83 MMHG

## 2019-11-18 DIAGNOSIS — M81.0 SENILE OSTEOPOROSIS: Primary | ICD-10-CM

## 2019-11-18 LAB
CALCIUM SERPL-MCNC: 9.7 MG/DL (ref 8.5–10.1)
MAGNESIUM SERPL-MCNC: 1.9 MG/DL (ref 1.6–2.3)
PHOSPHATE SERPL-MCNC: 3.2 MG/DL (ref 2.5–4.5)

## 2019-11-18 PROCEDURE — 84100 ASSAY OF PHOSPHORUS: CPT | Performed by: FAMILY MEDICINE

## 2019-11-18 PROCEDURE — 83735 ASSAY OF MAGNESIUM: CPT | Performed by: FAMILY MEDICINE

## 2019-11-18 PROCEDURE — 36415 COLL VENOUS BLD VENIPUNCTURE: CPT | Performed by: FAMILY MEDICINE

## 2019-11-18 PROCEDURE — G0463 HOSPITAL OUTPT CLINIC VISIT: HCPCS | Mod: ZF

## 2019-11-18 PROCEDURE — 82310 ASSAY OF CALCIUM: CPT | Performed by: FAMILY MEDICINE

## 2019-11-18 NOTE — PROGRESS NOTES
Cristina is a  74 year old female /post menopausal] [GR0, P0] that presents today for osteoporosis follow up patient was last seen 9/2018.  Started Tymlos  Oct 2018. Having diarrhea with the tymlos.  Using imodium to control it.  Gets diarrhea mainly after eating.  Doesn't want to stop the tymlos.  She is in assisted living at [facility].  She has MS and is wheel chair bound.     Referring Physician: Referred Kiley Paul MD     HPI     Have you ever had a bone density test? Yes  Where = [facility]   When = 10/2018  Previous 2007 and 2011  ([facility])  Spine Tscore =  L1-4  T= -3.6  Left neck Tscore = NA  Total left hip Tscore = NA  Right neck Tscore = NA  Total Right hip Tscore = NA  Radius 33%  T= -3.7   Have you received any x-ray dye or contrast in the last ten days? No  How many servings of dairy products do you consume per day? 2 Type: milk 12 oz , cheese, ice cream   Do you take a multi-vitamin daily? Yes  Do you take a vitamin D supplement? Yes 4000IU/day   Do you take a calcium supplement daily?  No  Do you take a supplement containing strontium? No  Are you exposed to natural sunlight at least 20 minutes three times a week? No    Social History   reports that she quit smoking about 38 years ago. She has never used smokeless tobacco.  Do you smoke cigarettes? Reformed smoker  Do you exercise? Yes. Details: physical therapy 2x/wk   Do you drink alcohol? No    Medication History  Have you used any of the following medications?   Actonel (Risedronate): No   Aredia (Pamidronate): No   Boniva (Ibindronate): No   Didronil (Etidronate): No   Evista (Raloxifene): No   Fosamax (Alendronate): No   Forteo (Parathyroid hormone) injections: No              Tymlos started 10/2018    HCTZ (Thiazide): No   Calcitonin nasal spray: No   Reclast or Zometa (Zolendronate): No   Prolia (Denosumab): No    Current Outpatient Medications   Medication Sig Dispense Refill     TYMLOS 3120 MCG/1.56ML SOPN  injection INJECT 0.04 ML (80MCG) SUBCUTANEOUSLY ONCE DAILY INTO ABDOMEN. ROTATE INJECTION SITES DAILY. 1.56 mL 5     amoxicillin (AMOXIL) 250 MG chewable tablet CHEW AND SWALLOW 8 TABLETS BY MOUTH 1 HOUR PRIOR TO DENTAL (Patient not taking: Reported on 11/18/2019) 8 tablet 1     armodafinil (NUVIGIL) 150 MG TABS tablet TAKE 1 TABLET BY MOUTH EVERY MORNING 30 tablet 5     baclofen (LIORESAL) 10 MG tablet Take 5 mg by mouth daily AND 5 mg every 4 hours as needed       CALMOSEPTINE 0.44-20.6 % OINT ointment APPLY TOPICALLY TO BUTTOCKS AS NEEDED WITH BRIEF CHANGES 113 g 11     cephALEXin (KEFLEX) 250 MG capsule TAKE 1 CAPSULE BY MOUTH ONCE DAILY FOR PREVENTIVE 30 capsule 11     cetirizine (ZYRTEC) 10 MG tablet TAKE 1 TABLET BY MOUTH ONCE DAILY 31 tablet 11     Cholecalciferol (VITAMIN D3) 2000 units CAPS TAKE TWO CAPSULES (4000 UNITS) BY MOUTH DAILY 62 capsule 11     CO2-Releasing (-TWO) SUPP Place rectally daily as needed       CRANBERRY CONCENTRATE 500 MG CAPS TAKE 1 CAPSULE BY MOUTH ONCE DAILY 31 capsule 11     DESITIN 13 % CREA APPLY TOPICALLY TO AREAS OF REDNESS OR RASH WITH EACH BRIEF CHANGE 113 g 11     Diaper Rash Products (DESITIN) OINT Externally apply topically as needed       ibuprofen (ADVIL/MOTRIN) 400 MG tablet TAKE 1 TABLET BY MOUTH ONCE DAILY;TAKE 1 TABLET BY MOUTH ONCE DAILY AS NEEDED 31 tablet 11     insulin pen needle (B-D U/F) 31G X 5 MM miscellaneous USE DAILY WITH TYMLOS AS DIRECTED 100 each 4     Multiple Vitamin (TAB-A-DAWSON) TABS TAKE 1 TABLET BY MOUTH ONCE DAILY 31 tablet 11     MYRBETRIQ 25 MG 24 hr tablet TAKE 1 TABLET BY MOUTH ONCE DAILY 31 tablet 11     nystatin (MYCOSTATIN) 918802 UNIT/GM external powder Apply topically 2 times daily AND BID PRN       polyethylene glycol (MIRALAX/GLYCOLAX) powder MIX 17GM OF POWDER IN 8OZ OF WATER UNTIL COMPLETELY DISSOLVED. DRINK SOLUTION DAILY AS NEEDED (Patient not taking: Reported on 11/18/2019) 527 g 98     Probiotic Product (PROBIOTIC DAILY) CAPS  Take 1 capsule by mouth daily 28 capsule 98     SENEXON-S 8.6-50 MG per tablet TAKE 1 TABLET BY MOUTH TWICE DAILY;AND MAY TAKE 1 TABLET BY MOUTH TWICE DAILY AS NEEDED FOR CONSTIPATION 62 tablet 12     vitamin C (ASCORBIC ACID) 500 MG tablet Take 1 tablet (500 mg) by mouth daily 31 tablet 98          Allergies   Allergen Reactions     Tetracyclines GI Disturbance     Ampicillin GI Disturbance     Cefadroxil Muscle Pain (Myalgia)     Cephalexin Diarrhea     Levofloxacin Other (See Comments)     Phlebitis with IV infusion     Macrobid [Nitrofuran Derivatives]      Sulfa Drugs      Sulfasalazine Nausea and Vomiting     Other reaction(s): Gastrointestinal  Added per pt request 5/20/18       Past Medical History    No family history on file.    ROS:  General: none  Head/Eyes: none  Ears/Nose/Throat: none  Cardiovascular: none  Respiratory: none  Gastrointestinal: diarrhea  Breast: none  Genitourinary: none  Sexual Function: none  Musculoskeletal: none  Skin: none  Neurological: difficulty with walking  Mental Health: none  Endocrine: temperature intolerance    Clinic Measurements  Vitals: BP (!) 147/83   BMI= There is no height or weight on file to calculate BMI. unable to calculate     Physical exam  Constitutional: older appearing woman in wheel chair here with assistant   Psychological: appropriate mood.  Neck: No thyroidmegaly.  no carotid bruits.  Cardiovascular: regular rate and rhythm, normal S1 and S2, no murmurs, rubs or gallops, peripheral pulses full and symmetric   Respiratory: clear to auscultation, no wheezes or crackles, normal breath sounds.  Musculoskeletal: trace edema and motor strength is diminished and  equal in the upper and lower extremities    Spine: unable to examine  Skin: no concerning lesions, no jaundice.  Neurological: cranial nerves intact, diminished  strength, 1-2/4reflexes at patella and biceps normal, unable to walk,  no tremor.     LAB  Vertebra; Fracture Assessment: NA  Dexa Scan:  10/2018  [place]  Unable to get hip measurement     FRAX Assessment Tool: N/A  Risk Factors:        ASSESSMENT:  This is a PM female with multiple sclerosis who has OP by T scores and hx of fracture who is at high risk for future fracture.  She was started on Tymlos Oct 2018.  She reports a side effect of diarrhea which I have not seen but she strongly feels it relates to the Tymlos.  She wants to continue however.  She will complete the 18 months in April 2020.  At that time she should then be moved to prolia to protect the gain with tymlos.  Her last vit D level was high and she has reduced it - will recheck.  Talked about calcium.     PLAN:  DXA due in 5/2020 - get at [facility] on [address].  Check if they have a harness to move her - otherwise [facility].   Tymlos stop in April 2020  See me in May 2020 after the DXA   We will then start prolia  Patient should take 1500mg of calcium/day in divided doses that is all supplements and diet together  and vitamin D3 4000IU/day.  You only absorb 5-600mg at one time  If you need a supplement use calcium citrate 325mg - 1-2 pills/day     I spent a total of 30 minutes face-to-face with the patient during today's office visit.  Over 50% of this time was spent counseling the patient and/or coordinating care regarding review recent DXA, calcium and vit D.  See note for details.    Thank you for allowing me to participate in the care of your patient.  Please do not hesitate to call with questions or concerns.    Sincerely,    Erica Abrams MD, PhD  CC Kiley Paul MD

## 2019-11-18 NOTE — LETTER
11/20/2019         Cristina Stevens   [address]  [facility] [address]        Dear Ms. Stevens:    The results of your recent test(s) listed below were normal.     Results for orders placed or performed in visit on 11/18/19   Calcium     Status: None   Result Value Ref Range    Calcium 9.7 8.5 - 10.1 mg/dL   Phosphorus     Status: None   Result Value Ref Range    Phosphorus 3.2 2.5 - 4.5 mg/dL   Magnesium     Status: None   Result Value Ref Range    Magnesium 1.9 1.6 - 2.3 mg/dL         Please note that test explanations are brief and do not reflect all diagnostic uses.  If you have any questions or concerns, please call the clinic at [phone].      Sincerely,      Erica Abrams MD, PhD

## 2019-11-18 NOTE — PATIENT INSTRUCTIONS
DXA due in 5/2020 - get at [facility] on [address].    Tymlos stop in April 2020  See me in May after the DXA   We will then start prolia  Patient should take 1500mg of calcium/day in divided doses that is all supplements and diet together  and vitamin D3 4000IU/day.  You only absorb 5-600mg at one time  If you need a supplement use calcium citrate 325mg - 1-2 pills/day

## 2019-11-18 NOTE — LETTER
11/18/2019       RE: Cristina Stevens  [address]  [facility] [address]     Dear Colleague,    Thank you for referring your patient, Cristina Stevens, to the [facility] at [facility]. Please see a copy of my visit note below.    Cristina is a  74 year old female /post menopausal] [GR0, P0] that presents today for osteoporosis follow up patient was last seen 9/2018.  Started Tymlos  Oct 2018. Having diarrhea with the tymlos.  Using imodium to control it.  Gets diarrhea mainly after eating.  Doesn't want to stop the tymlos.  She is in assisted living at [facility].  She has MS and is wheel chair bound.     Referring Physician: Referred Kiley Paul MD     HPI     Have you ever had a bone density test? Yes  Where = [place]   When = 10/2018  Previous 2007 and 2011  ([facility])  Spine Tscore =  L1-4  T= -3.6  Left neck Tscore = NA  Total left hip Tscore = NA  Right neck Tscore = NA  Total Right hip Tscore = NA  Radius 33%  T= -3.7   Have you received any x-ray dye or contrast in the last ten days? No  How many servings of dairy products do you consume per day? 2 Type: milk 12 oz , cheese, ice cream   Do you take a multi-vitamin daily? Yes  Do you take a vitamin D supplement? Yes 4000IU/day   Do you take a calcium supplement daily?  No  Do you take a supplement containing strontium? No  Are you exposed to natural sunlight at least 20 minutes three times a week? No    Social History   reports that she quit smoking about 38 years ago. She has never used smokeless tobacco.  Do you smoke cigarettes? Reformed smoker  Do you exercise? Yes. Details: physical therapy 2x/wk   Do you drink alcohol? No    Medication History  Have you used any of the following medications?   Actonel (Risedronate): No   Aredia (Pamidronate): No   Boniva (Ibindronate): No   Didronil (Etidronate): No   Evista (Raloxifene): No   Fosamax (Alendronate):  No   Forteo (Parathyroid hormone) injections: No              Tymlos started 10/2018    HCTZ (Thiazide): No   Calcitonin nasal spray: No   Reclast or Zometa (Zolendronate): No   Prolia (Denosumab): No    Current Outpatient Medications   Medication Sig Dispense Refill     TYMLOS 3120 MCG/1.56ML SOPN injection INJECT 0.04 ML (80MCG) SUBCUTANEOUSLY ONCE DAILY INTO ABDOMEN. ROTATE INJECTION SITES DAILY. 1.56 mL 5     amoxicillin (AMOXIL) 250 MG chewable tablet CHEW AND SWALLOW 8 TABLETS BY MOUTH 1 HOUR PRIOR TO DENTAL (Patient not taking: Reported on 11/18/2019) 8 tablet 1     armodafinil (NUVIGIL) 150 MG TABS tablet TAKE 1 TABLET BY MOUTH EVERY MORNING 30 tablet 5     baclofen (LIORESAL) 10 MG tablet Take 5 mg by mouth daily AND 5 mg every 4 hours as needed       CALMOSEPTINE 0.44-20.6 % OINT ointment APPLY TOPICALLY TO BUTTOCKS AS NEEDED WITH BRIEF CHANGES 113 g 11     cephALEXin (KEFLEX) 250 MG capsule TAKE 1 CAPSULE BY MOUTH ONCE DAILY FOR PREVENTIVE 30 capsule 11     cetirizine (ZYRTEC) 10 MG tablet TAKE 1 TABLET BY MOUTH ONCE DAILY 31 tablet 11     Cholecalciferol (VITAMIN D3) 2000 units CAPS TAKE TWO CAPSULES (4000 UNITS) BY MOUTH DAILY 62 capsule 11     CO2-Releasing (-TWO) SUPP Place rectally daily as needed       CRANBERRY CONCENTRATE 500 MG CAPS TAKE 1 CAPSULE BY MOUTH ONCE DAILY 31 capsule 11     DESITIN 13 % CREA APPLY TOPICALLY TO AREAS OF REDNESS OR RASH WITH EACH BRIEF CHANGE 113 g 11     Diaper Rash Products (DESITIN) OINT Externally apply topically as needed       ibuprofen (ADVIL/MOTRIN) 400 MG tablet TAKE 1 TABLET BY MOUTH ONCE DAILY;TAKE 1 TABLET BY MOUTH ONCE DAILY AS NEEDED 31 tablet 11     insulin pen needle (B-D U/F) 31G X 5 MM miscellaneous USE DAILY WITH TYMLOS AS DIRECTED 100 each 4     Multiple Vitamin (TAB-A-DAWSON) TABS TAKE 1 TABLET BY MOUTH ONCE DAILY 31 tablet 11     MYRBETRIQ 25 MG 24 hr tablet TAKE 1 TABLET BY MOUTH ONCE DAILY 31 tablet 11     nystatin (MYCOSTATIN) 088592 UNIT/GM  external powder Apply topically 2 times daily AND BID PRN       polyethylene glycol (MIRALAX/GLYCOLAX) powder MIX 17GM OF POWDER IN 8OZ OF WATER UNTIL COMPLETELY DISSOLVED. DRINK SOLUTION DAILY AS NEEDED (Patient not taking: Reported on 11/18/2019) 527 g 98     Probiotic Product (PROBIOTIC DAILY) CAPS Take 1 capsule by mouth daily 28 capsule 98     SENEXON-S 8.6-50 MG per tablet TAKE 1 TABLET BY MOUTH TWICE DAILY;AND MAY TAKE 1 TABLET BY MOUTH TWICE DAILY AS NEEDED FOR CONSTIPATION 62 tablet 12     vitamin C (ASCORBIC ACID) 500 MG tablet Take 1 tablet (500 mg) by mouth daily 31 tablet 98          Allergies   Allergen Reactions     Tetracyclines GI Disturbance     Ampicillin GI Disturbance     Cefadroxil Muscle Pain (Myalgia)     Cephalexin Diarrhea     Levofloxacin Other (See Comments)     Phlebitis with IV infusion     Macrobid [Nitrofuran Derivatives]      Sulfa Drugs      Sulfasalazine Nausea and Vomiting     Other reaction(s): Gastrointestinal  Added per pt request 5/20/18       Past Medical History    No family history on file.    ROS:  General: none  Head/Eyes: none  Ears/Nose/Throat: none  Cardiovascular: none  Respiratory: none  Gastrointestinal: diarrhea  Breast: none  Genitourinary: none  Sexual Function: none  Musculoskeletal: none  Skin: none  Neurological: difficulty with walking  Mental Health: none  Endocrine: temperature intolerance    Clinic Measurements  Vitals: BP (!) 147/83   BMI= There is no height or weight on file to calculate BMI. unable to calculate     Physical exam  Constitutional: older appearing woman in wheel chair here with assistant   Psychological: appropriate mood.  Neck: No thyroidmegaly.  no carotid bruits.  Cardiovascular: regular rate and rhythm, normal S1 and S2, no murmurs, rubs or gallops, peripheral pulses full and symmetric   Respiratory: clear to auscultation, no wheezes or crackles, normal breath sounds.  Musculoskeletal: trace edema and motor strength is diminished and   equal in the upper and lower extremities    Spine: unable to examine  Skin: no concerning lesions, no jaundice.  Neurological: cranial nerves intact, diminished  strength, 1-2/4reflexes at patella and biceps normal, unable to walk,  no tremor.     LAB  Vertebra; Fracture Assessment: NA  Dexa Scan: 10/2018  [place]  Unable to get hip measurement     FRAX Assessment Tool: N/A  Risk Factors:        ASSESSMENT:  This is a PM female with multiple sclerosis who has OP by T scores and hx of fracture who is at high risk for future fracture.   She was started on Tymlos Oct 2018.  She reports a side effect of diarrhea which I have not seen but she strongly feels it relates to the Tymlos.  She wants to continue however.  She will complete the 18 months in April 2020.  At that time she should then be moved to prolia to protect the gain with tymlos.  Her last vit D level was high and she has reduced it - will recheck.  Talked about calcium.     PLAN:  DXA due in 5/2020 - get [facility] [address].  Check if they have a harness to move her - otherwise [facility]   Tymlos stop in April 2020  See me in May 2020 after the DXA   We will then start prolia  Patient should take 1500mg of calcium/day in divided doses that is all supplements and diet together  and vitamin D3 4000IU/day.  You only absorb 5-600mg at one time  If you need a supplement use calcium citrate 325mg - 1-2 pills/day     I spent a total of 30 minutes face-to-face with the patient during today's office visit.  Over 50% of this time was spent counseling the patient and/or coordinating care regarding review recent DXA, calcium and vit D.  See note for details.    Thank you for allowing me to participate in the care of your patient.  Please do not hesitate to call with questions or concerns.    Sincerely,    Erica Abrams MD, PhD  CC Kiley Paul MD     Again, thank you for allowing me to participate in the care of your patient.       Sincerely,    Erica Abrams MD PhD

## 2019-11-18 NOTE — LETTER
11/18/2019       RE: Cristina Stevens  [address]  [facility] [address]     Dear Colleague,    Thank you for referring your patient, Cristina Stevens, to the [facility] at [facility]. Please see a copy of my visit note below.    Cristina is a  74 year old female /post menopausal] [GR0, P0] that presents today for osteoporosis follow up patient was last seen 9/2018.  Started Tymlos  Oct 2018. Having diarrhea with the tymlos.  Using imodium to control it.  Gets diarrhea mainly after eating.  Doesn't want to stop the tymlos.  She is in assisted living at [facility].  She has MS and is wheel chair bound.     Referring Physician: Referred Kiley Paul MD     HPI     Have you ever had a bone density test? Yes  Where = [place]   When = 10/2018  Previous 2007 and 2011  ([facility])  Spine Tscore =  L1-4  T= -3.6  Left neck Tscore = NA  Total left hip Tscore = NA  Right neck Tscore = NA  Total Right hip Tscore = NA  Radius 33%  T= -3.7   Have you received any x-ray dye or contrast in the last ten days? No  How many servings of dairy products do you consume per day? 2 Type: milk 12 oz , cheese, ice cream   Do you take a multi-vitamin daily? Yes  Do you take a vitamin D supplement? Yes 4000IU/day   Do you take a calcium supplement daily?  No  Do you take a supplement containing strontium? No  Are you exposed to natural sunlight at least 20 minutes three times a week? No    Social History   reports that she quit smoking about 38 years ago. She has never used smokeless tobacco.  Do you smoke cigarettes? Reformed smoker  Do you exercise? Yes. Details: physical therapy 2x/wk   Do you drink alcohol? No    Medication History  Have you used any of the following medications?   Actonel (Risedronate): No   Aredia (Pamidronate): No   Boniva (Ibindronate): No   Didronil (Etidronate): No   Evista (Raloxifene): No   Fosamax (Alendronate):  No   Forteo (Parathyroid hormone) injections: No              Tymlos started 10/2018    HCTZ (Thiazide): No   Calcitonin nasal spray: No   Reclast or Zometa (Zolendronate): No   Prolia (Denosumab): No    Current Outpatient Medications   Medication Sig Dispense Refill     TYMLOS 3120 MCG/1.56ML SOPN injection INJECT 0.04 ML (80MCG) SUBCUTANEOUSLY ONCE DAILY INTO ABDOMEN. ROTATE INJECTION SITES DAILY. 1.56 mL 5     amoxicillin (AMOXIL) 250 MG chewable tablet CHEW AND SWALLOW 8 TABLETS BY MOUTH 1 HOUR PRIOR TO DENTAL (Patient not taking: Reported on 11/18/2019) 8 tablet 1     armodafinil (NUVIGIL) 150 MG TABS tablet TAKE 1 TABLET BY MOUTH EVERY MORNING 30 tablet 5     baclofen (LIORESAL) 10 MG tablet Take 5 mg by mouth daily AND 5 mg every 4 hours as needed       CALMOSEPTINE 0.44-20.6 % OINT ointment APPLY TOPICALLY TO BUTTOCKS AS NEEDED WITH BRIEF CHANGES 113 g 11     cephALEXin (KEFLEX) 250 MG capsule TAKE 1 CAPSULE BY MOUTH ONCE DAILY FOR PREVENTIVE 30 capsule 11     cetirizine (ZYRTEC) 10 MG tablet TAKE 1 TABLET BY MOUTH ONCE DAILY 31 tablet 11     Cholecalciferol (VITAMIN D3) 2000 units CAPS TAKE TWO CAPSULES (4000 UNITS) BY MOUTH DAILY 62 capsule 11     CO2-Releasing (-TWO) SUPP Place rectally daily as needed       CRANBERRY CONCENTRATE 500 MG CAPS TAKE 1 CAPSULE BY MOUTH ONCE DAILY 31 capsule 11     DESITIN 13 % CREA APPLY TOPICALLY TO AREAS OF REDNESS OR RASH WITH EACH BRIEF CHANGE 113 g 11     Diaper Rash Products (DESITIN) OINT Externally apply topically as needed       ibuprofen (ADVIL/MOTRIN) 400 MG tablet TAKE 1 TABLET BY MOUTH ONCE DAILY;TAKE 1 TABLET BY MOUTH ONCE DAILY AS NEEDED 31 tablet 11     insulin pen needle (B-D U/F) 31G X 5 MM miscellaneous USE DAILY WITH TYMLOS AS DIRECTED 100 each 4     Multiple Vitamin (TAB-A-DAWSON) TABS TAKE 1 TABLET BY MOUTH ONCE DAILY 31 tablet 11     MYRBETRIQ 25 MG 24 hr tablet TAKE 1 TABLET BY MOUTH ONCE DAILY 31 tablet 11     nystatin (MYCOSTATIN) 558904 UNIT/GM  external powder Apply topically 2 times daily AND BID PRN       polyethylene glycol (MIRALAX/GLYCOLAX) powder MIX 17GM OF POWDER IN 8OZ OF WATER UNTIL COMPLETELY DISSOLVED. DRINK SOLUTION DAILY AS NEEDED (Patient not taking: Reported on 11/18/2019) 527 g 98     Probiotic Product (PROBIOTIC DAILY) CAPS Take 1 capsule by mouth daily 28 capsule 98     SENEXON-S 8.6-50 MG per tablet TAKE 1 TABLET BY MOUTH TWICE DAILY;AND MAY TAKE 1 TABLET BY MOUTH TWICE DAILY AS NEEDED FOR CONSTIPATION 62 tablet 12     vitamin C (ASCORBIC ACID) 500 MG tablet Take 1 tablet (500 mg) by mouth daily 31 tablet 98     Allergies   Allergen Reactions     Tetracyclines GI Disturbance     Ampicillin GI Disturbance     Cefadroxil Muscle Pain (Myalgia)     Cephalexin Diarrhea     Levofloxacin Other (See Comments)     Phlebitis with IV infusion     Macrobid [Nitrofuran Derivatives]      Sulfa Drugs      Sulfasalazine Nausea and Vomiting     Other reaction(s): Gastrointestinal  Added per pt request 5/20/18       Past Medical History    No family history on file.    ROS:  General: none  Head/Eyes: none  Ears/Nose/Throat: none  Cardiovascular: none  Respiratory: none  Gastrointestinal: diarrhea  Breast: none  Genitourinary: none  Sexual Function: none  Musculoskeletal: none  Skin: none  Neurological: difficulty with walking  Mental Health: none  Endocrine: temperature intolerance    Clinic Measurements  Vitals: BP (!) 147/83   BMI= There is no height or weight on file to calculate BMI. unable to calculate     Physical exam  Constitutional: older appearing woman in wheel chair here with assistant   Psychological: appropriate mood.  Neck: No thyroidmegaly.  no carotid bruits.  Cardiovascular: regular rate and rhythm, normal S1 and S2, no murmurs, rubs or gallops, peripheral pulses full and symmetric   Respiratory: clear to auscultation, no wheezes or crackles, normal breath sounds.  Musculoskeletal: trace edema and motor strength is diminished and  equal  in the upper and lower extremities    Spine: unable to examine  Skin: no concerning lesions, no jaundice.  Neurological: cranial nerves intact, diminished  strength, 1-2/4reflexes at patella and biceps normal, unable to walk,  no tremor.     LAB  Vertebra; Fracture Assessment: NA  Dexa Scan: 10/2018  [place]  Unable to get hip measurement     FRAX Assessment Tool: N/A  Risk Factors:        ASSESSMENT:  This is a PM female with multiple sclerosis who has OP by T scores and hx of fracture who is at high risk for future fracture.   She was started on Tymlos Oct 2018.  She reports a side effect of diarrhea which I have not seen but she strongly feels it relates to the Tymlos.  She wants to continue however.  She will complete the 18 months in April 2020.  At that time she should then be moved to prolia to protect the gain with tymlos.  Her last vit D level was high and she has reduced it - will recheck.  Talked about calcium.     PLAN:  DXA due in 5/2020 - get at [facility] on [address].  Check if they have a harness to move her - otherwise go back to [facility].   Tymlos stop in April 2020  See me in May 2020 after the DXA   We will then start prolia  Patient should take 1500mg of calcium/day in divided doses that is all supplements and diet together  and vitamin D3 4000IU/day.  You only absorb 5-600mg at one time  If you need a supplement use calcium citrate 325mg - 1-2 pills/day     I spent a total of 30 minutes face-to-face with the patient during today's office visit.  Over 50% of this time was spent counseling the patient and/or coordinating care regarding review recent DXA, calcium and vit D.  See note for details.    Thank you for allowing me to participate in the care of your patient.  Please do not hesitate to call with questions or concerns.    Sincerely,    Erica Abrams MD, PhD      CC Kiley Paul MD

## 2019-11-20 ENCOUNTER — ASSISTED LIVING VISIT (OUTPATIENT)
Dept: GERIATRICS | Facility: CLINIC | Age: 74
End: 2019-11-20
Payer: MEDICARE

## 2019-11-20 VITALS
DIASTOLIC BLOOD PRESSURE: 72 MMHG | SYSTOLIC BLOOD PRESSURE: 128 MMHG | HEART RATE: 78 BPM | TEMPERATURE: 97.4 F | RESPIRATION RATE: 18 BRPM

## 2019-11-20 DIAGNOSIS — Z46.6 URINARY CATHETER (FOLEY) CHANGE REQUIRED: ICD-10-CM

## 2019-11-20 DIAGNOSIS — N31.9 NEUROGENIC BLADDER: Primary | ICD-10-CM

## 2019-11-20 PROBLEM — Z91.81 AT HIGH RISK FOR FALLS: Status: ACTIVE | Noted: 2019-11-20

## 2019-11-20 NOTE — PROGRESS NOTES
[facility]  [place] Medical Record Number:  [id number]  Place of Service where encounter took place:  [facility] [[id number]]  Chief Complaint   Patient presents with     RECHECK       HPI:    Cristina Stevens  is a 74 year old (1945), who is being seen today for an episodic care visit.  HPI information obtained from: facility chart records, facility staff, patient report and [facility] chart review.     Today's concern is:  1. Neurogenic bladder    2. Urinary catheter (Ruby) change required      Ms. Stevens was visited today for a scheduled ruby catheter change out. She was seen in the [facility] urgent care on 11/3 and was diagnosed with urinary tract infection and was started on cipro which she has completed. Ruby was changed out at that time and has been functioning well until last night when it started to bypass urine. She has not had fever, body aches or chills.     Past Medical and Surgical History reviewed in Epic today.    MEDICATIONS:  Current Outpatient Medications   Medication Sig Dispense Refill     amoxicillin (AMOXIL) 250 MG chewable tablet CHEW AND SWALLOW 8 TABLETS BY MOUTH 1 HOUR PRIOR TO DENTAL 8 tablet 1     armodafinil (NUVIGIL) 150 MG TABS tablet TAKE 1 TABLET BY MOUTH EVERY MORNING 30 tablet 5     baclofen (LIORESAL) 10 MG tablet Take 5 mg by mouth daily AND 5 mg every 4 hours as needed       CALMOSEPTINE 0.44-20.6 % OINT ointment APPLY TOPICALLY TO BUTTOCKS AS NEEDED WITH BRIEF CHANGES 113 g 11     cephALEXin (KEFLEX) 250 MG capsule TAKE 1 CAPSULE BY MOUTH ONCE DAILY FOR PREVENTIVE 30 capsule 11     cetirizine (ZYRTEC) 10 MG tablet TAKE 1 TABLET BY MOUTH ONCE DAILY 31 tablet 11     Cholecalciferol (VITAMIN D3) 2000 units CAPS TAKE TWO CAPSULES (4000 UNITS) BY MOUTH DAILY 62 capsule 11     CO2-Releasing (-TWO) SUPP Place rectally daily as needed       CRANBERRY CONCENTRATE 500 MG CAPS TAKE 1 CAPSULE BY MOUTH ONCE DAILY 31  capsule 11     DESITIN 13 % CREA APPLY TOPICALLY TO AREAS OF REDNESS OR RASH WITH EACH BRIEF CHANGE 113 g 11     Diaper Rash Products (DESITIN) OINT Externally apply topically as needed       ibuprofen (ADVIL/MOTRIN) 400 MG tablet TAKE 1 TABLET BY MOUTH ONCE DAILY;TAKE 1 TABLET BY MOUTH ONCE DAILY AS NEEDED 31 tablet 11     insulin pen needle (B-D U/F) 31G X 5 MM miscellaneous USE DAILY WITH TYMLOS AS DIRECTED 100 each 4     Multiple Vitamin (TAB-A-DAWSON) TABS TAKE 1 TABLET BY MOUTH ONCE DAILY 31 tablet 11     MYRBETRIQ 25 MG 24 hr tablet TAKE 1 TABLET BY MOUTH ONCE DAILY 31 tablet 11     nystatin (MYCOSTATIN) 701778 UNIT/GM external powder Apply topically 2 times daily AND BID PRN       polyethylene glycol (MIRALAX/GLYCOLAX) powder MIX 17GM OF POWDER IN 8OZ OF WATER UNTIL COMPLETELY DISSOLVED. DRINK SOLUTION DAILY AS NEEDED 527 g 98     Probiotic Product (PROBIOTIC DAILY) CAPS Take 1 capsule by mouth daily 28 capsule 98     SENEXON-S 8.6-50 MG per tablet TAKE 1 TABLET BY MOUTH TWICE DAILY;AND MAY TAKE 1 TABLET BY MOUTH TWICE DAILY AS NEEDED FOR CONSTIPATION 62 tablet 12     TYMLOS 3120 MCG/1.56ML SOPN injection INJECT 0.04 ML (80MCG) SUBCUTANEOUSLY ONCE DAILY INTO ABDOMEN. ROTATE INJECTION SITES DAILY. 1.56 mL 5     vitamin C (ASCORBIC ACID) 500 MG tablet Take 1 tablet (500 mg) by mouth daily 31 tablet 98     REVIEW OF SYSTEMS:  4 point ROS including Respiratory, CV, GI and , other than that noted in the HPI,  is negative    Objective:  /72   Pulse 78   Temp 97.4  F (36.3  C)   Resp 18   Exam:  GENERAL APPEARANCE:  Alert, in no distress, pleasant, cooperative, oriented x 4  RESP:  respiratory effort normal, no respiratory distress, patient is on room air  CV: Edema none in bilateral lower extremities.   VASCULAR: warm extremities.   M/S:   Gait and station wheelchair bound  : catheter in place. Clear yellow urine.   SKIN:  Inspection and palpation of skin and subcutaneous tissue: skin warm, dry and  intact without rashes. Vaginal redness present without drainage.   NEURO: no facial asymmetry, no speech deficits and able to follow directions  PSYCH:  insight and judgement intact, memory intact, affect and mood normal    ASSESSMENT/PLAN:  (N31.9) Neurogenic bladder  (primary encounter diagnosis)  (Z46.6) Urinary catheter (Ruby) change required  Comment: ruby changed out today without complication. No s/sx of urinary tract infection.   Plan: plan for ruby change q3 weeks but will change PRN for bypassing.     Electronically signed by:  CAROL Dupont CNP

## 2019-11-20 NOTE — LETTER
11/20/2019        RE: Cristina Stevens  [address]  [facility] [address]        [place] [facility]  [place] [facility] Record Number:  [id number]  Place of Service where encounter took place:  [facility] [place] ([facility]) [[id number]]  Chief Complaint   Patient presents with     RECHECK       HPI:    Cristina Stevens  is a 74 year old (1945), who is being seen today for an episodic care visit.  HPI information obtained from: facility chart records, facility staff, patient report and [facility] chart review.     Today's concern is:  1. Neurogenic bladder    2. Urinary catheter (Ruby) change required      Ms. Stevens was visited today for a scheduled ruyb catheter change out. She was seen in the [facility] urgent care on 11/3 and was diagnosed with urinary tract infection and was started on cipro which she has completed. Ruby was changed out at that time and has been functioning well until last night when it started to bypass urine. She has not had fever, body aches or chills.     Past Medical and Surgical History reviewed in Epic today.    MEDICATIONS:  Current Outpatient Medications   Medication Sig Dispense Refill     amoxicillin (AMOXIL) 250 MG chewable tablet CHEW AND SWALLOW 8 TABLETS BY MOUTH 1 HOUR PRIOR TO DENTAL 8 tablet 1     armodafinil (NUVIGIL) 150 MG TABS tablet TAKE 1 TABLET BY MOUTH EVERY MORNING 30 tablet 5     baclofen (LIORESAL) 10 MG tablet Take 5 mg by mouth daily AND 5 mg every 4 hours as needed       CALMOSEPTINE 0.44-20.6 % OINT ointment APPLY TOPICALLY TO BUTTOCKS AS NEEDED WITH BRIEF CHANGES 113 g 11     cephALEXin (KEFLEX) 250 MG capsule TAKE 1 CAPSULE BY MOUTH ONCE DAILY FOR PREVENTIVE 30 capsule 11     cetirizine (ZYRTEC) 10 MG tablet TAKE 1 TABLET BY MOUTH ONCE DAILY 31 tablet 11     Cholecalciferol (VITAMIN D3) 2000 units CAPS TAKE TWO CAPSULES (4000 UNITS) BY MOUTH DAILY 62 capsule 11     CO2-Releasing (-TWO) SUPP Place  rectally daily as needed       CRANBERRY CONCENTRATE 500 MG CAPS TAKE 1 CAPSULE BY MOUTH ONCE DAILY 31 capsule 11     DESITIN 13 % CREA APPLY TOPICALLY TO AREAS OF REDNESS OR RASH WITH EACH BRIEF CHANGE 113 g 11     Diaper Rash Products (DESITIN) OINT Externally apply topically as needed       ibuprofen (ADVIL/MOTRIN) 400 MG tablet TAKE 1 TABLET BY MOUTH ONCE DAILY;TAKE 1 TABLET BY MOUTH ONCE DAILY AS NEEDED 31 tablet 11     insulin pen needle (B-D U/F) 31G X 5 MM miscellaneous USE DAILY WITH TYMLOS AS DIRECTED 100 each 4     Multiple Vitamin (TAB-A-DAWSON) TABS TAKE 1 TABLET BY MOUTH ONCE DAILY 31 tablet 11     MYRBETRIQ 25 MG 24 hr tablet TAKE 1 TABLET BY MOUTH ONCE DAILY 31 tablet 11     nystatin (MYCOSTATIN) 989287 UNIT/GM external powder Apply topically 2 times daily AND BID PRN       polyethylene glycol (MIRALAX/GLYCOLAX) powder MIX 17GM OF POWDER IN 8OZ OF WATER UNTIL COMPLETELY DISSOLVED. DRINK SOLUTION DAILY AS NEEDED 527 g 98     Probiotic Product (PROBIOTIC DAILY) CAPS Take 1 capsule by mouth daily 28 capsule 98     SENEXON-S 8.6-50 MG per tablet TAKE 1 TABLET BY MOUTH TWICE DAILY;AND MAY TAKE 1 TABLET BY MOUTH TWICE DAILY AS NEEDED FOR CONSTIPATION 62 tablet 12     TYMLOS 3120 MCG/1.56ML SOPN injection INJECT 0.04 ML (80MCG) SUBCUTANEOUSLY ONCE DAILY INTO ABDOMEN. ROTATE INJECTION SITES DAILY. 1.56 mL 5     vitamin C (ASCORBIC ACID) 500 MG tablet Take 1 tablet (500 mg) by mouth daily 31 tablet 98     REVIEW OF SYSTEMS:  4 point ROS including Respiratory, CV, GI and , other than that noted in the HPI,  is negative    Objective:  /72   Pulse 78   Temp 97.4  F (36.3  C)   Resp 18   Exam:  GENERAL APPEARANCE:  Alert, in no distress, pleasant, cooperative, oriented x 4  RESP:  respiratory effort normal, no respiratory distress, patient is on room air  CV: Edema none in bilateral lower extremities.   VASCULAR: warm extremities.   M/S:   Gait and station wheelchair bound  : catheter in place. Clear  yellow urine.   SKIN:  Inspection and palpation of skin and subcutaneous tissue: skin warm, dry and intact without rashes. Vaginal redness present without drainage.   NEURO: no facial asymmetry, no speech deficits and able to follow directions  PSYCH:  insight and judgement intact, memory intact, affect and mood normal    ASSESSMENT/PLAN:  (N31.9) Neurogenic bladder  (primary encounter diagnosis)  (Z46.6) Urinary catheter (Ruby) change required  Comment: ruby changed out today without complication. No s/sx of urinary tract infection.   Plan: plan for ruby change q3 weeks but will change PRN for bypassing.     Electronically signed by:  CAROL Dupont CNP                 Sincerely,        CAROL Dupont CNP

## 2019-12-10 ENCOUNTER — ASSISTED LIVING VISIT (OUTPATIENT)
Dept: GERIATRICS | Facility: CLINIC | Age: 74
End: 2019-12-10
Payer: MEDICARE

## 2019-12-10 VITALS
RESPIRATION RATE: 18 BRPM | DIASTOLIC BLOOD PRESSURE: 83 MMHG | SYSTOLIC BLOOD PRESSURE: 147 MMHG | TEMPERATURE: 97.4 F | HEART RATE: 78 BPM

## 2019-12-10 DIAGNOSIS — N31.9 NEUROGENIC BLADDER: Primary | ICD-10-CM

## 2019-12-10 DIAGNOSIS — Z97.8 FOLEY CATHETER IN PLACE: ICD-10-CM

## 2019-12-10 DIAGNOSIS — Z46.6 ENCOUNTER FOR FOLEY CATHETER REPLACEMENT: ICD-10-CM

## 2019-12-10 NOTE — PROGRESS NOTES
[place] [facility]  [place] [facility] Record Number:  [id number]  Place of Service where encounter took place:  [facility] [place] ([facility] [[id number]]  Chief Complaint   Patient presents with     Catheterization (insertion/replacement)       HPI:    Cristina Stevens  is a 74 year old (1945), who is being seen today for an episodic care visit.  HPI information obtained from: facility chart records, facility staff, patient report and [facility] chart review  Today's concern is:  1. Neurogenic bladder    2. Urinary catheter (Ruby) change required    3. Ruby catheter in place      Ms. Stevens was visited today for a scheduled ruby catheter change out.  She has not had fever, body aches or chills. Ruby has been functioning well without bypassing. Eating and drinking well.     Past Medical and Surgical History reviewed in Epic today.    MEDICATIONS:  Current Outpatient Medications   Medication Sig Dispense Refill     amoxicillin (AMOXIL) 250 MG chewable tablet CHEW AND SWALLOW 8 TABLETS BY MOUTH 1 HOUR PRIOR TO DENTAL 8 tablet 1     armodafinil (NUVIGIL) 150 MG TABS tablet TAKE 1 TABLET BY MOUTH EVERY MORNING 30 tablet 5     baclofen (LIORESAL) 10 MG tablet Take 5 mg by mouth daily AND 5 mg every 4 hours as needed       CALMOSEPTINE 0.44-20.6 % OINT ointment APPLY TOPICALLY TO BUTTOCKS AS NEEDED WITH BRIEF CHANGES 113 g 11     cephALEXin (KEFLEX) 250 MG capsule TAKE 1 CAPSULE BY MOUTH ONCE DAILY FOR PREVENTIVE 30 capsule 11     cetirizine (ZYRTEC) 10 MG tablet TAKE 1 TABLET BY MOUTH ONCE DAILY 31 tablet 11     Cholecalciferol (VITAMIN D3) 2000 units CAPS TAKE TWO CAPSULES (4000 UNITS) BY MOUTH DAILY 62 capsule 11     CO2-Releasing (-TWO) SUPP Place rectally daily as needed       CRANBERRY CONCENTRATE 500 MG CAPS TAKE 1 CAPSULE BY MOUTH ONCE DAILY 31 capsule 11     DESITIN 13 % CREA APPLY TOPICALLY TO AREAS OF REDNESS OR RASH WITH EACH BRIEF CHANGE 113 g 11     Diaper Rash  Products (DESITIN) OINT Externally apply topically as needed       ibuprofen (ADVIL/MOTRIN) 400 MG tablet TAKE 1 TABLET BY MOUTH ONCE DAILY;TAKE 1 TABLET BY MOUTH ONCE DAILY AS NEEDED 31 tablet 11     insulin pen needle (B-D U/F) 31G X 5 MM miscellaneous USE DAILY WITH TYMLOS AS DIRECTED 100 each 4     Multiple Vitamin (TAB-A-DAWSON) TABS TAKE 1 TABLET BY MOUTH ONCE DAILY 31 tablet 11     MYRBETRIQ 25 MG 24 hr tablet TAKE 1 TABLET BY MOUTH ONCE DAILY 31 tablet 11     nystatin (MYCOSTATIN) [id number] UNIT/GM external powder APPLY IN BETWEEN THE TOES TWICE DAILY UNTIL HEALED;THEN TWICE DAILY AS NEEDED 30 g 97     polyethylene glycol (MIRALAX/GLYCOLAX) powder MIX 17GM OF POWDER IN 8OZ OF WATER UNTIL COMPLETELY DISSOLVED. DRINK SOLUTION DAILY AS NEEDED 527 g 98     Probiotic Product (PROBIOTIC DAILY) CAPS Take 1 capsule by mouth daily 28 capsule 98     SENEXON-S 8.6-50 MG per tablet TAKE 1 TABLET BY MOUTH TWICE DAILY;AND MAY TAKE 1 TABLET BY MOUTH TWICE DAILY AS NEEDED FOR CONSTIPATION 62 tablet 12     TYMLOS 3120 MCG/1.56ML SOPN injection INJECT 0.04 ML (80MCG) SUBCUTANEOUSLY ONCE DAILY INTO ABDOMEN. ROTATE INJECTION SITES DAILY. 1.56 mL 5     vitamin C (ASCORBIC ACID) 500 MG tablet Take 1 tablet (500 mg) by mouth daily 31 tablet 98     REVIEW OF SYSTEMS:  4 point ROS including Respiratory, CV, GI and , other than that noted in the HPI,  is negative    Objective:  Exam:  GENERAL APPEARANCE:  Alert, in no distress, pleasant, cooperative, oriented x 4  RESP:  respiratory effort normal, no respiratory distress, patient is on room air  CV: Edema none in bilateral lower extremities.   VASCULAR: warm extremities.   M/S:   Gait and station wheelchair bound  : catheter in place. Clear yellow urine.   SKIN:  Inspection and palpation of skin and subcutaneous tissue: skin warm, dry and intact without rashes.   NEURO: no facial asymmetry, no speech deficits and able to follow directions  PSYCH:  insight and judgement intact, memory  intact, affect and mood normal    ASSESSMENT/PLAN:  (N31.9) Neurogenic bladder  (primary encounter diagnosis)  (Z46.6) Urinary catheter (Ruby) change required  Comment: ruby changed out today without complication but needed to use two catheters for replacement so will need more equipment from [employer].. No s/sx of urinary tract infection.   Plan: plan for ruby change q3 weeks but will change PRN for bypassing.     Electronically signed by:  CAROL Dupont CNP

## 2019-12-24 DIAGNOSIS — G47.429 NARCOLEPSY DUE TO UNDERLYING CONDITION WITHOUT CATAPLEXY: ICD-10-CM

## 2019-12-24 RX ORDER — ARMODAFINIL 150 MG/1
TABLET ORAL
Qty: 30 TABLET | Refills: 5 | Status: SHIPPED | OUTPATIENT
Start: 2019-12-24 | End: 2020-06-29

## 2020-01-10 ENCOUNTER — ASSISTED LIVING VISIT (OUTPATIENT)
Dept: GERIATRICS | Facility: CLINIC | Age: 75
End: 2020-01-10
Payer: MEDICARE

## 2020-01-10 VITALS
TEMPERATURE: 97.4 F | SYSTOLIC BLOOD PRESSURE: 147 MMHG | DIASTOLIC BLOOD PRESSURE: 83 MMHG | HEART RATE: 78 BPM | RESPIRATION RATE: 18 BRPM

## 2020-01-10 DIAGNOSIS — Z46.6 ENCOUNTER FOR FOLEY CATHETER REPLACEMENT: ICD-10-CM

## 2020-01-10 DIAGNOSIS — N31.9 NEUROGENIC BLADDER: Primary | ICD-10-CM

## 2020-01-10 DIAGNOSIS — Z97.8 FOLEY CATHETER IN PLACE: ICD-10-CM

## 2020-01-10 NOTE — LETTER
1/10/2020        RE: Cristina Stevens  [address]  [facility] [address]        [place] [facility]  [place] [facility] Record Number:  [id number]  Place of Service where encounter took place:  [facility] [place] ([facility] [[id number]]  Chief Complaint   Patient presents with     Catheterization (insertion/replacement)       HPI:    Cristina Stevens  is a 74 year old (1945), who is being seen today for an episodic care visit.  HPI information obtained from: facility chart records, facility staff, patient report and [facility] chart review    Ms. Stevens was visited today for a scheduled ruby catheter change out. It has been four weeks since the last change out and she has required several irrigations from her personal care attendant. There has also been quite a bit of sediment that has been clogging the valve in the leg bag so she has been using a 2 liter ruby bag to allow for continuous flow. She has not had a fever, bodyaches or chills. Catheter has not bypassed.     Past Medical and Surgical History reviewed in Epic today.    MEDICATIONS:  Current Outpatient Medications   Medication Sig Dispense Refill     amoxicillin (AMOXIL) 250 MG chewable tablet CHEW AND SWALLOW 8 TABLETS BY MOUTH 1 HOUR PRIOR TO DENTAL 8 tablet 1     armodafinil (NUVIGIL) 150 MG TABS tablet TAKE 1 TABLET BY MOUTH EVERY MORNING 30 tablet 5     baclofen (LIORESAL) 10 MG tablet Take 5 mg by mouth daily AND 5 mg every 4 hours as needed       CALMOSEPTINE 0.44-20.6 % OINT ointment APPLY TOPICALLY TO BUTTOCKS AS NEEDED WITH BRIEF CHANGES 113 g 11     cephALEXin (KEFLEX) 250 MG capsule TAKE 1 CAPSULE BY MOUTH ONCE DAILY FOR PREVENTIVE 30 capsule 11     cetirizine (ZYRTEC) 10 MG tablet TAKE 1 TABLET BY MOUTH ONCE DAILY 31 tablet 11     Cholecalciferol (VITAMIN D3) 2000 units CAPS TAKE TWO CAPSULES (4000 UNITS) BY MOUTH DAILY 62 capsule 11     CO2-Releasing (-TWO) SUPP Place rectally daily  as needed       CRANBERRY CONCENTRATE 500 MG CAPS TAKE 1 CAPSULE BY MOUTH ONCE DAILY 31 capsule 11     DESITIN 13 % CREA APPLY TOPICALLY TO AREAS OF REDNESS OR RASH WITH EACH BRIEF CHANGE 113 g 11     Diaper Rash Products (DESITIN) OINT Externally apply topically as needed       ibuprofen (ADVIL/MOTRIN) 400 MG tablet TAKE 1 TABLET BY MOUTH TWICE DAILY AS NEEDED FOR PAIN 30 tablet 11     insulin pen needle (B-D U/F) 31G X 5 MM miscellaneous USE DAILY WITH TYMLOS AS DIRECTED 100 each 4     Multiple Vitamin (TAB-A-DAWSON) TABS TAKE 1 TABLET BY MOUTH ONCE DAILY 31 tablet 11     MYRBETRIQ 25 MG 24 hr tablet TAKE 1 TABLET BY MOUTH ONCE DAILY 31 tablet 11     nystatin (MYCOSTATIN) 500879 UNIT/GM external powder APPLY IN BETWEEN THE TOES TWICE DAILY UNTIL HEALED;THEN TWICE DAILY AS NEEDED 30 g 97     polyethylene glycol (MIRALAX/GLYCOLAX) powder MIX 17GM OF POWDER IN 8OZ OF WATER UNTIL COMPLETELY DISSOLVED. DRINK SOLUTION DAILY AS NEEDED 527 g 98     Probiotic Product (PROBIOTIC DAILY) CAPS Take 1 capsule by mouth daily 28 capsule 98     SENEXON-S 8.6-50 MG per tablet TAKE 1 TABLET BY MOUTH TWICE DAILY;AND MAY TAKE 1 TABLET BY MOUTH TWICE DAILY AS NEEDED FOR CONSTIPATION 62 tablet 12     TYMLOS 3120 MCG/1.56ML SOPN injection INJECT 0.04 ML (80MCG) SUBCUTANEOUSLY ONCE DAILY INTO ABDOMEN. ROTATE INJECTION SITES DAILY. 1.56 mL 5     vitamin C (ASCORBIC ACID) 500 MG tablet Take 1 tablet (500 mg) by mouth daily 31 tablet 98     REVIEW OF SYSTEMS:  4 point ROS including Respiratory, CV, GI and , other than that noted in the HPI,  is negative    Objective:  Exam:  GENERAL APPEARANCE:  Alert, in no distress, pleasant, cooperative, oriented x 4  RESP:  respiratory effort normal, no respiratory distress, patient is on room air  CV: Edema none in bilateral lower extremities.   VASCULAR: warm extremities.   M/S:   Gait and station wheelchair bound  : catheter in place. Clear yellow urine with sediment.   SKIN:  Inspection and  palpation of skin and subcutaneous tissue: skin warm, dry and intact without rashes.   NEURO: no facial asymmetry, no speech deficits and able to follow directions  PSYCH:  insight and judgement intact, memory intact, affect and mood normal    ASSESSMENT/PLAN:  (N31.9) Neurogenic bladder  (primary encounter diagnosis)  (Z46.6) Urinary catheter ([facility]) change required  Comment: ruby changed out today which required the use of three different catheters due to difficult placement. We went four weeks without a cath change this month and there was a significant amount of sediment in catheter, and required flushes. No s/sx of urinary tract infection.   Plan: plan for ruby change q3 weeks but will change PRN for bypassing.     Electronically signed by:  CAROL Dupont CNP                   Sincerely,        CAROL Dupont CNP

## 2020-01-10 NOTE — PROGRESS NOTES
[place] [facility]  [place] [facility] Record Number:  [id number]  Place of Service where encounter took place:  [facility] [place] ([facility]) [[id number]]  Chief Complaint   Patient presents with     Catheterization (insertion/replacement)       HPI:    Cristina Stevens  is a 74 year old (1945), who is being seen today for an episodic care visit.  HPI information obtained from: facility chart records, facility staff, patient report and [facility] chart review    Ms. Stevens was visited today for a scheduled ruby catheter change out. It has been four weeks since the last change out and she has required several irrigations from her personal care attendant. There has also been quite a bit of sediment that has been clogging the valve in the leg bag so she has been using a 2 liter ruby bag to allow for continuous flow. She has not had a fever, bodyaches or chills. Catheter has not bypassed.     Past Medical and Surgical History reviewed in Epic today.    MEDICATIONS:  Current Outpatient Medications   Medication Sig Dispense Refill     amoxicillin (AMOXIL) 250 MG chewable tablet CHEW AND SWALLOW 8 TABLETS BY MOUTH 1 HOUR PRIOR TO DENTAL 8 tablet 1     armodafinil (NUVIGIL) 150 MG TABS tablet TAKE 1 TABLET BY MOUTH EVERY MORNING 30 tablet 5     baclofen (LIORESAL) 10 MG tablet Take 5 mg by mouth daily AND 5 mg every 4 hours as needed       CALMOSEPTINE 0.44-20.6 % OINT ointment APPLY TOPICALLY TO BUTTOCKS AS NEEDED WITH BRIEF CHANGES 113 g 11     cephALEXin (KEFLEX) 250 MG capsule TAKE 1 CAPSULE BY MOUTH ONCE DAILY FOR PREVENTIVE 30 capsule 11     cetirizine (ZYRTEC) 10 MG tablet TAKE 1 TABLET BY MOUTH ONCE DAILY 31 tablet 11     Cholecalciferol (VITAMIN D3) 2000 units CAPS TAKE TWO CAPSULES (4000 UNITS) BY MOUTH DAILY 62 capsule 11     CO2-Releasing (-TWO) SUPP Place rectally daily as needed       CRANBERRY CONCENTRATE 500 MG CAPS TAKE 1 CAPSULE BY MOUTH ONCE DAILY 31 capsule 11      DESITIN 13 % CREA APPLY TOPICALLY TO AREAS OF REDNESS OR RASH WITH EACH BRIEF CHANGE 113 g 11     Diaper Rash Products (DESITIN) OINT Externally apply topically as needed       ibuprofen (ADVIL/MOTRIN) 400 MG tablet TAKE 1 TABLET BY MOUTH TWICE DAILY AS NEEDED FOR PAIN 30 tablet 11     insulin pen needle (B-D U/F) 31G X 5 MM miscellaneous USE DAILY WITH TYMLOS AS DIRECTED 100 each 4     Multiple Vitamin (TAB-A-DAWSON) TABS TAKE 1 TABLET BY MOUTH ONCE DAILY 31 tablet 11     MYRBETRIQ 25 MG 24 hr tablet TAKE 1 TABLET BY MOUTH ONCE DAILY 31 tablet 11     nystatin (MYCOSTATIN) 184822 UNIT/GM external powder APPLY IN BETWEEN THE TOES TWICE DAILY UNTIL HEALED;THEN TWICE DAILY AS NEEDED 30 g 97     polyethylene glycol (MIRALAX/GLYCOLAX) powder MIX 17GM OF POWDER IN 8OZ OF WATER UNTIL COMPLETELY DISSOLVED. DRINK SOLUTION DAILY AS NEEDED 527 g 98     Probiotic Product (PROBIOTIC DAILY) CAPS Take 1 capsule by mouth daily 28 capsule 98     SENEXON-S 8.6-50 MG per tablet TAKE 1 TABLET BY MOUTH TWICE DAILY;AND MAY TAKE 1 TABLET BY MOUTH TWICE DAILY AS NEEDED FOR CONSTIPATION 62 tablet 12     TYMLOS 3120 MCG/1.56ML SOPN injection INJECT 0.04 ML (80MCG) SUBCUTANEOUSLY ONCE DAILY INTO ABDOMEN. ROTATE INJECTION SITES DAILY. 1.56 mL 5     vitamin C (ASCORBIC ACID) 500 MG tablet Take 1 tablet (500 mg) by mouth daily 31 tablet 98     REVIEW OF SYSTEMS:  4 point ROS including Respiratory, CV, GI and , other than that noted in the HPI,  is negative    Objective:  Exam:  GENERAL APPEARANCE:  Alert, in no distress, pleasant, cooperative, oriented x 4  RESP:  respiratory effort normal, no respiratory distress, patient is on room air  CV: Edema none in bilateral lower extremities.   VASCULAR: warm extremities.   M/S:   Gait and station wheelchair bound  : catheter in place. Clear yellow urine with sediment.   SKIN:  Inspection and palpation of skin and subcutaneous tissue: skin warm, dry and intact without rashes.   NEURO: no facial  asymmetry, no speech deficits and able to follow directions  PSYCH:  insight and judgement intact, memory intact, affect and mood normal    ASSESSMENT/PLAN:  (N31.9) Neurogenic bladder  (primary encounter diagnosis)  (Z46.6) Urinary catheter (Ruby) change required  Comment: ruby changed out today which required the use of three different catheters due to difficult placement. We went four weeks without a cath change this month and there was a significant amount of sediment in catheter, and required flushes. No s/sx of urinary tract infection.   Plan: plan for ruby change q3 weeks but will change PRN for bypassing.     Electronically signed by:  CAROL Dupont CNP

## 2020-01-24 ENCOUNTER — RECORDS - HEALTHEAST (OUTPATIENT)
Dept: LAB | Facility: CLINIC | Age: 75
End: 2020-01-24

## 2020-01-24 ENCOUNTER — ASSISTED LIVING VISIT (OUTPATIENT)
Dept: GERIATRICS | Facility: CLINIC | Age: 75
End: 2020-01-24
Payer: MEDICARE

## 2020-01-24 DIAGNOSIS — Z97.8 FOLEY CATHETER IN PLACE: ICD-10-CM

## 2020-01-24 DIAGNOSIS — N31.9 NEUROGENIC BLADDER: ICD-10-CM

## 2020-01-24 DIAGNOSIS — M81.0 OSTEOPOROSIS, UNSPECIFIED OSTEOPOROSIS TYPE, UNSPECIFIED PATHOLOGICAL FRACTURE PRESENCE: ICD-10-CM

## 2020-01-24 DIAGNOSIS — G82.50 SPASTIC QUADRIPLEGIA (H): Primary | ICD-10-CM

## 2020-01-24 DIAGNOSIS — G35 MS (MULTIPLE SCLEROSIS) (H): ICD-10-CM

## 2020-01-24 NOTE — LETTER
1/24/2020        RE: Cristina Stevens  [address]  [place] [address]              [place] [facility]  Chief Complaint   Patient presents with     Annual Visit     [place] Medical Record Number:  [id number]  Place of Service where encounter took place:  [facility] ([facility]) [[id number]]    HPI:    Cristina Stevens  is a 74 year old  (1945), who is being seen today for an annual comprehensive visit. HPI information obtained from: facility chart records, facility staff, patient report, [facility] chart review and Care Everywhere [facility] chart review.      Visited Muna today due to increased sediment in the urine and bypassing of urine over the past several days. Last catheter change was two weeks ago and had a difficult insertion. Denies fever, body aches, chills. Does not feels she has a urinary tract infection present. She has been sleeping and feeling well. Good appetite.      ALLERGIES: Nitrofuran derivatives; Tetracyclines; Ampicillin; Cefadroxil; Cephalexin; Gluten meal; Levofloxacin; Sulfa drugs; and Sulfasalazine  PAST MEDICAL HISTORY:  has a past medical history of Acute blood loss anemia (6/19/2018), Anemia (9/25/2012), At risk for impaired skin integrity (12/14/2014), Chronic constipation (7/12/2018), Chronic pain (9/25/2012), Closed fracture of neck of right femur (H) (5/20/2018), Closed fracture of right tibial plateau (7/9/2018), Frequent UTI (9/25/2012), Heat intolerance (5/26/2018), Immobility (6/8/2018), Kidney stone (6/19/2014), MDRO (multiple drug resistant organisms) resistance (5/31/2013), Multiple sclerosis (H) (5/10/2011), Neurogenic bladder (6/8/2018), Neuromuscular respiratory weakness (9/26/2014), OAB (overactive bladder) (6/2/2017), Osteoporosis (6/16/2006), Scoliosis associated with other condition (9/10/2014), Screening for osteoporosis (7/10/2018), 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  PAST SURGICAL HISTORY:  has no past surgical history on file.  IMMUNIZATIONS:  Immunization History   Administered Date(s) Administered     DTaP, Unspecified 04/19/2010     FLU 6-35 months 10/26/2009     Flu, Unspecified 10/13/2014     Influenza (H1N1) 01/27/2010     Influenza (High Dose) 3 valent vaccine 09/16/2011, 09/25/2012, 10/01/2013, 09/23/2015, 10/07/2016, 11/03/2017, 10/05/2018, 10/01/2019     Influenza (IIV3) PF 11/18/2002, 11/03/2005, 10/22/2007, 10/30/2008, 09/27/2010     Pneumo Conj 13-V (2010&after) 02/24/2016     Pneumococcal 23 valent 12/31/2008, 11/12/2013     TDAP Vaccine (Adacel) 04/19/2010     Td (Adult), Adsorbed 08/19/1999     Zoster vaccine, live 12/31/2008     Above immunizations pulled from [employer]. [facility] and facility records also reconciled. Outstanding information sent to  to update [employer].  Future immunizations are not needed at this point as all recommended immunizations are up to date.     Current Outpatient Medications   Medication Sig Dispense Refill     amoxicillin (AMOXIL) 250 MG chewable tablet CHEW AND SWALLOW 8 TABLETS BY MOUTH 1 HOUR PRIOR TO DENTAL 8 tablet 1     armodafinil (NUVIGIL) 150 MG TABS tablet TAKE 1 TABLET BY MOUTH EVERY MORNING 30 tablet 5     baclofen (LIORESAL) 10 MG tablet TAKE ONE-HALF TABLET (5MG) BY MOUTH ONCE DAILY;AND TAKE ONE-HALF TABLET (5MG) EVERY 4 HOURS AS NEEDED 16 tablet PRN     CALMOSEPTINE 0.44-20.6 % OINT ointment APPLY TOPICALLY TO BUTTOCKS AS NEEDED WITH BRIEF CHANGES 113 g 11     cephALEXin (KEFLEX) 250 MG capsule TAKE 1 CAPSULE BY MOUTH ONCE DAILY FOR PREVENTIVE 30 capsule 11     cetirizine (ZYRTEC) 10 MG tablet TAKE 1 TABLET BY MOUTH ONCE DAILY 31 tablet PRN     Cholecalciferol (VITAMIN D3) 2000 units CAPS TAKE TWO CAPSULES (4000 UNITS) BY MOUTH DAILY 62 capsule 11     CO2-Releasing (-TWO) SUPP Place rectally daily as needed       CRANBERRY CONCENTRATE 500 MG CAPS TAKE 1 CAPSULE BY MOUTH ONCE DAILY  31 capsule 11     DESITIN 13 % CREA APPLY TOPICALLY TO AREAS OF REDNESS OR RASH WITH EACH BRIEF CHANGE 113 g 11     Diaper Rash Products (DESITIN) OINT Externally apply topically as needed       ibuprofen (ADVIL/MOTRIN) 400 MG tablet TAKE 1 TABLET BY MOUTH TWICE DAILY AS NEEDED FOR PAIN 30 tablet 11     insulin pen needle (B-D U/F) 31G X 5 MM miscellaneous USE DAILY WITH TYMLOS AS DIRECTED 100 each 4     Multiple Vitamin (TAB-A-DAWSON) TABS TAKE 1 TABLET BY MOUTH ONCE DAILY 31 tablet 11     MYRBETRIQ 25 MG 24 hr tablet TAKE 1 TABLET BY MOUTH ONCE DAILY 31 tablet 11     nystatin (MYCOSTATIN) 727591 UNIT/GM external powder APPLY IN BETWEEN THE TOES TWICE DAILY UNTIL HEALED;THEN TWICE DAILY AS NEEDED 30 g 97     polyethylene glycol (MIRALAX/GLYCOLAX) powder MIX 17GM OF POWDER IN 8OZ OF WATER UNTIL COMPLETELY DISSOLVED. DRINK SOLUTION DAILY AS NEEDED 527 g 98     Probiotic Product (PROBIOTIC DAILY) CAPS Take 1 capsule by mouth daily 28 capsule 98     SENEXON-S 8.6-50 MG per tablet TAKE 1 TABLET BY MOUTH TWICE DAILY;AND MAY TAKE 1 TABLET BY MOUTH TWICE DAILY AS NEEDED FOR CONSTIPATION 62 tablet 12     TYMLOS 3120 MCG/1.56ML SOPN injection INJECT 0.04 ML (80MCG) SUBCUTANEOUSLY ONCE DAILY INTO ABDOMEN. ROTATE INJECTION SITES DAILY. 1.56 mL 5     vitamin C (ASCORBIC ACID) 500 MG tablet Take 1 tablet (500 mg) by mouth daily 31 tablet 98     Case Management:  I have reviewed the Assisted Living care plan, current immunizations and preventive care/cancer screening.  Patient's desire to return to the community is currently living in a community environment. Current Level of Care is appropriate.    Advance Directive Discussion:    I reviewed the current advanced directives as reflected in EPIC, the POLST and the facility chart, and verified the congruency of orders. I did review the advance directives with the resident.     Team Discussion:  I communicated with the appropriate disciplines involved with the Plan of Care:   Nursing     Information reviewed:  Medications, vital signs, orders, and nursing notes.    ROS:  10 point ROS of systems including Constitutional, Eyes, Respiratory, Cardiovascular, Gastroenterology, Genitourinary, Integumentary, Musculoskeletal, Psychiatric were all negative except for pertinent positives noted in my HPI.    Vitals:  BP (!) 147/83   Pulse 78   Temp 97.4  F (36.3  C)   Resp 18  There is no height or weight on file to calculate BMI.  Exam:  GENERAL APPEARANCE:  Alert, in no distress, pleasant, cooperative, oriented x 4  EYES: no discharge or mattering on lids or lashes noted  ENT:  moist mucous membranes, hearing acuity intact  NECK: supple, symmetrical  RESP: no respiratory distress, Lung sounds clear, patient is on room air  CV:  rate and rhythm regular, no murmur. Edema none in bilateral lower extremities. VASCULAR: warm extremities without open areas.  ABDOMEN: normal bowel sounds, soft, nontender.  M/S:   Gait and station, wheelchair bound, no tenderness or swelling of the joints; moves upper extremities, minimal movement to LEs  : no redness, odor present, large amount of sediment present in catheter tubing.   SKIN:  Inspection and palpation of skin and subcutaneous tissue: skin warm, dry and intact without rashes  NEURO: no facial asymmetry, no speech deficits and able to follow directions  PSYCH:  insight and judgement intact, memory intact, affect and mood normal    Lab/Diagnostic data:   CBC RESULTS:   Recent Labs   Lab Test 03/19/19   HGB 12.6       Last Basic Metabolic Panel:  Recent Labs   Lab Test 11/18/19  1224 09/24/18  1705   NA  --  136   POTASSIUM  --  4.4   CHLORIDE  --  103   BRENDA 9.7 9.4   CO2  --  25   BUN  --  14   CR  --  0.74   GLC  --  101*       TSH   Date Value Ref Range Status   03/19/2019 2.58 0.30 - 5.00 uIU/mL Final       ASSESSMENT/PLAN  MS  Wheelchair bound and uses a mechanical lift for transfers. Has spasms which are controlled with baclofen.  -- continues on  armodafinil 150 mg daily and baclofen 5 mg daily and q4h PRN  -- follow up with neurology as PM&R as per them     Neurogenic bladder  Ruby catheter chronic  Requires ruby change outs every three weeks due to sediment. Recently changed two weeks ago but bypassing and large amount of sediment in ruby tubing. She also has an odor present with cath change today so likely has a UTI. Ruby changed today which required two attempts.   --start cipro 500 mg q12h x 5 days   --send Urine for culture only and change abx as needed.   --follow up with urologist as she is likley due for botox bladder injection.      Osteoporosis  Hx of R tibial plateau and R femur fracture. Follows with Dr. Erica Abrams for osteo and was started on Tymos in 2018.  --continues with abaloparatide 3120 mcg injection daily.  -- continues on cholecalciferol 4000 units daily     Electronically signed by:  CAROL Dupont CNP         Sincerely,        CAROL Dupont CNP

## 2020-01-27 LAB — BACTERIA SPEC CULT: ABNORMAL

## 2020-01-27 NOTE — PROGRESS NOTES
[place] [facility]  Chief Complaint   Patient presents with     Annual Visit     [place] Medical Record Number:  [id number]  Place of Service where encounter took place:  [facility] [[id number]]    HPI:    Cristina Stevens  is a 74 year old  (1945), who is being seen today for an annual comprehensive visit. HPI information obtained from: facility chart records, facility staff, patient report, [employer] chart review and Care Everywhere [facility] chart review.      Visited Muna today due to increased sediment in the urine and bypassing of urine over the past several days. Last catheter change was two weeks ago and had a difficult insertion. Denies fever, body aches, chills. Does not feels she has a urinary tract infection present. She has been sleeping and feeling well. Good appetite.      ALLERGIES: Nitrofuran derivatives; Tetracyclines; Ampicillin; Cefadroxil; Cephalexin; Gluten meal; Levofloxacin; Sulfa drugs; and Sulfasalazine  PAST MEDICAL HISTORY:  has a past medical history of Acute blood loss anemia (6/19/2018), Anemia (9/25/2012), At risk for impaired skin integrity (12/14/2014), Chronic constipation (7/12/2018), Chronic pain (9/25/2012), Closed fracture of neck of right femur (H) (5/20/2018), Closed fracture of right tibial plateau (7/9/2018), Frequent UTI (9/25/2012), Heat intolerance (5/26/2018), Immobility (6/8/2018), Kidney stone (6/19/2014), MDRO (multiple drug resistant organisms) resistance (5/31/2013), Multiple sclerosis (H) (5/10/2011), Neurogenic bladder (6/8/2018), Neuromuscular respiratory weakness (9/26/2014), OAB (overactive bladder) (6/2/2017), Osteoporosis (6/16/2006), Scoliosis associated with other condition (9/10/2014), Screening for osteoporosis (7/10/2018), 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  PAST SURGICAL HISTORY:  has no past surgical history on file.  IMMUNIZATIONS:  Immunization History    Administered Date(s) Administered     DTaP, Unspecified 04/19/2010     FLU 6-35 months 10/26/2009     Flu, Unspecified 10/13/2014     Influenza (H1N1) 01/27/2010     Influenza (High Dose) 3 valent vaccine 09/16/2011, 09/25/2012, 10/01/2013, 09/23/2015, 10/07/2016, 11/03/2017, 10/05/2018, 10/01/2019     Influenza (IIV3) PF 11/18/2002, 11/03/2005, 10/22/2007, 10/30/2008, 09/27/2010     Pneumo Conj 13-V (2010&after) 02/24/2016     Pneumococcal 23 valent 12/31/2008, 11/12/2013     TDAP Vaccine (Adacel) 04/19/2010     Td (Adult), Adsorbed 08/19/1999     Zoster vaccine, live 12/31/2008     Above immunizations pulled from [employer]. MIIC and facility records also reconciled. Outstanding information sent to  to update [employer].  Future immunizations are not needed at this point as all recommended immunizations are up to date.     Current Outpatient Medications   Medication Sig Dispense Refill     amoxicillin (AMOXIL) 250 MG chewable tablet CHEW AND SWALLOW 8 TABLETS BY MOUTH 1 HOUR PRIOR TO DENTAL 8 tablet 1     armodafinil (NUVIGIL) 150 MG TABS tablet TAKE 1 TABLET BY MOUTH EVERY MORNING 30 tablet 5     baclofen (LIORESAL) 10 MG tablet TAKE ONE-HALF TABLET (5MG) BY MOUTH ONCE DAILY;AND TAKE ONE-HALF TABLET (5MG) EVERY 4 HOURS AS NEEDED 16 tablet PRN     CALMOSEPTINE 0.44-20.6 % OINT ointment APPLY TOPICALLY TO BUTTOCKS AS NEEDED WITH BRIEF CHANGES 113 g 11     cephALEXin (KEFLEX) 250 MG capsule TAKE 1 CAPSULE BY MOUTH ONCE DAILY FOR PREVENTIVE 30 capsule 11     cetirizine (ZYRTEC) 10 MG tablet TAKE 1 TABLET BY MOUTH ONCE DAILY 31 tablet PRN     Cholecalciferol (VITAMIN D3) 2000 units CAPS TAKE TWO CAPSULES (4000 UNITS) BY MOUTH DAILY 62 capsule 11     CO2-Releasing (-TWO) SUPP Place rectally daily as needed       CRANBERRY CONCENTRATE 500 MG CAPS TAKE 1 CAPSULE BY MOUTH ONCE DAILY 31 capsule 11     DESITIN 13 % CREA APPLY TOPICALLY TO AREAS OF REDNESS OR RASH WITH EACH BRIEF CHANGE 113 g 11      Diaper Rash Products (DESITIN) OINT Externally apply topically as needed       ibuprofen (ADVIL/MOTRIN) 400 MG tablet TAKE 1 TABLET BY MOUTH TWICE DAILY AS NEEDED FOR PAIN 30 tablet 11     insulin pen needle (B-D U/F) 31G X 5 MM miscellaneous USE DAILY WITH TYMLOS AS DIRECTED 100 each 4     Multiple Vitamin (TAB-A-DAWSON) TABS TAKE 1 TABLET BY MOUTH ONCE DAILY 31 tablet 11     MYRBETRIQ 25 MG 24 hr tablet TAKE 1 TABLET BY MOUTH ONCE DAILY 31 tablet 11     nystatin (MYCOSTATIN) 307200 UNIT/GM external powder APPLY IN BETWEEN THE TOES TWICE DAILY UNTIL HEALED;THEN TWICE DAILY AS NEEDED 30 g 97     polyethylene glycol (MIRALAX/GLYCOLAX) powder MIX 17GM OF POWDER IN 8OZ OF WATER UNTIL COMPLETELY DISSOLVED. DRINK SOLUTION DAILY AS NEEDED 527 g 98     Probiotic Product (PROBIOTIC DAILY) CAPS Take 1 capsule by mouth daily 28 capsule 98     SENEXON-S 8.6-50 MG per tablet TAKE 1 TABLET BY MOUTH TWICE DAILY;AND MAY TAKE 1 TABLET BY MOUTH TWICE DAILY AS NEEDED FOR CONSTIPATION 62 tablet 12     TYMLOS 3120 MCG/1.56ML SOPN injection INJECT 0.04 ML (80MCG) SUBCUTANEOUSLY ONCE DAILY INTO ABDOMEN. ROTATE INJECTION SITES DAILY. 1.56 mL 5     vitamin C (ASCORBIC ACID) 500 MG tablet Take 1 tablet (500 mg) by mouth daily 31 tablet 98     Case Management:  I have reviewed the Assisted Living care plan, current immunizations and preventive care/cancer screening.  Patient's desire to return to the community is currently living in a community environment. Current Level of Care is appropriate.    Advance Directive Discussion:    I reviewed the current advanced directives as reflected in EPIC, the POLST and the facility chart, and verified the congruency of orders. I did review the advance directives with the resident.     Team Discussion:  I communicated with the appropriate disciplines involved with the Plan of Care:   Nursing    Information reviewed:  Medications, vital signs, orders, and nursing notes.    ROS:  10 point ROS of systems  including Constitutional, Eyes, Respiratory, Cardiovascular, Gastroenterology, Genitourinary, Integumentary, Musculoskeletal, Psychiatric were all negative except for pertinent positives noted in my HPI.    Vitals:  BP (!) 147/83   Pulse 78   Temp 97.4  F (36.3  C)   Resp 18  There is no height or weight on file to calculate BMI.  Exam:  GENERAL APPEARANCE:  Alert, in no distress, pleasant, cooperative, oriented x 4  EYES: no discharge or mattering on lids or lashes noted  ENT:  moist mucous membranes, hearing acuity intact  NECK: supple, symmetrical  RESP: no respiratory distress, Lung sounds clear, patient is on room air  CV:  rate and rhythm regular, no murmur. Edema none in bilateral lower extremities. VASCULAR: warm extremities without open areas.  ABDOMEN: normal bowel sounds, soft, nontender.  M/S:   Gait and station, wheelchair bound, no tenderness or swelling of the joints; moves upper extremities, minimal movement to LEs  : no redness, odor present, large amount of sediment present in catheter tubing.   SKIN:  Inspection and palpation of skin and subcutaneous tissue: skin warm, dry and intact without rashes  NEURO: no facial asymmetry, no speech deficits and able to follow directions  PSYCH:  insight and judgement intact, memory intact, affect and mood normal    Lab/Diagnostic data:   CBC RESULTS:   Recent Labs   Lab Test 03/19/19   HGB 12.6       Last Basic Metabolic Panel:  Recent Labs   Lab Test 11/18/19  1224 09/24/18  1705   NA  --  136   POTASSIUM  --  4.4   CHLORIDE  --  103   BRENDA 9.7 9.4   CO2  --  25   BUN  --  14   CR  --  0.74   GLC  --  101*       TSH   Date Value Ref Range Status   03/19/2019 2.58 0.30 - 5.00 uIU/mL Final       ASSESSMENT/PLAN  MS  Wheelchair bound and uses a mechanical lift for transfers. Has spasms which are controlled with baclofen.  -- continues on armodafinil 150 mg daily and baclofen 5 mg daily and q4h PRN  -- follow up with neurology as PM&R as per  them     Neurogenic bladder  Ruby catheter chronic  Requires ruby change outs every three weeks due to sediment. Recently changed two weeks ago but bypassing and large amount of sediment in ruby tubing. She also has an odor present with cath change today so likely has a UTI. Ruby changed today which required two attempts.   --start cipro 500 mg q12h x 5 days   --send Urine for culture only and change abx as needed.   --follow up with urologist as she is likley due for botox bladder injection.      Osteoporosis  Hx of R tibial plateau and R femur fracture. Follows with Dr. Erica Abrams for osteo and was started on Tymos in 2018.  --continues with abaloparatide 3120 mcg injection daily.  -- continues on cholecalciferol 4000 units daily     Electronically signed by:  CAROL Dupont CNP

## 2020-02-13 VITALS
TEMPERATURE: 97.6 F | DIASTOLIC BLOOD PRESSURE: 64 MMHG | RESPIRATION RATE: 18 BRPM | SYSTOLIC BLOOD PRESSURE: 114 MMHG | HEART RATE: 66 BPM

## 2020-02-14 ENCOUNTER — ASSISTED LIVING VISIT (OUTPATIENT)
Dept: GERIATRICS | Facility: CLINIC | Age: 75
End: 2020-02-14
Payer: MEDICARE

## 2020-02-14 DIAGNOSIS — Z46.6 URINARY CATHETER (FOLEY) CHANGE REQUIRED: ICD-10-CM

## 2020-02-14 DIAGNOSIS — N31.9 NEUROGENIC BLADDER: Primary | ICD-10-CM

## 2020-02-14 NOTE — LETTER
2/14/2020        RE: Cristina Stevens  [address]  [place] [address]        [place] [facility]  [place] Medical Record Number:  [id number]  Place of Service where encounter took place:  [facility] ([facility] [[id number]]  Chief Complaint   Patient presents with     Catheterization (insertion/replacement)       HPI:    Cristina Stevens  is a 74 year old (1945), who is being seen today for an episodic care visit.  HPI information obtained from: facility chart records, facility staff, patient report and [facility] chart review    Ms. Stevens was visited today for a scheduled ruby catheter change out. She completed the course of ciprofloxacin and now bypassing less, and having less sediment. She has not had a fever, bodyaches or chills. Catheter has not bypassed.     Past Medical and Surgical History reviewed in Epic today.    MEDICATIONS:  Current Outpatient Medications   Medication Sig Dispense Refill     amoxicillin (AMOXIL) 250 MG chewable tablet CHEW AND SWALLOW 8 TABLETS BY MOUTH 1 HOUR PRIOR TO DENTAL 8 tablet 1     armodafinil (NUVIGIL) 150 MG TABS tablet TAKE 1 TABLET BY MOUTH EVERY MORNING 30 tablet 5     baclofen (LIORESAL) 10 MG tablet TAKE ONE-HALF TABLET (5MG) BY MOUTH ONCE DAILY;AND TAKE ONE-HALF TABLET (5MG) EVERY 4 HOURS AS NEEDED 16 tablet PRN     CALMOSEPTINE 0.44-20.6 % OINT ointment APPLY TOPICALLY TO BUTTOCKS AS NEEDED WITH BRIEF CHANGES 113 g 11     cephALEXin (KEFLEX) 250 MG capsule TAKE 1 CAPSULE BY MOUTH ONCE DAILY FOR PREVENTIVE 30 capsule 11     cetirizine (ZYRTEC) 10 MG tablet TAKE 1 TABLET BY MOUTH ONCE DAILY 31 tablet PRN     Cholecalciferol (VITAMIN D3) 2000 units CAPS TAKE TWO CAPSULES (4000 UNITS) BY MOUTH DAILY 62 capsule 11     CO2-Releasing (-TWO) SUPP Place rectally daily as needed       CRANBERRY CONCENTRATE 500 MG CAPS TAKE 1 CAPSULE BY MOUTH ONCE DAILY 31 capsule 11     DESITIN 13 % CREA APPLY TOPICALLY TO  AREAS OF REDNESS OR RASH WITH EACH BRIEF CHANGE 113 g 11     Diaper Rash Products (DESITIN) OINT Externally apply topically as needed       ibuprofen (ADVIL/MOTRIN) 400 MG tablet TAKE 1 TABLET BY MOUTH TWICE DAILY AS NEEDED FOR PAIN 30 tablet 11     insulin pen needle (B-D U/F) 31G X 5 MM miscellaneous USE DAILY WITH TYMLOS AS DIRECTED 100 each 4     Multiple Vitamin (TAB-A-DAWSON) TABS TAKE 1 TABLET BY MOUTH ONCE DAILY 31 tablet 11     MYRBETRIQ 25 MG 24 hr tablet TAKE 1 TABLET BY MOUTH ONCE DAILY 31 tablet 11     nystatin (MYCOSTATIN) 871898 UNIT/GM external powder APPLY IN BETWEEN THE TOES TWICE DAILY UNTIL HEALED;THEN TWICE DAILY AS NEEDED 30 g 97     polyethylene glycol (MIRALAX/GLYCOLAX) powder MIX 17GM OF POWDER IN 8OZ OF WATER UNTIL COMPLETELY DISSOLVED. DRINK SOLUTION DAILY AS NEEDED 527 g 98     Probiotic Product (PROBIOTIC DAILY) CAPS Take 1 capsule by mouth daily 28 capsule 98     SENEXON-S 8.6-50 MG per tablet TAKE 1 TABLET BY MOUTH TWICE DAILY;AND MAY TAKE 1 TABLET BY MOUTH TWICE DAILY AS NEEDED FOR CONSTIPATION 62 tablet 12     TYMLOS 3120 MCG/1.56ML SOPN injection INJECT 0.04 ML (80MCG) SUBCUTANEOUSLY ONCE DAILY INTO ABDOMEN. ROTATE INJECTION SITES DAILY. 1.56 mL 5     vitamin C (ASCORBIC ACID) 500 MG tablet Take 1 tablet (500 mg) by mouth daily 31 tablet 98     REVIEW OF SYSTEMS:  4 point ROS including Respiratory, CV, GI and , other than that noted in the HPI,  is negative    Objective:  Exam:  GENERAL APPEARANCE:  Alert, in no distress, pleasant, cooperative, oriented x 4  RESP:  respiratory effort normal, no respiratory distress, patient is on room air  CV: Edema none in bilateral lower extremities.   VASCULAR: warm extremities.   M/S:   Gait and station wheelchair bound  : catheter in place. Clear yellow urine without sediment.   SKIN:  Inspection and palpation of skin and subcutaneous tissue: skin warm, dry and intact without rashes.   NEURO: no facial asymmetry, no speech deficits and able  to follow directions  PSYCH:  insight and judgement intact, memory intact, affect and mood normal    ASSESSMENT/PLAN:  (N31.9) Neurogenic bladder  (primary encounter diagnosis)  (Z46.6) Urinary catheter (Ruby) change required  Comment: ruby changed out today without difficulty. Had a recent UTI which was treated with ciprofloxacin and now having less sediment and bypassing.   Plan: plan for ruby change q3 weeks but will change PRN for bypassing.     Electronically signed by:  CAROL Dupont CNP                     Sincerely,        CAROL Dupont CNP

## 2020-02-14 NOTE — PROGRESS NOTES
[place] [facility]  [place] Medical Record Number:  [id number]  Place of Service where encounter took place:  [facility] [[id number]]  Chief Complaint   Patient presents with     Catheterization (insertion/replacement)       HPI:    Cristina Stevens  is a 74 year old (1945), who is being seen today for an episodic care visit.  HPI information obtained from: facility chart records, facility staff, patient report and [facility] chart review    Ms. Stevens was visited today for a scheduled ruby catheter change out. She completed the course of ciprofloxacin and now bypassing less, and having less sediment. She has not had a fever, bodyaches or chills. Catheter has not bypassed.     Past Medical and Surgical History reviewed in Epic today.    MEDICATIONS:  Current Outpatient Medications   Medication Sig Dispense Refill     amoxicillin (AMOXIL) 250 MG chewable tablet CHEW AND SWALLOW 8 TABLETS BY MOUTH 1 HOUR PRIOR TO DENTAL 8 tablet 1     armodafinil (NUVIGIL) 150 MG TABS tablet TAKE 1 TABLET BY MOUTH EVERY MORNING 30 tablet 5     baclofen (LIORESAL) 10 MG tablet TAKE ONE-HALF TABLET (5MG) BY MOUTH ONCE DAILY;AND TAKE ONE-HALF TABLET (5MG) EVERY 4 HOURS AS NEEDED 16 tablet PRN     CALMOSEPTINE 0.44-20.6 % OINT ointment APPLY TOPICALLY TO BUTTOCKS AS NEEDED WITH BRIEF CHANGES 113 g 11     cephALEXin (KEFLEX) 250 MG capsule TAKE 1 CAPSULE BY MOUTH ONCE DAILY FOR PREVENTIVE 30 capsule 11     cetirizine (ZYRTEC) 10 MG tablet TAKE 1 TABLET BY MOUTH ONCE DAILY 31 tablet PRN     Cholecalciferol (VITAMIN D3) 2000 units CAPS TAKE TWO CAPSULES (4000 UNITS) BY MOUTH DAILY 62 capsule 11     CO2-Releasing (-TWO) SUPP Place rectally daily as needed       CRANBERRY CONCENTRATE 500 MG CAPS TAKE 1 CAPSULE BY MOUTH ONCE DAILY 31 capsule 11     DESITIN 13 % CREA APPLY TOPICALLY TO AREAS OF REDNESS OR RASH WITH EACH BRIEF CHANGE 113 g 11     Diaper Rash Products (DESITIN) OINT  Externally apply topically as needed       ibuprofen (ADVIL/MOTRIN) 400 MG tablet TAKE 1 TABLET BY MOUTH TWICE DAILY AS NEEDED FOR PAIN 30 tablet 11     insulin pen needle (B-D U/F) 31G X 5 MM miscellaneous USE DAILY WITH TYMLOS AS DIRECTED 100 each 4     Multiple Vitamin (TAB-A-DAWSON) TABS TAKE 1 TABLET BY MOUTH ONCE DAILY 31 tablet 11     MYRBETRIQ 25 MG 24 hr tablet TAKE 1 TABLET BY MOUTH ONCE DAILY 31 tablet 11     nystatin (MYCOSTATIN) 086376 UNIT/GM external powder APPLY IN BETWEEN THE TOES TWICE DAILY UNTIL HEALED;THEN TWICE DAILY AS NEEDED 30 g 97     polyethylene glycol (MIRALAX/GLYCOLAX) powder MIX 17GM OF POWDER IN 8OZ OF WATER UNTIL COMPLETELY DISSOLVED. DRINK SOLUTION DAILY AS NEEDED 527 g 98     Probiotic Product (PROBIOTIC DAILY) CAPS Take 1 capsule by mouth daily 28 capsule 98     SENEXON-S 8.6-50 MG per tablet TAKE 1 TABLET BY MOUTH TWICE DAILY;AND MAY TAKE 1 TABLET BY MOUTH TWICE DAILY AS NEEDED FOR CONSTIPATION 62 tablet 12     TYMLOS 3120 MCG/1.56ML SOPN injection INJECT 0.04 ML (80MCG) SUBCUTANEOUSLY ONCE DAILY INTO ABDOMEN. ROTATE INJECTION SITES DAILY. 1.56 mL 5     vitamin C (ASCORBIC ACID) 500 MG tablet Take 1 tablet (500 mg) by mouth daily 31 tablet 98     REVIEW OF SYSTEMS:  4 point ROS including Respiratory, CV, GI and , other than that noted in the HPI,  is negative    Objective:  Exam:  GENERAL APPEARANCE:  Alert, in no distress, pleasant, cooperative, oriented x 4  RESP:  respiratory effort normal, no respiratory distress, patient is on room air  CV: Edema none in bilateral lower extremities.   VASCULAR: warm extremities.   M/S:   Gait and station wheelchair bound  : catheter in place. Clear yellow urine without sediment.   SKIN:  Inspection and palpation of skin and subcutaneous tissue: skin warm, dry and intact without rashes.   NEURO: no facial asymmetry, no speech deficits and able to follow directions  PSYCH:  insight and judgement intact, memory intact, affect and mood  normal    ASSESSMENT/PLAN:  (N31.9) Neurogenic bladder  (primary encounter diagnosis)  (Z46.6) Urinary catheter (Ruby) change required  Comment: ruby changed out today without difficulty. Had a recent UTI which was treated with ciprofloxacin and now having less sediment and bypassing.   Plan: plan for ruby change q3 weeks but will change PRN for bypassing.     Electronically signed by:  CAROL Dupont CNP

## 2020-03-06 ENCOUNTER — ASSISTED LIVING VISIT (OUTPATIENT)
Dept: GERIATRICS | Facility: CLINIC | Age: 75
End: 2020-03-06
Payer: MEDICARE

## 2020-03-06 VITALS
DIASTOLIC BLOOD PRESSURE: 64 MMHG | RESPIRATION RATE: 18 BRPM | HEART RATE: 66 BPM | TEMPERATURE: 97.6 F | SYSTOLIC BLOOD PRESSURE: 114 MMHG

## 2020-03-06 DIAGNOSIS — Z46.6 URINARY CATHETER (FOLEY) CHANGE REQUIRED: ICD-10-CM

## 2020-03-06 DIAGNOSIS — N31.9 NEUROGENIC BLADDER: Primary | ICD-10-CM

## 2020-03-06 DIAGNOSIS — Z97.8 FOLEY CATHETER IN PLACE: ICD-10-CM

## 2020-03-06 NOTE — LETTER
3/6/2020        RE: Cristina Stevens  [address]  [place] [address]        [place] [facility]  [place] Medical Record Number:  [id number]  Place of Service where encounter took place:  [facility] [place] [facility] [[id number]]  Chief Complaint   Patient presents with     Catheterization (insertion/replacement)       HPI:    Cristina Stevens  is a 74 year old (1945), who is being seen today for an episodic care visit.  HPI information obtained from: facility chart records, facility staff, patient report and [facility] chart review    Ms. Stevens was visited today for a scheduled ruby catheter change out. She has not had a fever, bodyaches or chills. Catheter has not bypassed and she has not noted sediment.      Past Medical and Surgical History reviewed in Epic today.    MEDICATIONS:  Current Outpatient Medications   Medication Sig Dispense Refill     amoxicillin (AMOXIL) 250 MG chewable tablet CHEW AND SWALLOW 8 TABLETS BY MOUTH 1 HOUR PRIOR TO DENTAL 8 tablet 1     armodafinil (NUVIGIL) 150 MG TABS tablet TAKE 1 TABLET BY MOUTH EVERY MORNING 30 tablet 5     baclofen (LIORESAL) 10 MG tablet TAKE ONE-HALF TABLET (5MG) BY MOUTH ONCE DAILY;AND TAKE ONE-HALF TABLET (5MG) EVERY 4 HOURS AS NEEDED 16 tablet PRN     CALMOSEPTINE 0.44-20.6 % OINT ointment APPLY TOPICALLY TO BUTTOCKS AS NEEDED WITH BRIEF CHANGES 113 g 11     cephALEXin (KEFLEX) 250 MG capsule TAKE 1 CAPSULE BY MOUTH ONCE DAILY FOR PREVENTIVE 30 capsule 11     cetirizine (ZYRTEC) 10 MG tablet TAKE 1 TABLET BY MOUTH ONCE DAILY 31 tablet PRN     Cholecalciferol (VITAMIN D3) 2000 units CAPS TAKE TWO CAPSULES (4000 UNITS) BY MOUTH DAILY 62 capsule 11     CO2-Releasing (-TWO) SUPP Place rectally daily as needed       CRANBERRY CONCENTRATE 500 MG CAPS TAKE 1 CAPSULE BY MOUTH ONCE DAILY 31 capsule 11     DESITIN 13 % CREA APPLY TOPICALLY TO AREAS OF REDNESS OR RASH WITH EACH BRIEF CHANGE 113 g 11      Diaper Rash Products (DESITIN) OINT Externally apply topically as needed       ibuprofen (ADVIL/MOTRIN) 400 MG tablet TAKE 1 TABLET BY MOUTH TWICE DAILY AS NEEDED FOR PAIN 30 tablet 11     insulin pen needle (B-D U/F) 31G X 5 MM miscellaneous USE DAILY WITH TYMLOS AS DIRECTED 100 each 4     Multiple Vitamin (TAB-A-DAWSON) TABS TAKE 1 TABLET BY MOUTH ONCE DAILY 31 tablet 11     MYRBETRIQ 25 MG 24 hr tablet TAKE 1 TABLET BY MOUTH ONCE DAILY 31 tablet 11     nystatin (MYCOSTATIN) 980619 UNIT/GM external powder APPLY IN BETWEEN THE TOES TWICE DAILY UNTIL HEALED;THEN TWICE DAILY AS NEEDED 30 g 97     polyethylene glycol (MIRALAX/GLYCOLAX) powder MIX 17GM OF POWDER IN 8OZ OF WATER UNTIL COMPLETELY DISSOLVED. DRINK SOLUTION DAILY AS NEEDED 527 g 98     Probiotic Product (PROBIOTIC DAILY) CAPS Take 1 capsule by mouth daily 28 capsule 98     SENEXON-S 8.6-50 MG per tablet TAKE 1 TABLET BY MOUTH TWICE DAILY;AND MAY TAKE 1 TABLET BY MOUTH TWICE DAILY AS NEEDED FOR CONSTIPATION 62 tablet 12     TYMLOS 3120 MCG/1.56ML SOPN injection INJECT 0.04 ML (80MCG) SUBCUTANEOUSLY ONCE DAILY INTO ABDOMEN. ROTATE INJECTION SITES DAILY. 1.56 mL 5     vitamin C (ASCORBIC ACID) 500 MG tablet Take 1 tablet (500 mg) by mouth daily 31 tablet 98     REVIEW OF SYSTEMS:  4 point ROS including Respiratory, CV, GI and , other than that noted in the HPI,  is negative    Objective:  Exam:  GENERAL APPEARANCE:  Alert, in no distress, pleasant, cooperative, oriented x 4  RESP:  respiratory effort normal, no respiratory distress, patient is on room air  CV: Edema none in bilateral lower extremities.   VASCULAR: warm extremities.   M/S:   Gait and station wheelchair bound  : catheter in place. Clear yellow urine without sediment.   SKIN:  Inspection and palpation of skin and subcutaneous tissue: skin warm, dry and intact without rashes.   NEURO: no facial asymmetry, no speech deficits and able to follow directions  PSYCH:  insight and judgement intact,  memory intact, affect and mood normal    ASSESSMENT/PLAN:  (N31.9) Neurogenic bladder  (primary encounter diagnosis)  (Z46.6) Urinary catheter (Ruby) change required  Comment: ruby changed out today without difficulty. No current s/sx of UTI.  Plan: plan for ruby change q3 weeks but will change PRN for bypassing.     Electronically signed by:  CAROL Dupont CNP                         Sincerely,        CAROL Dupont CNP

## 2020-03-06 NOTE — PROGRESS NOTES
[place] [facility]  [place] Medical Record Number:  [id number]  Place of Service where encounter took place:  [facility] [place] [[id number]]  Chief Complaint   Patient presents with     Catheterization (insertion/replacement)       HPI:    Cristina Stevens  is a 74 year old (1945), who is being seen today for an episodic care visit.  HPI information obtained from: facility chart records, facility staff, patient report and [facility] chart review    Ms. Stevens was visited today for a scheduled ruby catheter change out. She has not had a fever, bodyaches or chills. Catheter has not bypassed and she has not noted sediment.      Past Medical and Surgical History reviewed in Epic today.    MEDICATIONS:  Current Outpatient Medications   Medication Sig Dispense Refill     amoxicillin (AMOXIL) 250 MG chewable tablet CHEW AND SWALLOW 8 TABLETS BY MOUTH 1 HOUR PRIOR TO DENTAL 8 tablet 1     armodafinil (NUVIGIL) 150 MG TABS tablet TAKE 1 TABLET BY MOUTH EVERY MORNING 30 tablet 5     baclofen (LIORESAL) 10 MG tablet TAKE ONE-HALF TABLET (5MG) BY MOUTH ONCE DAILY;AND TAKE ONE-HALF TABLET (5MG) EVERY 4 HOURS AS NEEDED 16 tablet PRN     CALMOSEPTINE 0.44-20.6 % OINT ointment APPLY TOPICALLY TO BUTTOCKS AS NEEDED WITH BRIEF CHANGES 113 g 11     cephALEXin (KEFLEX) 250 MG capsule TAKE 1 CAPSULE BY MOUTH ONCE DAILY FOR PREVENTIVE 30 capsule 11     cetirizine (ZYRTEC) 10 MG tablet TAKE 1 TABLET BY MOUTH ONCE DAILY 31 tablet PRN     Cholecalciferol (VITAMIN D3) 2000 units CAPS TAKE TWO CAPSULES (4000 UNITS) BY MOUTH DAILY 62 capsule 11     CO2-Releasing (-TWO) SUPP Place rectally daily as needed       CRANBERRY CONCENTRATE 500 MG CAPS TAKE 1 CAPSULE BY MOUTH ONCE DAILY 31 capsule 11     DESITIN 13 % CREA APPLY TOPICALLY TO AREAS OF REDNESS OR RASH WITH EACH BRIEF CHANGE 113 g 11     Diaper Rash Products (DESITIN) OINT Externally apply topically as needed       ibuprofen  (ADVIL/MOTRIN) 400 MG tablet TAKE 1 TABLET BY MOUTH TWICE DAILY AS NEEDED FOR PAIN 30 tablet 11     insulin pen needle (B-D U/F) 31G X 5 MM miscellaneous USE DAILY WITH TYMLOS AS DIRECTED 100 each 4     Multiple Vitamin (TAB-A-DAWSON) TABS TAKE 1 TABLET BY MOUTH ONCE DAILY 31 tablet 11     MYRBETRIQ 25 MG 24 hr tablet TAKE 1 TABLET BY MOUTH ONCE DAILY 31 tablet 11     nystatin (MYCOSTATIN) 914849 UNIT/GM external powder APPLY IN BETWEEN THE TOES TWICE DAILY UNTIL HEALED;THEN TWICE DAILY AS NEEDED 30 g 97     polyethylene glycol (MIRALAX/GLYCOLAX) powder MIX 17GM OF POWDER IN 8OZ OF WATER UNTIL COMPLETELY DISSOLVED. DRINK SOLUTION DAILY AS NEEDED 527 g 98     Probiotic Product (PROBIOTIC DAILY) CAPS Take 1 capsule by mouth daily 28 capsule 98     SENEXON-S 8.6-50 MG per tablet TAKE 1 TABLET BY MOUTH TWICE DAILY;AND MAY TAKE 1 TABLET BY MOUTH TWICE DAILY AS NEEDED FOR CONSTIPATION 62 tablet 12     TYMLOS 3120 MCG/1.56ML SOPN injection INJECT 0.04 ML (80MCG) SUBCUTANEOUSLY ONCE DAILY INTO ABDOMEN. ROTATE INJECTION SITES DAILY. 1.56 mL 5     vitamin C (ASCORBIC ACID) 500 MG tablet Take 1 tablet (500 mg) by mouth daily 31 tablet 98     REVIEW OF SYSTEMS:  4 point ROS including Respiratory, CV, GI and , other than that noted in the HPI,  is negative    Objective:  Exam:  GENERAL APPEARANCE:  Alert, in no distress, pleasant, cooperative, oriented x 4  RESP:  respiratory effort normal, no respiratory distress, patient is on room air  CV: Edema none in bilateral lower extremities.   VASCULAR: warm extremities.   M/S:   Gait and station wheelchair bound  : catheter in place. Clear yellow urine without sediment.   SKIN:  Inspection and palpation of skin and subcutaneous tissue: skin warm, dry and intact without rashes.   NEURO: no facial asymmetry, no speech deficits and able to follow directions  PSYCH:  insight and judgement intact, memory intact, affect and mood normal    ASSESSMENT/PLAN:  (N31.9) Neurogenic bladder   (primary encounter diagnosis)  (Z46.6) Urinary catheter (Ruby) change required  Comment: ruby changed out today without difficulty. No current s/sx of UTI.  Plan: plan for ruby change q3 weeks but will change PRN for bypassing.     Electronically signed by:  CAROL Dupont CNP

## 2020-03-26 ENCOUNTER — TELEPHONE (OUTPATIENT)
Dept: FAMILY MEDICINE | Facility: CLINIC | Age: 75
End: 2020-03-26

## 2020-03-26 DIAGNOSIS — M81.0 SENILE OSTEOPOROSIS: Primary | ICD-10-CM

## 2020-03-26 NOTE — TELEPHONE ENCOUNTER
[facility] Call Center    Phone Message    May a detailed message be left on voicemail: no     Reason for Call: Medication Question or concern regarding medication   Prescription Clarification  Name of Medication: TYMLOS 3120 MCG/1.56ML SOPN injection   Prescribing Provider: Dr. Abrams   Pharmacy: [facility]   What on the order needs clarification? They want to know if the Pt is supposed to stop taking this medication and discontinue therapy from it. Please call them back.          Action Taken: Message routed to:  [facility]    Travel Screening: Not Applicable                 3-27-20  Phone call: left message.  She started Tymlos 10/2018 and should take it through 4/2020. Then stop.  She should get a DXA in summer 2020 and also see me in 7/2020 to consider start of prolia.   Erica Abrams MD, PhD

## 2020-03-30 ENCOUNTER — AMBULATORY - HEALTHEAST (OUTPATIENT)
Dept: OTHER | Facility: CLINIC | Age: 75
End: 2020-03-30

## 2020-03-30 ENCOUNTER — DOCUMENTATION ONLY (OUTPATIENT)
Dept: OTHER | Facility: CLINIC | Age: 75
End: 2020-03-30

## 2020-04-16 ENCOUNTER — TELEPHONE (OUTPATIENT)
Dept: OBGYN | Facility: CLINIC | Age: 75
End: 2020-04-16

## 2020-04-16 NOTE — TELEPHONE ENCOUNTER
----- Message from Erica Abrams MD PhD sent at 4/16/2020 10:57 AM CDT -----  Regarding: needs a phone visit  Please make a phone visit with her on Monday 4/20 to discuss her stopping tymlos and OP  Thanks  Erica Abrams

## 2020-04-20 ENCOUNTER — VIRTUAL VISIT (OUTPATIENT)
Dept: FAMILY MEDICINE | Facility: CLINIC | Age: 75
End: 2020-04-20
Attending: FAMILY MEDICINE
Payer: MEDICARE

## 2020-04-20 ENCOUNTER — TELEPHONE (OUTPATIENT)
Dept: OBGYN | Facility: CLINIC | Age: 75
End: 2020-04-20

## 2020-04-20 DIAGNOSIS — M81.0 SENILE OSTEOPOROSIS: Primary | ICD-10-CM

## 2020-04-20 NOTE — PROGRESS NOTES
"Cristina Stevens is a 74 year old female who is being evaluated via a billable telephone visit.      The patient has been notified of following:     \"This telephone visit will be conducted via a call between you and your physician/provider. We have found that certain health care needs can be provided without the need for a physical exam.  This service lets us provide the care you need with a short phone conversation.  If a prescription is necessary we can send it directly to your pharmacy.  If lab work is needed we can place an order for that and you can then stop by our lab to have the test done at a later time.    Telephone visits are billed at different rates depending on your insurance coverage. During this emergency period, for some insurers they may be billed the same as an in-person visit.  Please reach out to your insurance provider with any questions.    If during the course of the call the physician/provider feels a telephone visit is not appropriate, you will not be charged for this service.\"    Patient has given verbal consent for Telephone visit?  Yes    How would you like to obtain your AVS? Mail a copy    Subjective     Cristina Stevens is a 74 year old female who presents to clinic today for the following health issues: osteoporosis    Cristina is a  74 year old female post menopausal] [GR0, P0] that presents today for osteoporosis follow up patient was last seen 11/2019.  She has MS and wheel chair bound.  She is not immunosuppressed. She has currently been on Tymlos since 10/2018.  She has had no side effects.   Referring Physician: Kiley Paul MD     HPI     Have you ever had a bone density test? Yes  Where = [facility]   When = 10/2018  Previous 2007 and 2011  ([facility])  Spine Tscore =  L1-4  T= -3.6  Left neck Tscore = NA  Total left hip Tscore = NA  Right neck Tscore = NA  Total Right hip Tscore = NA  Radius 33%  T= -3.7   Have you received any x-ray dye or contrast in the " last ten days? No  How many servings of dairy products do you consume per day? 3-4 Type: milk  3-4 glasses, cottage cheese  Do you take a multi-vitamin daily? Yes  Do you take a vitamin D supplement? Yes  Do you take a calcium supplement daily?  No  Do you take a supplement containing strontium? No  Are you exposed to natural sunlight at least 20 minutes three times a week? Minimal     Social History   reports that she quit smoking about 38 years ago. She has never used smokeless tobacco.  Do you smoke cigarettes? Reformed   Do you exercise? Yes. Details: limited - physical therapy few times/wk   Do you drink alcohol? No    Medication History  Have you used any of the following medications?   Actonel (Risedronate): No   Aredia (Pamidronate): No   Boniva (Ibindronate): No   Didronil (Etidronate): No   Evista (Raloxifene): No   Fosamax (Alendronate): No   Forteo (Parathyroid hormone) injections: No   Tymlos: currently 10/2018 -end of April 2020   HCTZ (Thiazide): No   Calcitonin nasal spray: No   Reclast or Zometa (Zolendronate): No   Prolia (Denosumab): No    Current Outpatient Medications   Medication Sig Dispense Refill     amoxicillin (AMOXIL) 250 MG chewable tablet CHEW AND SWALLOW 8 TABLETS BY MOUTH 1 HOUR PRIOR TO DENTAL (Patient not taking: Reported on 4/20/2020) 8 tablet 1     armodafinil (NUVIGIL) 150 MG TABS tablet TAKE 1 TABLET BY MOUTH EVERY MORNING 30 tablet 5     baclofen (LIORESAL) 10 MG tablet TAKE ONE-HALF TABLET (5MG) BY MOUTH ONCE DAILY;AND TAKE ONE-HALF TABLET (5MG) EVERY 4 HOURS AS NEEDED 16 tablet PRN     CALMOSEPTINE 0.44-20.6 % OINT ointment APPLY TOPICALLY TO BUTTOCKS AS NEEDED WITH BRIEF CHANGES 113 g 11     cephALEXin (KEFLEX) 250 MG capsule TAKE 1 CAPSULE BY MOUTH ONCE DAILY FOR PREVENTIVE (Patient not taking: Reported on 4/20/2020) 30 capsule 11     cetirizine (ZYRTEC) 10 MG tablet TAKE 1 TABLET BY MOUTH ONCE DAILY 31 tablet PRN     Cholecalciferol (VITAMIN D3) 2000 units CAPS TAKE TWO  CAPSULES (4000 UNITS) BY MOUTH DAILY 62 capsule 11     CO2-Releasing (-TWO) SUPP Place rectally daily as needed       CRANBERRY CONCENTRATE 500 MG CAPS TAKE 1 CAPSULE BY MOUTH ONCE DAILY 31 capsule 11     DESITIN 13 % CREA APPLY TOPICALLY TO AREAS OF REDNESS OR RASH WITH EACH BRIEF CHANGE 113 g 11     Diaper Rash Products (DESITIN) OINT Externally apply topically as needed       ibuprofen (ADVIL/MOTRIN) 400 MG tablet TAKE 1 TABLET BY MOUTH TWICE DAILY AS NEEDED FOR PAIN 30 tablet 11     insulin pen needle (B-D U/F) 31G X 5 MM miscellaneous USE DAILY WITH TYMLOS AS DIRECTED 100 each 4     Multiple Vitamin (TAB-A-DAWSON) TABS TAKE 1 TABLET BY MOUTH ONCE DAILY 31 tablet 11     MYRBETRIQ 25 MG 24 hr tablet TAKE 1 TABLET BY MOUTH ONCE DAILY 31 tablet 11     nystatin (MYCOSTATIN) 295418 UNIT/GM external powder APPLY IN BETWEEN THE TOES TWICE DAILY UNTIL HEALED;THEN TWICE DAILY AS NEEDED 30 g 97     polyethylene glycol (MIRALAX/GLYCOLAX) powder MIX 17GM OF POWDER IN 8OZ OF WATER UNTIL COMPLETELY DISSOLVED. DRINK SOLUTION DAILY AS NEEDED 527 g 98     Probiotic Product (PROBIOTIC DAILY) CAPS Take 1 capsule by mouth daily 28 capsule 98     SENEXON-S 8.6-50 MG per tablet TAKE 1 TABLET BY MOUTH TWICE DAILY;AND MAY TAKE 1 TABLET BY MOUTH TWICE DAILY AS NEEDED FOR CONSTIPATION 62 tablet 12     TYMLOS 3120 MCG/1.56ML SOPN injection INJECT 0.04 ML (80MCG) SUBCUTANEOUSLY ONCE DAILY INTO ABDOMEN. ROTATE INJECTION SITES DAILY. 1.56 mL 5     vitamin C (ASCORBIC ACID) 500 MG tablet Take 1 tablet (500 mg) by mouth daily 31 tablet 98          Allergies   Allergen Reactions     Nitrofuran Derivatives Other (See Comments)     Tetracyclines GI Disturbance     Ampicillin GI Disturbance     Other reaction(s): Gastrointestinal     Cefadroxil Muscle Pain (Myalgia) and Other (See Comments)     Cephalexin Diarrhea and GI Disturbance     Gluten Meal      confirmed via chart from Dr. Avila     Levofloxacin Other (See Comments)     Phlebitis  with IV infusion     Sulfa Drugs Unknown, GI Disturbance and Nausea and Vomiting     Sulfasalazine Nausea and Vomiting     Other reaction(s): Gastrointestinal  Added per pt request 5/20/18       Past Medical History    No family history on file.    ROS:  General: none  Head/Eyes: none  Ears/Nose/Throat: none  Cardiovascular: none  Respiratory: none  Gastrointestinal: none  Breast: none  Genitourinary: none has a catheter  Sexual Function: none  Musculoskeletal: muscle weakness/cramps and loss of balance  MS  Skin: none  Neurological: none has MS   Mental Health: none  Endocrine: none    Clinic Measurements  Vitals: There were no vitals taken for this visit.  BMI= There is no height or weight on file to calculate BMI.    Physical exam  Constitutional: Well appearing woman in no acute distress.   Psychological: appropriate mood.  Musculoskeletal: wheel chair bound     Neurological: has MS - wheel chair bound       LAB  Vertebra; Fracture Assessment: NA  Dexa Scan: 10/2018    FRAX Assessment Tool: N/A, on [facility]  Risk Factors: T scores, MS, inactivity, hx of fracture     ASSESSMENT:  This is a 74-year-old postmenopausal female with wheelchair-bound MS who has osteoporosis by T-scores and history of fracture and is at high risk for future's fracture.  She has been on Tymlos since October 2018.  She will complete her 18 months at the end of April.  We will transition her to Prolia once the clinic is opened again for Prolia injections.  Meantime she is taking adequate calcium by diet, she continues with physical therapy, she is not immunosuppressed so we should be able to transition her to Prolia.  She will need creatinine and calcium check prior to the start of Prolia.    PLAN:  DXA due in 10/2020 - [facility] [address].  Check if they have a harness to move her - [facility].   Tymlos stop end of  April 2020  See me in fall after the DXA   We will  start prolia post COVID in June or July    Patient should take 1500mg of calcium/day in divided doses that is all supplements and diet together  and vitamin D3 4000IU/day.  You only absorb 5-600mg at one time  You are getting plenty of calcium by diet and don't need a supplement        I spent a total of 25 minutes with the patient during today's phone visit.  100 %of this time was spent counseling the patient and/or coordinating care regarding review of calcium and vit D and change of medication.  See note for details.    Thank you for allowing me to participate in the care of your patient.  Please do not hesitate to call with questions or concerns.    Sincerely,    Erica Abrams MD, PhD  CC Kiley Paul MD       Order for prolia placed - will administer June or July  Erica Abrams MD, PhD

## 2020-04-20 NOTE — TELEPHONE ENCOUNTER
Reached out to patient's caretaker, Rubi, and voicemail left. Per Dr. Abrams's note, requested a verbal order be provided to [facility] in [place] on [address] to stop Tyms on April 30.    RN unable to find phone number for nursing care at this facility, so voicemail left with Rubi. Await call back.    AVS printed and mailed to Cristina.

## 2020-04-20 NOTE — PATIENT INSTRUCTIONS
DXA due in 10/2020 - get at [facility] [address].  Check if they have a harness to move her - otherwise [facility].   Tymlos stop end of  April 2020  See me in fall after the DXA  - in Nov   We will  start prolia post COVID in June or July   Get a BMP (blood test) prior to the prolia shot   Patient should take 1500mg of calcium/day in divided doses that is all supplements and diet together  and vitamin D3 4000IU/day.  You only absorb 5-600mg at one time  You are getting plenty of calcium by diet and don't need a supplement

## 2020-04-20 NOTE — TELEPHONE ENCOUNTER
----- Message from Erica Abrams MD PhD sent at 4/20/2020 11:16 AM CDT -----  Regarding: order  Hi   I just did a phone visit with this pt - someone needs to call [facility] in [place] on [address] and give an order to stop Tymlos on April 30th.  She has completed the 18 months.  We will start prolia when clinic opens up for this ?June or ?July.  She will need a BMP prior and a DXA in Oct 2020 - both orders place  She asked that an AVS be mailed - can someone do that please and let me know that it was done  Dr Abrams

## 2020-04-22 ENCOUNTER — TELEPHONE (OUTPATIENT)
Dept: FAMILY MEDICINE | Facility: CLINIC | Age: 75
End: 2020-04-22

## 2020-04-22 ENCOUNTER — TRANSFERRED RECORDS (OUTPATIENT)
Dept: HEALTH INFORMATION MANAGEMENT | Facility: CLINIC | Age: 75
End: 2020-04-22

## 2020-04-22 ENCOUNTER — RECORDS - HEALTHEAST (OUTPATIENT)
Dept: LAB | Facility: CLINIC | Age: 75
End: 2020-04-22

## 2020-04-22 DIAGNOSIS — Z92.29 HEPATITIS B NON-CONVERTER (POST-VACCINATION): ICD-10-CM

## 2020-04-22 DIAGNOSIS — Z92.29 PERSONAL HISTORY OF DRUG THERAPY: ICD-10-CM

## 2020-04-22 DIAGNOSIS — M81.0 SENILE OSTEOPOROSIS: Primary | ICD-10-CM

## 2020-04-22 LAB
ALBUMIN UR-MCNC: NEGATIVE MG/DL
APPEARANCE UR: ABNORMAL
BACTERIA #/AREA URNS HPF: ABNORMAL HPF
BILIRUB UR QL STRIP: NEGATIVE
COLOR UR AUTO: ABNORMAL
GLUCOSE UR STRIP-MCNC: NEGATIVE MG/DL
HGB UR QL STRIP: NEGATIVE
KETONES UR STRIP-MCNC: NEGATIVE MG/DL
LEUKOCYTE ESTERASE UR QL STRIP: ABNORMAL
NITRATE UR QL: POSITIVE
PH UR STRIP: 6.5 [PH] (ref 4.5–8)
RBC #/AREA URNS AUTO: ABNORMAL HPF
SP GR UR STRIP: 1.01 (ref 1–1.03)
SQUAMOUS #/AREA URNS AUTO: ABNORMAL LPF
TRANS CELLS #/AREA URNS HPF: ABNORMAL LPF
UROBILINOGEN UR STRIP-ACNC: ABNORMAL
WBC #/AREA URNS AUTO: ABNORMAL HPF
WBC CLUMPS #/AREA URNS HPF: PRESENT /[HPF]

## 2020-04-22 NOTE — TELEPHONE ENCOUNTER
4-22-20  Phone call  - patient gave me fax # [phone]   to fax an order to stop Tymlos end of April.  Atten: Anaya Abrams MD, PhD

## 2020-04-23 NOTE — PROGRESS NOTES
4-22-20 needs 2 diagnoses for prolia PA - z92.29z and senile osteoporosis - done  Erica Abrams MD, PhD

## 2020-04-27 LAB
BACTERIA SPEC CULT: ABNORMAL
BACTERIA SPEC CULT: ABNORMAL

## 2020-04-28 ENCOUNTER — TELEPHONE (OUTPATIENT)
Dept: OBGYN | Facility: CLINIC | Age: 75
End: 2020-04-28

## 2020-04-28 NOTE — TELEPHONE ENCOUNTER
Called to nurse line # provided and received unidentified voicemail. Left vague message on this voicemail to return call to triage to discuss a mutual patient. Also left fax number for paperwork to be sent if still needed.

## 2020-04-28 NOTE — TELEPHONE ENCOUNTER
----- Message from Lilian Gambino sent at 4/23/2020  9:19 AM CDT -----  Regarding: Order to stop Tymlos injections  Contact: [phone]  [facility] Team,    Aleja at [facility]  at [place] called.  Patient had stated Dr. Abrams wanted the Tymlos injections to be stopped at the end of April 2020; however, they need signed orders from Dr. Abrams.  Please faxed signed orders from Dr. Abrams stating to stop the Tymlos injections, and what date, to [phone].  Any questions, please call the nurse line at [phone].  Thank you!    Kind regards,  Lilian    Please DO NOT send this message and/or reply back to sender. Call Center Representatives DO NOT respond to messages.

## 2020-05-05 DIAGNOSIS — R23.8 SKIN IRRITATION: ICD-10-CM

## 2020-05-07 ENCOUNTER — TELEPHONE (OUTPATIENT)
Dept: GERIATRICS | Facility: CLINIC | Age: 75
End: 2020-05-07

## 2020-05-07 RX ORDER — ANORECTAL OINTMENT 15.7; .44; 24; 20.6 G/100G; G/100G; G/100G; G/100G
OINTMENT TOPICAL
Qty: 113 G | Refills: 97 | Status: SHIPPED | OUTPATIENT
Start: 2020-05-07 | End: 2023-02-21

## 2020-05-07 NOTE — TELEPHONE ENCOUNTER
Prior Authorization Retail Medication Request    Medication/Dose: solifenacin 10mg tabs  ICD code (if different than what is on RX):    Previously Tried and Failed:   Rationale:      Insurance Name: MEDIMPACT PART D  Insurance ID: [id number]  Insurance Ph#: [phone]    Pharmacy Information (if different than what is on RX)  Name: [facility]  Phone:  [phone]  Fax: [phone]

## 2020-05-08 NOTE — TELEPHONE ENCOUNTER
There is no order for this in the patient's chart. Please update chart with current directions and diagnosis. Please route back to me when finished so I can complete PA.

## 2020-05-12 RX ORDER — SOLIFENACIN SUCCINATE 10 MG/1
10 TABLET, FILM COATED ORAL DAILY
COMMUNITY
Start: 2020-05-12 | End: 2020-07-28

## 2020-05-13 NOTE — TELEPHONE ENCOUNTER
PA Initiation    Medication: solifenacin 10mg tabs  Insurance Company: [employer] - Phone [phone] Fax [phone]  Pharmacy Filling the Rx: [facility] - [address]  Filling Pharmacy Phone: [phone]  Filling Pharmacy Fax: [phone]  Start Date: 5/8/2020    Cristina Stevens Zuleta: [id number]

## 2020-05-13 NOTE — TELEPHONE ENCOUNTER
Prior Authorization Not Needed per Insurance    Medication: solifenacin 10mg tabs  Insurance Company: [employer] - Phone [phone] Fax [phone]  Expected CoPay:      Pharmacy Filling the Rx: [facility] - [place], [place] - [address] [place] [address]  Pharmacy Notified: Yes  Patient Notified: Yes    Pharmacy received paid claim.  The requested medication was requested before and approved. No additional authorization is needed at this time. Please call us at [phone] with any questions.

## 2020-05-31 DIAGNOSIS — Z78.9 TAKES DIETARY SUPPLEMENTS: ICD-10-CM

## 2020-06-01 RX ORDER — BACITRACIN ZINC AND POLYMYXIN B SULFATE 500; 10000 [USP'U]/G; [USP'U]/G
OINTMENT TOPICAL
Qty: 28 CAPSULE | Refills: 97 | Status: SHIPPED | OUTPATIENT
Start: 2020-06-01 | End: 2021-05-17

## 2020-06-28 DIAGNOSIS — Z29.89 NEED FOR PROPHYLAXIS AGAINST URINARY TRACT INFECTION: ICD-10-CM

## 2020-06-29 DIAGNOSIS — G47.429 NARCOLEPSY DUE TO UNDERLYING CONDITION WITHOUT CATAPLEXY: ICD-10-CM

## 2020-06-29 RX ORDER — ARMODAFINIL 150 MG/1
TABLET ORAL
Qty: 30 TABLET | Refills: 4 | Status: SHIPPED | OUTPATIENT
Start: 2020-06-29 | End: 2020-12-08

## 2020-07-27 DIAGNOSIS — Z78.9 TAKES DIETARY SUPPLEMENTS: ICD-10-CM

## 2020-07-27 DIAGNOSIS — Z29.89 NEED FOR PROPHYLAXIS AGAINST URINARY TRACT INFECTION: ICD-10-CM

## 2020-07-27 DIAGNOSIS — N32.81 OVERACTIVE BLADDER: ICD-10-CM

## 2020-07-28 DIAGNOSIS — Z79.2 PROPHYLACTIC ANTIBIOTIC: ICD-10-CM

## 2020-07-28 RX ORDER — SOLIFENACIN SUCCINATE 10 MG/1
TABLET, FILM COATED ORAL
Qty: 28 TABLET | Refills: 97 | Status: SHIPPED | OUTPATIENT
Start: 2020-07-28 | End: 2021-05-17

## 2020-07-28 RX ORDER — MULTIVITAMIN WITH FOLIC ACID 400 MCG
TABLET ORAL
Qty: 28 TABLET | Refills: 97 | Status: SHIPPED | OUTPATIENT
Start: 2020-07-28 | End: 2022-03-15

## 2020-07-28 RX ORDER — MIRABEGRON 25 MG/1
TABLET, FILM COATED, EXTENDED RELEASE ORAL
Qty: 28 TABLET | Refills: 97 | Status: SHIPPED | OUTPATIENT
Start: 2020-07-28 | End: 2022-03-15

## 2020-07-28 RX ORDER — AMOXICILLIN 250 MG
TABLET,CHEWABLE ORAL
Qty: 8 TABLET | Refills: 97 | Status: SHIPPED | OUTPATIENT
Start: 2020-07-28

## 2020-07-28 RX ORDER — EVENING PRIMROSE OIL 500 MG
CAPSULE ORAL
Qty: 28 CAPSULE | Refills: 97 | Status: SHIPPED | OUTPATIENT
Start: 2020-07-28 | End: 2022-03-15

## 2020-07-28 RX ORDER — ACETAMINOPHEN 160 MG
TABLET,DISINTEGRATING ORAL
Qty: 56 CAPSULE | Refills: 97 | Status: SHIPPED | OUTPATIENT
Start: 2020-07-28 | End: 2021-09-27

## 2020-08-18 ENCOUNTER — MYC MEDICAL ADVICE (OUTPATIENT)
Dept: FAMILY MEDICINE | Facility: CLINIC | Age: 75
End: 2020-08-18

## 2020-08-18 ENCOUNTER — TELEPHONE (OUTPATIENT)
Dept: OBGYN | Facility: CLINIC | Age: 75
End: 2020-08-18

## 2020-08-18 NOTE — TELEPHONE ENCOUNTER
----- Message from Devi Pace RN sent at 8/18/2020 10:33 AM CDT -----  Regarding: Needs DEXA order faxed to [facility]  In wheelchair and needs karen lift so needs DEXA done at [facility].   Please fax DEXA order to them at [phone].    Aid name is Rubi, call her with questions at [phone].

## 2020-08-21 ENCOUNTER — ANCILLARY PROCEDURE (OUTPATIENT)
Dept: BONE DENSITY | Facility: CLINIC | Age: 75
End: 2020-08-21
Attending: FAMILY MEDICINE
Payer: MEDICARE

## 2020-08-21 ENCOUNTER — ASSISTED LIVING VISIT (OUTPATIENT)
Dept: GERIATRICS | Facility: CLINIC | Age: 75
End: 2020-08-21
Payer: MEDICARE

## 2020-08-21 VITALS
OXYGEN SATURATION: 98 % | DIASTOLIC BLOOD PRESSURE: 74 MMHG | TEMPERATURE: 97.6 F | SYSTOLIC BLOOD PRESSURE: 124 MMHG | HEART RATE: 62 BPM

## 2020-08-21 DIAGNOSIS — Z97.8 FOLEY CATHETER IN PLACE: ICD-10-CM

## 2020-08-21 DIAGNOSIS — G82.50 SPASTIC QUADRIPLEGIA (H): ICD-10-CM

## 2020-08-21 DIAGNOSIS — M81.0 SENILE OSTEOPOROSIS: ICD-10-CM

## 2020-08-21 DIAGNOSIS — G35 MS (MULTIPLE SCLEROSIS) (H): ICD-10-CM

## 2020-08-21 DIAGNOSIS — N31.9 NEUROGENIC BLADDER: Primary | ICD-10-CM

## 2020-08-21 DIAGNOSIS — M81.0 OSTEOPOROSIS, UNSPECIFIED OSTEOPOROSIS TYPE, UNSPECIFIED PATHOLOGICAL FRACTURE PRESENCE: ICD-10-CM

## 2020-08-22 RX ORDER — AMOXICILLIN 250 MG
2 CAPSULE ORAL 2 TIMES DAILY
Qty: 62 TABLET | Refills: 12 | COMMUNITY
Start: 2020-08-22 | End: 2022-05-23

## 2020-08-26 DIAGNOSIS — Z78.9 TAKES DIETARY SUPPLEMENTS: ICD-10-CM

## 2020-08-30 RX ORDER — ASCORBIC ACID 500 MG
TABLET ORAL
Qty: 28 TABLET | Refills: 97 | Status: SHIPPED | OUTPATIENT
Start: 2020-08-30 | End: 2022-08-30

## 2020-08-31 ENCOUNTER — VIRTUAL VISIT (OUTPATIENT)
Dept: FAMILY MEDICINE | Facility: CLINIC | Age: 75
End: 2020-08-31
Attending: FAMILY MEDICINE
Payer: MEDICARE

## 2020-08-31 DIAGNOSIS — Z92.29 HX DRUG THERAPY: ICD-10-CM

## 2020-08-31 DIAGNOSIS — M81.0 SENILE OSTEOPOROSIS: Primary | ICD-10-CM

## 2020-08-31 NOTE — PROGRESS NOTES
"Cristina Stevens is a 75 year old female who is being evaluated via a billable video visit.      The patient has been notified of following:     \"This video visit will be conducted via a call between you and your physician/provider. We have found that certain health care needs can be provided without the need for an in-person physical exam.  This service lets us provide the care you need with a video conversation.  If a prescription is necessary we can send it directly to your pharmacy.  If lab work is needed we can place an order for that and you can then stop by our lab to have the test done at a later time.    Video visits are billed at different rates depending on your insurance coverage.  Please reach out to your insurance provider with any questions.    If during the course of the call the physician/provider feels a video visit is not appropriate, you will not be charged for this service.\"    Patient has given verbal consent for Video visit? Yes  How would you like to obtain your AVS? MyChart  If you are dropped from the video visit, the video invite should be resent to: Text to cell phone: [phone]  Will anyone else be joining your video visit? Yes - Rubi - care giver       Subjective     Cristina Stevens is a 75 year old female who presents today via video visit for the following health issues: osteoporosis     HPI          Video Start Time: 10:31        Review of Systems         Objective           Vitals:  No vitals were obtained today due to virtual visit.    Physical Exam     GENERAL: dehabilitated - wheel chair bound   EYES: Eyes grossly normal to inspection.  No discharge or erythema, or obvious scleral/conjunctival abnormalities.  RESP: No audible wheeze, cough, or visible cyanosis.  No visible retractions or increased work of breathing.    SKIN: Visible skin clear. No significant rash, abnormal pigmentation or lesions.  NEURO: Cranial nerves grossly intact, but difficult to assess.  Mentation and " speech appropriate for age.  PSYCH: Mentation appears normal, affect normal/bright, judgement and insight intact, normal speech and appearance well-groomed.                    Video-Visit Details    Type of service:  Video Visit    Video End Time:10:51    Originating Location (pt. Location): Home    Distant Location (provider location):  [facility]     Platform used for Video Visit: Doximity     Started with video and internet not stable so finished by phone.                             [facility]  Established Patient visit    Cristina is a  75 year old female post menopausal] [GR0, P0] that presents today for osteoporosis follow up patient was last seen 4/2020. Pt on Tymlos for 18 months and stopped end of April.  She has MS and wheel chair bound.  She is not immunosuppressed. Currently not on any bone meds. She had a recent DXA but not on the same machine so it is not comparable.    Referring Physician:Le Paul MD     HPI     Have you ever had a bone density test? Yes  Where = [email or web address]  When = 8/2020  Spine Tscore = -2.4  Left neck Tscore = NA  Total left hip Tscore = NA  Right neck Tscore = NA  Total Right hip Tscore = NA  Radius  T= -4.4  Have you received any x-ray dye or contrast in the last ten days? No  How many servings of dairy products do you consume per day? 3-4 Type: milk  8 oz 3x/day  Yogurt, green vegtables   Do you take a multi-vitamin daily? Yes  Do you take a vitamin D supplement? Yes 4000IU/day  Do you take a calcium supplement daily?  No  Do you take a supplement containing strontium? No  Are you exposed to natural sunlight at least 20 minutes three times a week? Yes    Social History   reports that she quit smoking about 39 years ago. She has never used smokeless tobacco.  Do you smoke cigarettes? Reformed smoker  Do you exercise? No wheel chair bound    Do you drink alcohol? No    Medication History  Have you used any of the following medications?   Actonel (Risedronate):  No   Aredia (Pamidronate): No   Boniva (Ibindronate): No   Didronil (Etidronate): No   Evista (Raloxifene): No   Fosamax (Alendronate): No   Forteo (Parathyroid hormone) injections: No   tymlos 10/2018-4/2020   HCTZ (Thiazide): No   Calcitonin nasal spray: No   Reclast or Zometa (Zolendronate): No   Prolia (Denosumab): No    Current Outpatient Medications   Medication Sig Dispense Refill     amoxicillin (AMOXIL) 250 MG chewable tablet CHEW AND SWALLOW 8 TABLETS (2000 MG) BY MOUTH 1 HOUR PRIOR TO DENTAL 8 tablet 97     armodafinil (NUVIGIL) 150 MG TABS tablet TAKE 1 TABLET BY MOUTH EVERY MORNING 30 tablet 4     Bacillus Coagulans-Inulin (PROBIOTIC FORMULA) 1-250 BILLION-MG CAPS TAKE 1 CAPSULE BY MOUTH ONCE DAILY 28 capsule 97     CALMOSEPTINE 0.44-20.6 % OINT ointment APPLY TO AFFECTED AREA(S) TOPICALLY TO BUTTOCKS AS NEEDED WITH BRIEF CHANGES 113 g 97     cephALEXin (KEFLEX) 250 MG capsule TAKE 1 CAPSULE BY MOUTH ONCE DAILY FOR PREVENTIVE 31 capsule 97     cetirizine (ZYRTEC) 10 MG tablet TAKE 1 TABLET BY MOUTH ONCE DAILY 31 tablet PRN     Cholecalciferol (VITAMIN D3) 50 MCG (2000 UT) CAPS TAKE TWO CAPSULES (4000 UNITS) BY MOUTH ONCE DAILY 56 capsule 97     CO2-Releasing (-TWO) SUPP Place rectally daily as needed       CRANBERRY CONCENTRATE 500 MG CAPS TAKE 1 CAPSULE BY MOUTH ONCE DAILY 28 capsule 97     DESITIN 13 % CREA APPLY TOPICALLY TO AREAS OF REDNESS OR RASH WITH EACH BRIEF CHANGE 113 g 97     Diaper Rash Products (DESITIN) OINT Externally apply topically as needed       ibuprofen (ADVIL/MOTRIN) 400 MG tablet TAKE 1 TABLET BY MOUTH TWICE DAILY AS NEEDED FOR PAIN 30 tablet 11     insulin pen needle (B-D U/F) 31G X 5 MM miscellaneous USE DAILY WITH TYMLOS AS DIRECTED 100 each 4     Multiple Vitamins-Minerals (TAB-A-DAWSON) TABS TAKE 1 TABLET BY MOUTH ONCE DAILY 28 tablet 97     MYRBETRIQ 25 MG 24 hr tablet TAKE 1 TABLET BY MOUTH ONCE DAILY 28 tablet 97     nystatin (MYCOSTATIN) 932838 UNIT/GM external powder  APPLY IN BETWEEN THE TOES TWICE DAILY UNTIL HEALED;THEN TWICE DAILY AS NEEDED 30 g 97     polyethylene glycol (MIRALAX/GLYCOLAX) powder MIX 17GM OF POWDER IN 8OZ OF WATER UNTIL COMPLETELY DISSOLVED. DRINK SOLUTION DAILY AS NEEDED 527 g 98     Probiotic Product (PROBIOTIC DAILY) CAPS Take 1 capsule by mouth daily 28 capsule 98     senna-docusate (SENEXON-S) 8.6-50 MG tablet Take 2 tablets by mouth 2 times daily 62 tablet 12     solifenacin (VESICARE) 10 MG tablet TAKE 1 TABLET BY MOUTH ONCE DAILY 28 tablet 97     TYMLOS 3120 MCG/1.56ML SOPN injection INJECT 0.04 ML (80MCG) SUBCUTANEOUSLY ONCE DAILY INTO ABDOMEN. ROTATE INJECTION SITES DAILY. 1.56 mL 5     vitamin C (ASCORBIC ACID) 500 MG tablet TAKE 1 TABLET BY MOUTH ONCE DAILY 28 tablet 97          Allergies   Allergen Reactions     Nitrofuran Derivatives Visual Disturbance     Hallucinations     Tetracyclines GI Disturbance     Ampicillin GI Disturbance     Cefadroxil Muscle Pain (Myalgia)     Cephalexin Diarrhea and GI Disturbance     Gluten Meal Unknown     Confirmed via chart from Dr. Avila     Levofloxacin Other (See Comments)     Phlebitis with IV infusion     Sulfa Drugs Nausea and Vomiting, GI Disturbance and Unknown     Reaction occurred as a child     Sulfasalazine Nausea and Vomiting       Past Medical History    No family history on file.    ROS:  General: none  Head/Eyes: none  Ears/Nose/Throat: none  Cardiovascular: none  Respiratory: none  Gastrointestinal: none  Breast: none  Genitourinary: none  Sexual Function: none  Musculoskeletal: muscle weakness/cramps  Skin: none  Neurological: difficulty with walking has MS   Mental Health: none  Endocrine: none    Clinic Measurements  Vitals: There were no vitals taken for this visit.  BMI= There is no height or weight on file to calculate BMI.    Physical exam    LAB  Vertebra; Fracture Assessment: NA  Dexa Scan: 8/2020     FRAX Assessment Tool: [N/A,   Risk Factors:  T scores, MS, inactivity, hx of  fracture     ASSESSMENT:  This is a 75-year-old postmenopausal female with wheelchair-bound MS who has osteoporosis by T-scores and history of fracture and is at high risk for future fracture.  She was on Tymlos from October 2018 till April 2020.  She recently had a DEXA but not on the same machine as previously because they did not have ability to transfer her onto the table.  Subsequently we cannot compare the readings.  However she has risk factors and is at high risk for future fracture.  The plan is to transition her to Prolia.  She needs t2 diagnoses for prior authorization.  We will submit that and get a BMP blood test to check her kidney function.  She is to call and make a nurse visit for the Prolia.     PLAN:  We will get PA for the prolia  Call in 1 wk to see if we did get PA for prolia  Then make a nurse appt for the shot  Get blood test 2 wks AFTER the shot  Next shot will be in 6 months - we don't send reminders  See me in 1 year when third shot is due      I spent a total of 20 minutes  with the patient during today's tele visit.  This time was spent counseling the patient and/or coordinating care regarding review of medications, calcium and vitamin D.  See note for details.    Thank you for allowing me to participate in the care of your patient.  Please do not hesitate to call with questions or concerns.    Sincerely,    Erica Abrams MD, PhD  CC Le Paul MD

## 2020-08-31 NOTE — PATIENT INSTRUCTIONS
We will get PA for the prolia  Call in 1 wk to see if we did get PA for prolia  Then make a nurse appt for the shot  Get 1 blood test (BMP) before the shot   Get blood test 2 wks AFTER the shot  Next shot will be in 6 months - we don't send reminders  See me in 1 year when third shot is due.

## 2020-08-31 NOTE — LETTER
"8/31/2020       RE: Cristina Rico  [address] [place] [address]     Dear Colleague,    Thank you for referring your patient, Cristina Stevens, to the [facility] at [facility]. Please see a copy of my visit note below.    Cristina Stevens is a 75 year old female who is being evaluated via a billable video visit.      The patient has been notified of following:     \"This video visit will be conducted via a call between you and your physician/provider. We have found that certain health care needs can be provided without the need for an in-person physical exam.  This service lets us provide the care you need with a video conversation.  If a prescription is necessary we can send it directly to your pharmacy.  If lab work is needed we can place an order for that and you can then stop by our lab to have the test done at a later time.    Video visits are billed at different rates depending on your insurance coverage.  Please reach out to your insurance provider with any questions.    If during the course of the call the physician/provider feels a video visit is not appropriate, you will not be charged for this service.\"    Patient has given verbal consent for Video visit? Yes  How would you like to obtain your AVS? MyChart  If you are dropped from the video visit, the video invite should be resent to: Text to cell phone: [phone]  Will anyone else be joining your video visit? Yes - Rubi - care giver       Subjective     Cristina Stevens is a 75 year old female who presents today via video visit for the following health issues: osteoporosis     HPI          Video Start Time: 10:31        Review of Systems         Objective           Vitals:  No vitals were obtained today due to virtual visit.    Physical Exam     GENERAL: dehabilitated - wheel chair bound   EYES: Eyes grossly normal to inspection.  No discharge or " erythema, or obvious scleral/conjunctival abnormalities.  RESP: No audible wheeze, cough, or visible cyanosis.  No visible retractions or increased work of breathing.    SKIN: Visible skin clear. No significant rash, abnormal pigmentation or lesions.  NEURO: Cranial nerves grossly intact, but difficult to assess.  Mentation and speech appropriate for age.  PSYCH: Mentation appears normal, affect normal/bright, judgement and insight intact, normal speech and appearance well-groomed.                    Video-Visit Details    Type of service:  Video Visit    Video End Time:10:51    Originating Location (pt. Location): Home    Distant Location (provider location):  [facility]     Platform used for Video Visit: Doximity     Started with video and internet not stable so finished by phone.                             [facility]  Established Patient visit    Cristina is a  75 year old female post menopausal] [GR0, P0] that presents today for osteoporosis follow up patient was last seen 4/2020. Pt on Tymlos for 18 months and stopped end of April.  She has MS and wheel chair bound.  She is not immunosuppressed. Currently not on any bone meds. She had a recent DXA but not on the same machine so it is not comparable.    Referring Physician:Le Paul MD     HPI     Have you ever had a bone density test? Yes  Where = [facility]  When = 8/2020  Spine Tscore = -2.4  Left neck Tscore = NA  Total left hip Tscore = NA  Right neck Tscore = NA  Total Right hip Tscore = NA  Radius  T= -4.4  Have you received any x-ray dye or contrast in the last ten days? No  How many servings of dairy products do you consume per day? 3-4 Type: milk  8 oz 3x/day  Yogurt, green vegtables   Do you take a multi-vitamin daily? Yes  Do you take a vitamin D supplement? Yes 4000IU/day  Do you take a calcium supplement daily?  No  Do you take a supplement containing strontium? No  Are you exposed to natural sunlight at least 20 minutes three  times a week? Yes    Social History   reports that she quit smoking about 39 years ago. She has never used smokeless tobacco.  Do you smoke cigarettes? Reformed smoker  Do you exercise? No wheel chair bound    Do you drink alcohol? No    Medication History  Have you used any of the following medications?   Actonel (Risedronate): No   Aredia (Pamidronate): No   Boniva (Ibindronate): No   Didronil (Etidronate): No   Evista (Raloxifene): No   Fosamax (Alendronate): No   Forteo (Parathyroid hormone) injections: No   tymlos 10/2018-4/2020   HCTZ (Thiazide): No   Calcitonin nasal spray: No   Reclast or Zometa (Zolendronate): No   Prolia (Denosumab): No    Current Outpatient Medications   Medication Sig Dispense Refill     amoxicillin (AMOXIL) 250 MG chewable tablet CHEW AND SWALLOW 8 TABLETS (2000 MG) BY MOUTH 1 HOUR PRIOR TO DENTAL 8 tablet 97     armodafinil (NUVIGIL) 150 MG TABS tablet TAKE 1 TABLET BY MOUTH EVERY MORNING 30 tablet 4     Bacillus Coagulans-Inulin (PROBIOTIC FORMULA) 1-250 BILLION-MG CAPS TAKE 1 CAPSULE BY MOUTH ONCE DAILY 28 capsule 97     CALMOSEPTINE 0.44-20.6 % OINT ointment APPLY TO AFFECTED AREA(S) TOPICALLY TO BUTTOCKS AS NEEDED WITH BRIEF CHANGES 113 g 97     cephALEXin (KEFLEX) 250 MG capsule TAKE 1 CAPSULE BY MOUTH ONCE DAILY FOR PREVENTIVE 31 capsule 97     cetirizine (ZYRTEC) 10 MG tablet TAKE 1 TABLET BY MOUTH ONCE DAILY 31 tablet PRN     Cholecalciferol (VITAMIN D3) 50 MCG (2000 UT) CAPS TAKE TWO CAPSULES (4000 UNITS) BY MOUTH ONCE DAILY 56 capsule 97     CO2-Releasing (-TWO) SUPP Place rectally daily as needed       CRANBERRY CONCENTRATE 500 MG CAPS TAKE 1 CAPSULE BY MOUTH ONCE DAILY 28 capsule 97     DESITIN 13 % CREA APPLY TOPICALLY TO AREAS OF REDNESS OR RASH WITH EACH BRIEF CHANGE 113 g 97     Diaper Rash Products (DESITIN) OINT Externally apply topically as needed       ibuprofen (ADVIL/MOTRIN) 400 MG tablet TAKE 1 TABLET BY MOUTH TWICE DAILY AS NEEDED FOR PAIN 30 tablet 11      insulin pen needle (B-D U/F) 31G X 5 MM miscellaneous USE DAILY WITH TYMLOS AS DIRECTED 100 each 4     Multiple Vitamins-Minerals (TAB-A-DAWSON) TABS TAKE 1 TABLET BY MOUTH ONCE DAILY 28 tablet 97     MYRBETRIQ 25 MG 24 hr tablet TAKE 1 TABLET BY MOUTH ONCE DAILY 28 tablet 97     nystatin (MYCOSTATIN) 793090 UNIT/GM external powder APPLY IN BETWEEN THE TOES TWICE DAILY UNTIL HEALED;THEN TWICE DAILY AS NEEDED 30 g 97     polyethylene glycol (MIRALAX/GLYCOLAX) powder MIX 17GM OF POWDER IN 8OZ OF WATER UNTIL COMPLETELY DISSOLVED. DRINK SOLUTION DAILY AS NEEDED 527 g 98     Probiotic Product (PROBIOTIC DAILY) CAPS Take 1 capsule by mouth daily 28 capsule 98     senna-docusate (SENEXON-S) 8.6-50 MG tablet Take 2 tablets by mouth 2 times daily 62 tablet 12     solifenacin (VESICARE) 10 MG tablet TAKE 1 TABLET BY MOUTH ONCE DAILY 28 tablet 97     TYMLOS 3120 MCG/1.56ML SOPN injection INJECT 0.04 ML (80MCG) SUBCUTANEOUSLY ONCE DAILY INTO ABDOMEN. ROTATE INJECTION SITES DAILY. 1.56 mL 5     vitamin C (ASCORBIC ACID) 500 MG tablet TAKE 1 TABLET BY MOUTH ONCE DAILY 28 tablet 97          Allergies   Allergen Reactions     Nitrofuran Derivatives Visual Disturbance     Hallucinations     Tetracyclines GI Disturbance     Ampicillin GI Disturbance     Cefadroxil Muscle Pain (Myalgia)     Cephalexin Diarrhea and GI Disturbance     Gluten Meal Unknown     Confirmed via chart from Dr. Avila     Levofloxacin Other (See Comments)     Phlebitis with IV infusion     Sulfa Drugs Nausea and Vomiting, GI Disturbance and Unknown     Reaction occurred as a child     Sulfasalazine Nausea and Vomiting       Past Medical History    No family history on file.    ROS:  General: none  Head/Eyes: none  Ears/Nose/Throat: none  Cardiovascular: none  Respiratory: none  Gastrointestinal: none  Breast: none  Genitourinary: none  Sexual Function: none  Musculoskeletal: muscle weakness/cramps  Skin: none  Neurological: difficulty with walking has MS    Mental Health: none  Endocrine: none    Clinic Measurements  Vitals: There were no vitals taken for this visit.  BMI= There is no height or weight on file to calculate BMI.    Physical exam    LAB  Vertebra; Fracture Assessment: NA  Dexa Scan: 8/2020     FRAX Assessment Tool: [N/A,   Risk Factors:  T scores, MS, inactivity, hx of fracture     ASSESSMENT:  This is a 75-year-old postmenopausal female with wheelchair-bound MS who has osteoporosis by T-scores and history of fracture and is at high risk for future fracture.  She was on Tymlos from October 2018 till April 2020.  She recently had a DEXA but not on the same machine as previously because they did not have ability to transfer her onto the table.  Subsequently we cannot compare the readings.  However she has risk factors and is at high risk for future fracture.  The plan is to transition her to Prolia.  She needs t2 diagnoses for prior authorization.  We will submit that and get a BMP blood test to check her kidney function.  She is to call and make a nurse visit for the Prolia.     PLAN:  We will get PA for the prolia  Call in 1 wk to see if we did get PA for prolia  Then make a nurse appt for the shot  Get blood test 2 wks AFTER the shot  Next shot will be in 6 months - we don't send reminders  See me in 1 year when third shot is due      I spent a total of 20 minutes  with the patient during today's tele visit.  This time was spent counseling the patient and/or coordinating care regarding review of medications, calcium and vitamin D.  See note for details.    Thank you for allowing me to participate in the care of your patient.  Please do not hesitate to call with questions or concerns.    Sincerely,    Erica Abrams MD, PhD  CC Le Paul MD

## 2020-09-03 ENCOUNTER — RECORDS - HEALTHEAST (OUTPATIENT)
Dept: LAB | Facility: CLINIC | Age: 75
End: 2020-09-03

## 2020-09-04 ENCOUNTER — TELEPHONE (OUTPATIENT)
Dept: OBGYN | Facility: CLINIC | Age: 75
End: 2020-09-04

## 2020-09-04 NOTE — TELEPHONE ENCOUNTER
Left message  reji approved-  Please call and schedule her nurse visit for infection-  She already had pre labs drawn.

## 2020-09-06 LAB
SARS-COV-2 BY NAA - HISTORICAL: NOT DETECTED
SARS-COV-2 SOURCE - HISTORICAL: NORMAL

## 2020-09-16 ENCOUNTER — TELEPHONE (OUTPATIENT)
Dept: OBGYN | Facility: CLINIC | Age: 75
End: 2020-09-16

## 2020-09-16 NOTE — TELEPHONE ENCOUNTER
Message left from Rubi regarding address for prolia injection tomorrow.      Attempted to reach Rubi however her voice mail is full and not accepting messages    Called [facility], they do not have any additional contacts for Rubi    Spoke with Cristina and ask to call back and leave message with address on her voice mail.  Called back and left address to clinic on voice mail per Cristina's request. Call if questions [phone].

## 2020-09-17 ENCOUNTER — ALLIED HEALTH/NURSE VISIT (OUTPATIENT)
Dept: OBGYN | Facility: CLINIC | Age: 75
End: 2020-09-17
Attending: FAMILY MEDICINE
Payer: MEDICARE

## 2020-09-17 DIAGNOSIS — Z91.81 AT HIGH RISK FOR FALLS: Primary | ICD-10-CM

## 2020-09-17 PROCEDURE — 40000269 ZZH STATISTIC NO CHARGE FACILITY FEE: Mod: ZF

## 2020-09-17 PROCEDURE — 25000128 H RX IP 250 OP 636: Mod: ZF | Performed by: FAMILY MEDICINE

## 2020-09-17 PROCEDURE — 96372 THER/PROPH/DIAG INJ SC/IM: CPT | Mod: ZF

## 2020-09-17 RX ADMIN — DENOSUMAB 60 MG: 60 INJECTION SUBCUTANEOUS at 09:57

## 2020-09-17 NOTE — NURSING NOTE
Chief Complaint   Patient presents with     Allied Health Visit     Prolia injection         Clinic Administered Medication Documentation      Injectable Medication Documentation    Patient was given Prolia. Prior to medication administration, verified patients identity using patient s name and date of birth. Please see MAR and medication order for additional information. Patient instructed to remain in clinic for 15 minutes.      Was entire vial of medication used? Yes   Vial/Syringe: Syringe  Expiration Date:  10/2022  Was this medication supplied by the patient? No           Emilee Mercado LPN

## 2020-09-21 DIAGNOSIS — M81.0 SENILE OSTEOPOROSIS: Primary | ICD-10-CM

## 2020-09-30 DIAGNOSIS — M81.0 SENILE OSTEOPOROSIS: ICD-10-CM

## 2020-09-30 LAB
CALCIUM SERPL-MCNC: 9.4 MG/DL (ref 8.5–10.1)
MAGNESIUM SERPL-MCNC: 2.2 MG/DL (ref 1.6–2.3)
PHOSPHATE SERPL-MCNC: 3.2 MG/DL (ref 2.5–4.5)

## 2020-11-16 ENCOUNTER — TELEPHONE (OUTPATIENT)
Dept: OBGYN | Facility: CLINIC | Age: 75
End: 2020-11-16

## 2020-11-16 NOTE — TELEPHONE ENCOUNTER
ANAMARIA Evans who would like to send Dr. Chris an article to read.  Asking for address - call back and leave on voice mail    Called and left message of clinic address of clinic on voice mail per pt request

## 2020-11-23 ENCOUNTER — VIRTUAL VISIT (OUTPATIENT)
Dept: GERIATRICS | Facility: CLINIC | Age: 75
End: 2020-11-23
Payer: MEDICARE

## 2020-11-23 VITALS
WEIGHT: 160 LBS | SYSTOLIC BLOOD PRESSURE: 124 MMHG | BODY MASS INDEX: 23.7 KG/M2 | TEMPERATURE: 96.7 F | RESPIRATION RATE: 16 BRPM | OXYGEN SATURATION: 98 % | HEART RATE: 72 BPM | DIASTOLIC BLOOD PRESSURE: 74 MMHG | HEIGHT: 69 IN

## 2020-11-23 DIAGNOSIS — M81.0 OSTEOPOROSIS, UNSPECIFIED OSTEOPOROSIS TYPE, UNSPECIFIED PATHOLOGICAL FRACTURE PRESENCE: ICD-10-CM

## 2020-11-23 DIAGNOSIS — G35 MS (MULTIPLE SCLEROSIS) (H): ICD-10-CM

## 2020-11-23 DIAGNOSIS — N31.9 NEUROGENIC BLADDER: Primary | ICD-10-CM

## 2020-11-23 DIAGNOSIS — Z97.8 FOLEY CATHETER IN PLACE: ICD-10-CM

## 2020-11-23 DIAGNOSIS — G82.50 SPASTIC QUADRIPLEGIA (H): ICD-10-CM

## 2020-11-23 ASSESSMENT — MIFFLIN-ST. JEOR: SCORE: 1290.14

## 2020-11-23 NOTE — LETTER
"    11/23/2020        RE: Cristina Rico  [address] [place] [address] [place]  [address]  [address] [facility]   Cristina Stevens is being evaluated via a billable video visit.   The patient has been notified of following:  \"This video visit will be conducted via a call between you and your provider. We have found that certain health care needs can be provided without the need for an in-person physical exam.  This service lets us provide the care you need with a video conversation. If during the course of the call the provider feels a video visit is not appropriate, you will not be charged for this service.\"   The provider has received verbal consent for a Video Visit from the patient or first contact? Yes  Patient  or facility staff would like the video invitation sent by: N/A   Video Start Time: 8:10  Received verbal consent to use Care Everywhere in order to access labs, documents, histories, and all other needed information to provide care at current facility.  [facility] Record Number: [id number]  Place of Location at the time of visit: [facility]   Chief Complaint   Patient presents with     Video Visit     RECHECK     Wellness Visit     HPI: Cristina Stevens is a 75 year old (1945), who is being seen today for a visit. HPI information obtained from: facility chart records, facility staff, patient report and [facility] chart review.     Ms. Stevens was visited virtually this morning. States that she has not had a recent UTI. Her essential care giver (also a RN) has been changing out her ruby catheter every three weeks. She has not had bladder spasms recently. She has been sleeping and eating well. Having regular bowel movements on the bowel program. She recently took a drive up to [place] with her care giver just to get out of the building. She enjoyed that as she has been out of the facility three times " "since the pandemic started in March.     Annual Wellness Visit    Are you in the first 12 months of your Medicare Part B coverage?  No    Physical Health:    In general, how would you rate your overall physical health? good    Outside of work, how many days during the week do you exercise?none    Outside of work, approximately how many minutes a day do you exercise?not applicable    If you drink alcohol do you typically have >3 drinks per day or >7 drinks per week? No    Do you usually eat at least 4 servings of fruit and vegetables a day, include whole grains & fiber and avoid regularly eating high fat or \"junk\" foods? Yes    Do you have any problems taking medications regularly? No    Do you have any side effects from medications? not applicable    Needs assistance for the following daily activities: transportation, shopping, preparing meals, housework, bathing, laundry, money management and taking medicine    Which of the following safety concerns are present in your home?  none identified     Hearing impairment: No    In the past 6 months, have you been bothered by leaking of urine? Has chronic ruby     /74   Pulse 72   Temp 96.7  F (35.9  C)   Resp 16   Ht 1.753 m (5' 9\")   Wt 72.6 kg (160 lb)   SpO2 98%   BMI 23.63 kg/m    Weight: Unable to obtain due to video visit  Height: Unable to obtain due to video visit  BMI: Unable to obtain due to video visit  Blood Pressure: Unable to obtain due to video visit    Mental Health:    In general, how would you rate your overall mental or emotional health? good      PHQ-2 Score:   PHQ-2 ( 1999 Pfizer) 11/27/2020   Q1: Little interest or pleasure in doing things 0   Q2: Feeling down, depressed or hopeless 0   PHQ-2 Score 0          Do you feel safe in your environment? Yes    Have you ever done Advance Care Planning? (For example, a Health Directive, POLST, or a discussion with a medical provider or your loved ones about your wishes)? Yes, advance care " planning is on file.    Fall risk:  Fallen 2 or more times in the past year?: No(wheelchair bound)  Any fall with injury in the past year?: No(wheelchair bound)    Cognitive Screening: SLUMS 27/30    Do you have sleep apnea, excessive snoring or daytime drowsiness?: no    Current providers sharing in care for this patient include:   Patient Care Team:  Kimberlee Castro APRN CNP as PCP - General (Nurse Practitioner - Gerontology)  Kiley Paul MD as MD (Internal Medicine)  Erica Abrams MD PhD as MD (Family Practice)  Kimberlee Castro APRN CNP as Assigned PCP  Inge Cmapuzano as Nursing Assistant (Gerontology)    CAROL Dupont CNP    Past Medical and Surgical History reviewed in Epic today.  MEDICATIONS:    Current Outpatient Medications   Medication Sig Dispense Refill     amoxicillin (AMOXIL) 250 MG chewable tablet CHEW AND SWALLOW 8 TABLETS (2000 MG) BY MOUTH 1 HOUR PRIOR TO DENTAL 8 tablet 97     armodafinil (NUVIGIL) 150 MG TABS tablet TAKE 1 TABLET BY MOUTH EVERY MORNING 30 tablet 4     Bacillus Coagulans-Inulin (PROBIOTIC FORMULA) 1-250 BILLION-MG CAPS TAKE 1 CAPSULE BY MOUTH ONCE DAILY 28 capsule 97     CALMOSEPTINE 0.44-20.6 % OINT ointment APPLY TO AFFECTED AREA(S) TOPICALLY TO BUTTOCKS AS NEEDED WITH BRIEF CHANGES 113 g 97     cephALEXin (KEFLEX) 250 MG capsule TAKE 1 CAPSULE BY MOUTH ONCE DAILY FOR PREVENTIVE 31 capsule 97     cetirizine (ZYRTEC) 10 MG tablet TAKE 1 TABLET BY MOUTH ONCE DAILY 31 tablet PRN     Cholecalciferol (VITAMIN D3) 50 MCG (2000 UT) CAPS TAKE TWO CAPSULES (4000 UNITS) BY MOUTH ONCE DAILY 56 capsule 97     CO2-Releasing (-TWO) SUPP Place rectally daily as needed       CRANBERRY CONCENTRATE 500 MG CAPS TAKE 1 CAPSULE BY MOUTH ONCE DAILY 28 capsule 97     DESITIN 13 % CREA APPLY TOPICALLY TO AREAS OF REDNESS OR RASH WITH EACH BRIEF CHANGE 113 g 97     Diaper Rash Products (DESITIN) OINT Externally apply topically as needed       ibuprofen  "(ADVIL/MOTRIN) 400 MG tablet TAKE 1 TABLET BY MOUTH TWICE DAILY AS NEEDED FOR PAIN 30 tablet 11     insulin pen needle (B-D U/F) 31G X 5 MM miscellaneous USE DAILY WITH TYMLOS AS DIRECTED 100 each 4     Multiple Vitamins-Minerals (TAB-A-DAWSON) TABS TAKE 1 TABLET BY MOUTH ONCE DAILY 28 tablet 97     MYRBETRIQ 25 MG 24 hr tablet TAKE 1 TABLET BY MOUTH ONCE DAILY 28 tablet 97     nystatin (MYCOSTATIN) 686996 UNIT/GM external powder APPLY IN BETWEEN THE TOES TWICE DAILY UNTIL HEALED;THEN TWICE DAILY AS NEEDED 30 g 97     polyethylene glycol (MIRALAX/GLYCOLAX) powder MIX 17GM OF POWDER IN 8OZ OF WATER UNTIL COMPLETELY DISSOLVED. DRINK SOLUTION DAILY AS NEEDED 527 g 98     Probiotic Product (PROBIOTIC DAILY) CAPS Take 1 capsule by mouth daily 28 capsule 98     senna-docusate (SENEXON-S) 8.6-50 MG tablet Take 2 tablets by mouth 2 times daily 62 tablet 12     solifenacin (VESICARE) 10 MG tablet TAKE 1 TABLET BY MOUTH ONCE DAILY 28 tablet 97     TYMLOS 3120 MCG/1.56ML SOPN injection INJECT 0.04 ML (80MCG) SUBCUTANEOUSLY ONCE DAILY INTO ABDOMEN. ROTATE INJECTION SITES DAILY. 1.56 mL 5     vitamin C (ASCORBIC ACID) 500 MG tablet TAKE 1 TABLET BY MOUTH ONCE DAILY 28 tablet 97     REVIEW OF SYSTEMS: 4 point ROS including Respiratory, CV, GI and , other than that noted in the HPI,  is negative  Objective: /74   Pulse 72   Temp 96.7  F (35.9  C)   Resp 16   Ht 1.753 m (5' 9\")   Wt 72.6 kg (160 lb)   SpO2 98%   BMI 23.63 kg/m    Limited visit exam done given COVID-19 precautions.   GENERAL APPEARANCE:  Alert, in no distress, pleasant, cooperative  RESP: no respiratory distress, patient is on room air  CV: . Edema unable to visualize lower extremities.  M/S:   Gait and station: limited movement in lower extremities, wheelchair bound, uses mechanical lift  SKIN:  Inspection and palpation of skin and subcutaneous tissue: intact without rashes but exam is limited  NEURO: no facial asymmetry, no speech deficits and able to " follow directions  PSYCH:  insight, judgement, and memory intact, affect and mood normal    Labs:   CBC RESULTS:   Recent Labs   Lab Test 03/19/19   HGB 12.6       Last Basic Metabolic Panel:  Recent Labs   Lab Test 09/30/20  1023 11/18/19  1224 09/24/18  1705   NA  --   --  136   POTASSIUM  --   --  4.4   CHLORIDE  --   --  103   BRENDA 9.4 9.7 9.4   CO2  --   --  25   BUN  --   --  14   CR  --   --  0.74   GLC  --   --  101*       TSH   Date Value Ref Range Status   03/19/2019 2.58 0.30 - 5.00 uIU/mL Final     ASSESSMENT/PLAN:  Multiple Sclerosis  Spastic quad  Wheelchair bound and uses a mechanical lift for transfers. Baclofen was discontinued by neurologist over the summer and she has not noted any increase to spasms.   -- continues on armodafinil 150 mg daily   -- follow up with neurology as PM&R as per them   --follow up with Dr. Macario of neurology      Neurogenic bladder  Ruby catheter chronic  Requires ruby change outs every three weeks. Her PCA whom is also a nurse has been changing out ruby and it has been going well. No recent UTIS, no bypassing or bladder spasms.    --follow up with urologist as she is likley due for botox bladder injection but will likely not be able to do so due to COVID pandemic.     --continue with vesicare 10 mg daily   --change out ruby every 3 weeks.      Osteoporosis  Follows with Dr. Erica Abrams for osteo and was started on Tymos in 2018.  --continues with abaloparatide 3120 mcg injection daily.  -- continues on cholecalciferol 4000 units daily     Electronically signed by:  CAROL Dupont CNP     Video-Visit Details  Type of service:  Video Visit  Video End Time (time video stopped): 8:35  Distant Location (provider location):  [facility]             Sincerely,        CAROL Dupont CNP

## 2020-11-23 NOTE — PROGRESS NOTES
"[facility]   Cristina Stevens is being evaluated via a billable video visit.   The patient has been notified of following:  \"This video visit will be conducted via a call between you and your provider. We have found that certain health care needs can be provided without the need for an in-person physical exam.  This service lets us provide the care you need with a video conversation. If during the course of the call the provider feels a video visit is not appropriate, you will not be charged for this service.\"   The provider has received verbal consent for a Video Visit from the patient or first contact? Yes  Patient  or facility staff would like the video invitation sent by: N/A   Video Start Time: 8:10  Received verbal consent to use Care Everywhere in order to access labs, documents, histories, and all other needed information to provide care at current facility.  [facility] Medical Record Number: [id number]  [facility] of [facility] at the time of visit[facility]   Chief Complaint   Patient presents with     Video Visit     RECHECK     Wellness Visit     HPI: Cristina Stevens is a 75 year old (1945), who is being seen today for a visit. HPI information obtained from: facility chart records, facility staff, patient report and [facility] Epic chart review.     Ms. Stevens was visited virtually this morning. States that she has not had a recent UTI. Her essential care giver (also a RN) has been changing out her ruby catheter every three weeks. She has not had bladder spasms recently. She has been sleeping and eating well. Having regular bowel movements on the bowel program. She recently took a drive up to [place] with her care giver just to get out of the building. She enjoyed that as she has been out of the facility three times since the pandemic started in March.     Annual Wellness Visit    Are you in the first 12 months of your Medicare Part B " "coverage?  No    Physical Health:    In general, how would you rate your overall physical health? good    Outside of work, how many days during the week do you exercise?none    Outside of work, approximately how many minutes a day do you exercise?not applicable    If you drink alcohol do you typically have >3 drinks per day or >7 drinks per week? No    Do you usually eat at least 4 servings of fruit and vegetables a day, include whole grains & fiber and avoid regularly eating high fat or \"junk\" foods? Yes    Do you have any problems taking medications regularly? No    Do you have any side effects from medications? not applicable    Needs assistance for the following daily activities: transportation, shopping, preparing meals, housework, bathing, laundry, money management and taking medicine    Which of the following safety concerns are present in your home?  none identified     Hearing impairment: No    In the past 6 months, have you been bothered by leaking of urine? Has chronic ruby     /74   Pulse 72   Temp 96.7  F (35.9  C)   Resp 16   Ht 1.753 m (5' 9\")   Wt 72.6 kg (160 lb)   SpO2 98%   BMI 23.63 kg/m    Weight: Unable to obtain due to video visit  Height: Unable to obtain due to video visit  BMI: Unable to obtain due to video visit  Blood Pressure: Unable to obtain due to video visit    Mental Health:    In general, how would you rate your overall mental or emotional health? good      PHQ-2 Score:   PHQ-2 ( 1999 Pfizer) 11/27/2020   Q1: Little interest or pleasure in doing things 0   Q2: Feeling down, depressed or hopeless 0   PHQ-2 Score 0          Do you feel safe in your environment? Yes    Have you ever done Advance Care Planning? (For example, a Health Directive, POLST, or a discussion with a medical provider or your loved ones about your wishes)? Yes, advance care planning is on file.    Fall risk:  Fallen 2 or more times in the past year?: No(wheelchair bound)  Any fall with injury in the " past year?: No(wheelchair bound)    Cognitive Screening: SLUMS 27/30    Do you have sleep apnea, excessive snoring or daytime drowsiness?: no    Current providers sharing in care for this patient include:   Patient Care Team:  Kimberlee Castro APRN CNP as PCP - General (Nurse Practitioner - Gerontology)  Kiley Paul MD as MD (Internal Medicine)  Erica Abrams MD PhD as MD (Family Practice)  Kimberlee Castro APRN CNP as Assigned PCP  Inge Campuzano as Nursing Assistant (Gerontology)    CAROL Dupont CNP    Past Medical and Surgical History reviewed in Epic today.  MEDICATIONS:    Current Outpatient Medications   Medication Sig Dispense Refill     amoxicillin (AMOXIL) 250 MG chewable tablet CHEW AND SWALLOW 8 TABLETS (2000 MG) BY MOUTH 1 HOUR PRIOR TO DENTAL 8 tablet 97     armodafinil (NUVIGIL) 150 MG TABS tablet TAKE 1 TABLET BY MOUTH EVERY MORNING 30 tablet 4     Bacillus Coagulans-Inulin (PROBIOTIC FORMULA) 1-250 BILLION-MG CAPS TAKE 1 CAPSULE BY MOUTH ONCE DAILY 28 capsule 97     CALMOSEPTINE 0.44-20.6 % OINT ointment APPLY TO AFFECTED AREA(S) TOPICALLY TO BUTTOCKS AS NEEDED WITH BRIEF CHANGES 113 g 97     cephALEXin (KEFLEX) 250 MG capsule TAKE 1 CAPSULE BY MOUTH ONCE DAILY FOR PREVENTIVE 31 capsule 97     cetirizine (ZYRTEC) 10 MG tablet TAKE 1 TABLET BY MOUTH ONCE DAILY 31 tablet PRN     Cholecalciferol (VITAMIN D3) 50 MCG (2000 UT) CAPS TAKE TWO CAPSULES (4000 UNITS) BY MOUTH ONCE DAILY 56 capsule 97     CO2-Releasing (-TWO) SUPP Place rectally daily as needed       CRANBERRY CONCENTRATE 500 MG CAPS TAKE 1 CAPSULE BY MOUTH ONCE DAILY 28 capsule 97     DESITIN 13 % CREA APPLY TOPICALLY TO AREAS OF REDNESS OR RASH WITH EACH BRIEF CHANGE 113 g 97     Diaper Rash Products (DESITIN) OINT Externally apply topically as needed       ibuprofen (ADVIL/MOTRIN) 400 MG tablet TAKE 1 TABLET BY MOUTH TWICE DAILY AS NEEDED FOR PAIN 30 tablet 11     insulin pen needle (B-D U/F) 31G X 5  "MM miscellaneous USE DAILY WITH TYMLOS AS DIRECTED 100 each 4     Multiple Vitamins-Minerals (TAB-A-DAWSON) TABS TAKE 1 TABLET BY MOUTH ONCE DAILY 28 tablet 97     MYRBETRIQ 25 MG 24 hr tablet TAKE 1 TABLET BY MOUTH ONCE DAILY 28 tablet 97     nystatin (MYCOSTATIN) 579141 UNIT/GM external powder APPLY IN BETWEEN THE TOES TWICE DAILY UNTIL HEALED;THEN TWICE DAILY AS NEEDED 30 g 97     polyethylene glycol (MIRALAX/GLYCOLAX) powder MIX 17GM OF POWDER IN 8OZ OF WATER UNTIL COMPLETELY DISSOLVED. DRINK SOLUTION DAILY AS NEEDED 527 g 98     Probiotic Product (PROBIOTIC DAILY) CAPS Take 1 capsule by mouth daily 28 capsule 98     senna-docusate (SENEXON-S) 8.6-50 MG tablet Take 2 tablets by mouth 2 times daily 62 tablet 12     solifenacin (VESICARE) 10 MG tablet TAKE 1 TABLET BY MOUTH ONCE DAILY 28 tablet 97     TYMLOS 3120 MCG/1.56ML SOPN injection INJECT 0.04 ML (80MCG) SUBCUTANEOUSLY ONCE DAILY INTO ABDOMEN. ROTATE INJECTION SITES DAILY. 1.56 mL 5     vitamin C (ASCORBIC ACID) 500 MG tablet TAKE 1 TABLET BY MOUTH ONCE DAILY 28 tablet 97     REVIEW OF SYSTEMS: 4 point ROS including Respiratory, CV, GI and , other than that noted in the HPI,  is negative  Objective: /74   Pulse 72   Temp 96.7  F (35.9  C)   Resp 16   Ht 1.753 m (5' 9\")   Wt 72.6 kg (160 lb)   SpO2 98%   BMI 23.63 kg/m    Limited visit exam done given COVID-19 precautions.   GENERAL APPEARANCE:  Alert, in no distress, pleasant, cooperative  RESP: no respiratory distress, patient is on room air  CV: . Edema unable to visualize lower extremities.  M/S:   Gait and station: limited movement in lower extremities, wheelchair bound, uses mechanical lift  SKIN:  Inspection and palpation of skin and subcutaneous tissue: intact without rashes but exam is limited  NEURO: no facial asymmetry, no speech deficits and able to follow directions  PSYCH:  insight, judgement, and memory intact, affect and mood normal    Labs:   CBC RESULTS:   Recent Labs   Lab " Test 03/19/19   HGB 12.6       Last Basic Metabolic Panel:  Recent Labs   Lab Test 09/30/20  1023 11/18/19  1224 09/24/18  1705   NA  --   --  136   POTASSIUM  --   --  4.4   CHLORIDE  --   --  103   BRENDA 9.4 9.7 9.4   CO2  --   --  25   BUN  --   --  14   CR  --   --  0.74   GLC  --   --  101*       TSH   Date Value Ref Range Status   03/19/2019 2.58 0.30 - 5.00 uIU/mL Final     ASSESSMENT/PLAN:  Multiple Sclerosis  Spastic quad  Wheelchair bound and uses a mechanical lift for transfers. Baclofen was discontinued by neurologist over the summer and she has not noted any increase to spasms.   -- continues on armodafinil 150 mg daily   -- follow up with neurology as PM&R as per them   --follow up with Dr. Macario of neurology      Neurogenic bladder  Ruby catheter chronic  Requires ruby change outs every three weeks. Her PCA whom is also a nurse has been changing out ruby and it has been going well. No recent UTIS, no bypassing or bladder spasms.    --follow up with urologist as she is likley due for botox bladder injection but will likely not be able to do so due to COVID pandemic.     --continue with vesicare 10 mg daily   --change out ruby every 3 weeks.      Osteoporosis  Follows with Dr. Erica Abrams for osteo and was started on Tymos in 2018.  --continues with abaloparatide 3120 mcg injection daily.  -- continues on cholecalciferol 4000 units daily     Electronically signed by:  CAROL Dupont CNP     Video-Visit Details  Type of service:  Video Visit  Video End Time (time video stopped): 8:35  Distant Location (provider location):  [facility]

## 2020-12-06 ENCOUNTER — HEALTH MAINTENANCE LETTER (OUTPATIENT)
Age: 75
End: 2020-12-06

## 2020-12-07 DIAGNOSIS — R21 RASH: ICD-10-CM

## 2020-12-07 DIAGNOSIS — G47.429 NARCOLEPSY DUE TO UNDERLYING CONDITION WITHOUT CATAPLEXY: ICD-10-CM

## 2020-12-08 RX ORDER — ARMODAFINIL 150 MG/1
TABLET ORAL
Qty: 30 TABLET | Refills: 4 | Status: SHIPPED | OUTPATIENT
Start: 2020-12-08 | End: 2021-04-14

## 2020-12-08 RX ORDER — NYSTATIN 100000 [USP'U]/G
POWDER TOPICAL
Qty: 30 G | Refills: 97 | Status: SHIPPED | OUTPATIENT
Start: 2020-12-08 | End: 2023-01-16

## 2021-01-02 ENCOUNTER — RECORDS - HEALTHEAST (OUTPATIENT)
Dept: LAB | Facility: CLINIC | Age: 76
End: 2021-01-02

## 2021-01-02 LAB
SARS-COV-2 PCR COMMENT: NORMAL
SARS-COV-2 RNA SPEC QL NAA+PROBE: NEGATIVE
SARS-COV-2 VIRUS SPECIMEN SOURCE: NORMAL

## 2021-02-04 VITALS
HEART RATE: 66 BPM | TEMPERATURE: 96.8 F | SYSTOLIC BLOOD PRESSURE: 128 MMHG | RESPIRATION RATE: 18 BRPM | DIASTOLIC BLOOD PRESSURE: 72 MMHG | BODY MASS INDEX: 23.63 KG/M2 | HEIGHT: 69 IN | OXYGEN SATURATION: 94 %

## 2021-02-04 NOTE — PROGRESS NOTES
[facility]  [facility] Medical Record Number: [id number]  Place of Service where encounter took place: [facility] [place] ([facility]) [[id number]]  Chief Complaint   Patient presents with     RECHECK       HPI:    Cristina Stevens is a 75 year old (1945), who is being seen today for an episodic care visit. HPI information obtained from: facility chart records, facility staff, patient report and [facility] chart review.     Ms. Stevens was visited today while in her room, sitting in the wheelchair. Denies pain or discomfort. No shortness of breath. Catheter has been functioning well and has not had UTIs for months, no bladder spasms or leaking of urine. She has been sleeping well.  Having regular bowel movements with the bowel regimen.     Past Medical and Surgical History reviewed in Epic today.    MEDICATIONS:  Current Outpatient Medications   Medication Sig Dispense Refill     amoxicillin (AMOXIL) 250 MG chewable tablet CHEW AND SWALLOW 8 TABLETS (2000 MG) BY MOUTH 1 HOUR PRIOR TO DENTAL 8 tablet 97     armodafinil (NUVIGIL) 150 MG TABS tablet TAKE 1 TABLET BY MOUTH EVERY MORNING 30 tablet 4     Bacillus Coagulans-Inulin (PROBIOTIC FORMULA) 1-250 BILLION-MG CAPS TAKE 1 CAPSULE BY MOUTH ONCE DAILY 28 capsule 97     CALMOSEPTINE 0.44-20.6 % OINT ointment APPLY TO AFFECTED AREA(S) TOPICALLY TO BUTTOCKS AS NEEDED WITH BRIEF CHANGES 113 g 97     cephALEXin (KEFLEX) 250 MG capsule TAKE 1 CAPSULE BY MOUTH ONCE DAILY FOR PREVENTIVE 31 capsule 97     cetirizine (ZYRTEC) 10 MG tablet TAKE 1 TABLET BY MOUTH ONCE DAILY 31 tablet PRN     Cholecalciferol (VITAMIN D3) 50 MCG (2000 UT) CAPS TAKE TWO CAPSULES (4000 UNITS) BY MOUTH ONCE DAILY 56 capsule 97     CO2-Releasing (-TWO) SUPP Place rectally daily as needed       CRANBERRY CONCENTRATE 500 MG CAPS TAKE 1 CAPSULE BY MOUTH ONCE DAILY 28 capsule 97     DESITIN 13 % CREA APPLY TOPICALLY TO AREAS OF REDNESS OR RASH WITH EACH BRIEF  "CHANGE 113 g 97     Diaper Rash Products (DESITIN) OINT Externally apply topically as needed       ibuprofen (ADVIL/MOTRIN) 400 MG tablet TAKE 1 TABLET BY MOUTH TWICE DAILY AS NEEDED FOR PAIN 30 tablet 11     insulin pen needle (B-D U/F) 31G X 5 MM miscellaneous USE DAILY WITH TYMLOS AS DIRECTED 100 each 4     Multiple Vitamins-Minerals (TAB-A-DAWSON) TABS TAKE 1 TABLET BY MOUTH ONCE DAILY 28 tablet 97     MYRBETRIQ 25 MG 24 hr tablet TAKE 1 TABLET BY MOUTH ONCE DAILY 28 tablet 97     nystatin (MYCOSTATIN) 684765 UNIT/GM external powder APPLY IN BETWEEN THE TOES TWICE DAILY UNTIL HEALED;THEN TWICE DAILY AS NEEDED 30 g 97     polyethylene glycol (MIRALAX/GLYCOLAX) powder MIX 17GM OF POWDER IN 8OZ OF WATER UNTIL COMPLETELY DISSOLVED. DRINK SOLUTION DAILY AS NEEDED 527 g 98     Probiotic Product (PROBIOTIC DAILY) CAPS Take 1 capsule by mouth daily 28 capsule 98     senna-docusate (SENEXON-S) 8.6-50 MG tablet Take 2 tablets by mouth 2 times daily 62 tablet 12     solifenacin (VESICARE) 10 MG tablet TAKE 1 TABLET BY MOUTH ONCE DAILY 28 tablet 97     TYMLOS 3120 MCG/1.56ML SOPN injection INJECT 0.04 ML (80MCG) SUBCUTANEOUSLY ONCE DAILY INTO ABDOMEN. ROTATE INJECTION SITES DAILY. 1.56 mL 5     vitamin C (ASCORBIC ACID) 500 MG tablet TAKE 1 TABLET BY MOUTH ONCE DAILY 28 tablet 97     REVIEW OF SYSTEMS:  4 point ROS including Respiratory, CV, GI and , other than that noted in the HPI,  is negative    Objective:  /72   Pulse 66   Temp 96.8  F (36  C)   Resp 18   Ht 1.753 m (5' 9\")   SpO2 94%   BMI 23.63 kg/m    Exam:  GENERAL APPEARANCE:  Alert, in no distress, pleasant, cooperative  RESP: no respiratory distress, patient is on room air  CV: . Edema none in bilateral lower extremities. teds are in place  M/S: Gait and station, wheelchair bound, no tenderness or swelling of the joints; moves upper extremities, minimal movement to LEs  SKIN:  Inspection and palpation of skin and subcutaneous tissue: skin warm, dry " and intact without rashes but exam is limited  NEURO: no facial asymmetry, no speech deficits and able to follow directions  PSYCH:  insight and judgement intact, memory intact, affect and mood normal    Labs:   Reviewed in care everywhere.     ASSESSMENT/PLAN:  Multiple Sclerosis  Spastic quad  Wheelchair bound and uses a mechanical lift for transfers. No longer on baclofen and she is doing well. On a bowel program with good results.   -- continues on armodafinil 150 mg daily   -- follow up with neurology as PM&R as per their recommendations.   --follow up with Dr. Macario of neurology      Neurogenic bladder  Ruby catheter chronic  Requires ruby change outs every three weeks. Her PCA whom is also a nurse has been changing out her catheters every three weeks and it is going well. She has not had a UTI or bladder spasms since this has started. Clear yellow urine in the ruby bag today without sediment.    --follow up with urologist PRN which she has not seen in over a year due to the pandemic but she has been doing well from a urinary standpoint.   --continue with vesicare 10 mg daily   --change out ruby catheter every three weeks.      Osteoporosis  Follows with Dr. Erica Abrams for osteo and was started on Tymos in 2018.  --continues with abaloparatide 3120 mcg injection daily.  -- continues on cholecalciferol 4000 units daily     Electronically signed by:  CAROL Dupont CNP

## 2021-02-05 ENCOUNTER — ASSISTED LIVING VISIT (OUTPATIENT)
Dept: GERIATRICS | Facility: CLINIC | Age: 76
End: 2021-02-05
Payer: COMMERCIAL

## 2021-02-05 DIAGNOSIS — Z97.8 FOLEY CATHETER IN PLACE: ICD-10-CM

## 2021-02-05 DIAGNOSIS — G82.50 SPASTIC QUADRIPLEGIA (H): ICD-10-CM

## 2021-02-05 DIAGNOSIS — G35 MS (MULTIPLE SCLEROSIS) (H): ICD-10-CM

## 2021-02-05 DIAGNOSIS — M81.0 OSTEOPOROSIS, UNSPECIFIED OSTEOPOROSIS TYPE, UNSPECIFIED PATHOLOGICAL FRACTURE PRESENCE: ICD-10-CM

## 2021-02-05 DIAGNOSIS — N31.9 NEUROGENIC BLADDER: Primary | ICD-10-CM

## 2021-02-05 NOTE — LETTER
2/5/2021        RE: Cristina Stevens  [address]  [address]  [place] [id number]        [facility]  [place] Medical Record Number: [id number]  Place of Service where encounter took place: [facility] [place] ([facility]) [[id number]]  Chief Complaint   Patient presents with     RECHECK       HPI:    Cristina Stevens is a 75 year old (1945), who is being seen today for an episodic care visit. HPI information obtained from: facility chart records, facility staff, patient report and [facility] chart review.     Ms. Stevens was visited today while in her room, sitting in the wheelchair. Denies pain or discomfort. No shortness of breath. Catheter has been functioning well and has not had UTIs for months, no bladder spasms or leaking of urine. She has been sleeping well.  Having regular bowel movements with the bowel regimen.     Past Medical and Surgical History reviewed in Epic today.    MEDICATIONS:  Current Outpatient Medications   Medication Sig Dispense Refill     amoxicillin (AMOXIL) 250 MG chewable tablet CHEW AND SWALLOW 8 TABLETS (2000 MG) BY MOUTH 1 HOUR PRIOR TO DENTAL 8 tablet 97     armodafinil (NUVIGIL) 150 MG TABS tablet TAKE 1 TABLET BY MOUTH EVERY MORNING 30 tablet 4     Bacillus Coagulans-Inulin (PROBIOTIC FORMULA) 1-250 BILLION-MG CAPS TAKE 1 CAPSULE BY MOUTH ONCE DAILY 28 capsule 97     CALMOSEPTINE 0.44-20.6 % OINT ointment APPLY TO AFFECTED AREA(S) TOPICALLY TO BUTTOCKS AS NEEDED WITH BRIEF CHANGES 113 g 97     cephALEXin (KEFLEX) 250 MG capsule TAKE 1 CAPSULE BY MOUTH ONCE DAILY FOR PREVENTIVE 31 capsule 97     cetirizine (ZYRTEC) 10 MG tablet TAKE 1 TABLET BY MOUTH ONCE DAILY 31 tablet PRN     Cholecalciferol (VITAMIN D3) 50 MCG (2000 UT) CAPS TAKE TWO CAPSULES (4000 UNITS) BY MOUTH ONCE DAILY 56 capsule 97     CO2-Releasing (-TWO) SUPP Place rectally daily as needed       CRANBERRY CONCENTRATE 500 MG CAPS TAKE 1  "CAPSULE BY MOUTH ONCE DAILY 28 capsule 97     DESITIN 13 % CREA APPLY TOPICALLY TO AREAS OF REDNESS OR RASH WITH EACH BRIEF CHANGE 113 g 97     Diaper Rash Products (DESITIN) OINT Externally apply topically as needed       ibuprofen (ADVIL/MOTRIN) 400 MG tablet TAKE 1 TABLET BY MOUTH TWICE DAILY AS NEEDED FOR PAIN 30 tablet 11     insulin pen needle (B-D U/F) 31G X 5 MM miscellaneous USE DAILY WITH TYMLOS AS DIRECTED 100 each 4     Multiple Vitamins-Minerals (TAB-A-DAWSON) TABS TAKE 1 TABLET BY MOUTH ONCE DAILY 28 tablet 97     MYRBETRIQ 25 MG 24 hr tablet TAKE 1 TABLET BY MOUTH ONCE DAILY 28 tablet 97     nystatin (MYCOSTATIN) 465799 UNIT/GM external powder APPLY IN BETWEEN THE TOES TWICE DAILY UNTIL HEALED;THEN TWICE DAILY AS NEEDED 30 g 97     polyethylene glycol (MIRALAX/GLYCOLAX) powder MIX 17GM OF POWDER IN 8OZ OF WATER UNTIL COMPLETELY DISSOLVED. DRINK SOLUTION DAILY AS NEEDED 527 g 98     Probiotic Product (PROBIOTIC DAILY) CAPS Take 1 capsule by mouth daily 28 capsule 98     senna-docusate (SENEXON-S) 8.6-50 MG tablet Take 2 tablets by mouth 2 times daily 62 tablet 12     solifenacin (VESICARE) 10 MG tablet TAKE 1 TABLET BY MOUTH ONCE DAILY 28 tablet 97     TYMLOS 3120 MCG/1.56ML SOPN injection INJECT 0.04 ML (80MCG) SUBCUTANEOUSLY ONCE DAILY INTO ABDOMEN. ROTATE INJECTION SITES DAILY. 1.56 mL 5     vitamin C (ASCORBIC ACID) 500 MG tablet TAKE 1 TABLET BY MOUTH ONCE DAILY 28 tablet 97     REVIEW OF SYSTEMS:  4 point ROS including Respiratory, CV, GI and , other than that noted in the HPI,  is negative    Objective:  /72   Pulse 66   Temp 96.8  F (36  C)   Resp 18   Ht 1.753 m (5' 9\")   SpO2 94%   BMI 23.63 kg/m    Exam:  GENERAL APPEARANCE:  Alert, in no distress, pleasant, cooperative  RESP: no respiratory distress, patient is on room air  CV: . Edema none in bilateral lower extremities. teds are in place  M/S: Gait and station, wheelchair bound, no tenderness or swelling of the joints; moves " upper extremities, minimal movement to LEs  SKIN:  Inspection and palpation of skin and subcutaneous tissue: skin warm, dry and intact without rashes but exam is limited  NEURO: no facial asymmetry, no speech deficits and able to follow directions  PSYCH:  insight and judgement intact, memory intact, affect and mood normal    Labs:   Reviewed in care everywhere.     ASSESSMENT/PLAN:  Multiple Sclerosis  Spastic quad  Wheelchair bound and uses a mechanical lift for transfers. No longer on baclofen and she is doing well. On a bowel program with good results.   -- continues on armodafinil 150 mg daily   -- follow up with neurology as PM&R as per their recommendations.   --follow up with Dr. Macario of neurology      Neurogenic bladder  Ruby catheter chronic  Requires ruby change outs every three weeks. Her PCA whom is also a nurse has been changing out her catheters every three weeks and it is going well. She has not had a UTI or bladder spasms since this has started. Clear yellow urine in the ruby bag today without sediment.    --follow up with urologist PRN which she has not seen in over a year due to the pandemic but she has been doing well from a urinary standpoint.   --continue with vesicare 10 mg daily   --change out ruby catheter every three weeks.      Osteoporosis  Follows with Dr. Erica Abrams for osteo and was started on Tymos in 2018.  --continues with abaloparatide 3120 mcg injection daily.  -- continues on cholecalciferol 4000 units daily     Electronically signed by:  CAROL Dupont CNP             Sincerely,        CAROL Dupont CNP

## 2021-02-07 DIAGNOSIS — T78.40XS ALLERGY, SEQUELA: ICD-10-CM

## 2021-02-08 RX ORDER — CETIRIZINE HYDROCHLORIDE 10 MG/1
TABLET ORAL
Qty: 28 TABLET | Refills: 97 | Status: SHIPPED | OUTPATIENT
Start: 2021-02-08 | End: 2022-03-15

## 2021-02-15 DIAGNOSIS — S82.141D CLOSED FRACTURE OF RIGHT TIBIAL PLATEAU WITH ROUTINE HEALING, SUBSEQUENT ENCOUNTER: ICD-10-CM

## 2021-02-15 RX ORDER — IBUPROFEN 400 MG/1
TABLET, FILM COATED ORAL
Qty: 30 TABLET | Refills: 97 | Status: SHIPPED | OUTPATIENT
Start: 2021-02-15

## 2021-03-23 ENCOUNTER — TELEPHONE (OUTPATIENT)
Dept: GERIATRICS | Facility: CLINIC | Age: 76
End: 2021-03-23

## 2021-03-23 NOTE — TELEPHONE ENCOUNTER
[place] Initiation    Medication: ARMODAFINIL 150MG  Insurance Company: [employer] - Phone [phone] Fax [phone]  Pharmacy Filling the Rx: [facility] - [place], [place] - [address] [place] [address]  Filling Pharmacy Phone: [phone]  Filling Pharmacy Fax:    Start Date: 3/23/2021    Central Prior Authorization Team   Phone: [phone]

## 2021-03-23 NOTE — TELEPHONE ENCOUNTER
This is a request for a PRIOR AUTHORIZATION    * Please SUBMIT PRIOR AUTHORIZATION (if appropriate) and UPDATE -[facility] once approved / denied. *    * Patient's Drug Insurance will not pay for this medication without a Prior Authorization in place. *    * Thank you! *    Medication:    ARMODAFINIL 150MG    Reason for Request:   PRIOR AUTHORIZATION REQUIRED    Quantity:    #30 FOR 30 DAYS  Directions:    1 TABLET DAILY IN THE MORNING    Plan Name:    MEDIMPACT PART D  Insurance Phone #:   [phone]    BIN:     [id number]  PCN:     [id number]  ID:     [id number]  GROUP:    [id number]    Additional Notes:    * KIND OF COMPLICATED NOTES ON THIS ONE >    * TERENCE PT'S NURSE ([phone]) CALLED IN ASKING US TO SUBMIT A PRIOR AUTH TO [facility] BECAUSE PT RECEIVED DOCUMENTATION FROM HER INSURANCE STATING THAT AFTER A CERTAIN # OF FILLS (90 DAYS) MED WOULD NO LONGER BE FORMULARY AND NEED A PRIOR AUTH GOING FORWARD.    * MED WAS LAST FILLED 30 D/S ON 3/08/21 AND PAID FOR BY INS - IT LOOKS LIKE THE NEXT FILL WILL ALSO BE COVERED.    * THE PROBLEM, IS THAT -[facility] WILL TRANSITION MEDICATION CARE OVER TO [facility] ON 4/29/21 - SOME INS REQUIRE PRIOR AUTH'S DO BE PHARMACY SPECIFIC, SOME DON'T.    * MEDICATION MAY NOT NEED A PRIOR AUTHORIZATION UNTIL [facility] IS NO LONGER [facility], BUT NURSE AND I AGREED THAT IT WAS GOOD TO GET THE INFO IN ONE PLACE -- MAY WANT TO TRY FOR THE P/A (BUT JUST FYI, WE MAY HAVE TO DO IT AGAIN AFTER PT STARTS WITH [facility])

## 2021-03-24 NOTE — TELEPHONE ENCOUNTER
Prior Authorization Approval    Authorization Effective Date: 2/22/2021  Authorization Expiration Date: 3/24/2022  Medication: ARMODAFINIL 150MG  Approved Dose/Quantity: 30  Reference #: [id number]   Insurance Company: "[employer] - Phone [phone] Fax [phone]   Which Pharmacy is filling the prescription (Not needed for infusion/clinic administered): [facility] - [address]  Pharmacy Notified: Yes

## 2021-03-30 ENCOUNTER — ALLIED HEALTH/NURSE VISIT (OUTPATIENT)
Dept: OBGYN | Facility: CLINIC | Age: 76
End: 2021-03-30
Payer: COMMERCIAL

## 2021-03-30 DIAGNOSIS — M81.0 OSTEOPOROSIS: ICD-10-CM

## 2021-03-30 DIAGNOSIS — Z91.81 AT HIGH RISK FOR FALLS: Primary | ICD-10-CM

## 2021-03-30 DIAGNOSIS — M81.0 OSTEOPOROSIS, UNSPECIFIED OSTEOPOROSIS TYPE, UNSPECIFIED PATHOLOGICAL FRACTURE PRESENCE: Primary | ICD-10-CM

## 2021-03-30 PROCEDURE — 250N000011 HC RX IP 250 OP 636: Performed by: FAMILY MEDICINE

## 2021-03-30 PROCEDURE — 999N000103 HC STATISTIC NO CHARGE FACILITY FEE

## 2021-03-30 PROCEDURE — 96372 THER/PROPH/DIAG INJ SC/IM: CPT | Performed by: FAMILY MEDICINE

## 2021-03-30 RX ADMIN — DENOSUMAB 60 MG: 60 INJECTION SUBCUTANEOUS at 14:36

## 2021-03-30 NOTE — NURSING NOTE
Clinic Administered Medication Documentation      Prolia Documentation    Prior to injection, verified patient identity using patient's name and date of birth. Medication was administered. Please see MAR and medication order for additional information. Patient instructed to report any adverse reaction to staff immediately .    Indication: Prolia  (denosumab) is a prescription medicine used to treat osteoporosis in patients who:     Are at high risk for fracture, meaning patients who have had a fracture related to osteoporosis, or who have multiple risk factors for fracture.    Cannot use another osteoporosis medicine or other osteoporosis medicines did not work well.    The timeline for early/late injections would be 4 weeks early and any time after the 6 month peace. If a patient receives their injection late, then the subsequent injection would be 6 months from the date that they actually received the injection.    1.  When was the last injection?  6 months ago  2.  Did they check with their insurance for this calendar year?  yes  3.  Is there an order in the chart?  yes  4.  Has the patient had dental work involving the bone in the past month or will have work in the next 6 months?  NA    The following steps were completed to comply with the REMS program for Prolia:    Reviewed information in the Medication Guide and Patient Counseling Chart, including the serious risks of Prolia  and the symptoms of each risk.    Advised patient to seek prompt medical attention if they have signs or symptoms of any of the serious risks.    Provided each patient a copy of the Medication Guide and Patient Brochure.    Was entire vial of medication used? Yes  Vial/Syringe: Syringe  Expiration Date:  04/2023  Was this medication supplied by the patient? No

## 2021-04-02 ENCOUNTER — RECORDS - HEALTHEAST (OUTPATIENT)
Dept: LAB | Facility: CLINIC | Age: 76
End: 2021-04-02

## 2021-04-14 DIAGNOSIS — G47.429 NARCOLEPSY DUE TO UNDERLYING CONDITION WITHOUT CATAPLEXY: ICD-10-CM

## 2021-04-14 RX ORDER — ARMODAFINIL 150 MG/1
TABLET ORAL
Qty: 30 TABLET | Refills: 4 | Status: SHIPPED | OUTPATIENT
Start: 2021-04-14 | End: 2021-11-08

## 2021-04-30 ENCOUNTER — RECORDS - HEALTHEAST (OUTPATIENT)
Dept: LAB | Facility: CLINIC | Age: 76
End: 2021-04-30

## 2021-05-12 VITALS — HEIGHT: 69 IN | BODY MASS INDEX: 23.7 KG/M2 | WEIGHT: 160 LBS | OXYGEN SATURATION: 99 % | TEMPERATURE: 95.9 F

## 2021-05-12 ASSESSMENT — MIFFLIN-ST. JEOR: SCORE: 1290.14

## 2021-05-12 NOTE — PROGRESS NOTES
[facility]  [facility] Record Number: [id number]  Place of Service where encounter took place: [facility] [place] [facility] [[id number]]  Chief Complaint   Patient presents with     RECHECK     Routine       HPI:    Cristina Stevens is a 75 year old (1945), who is being seen today for an episodic care visit. HPI information obtained from: facility chart records, facility staff, patient report and [facility] chart review.     Ms. Stevens was visited today while in her room, in bed. PCA, Rubi is present. She generally comes 1-2 times per week. She has been changing out the ruby catheter every three weeks and this has been going well. She has had no bladder spasms or urinary tract infections for over one year since the catheter changes were scheduled every three weeks. She has a fair appetite and will eat a frozen meal and nutritional shake to supplement throughout the week. She is sleeping well. On a bowel program. Discussed getting back to the outpatient PT/OT.     Past Medical and Surgical History reviewed in Epic today.    MEDICATIONS:  Current Outpatient Medications   Medication Sig Dispense Refill     amoxicillin (AMOXIL) 250 MG chewable tablet CHEW AND SWALLOW 8 TABLETS (2000 MG) BY MOUTH 1 HOUR PRIOR TO DENTAL 8 tablet 97     armodafinil (NUVIGIL) 150 MG TABS tablet 1 TABLET ORALLY EVERY MORNING 30 tablet 4     CALMOSEPTINE 0.44-20.6 % OINT ointment APPLY TO AFFECTED AREA(S) TOPICALLY TO BUTTOCKS AS NEEDED WITH BRIEF CHANGES 113 g 97     cephALEXin (KEFLEX) 250 MG capsule TAKE 1 CAPSULE BY MOUTH ONCE DAILY FOR PREVENTIVE 31 capsule 97     cetirizine (ZYRTEC) 10 MG tablet TAKE 1 TABLET BY MOUTH ONCE DAILY 28 tablet 97     Cholecalciferol (VITAMIN D3) 50 MCG (2000 UT) CAPS TAKE TWO CAPSULES (4000 UNITS) BY MOUTH ONCE DAILY 56 capsule 97     CO2-Releasing (-TWO) SUPP Place rectally daily as needed       CRANBERRY CONCENTRATE 500 MG CAPS TAKE 1 CAPSULE BY  "MOUTH ONCE DAILY 28 capsule 97     DESITIN 13 % CREA APPLY TOPICALLY TO AREAS OF REDNESS OR RASH WITH EACH BRIEF CHANGE 113 g 97     ibuprofen (ADVIL/MOTRIN) 400 MG tablet TAKE 1 TABLET BY MOUTH TWICE DAILY AS NEEDED FOR PAIN 30 tablet 97     insulin pen needle (B-D U/F) 31G X 5 MM miscellaneous USE DAILY WITH TYMLOS AS DIRECTED 100 each 4     Multiple Vitamins-Minerals (TAB-A-DAWSON) TABS TAKE 1 TABLET BY MOUTH ONCE DAILY 28 tablet 97     MYRBETRIQ 25 MG 24 hr tablet TAKE 1 TABLET BY MOUTH ONCE DAILY 28 tablet 97     nystatin (MYCOSTATIN) [id number] UNIT/GM external powder APPLY IN BETWEEN THE TOES TWICE DAILY UNTIL HEALED;THEN TWICE DAILY AS NEEDED 30 g 97     polyethylene glycol (MIRALAX/GLYCOLAX) powder MIX 17GM OF POWDER IN 8OZ OF WATER UNTIL COMPLETELY DISSOLVED. DRINK SOLUTION DAILY AS NEEDED 527 g 98     Probiotic Product (PROBIOTIC DAILY) CAPS Take 1 capsule by mouth daily 28 capsule 98     senna-docusate (SENEXON-S) 8.6-50 MG tablet Take 2 tablets by mouth 2 times daily 62 tablet 12     vitamin C (ASCORBIC ACID) 500 MG tablet TAKE 1 TABLET BY MOUTH ONCE DAILY 28 tablet 97     REVIEW OF SYSTEMS:  4 point ROS including Respiratory, CV, GI and , other than that noted in the HPI,  is negative    Objective:  Temp 95.9  F (35.5  C)   Ht 1.753 m (5' 9\")   Wt 72.6 kg (160 lb)   SpO2 99%   BMI 23.63 kg/m    Exam:  GENERAL APPEARANCE:  Alert, in no distress, pleasant, cooperative  RESP: no respiratory distress, lungs are clear, patient is on room air  CV: regular rate an rhythm. Edema none in bilateral lower extremities. teds are in place  M/S: Gait and station, wheelchair bound, no tenderness or swelling of the joints; moves upper extremities, minimal movement to LEs  SKIN:  Inspection and palpation of skin and subcutaneous tissue: skin warm, dry and intact without rashes but exam is limited  NEURO: no facial asymmetry, no speech deficits and able to follow directions  PSYCH:  insight and judgement intact, memory " intact, affect and mood normal    Labs:   Reviewed.    ASSESSMENT/PLAN:  Multiple Sclerosis  Spastic quad  Wheelchair bound and uses a mechanical lift for transfers. No longer on baclofen and she is doing well. On a bowel program with good results. Last neurology appointment was February 2021.   -- continues on armodafinil 150 mg daily   -- follow up with neurology as PM&R as per their recommendations. We discussed this today and she would like to return to the therapy gym as soon as possible.   --follow up with Dr. Macario of neurology as scheduled this fall.       Neurogenic bladder  Ruby catheter chronic  Requires ruby change outs every three weeks. Her PCA whom is also a nurse has been changing out her catheters every three weeks and it is going well. She has not had a UTI or bladder spasms for the past year since the catheter changes have been scheduled every three weeks.  Clear yellow urine in the ruby bag today without sediment.    --follow up with urologist PRN   --continue with myrbetriq 25 mg daily  --cephalexin 250 mg daily for UTI prophylaxis  --change out ruby catheter every three weeks.      Osteoporosis  Follows with Dr. Erica Abrams for osteo and was started on Tymos in 2018.  --continues with abaloparatide 3120 mcg injection daily.  -- continues on cholecalciferol 4000 units daily     Electronically signed by:  ACROL Dupont CNP

## 2021-05-13 ENCOUNTER — ASSISTED LIVING VISIT (OUTPATIENT)
Dept: GERIATRICS | Facility: CLINIC | Age: 76
End: 2021-05-13
Payer: COMMERCIAL

## 2021-05-13 DIAGNOSIS — G35 MS (MULTIPLE SCLEROSIS) (H): Primary | ICD-10-CM

## 2021-05-13 DIAGNOSIS — M81.0 OSTEOPOROSIS, UNSPECIFIED OSTEOPOROSIS TYPE, UNSPECIFIED PATHOLOGICAL FRACTURE PRESENCE: ICD-10-CM

## 2021-05-13 DIAGNOSIS — N31.9 NEUROGENIC BLADDER: ICD-10-CM

## 2021-05-13 DIAGNOSIS — Z97.8 FOLEY CATHETER IN PLACE: ICD-10-CM

## 2021-05-13 DIAGNOSIS — G82.50 SPASTIC QUADRIPLEGIA (H): ICD-10-CM

## 2021-05-13 NOTE — LETTER
5/13/2021        RE: Cristina Stevens  [address]  [address]  [place] [id number]        [facility]  [place] Medical Record Number: [id number]  Place of Service where encounter took place: [facility] [[id number]]  Chief Complaint   Patient presents with     RECHECK     Routine       HPI:    Cristina Stevens is a 75 year old (1945), who is being seen today for an episodic care visit. HPI information obtained from: facility chart records, facility staff, patient report and [facility] chart review.     Ms. Stevens was visited today while in her room, in bed. PCA, Rubi is present. She generally comes 1-2 times per week. She has been changing out the ruby catheter every three weeks and this has been going well. She has had no bladder spasms or urinary tract infections for over one year since the catheter changes were scheduled every three weeks. She has a fair appetite and will eat a frozen meal and nutritional shake to supplement throughout the week. She is sleeping well. On a bowel program. Discussed getting back to the outpatient PT/OT.     Past Medical and Surgical History reviewed in Epic today.    MEDICATIONS:  Current Outpatient Medications   Medication Sig Dispense Refill     amoxicillin (AMOXIL) 250 MG chewable tablet CHEW AND SWALLOW 8 TABLETS (2000 MG) BY MOUTH 1 HOUR PRIOR TO DENTAL 8 tablet 97     armodafinil (NUVIGIL) 150 MG TABS tablet 1 TABLET ORALLY EVERY MORNING 30 tablet 4     CALMOSEPTINE 0.44-20.6 % OINT ointment APPLY TO AFFECTED AREA(S) TOPICALLY TO BUTTOCKS AS NEEDED WITH BRIEF CHANGES 113 g 97     cephALEXin (KEFLEX) 250 MG capsule TAKE 1 CAPSULE BY MOUTH ONCE DAILY FOR PREVENTIVE 31 capsule 97     cetirizine (ZYRTEC) 10 MG tablet TAKE 1 TABLET BY MOUTH ONCE DAILY 28 tablet 97     Cholecalciferol (VITAMIN D3) 50 MCG (2000 UT) CAPS TAKE TWO CAPSULES (4000 UNITS) BY MOUTH ONCE DAILY 56 capsule  "97     CO2-Releasing (-TWO) SUPP Place rectally daily as needed       CRANBERRY CONCENTRATE 500 MG CAPS TAKE 1 CAPSULE BY MOUTH ONCE DAILY 28 capsule 97     DESITIN 13 % CREA APPLY TOPICALLY TO AREAS OF REDNESS OR RASH WITH EACH BRIEF CHANGE 113 g 97     ibuprofen (ADVIL/MOTRIN) 400 MG tablet TAKE 1 TABLET BY MOUTH TWICE DAILY AS NEEDED FOR PAIN 30 tablet 97     insulin pen needle (B-D U/F) 31G X 5 MM miscellaneous USE DAILY WITH TYMLOS AS DIRECTED 100 each 4     Multiple Vitamins-Minerals (TAB-A-DAWSON) TABS TAKE 1 TABLET BY MOUTH ONCE DAILY 28 tablet 97     MYRBETRIQ 25 MG 24 hr tablet TAKE 1 TABLET BY MOUTH ONCE DAILY 28 tablet 97     nystatin (MYCOSTATIN) 580266 UNIT/GM external powder APPLY IN BETWEEN THE TOES TWICE DAILY UNTIL HEALED;THEN TWICE DAILY AS NEEDED 30 g 97     polyethylene glycol (MIRALAX/GLYCOLAX) powder MIX 17GM OF POWDER IN 8OZ OF WATER UNTIL COMPLETELY DISSOLVED. DRINK SOLUTION DAILY AS NEEDED 527 g 98     Probiotic Product (PROBIOTIC DAILY) CAPS Take 1 capsule by mouth daily 28 capsule 98     senna-docusate (SENEXON-S) 8.6-50 MG tablet Take 2 tablets by mouth 2 times daily 62 tablet 12     vitamin C (ASCORBIC ACID) 500 MG tablet TAKE 1 TABLET BY MOUTH ONCE DAILY 28 tablet 97     REVIEW OF SYSTEMS:  4 point ROS including Respiratory, CV, GI and , other than that noted in the HPI,  is negative    Objective:  Temp 95.9  F (35.5  C)   Ht 1.753 m (5' 9\")   Wt 72.6 kg (160 lb)   SpO2 99%   BMI 23.63 kg/m    Exam:  GENERAL APPEARANCE:  Alert, in no distress, pleasant, cooperative  RESP: no respiratory distress, lungs are clear, patient is on room air  CV: regular rate an rhythm. Edema none in bilateral lower extremities. teds are in place  M/S: Gait and station, wheelchair bound, no tenderness or swelling of the joints; moves upper extremities, minimal movement to LEs  SKIN:  Inspection and palpation of skin and subcutaneous tissue: skin warm, dry and intact without rashes but exam is " limited  NEURO: no facial asymmetry, no speech deficits and able to follow directions  PSYCH:  insight and judgement intact, memory intact, affect and mood normal    Labs:   Reviewed.    ASSESSMENT/PLAN:  Multiple Sclerosis  Spastic quad  Wheelchair bound and uses a mechanical lift for transfers. No longer on baclofen and she is doing well. On a bowel program with good results. Last neurology appointment was February 2021.   -- continues on armodafinil 150 mg daily   -- follow up with neurology as PM&R as per their recommendations. We discussed this today and she would like to return to the therapy gym as soon as possible.   --follow up with Dr. Macario of neurology as scheduled this fall.       Neurogenic bladder  Ruby catheter chronic  Requires ruby change outs every three weeks. Her PCA whom is also a nurse has been changing out her catheters every three weeks and it is going well. She has not had a UTI or bladder spasms for the past year since the catheter changes have been scheduled every three weeks.  Clear yellow urine in the ruby bag today without sediment.    --follow up with urologist PRN   --continue with myrbetriq 25 mg daily  --cephalexin 250 mg daily for UTI prophylaxis  --change out ruby catheter every three weeks.      Osteoporosis  Follows with Dr. Erica Abrams for osteo and was started on Tymos in 2018.  --continues with abaloparatide 3120 mcg injection daily.  -- continues on cholecalciferol 4000 units daily     Electronically signed by:  CAROL Dupont CNP               Sincerely,        CAROL Dupont CNP

## 2021-05-25 ENCOUNTER — RECORDS - HEALTHEAST (OUTPATIENT)
Dept: LAB | Facility: CLINIC | Age: 76
End: 2021-05-25

## 2021-05-28 ENCOUNTER — RECORDS - HEALTHEAST (OUTPATIENT)
Dept: ADMINISTRATIVE | Facility: CLINIC | Age: 76
End: 2021-05-28

## 2021-05-28 ENCOUNTER — ASSISTED LIVING VISIT (OUTPATIENT)
Dept: GERIATRICS | Facility: CLINIC | Age: 76
End: 2021-05-28
Payer: COMMERCIAL

## 2021-05-28 VITALS — BODY MASS INDEX: 23.7 KG/M2 | OXYGEN SATURATION: 97 % | TEMPERATURE: 95.2 F | WEIGHT: 160 LBS | HEIGHT: 69 IN

## 2021-05-28 DIAGNOSIS — T83.511A URINARY TRACT INFECTION ASSOCIATED WITH INDWELLING URETHRAL CATHETER, INITIAL ENCOUNTER (H): Primary | ICD-10-CM

## 2021-05-28 DIAGNOSIS — N39.0 URINARY TRACT INFECTION ASSOCIATED WITH INDWELLING URETHRAL CATHETER, INITIAL ENCOUNTER (H): Primary | ICD-10-CM

## 2021-05-28 RX ORDER — CIPROFLOXACIN 500 MG/1
500 TABLET, FILM COATED ORAL 2 TIMES DAILY
COMMUNITY
Start: 2021-05-26 | End: 2021-06-02

## 2021-05-28 ASSESSMENT — MIFFLIN-ST. JEOR: SCORE: 1290.14

## 2021-05-28 NOTE — PROGRESS NOTES
[facility]  [place] Medical Record Number:  [id number]  Place of Service where encounter took place:  [facility] [[id number]]  Chief Complaint   Patient presents with     RECHECK       HPI:    Cristina Stevens  is a 75 year old (1945), who is being seen today for an episodic care visit.  HPI information obtained from: facility chart records, facility staff and patient report.     Ms. Stevens was visited today after having discharge in the urine over the past weekend and again on Tuesday of this week. She was started on cipro as this has worked well in the past, and since that time, she has not had any further discharge. She denies fever, chills, or bladder spasms.     Past Medical and Surgical History reviewed in Epic today.    MEDICATIONS:    Current Outpatient Medications   Medication Sig Dispense Refill     amoxicillin (AMOXIL) 250 MG chewable tablet CHEW AND SWALLOW 8 TABLETS (2000 MG) BY MOUTH 1 HOUR PRIOR TO DENTAL 8 tablet 97     armodafinil (NUVIGIL) 150 MG TABS tablet 1 TABLET ORALLY EVERY MORNING 30 tablet 4     CALMOSEPTINE 0.44-20.6 % OINT ointment APPLY TO AFFECTED AREA(S) TOPICALLY TO BUTTOCKS AS NEEDED WITH BRIEF CHANGES 113 g 97     cephALEXin (KEFLEX) 250 MG capsule TAKE 1 CAPSULE BY MOUTH ONCE DAILY FOR PREVENTIVE 31 capsule 97     cetirizine (ZYRTEC) 10 MG tablet TAKE 1 TABLET BY MOUTH ONCE DAILY 28 tablet 97     Cholecalciferol (VITAMIN D3) 50 MCG (2000 UT) CAPS TAKE TWO CAPSULES (4000 UNITS) BY MOUTH ONCE DAILY 56 capsule 97     ciprofloxacin (CIPRO) 500 MG tablet Take 500 mg by mouth 2 times daily       CO2-Releasing (-TWO) SUPP Place rectally daily as needed       CRANBERRY CONCENTRATE 500 MG CAPS TAKE 1 CAPSULE BY MOUTH ONCE DAILY 28 capsule 97     DESITIN 13 % CREA APPLY TOPICALLY TO AREAS OF REDNESS OR RASH WITH EACH BRIEF CHANGE 113 g 97     ibuprofen (ADVIL/MOTRIN) 400 MG tablet TAKE 1 TABLET BY MOUTH TWICE DAILY AS  "NEEDED FOR PAIN 30 tablet 97     insulin pen needle (B-D U/F) 31G X 5 MM miscellaneous USE DAILY WITH TYMLOS AS DIRECTED 100 each 4     Multiple Vitamins-Minerals (TAB-A-DAWSON) TABS TAKE 1 TABLET BY MOUTH ONCE DAILY 28 tablet 97     MYRBETRIQ 25 MG 24 hr tablet TAKE 1 TABLET BY MOUTH ONCE DAILY 28 tablet 97     nystatin (MYCOSTATIN) 816323 UNIT/GM external powder APPLY IN BETWEEN THE TOES TWICE DAILY UNTIL HEALED;THEN TWICE DAILY AS NEEDED 30 g 97     polyethylene glycol (MIRALAX/GLYCOLAX) powder MIX 17GM OF POWDER IN 8OZ OF WATER UNTIL COMPLETELY DISSOLVED. DRINK SOLUTION DAILY AS NEEDED 527 g 98     Probiotic Product (PROBIOTIC DAILY) CAPS Take 1 capsule by mouth daily 28 capsule 98     senna-docusate (SENEXON-S) 8.6-50 MG tablet Take 2 tablets by mouth 2 times daily 62 tablet 12     vitamin C (ASCORBIC ACID) 500 MG tablet TAKE 1 TABLET BY MOUTH ONCE DAILY 28 tablet 97     REVIEW OF SYSTEMS:  4 point ROS including Respiratory, CV, GI and , other than that noted in the HPI,  is negative    Objective:  Temp 95.2  F (35.1  C)   Ht 1.753 m (5' 9\")   Wt 72.6 kg (160 lb)   SpO2 97%   BMI 23.63 kg/m    Exam:  GENERAL APPEARANCE:  Alert, in no distress, pleasant, cooperative  RESP: no respiratory distress, patient is on room air  CV: . Edema none in bilateral lower extremities. teds are in place  M/S: Gait and station, wheelchair bound, no tenderness or swelling of the joints; moves upper extremities, minimal movement to LEs   : clear yellow urine.   SKIN:  Inspection and palpation of skin and subcutaneous tissue: skin warm, dry and intact without rashes but exam is limited  NEURO: no facial asymmetry, no speech deficits and able to follow directions  PSYCH:  insight and judgement intact, memory intact, affect and mood normal    Labs:   reviewed    ASSESSMENT/PLAN:  (T83.511A,  N39.0) Urinary tract infection associated with indwelling urethral catheter, initial encounter (H)  (primary encounter diagnosis)  Comment: " having discharge in relation to the catheter earlier this week. She was started on cipro without taking a urine specimen and discharge has subsided. No sediment in the urine and no bladder spasms.   Plan: ciprofloxacin 500 mg BID with last dose on 6/2  --PCA will be changing out ruby catheter today.     Electronically signed by:  CAROL Dupont CNP

## 2021-05-28 NOTE — LETTER
5/28/2021        RE: Cristina Stevens  [address]  [address]  [place] [phone]        [facility]  [place] Medical Record Number:  [id number]  Place of Service where encounter took place:  [facility] [[id number]]  Chief Complaint   Patient presents with     RECHECK       HPI:    Cristina Stevens  is a 75 year old (1945), who is being seen today for an episodic care visit.  HPI information obtained from: facility chart records, facility staff and patient report.     Ms. Stevens was visited today after having discharge in the urine over the past weekend and again on Tuesday of this week. She was started on cipro as this has worked well in the past, and since that time, she has not had any further discharge. She denies fever, chills, or bladder spasms.     Past Medical and Surgical History reviewed in Epic today.    MEDICATIONS:    Current Outpatient Medications   Medication Sig Dispense Refill     amoxicillin (AMOXIL) 250 MG chewable tablet CHEW AND SWALLOW 8 TABLETS (2000 MG) BY MOUTH 1 HOUR PRIOR TO DENTAL 8 tablet 97     armodafinil (NUVIGIL) 150 MG TABS tablet 1 TABLET ORALLY EVERY MORNING 30 tablet 4     CALMOSEPTINE 0.44-20.6 % OINT ointment APPLY TO AFFECTED AREA(S) TOPICALLY TO BUTTOCKS AS NEEDED WITH BRIEF CHANGES 113 g 97     cephALEXin (KEFLEX) 250 MG capsule TAKE 1 CAPSULE BY MOUTH ONCE DAILY FOR PREVENTIVE 31 capsule 97     cetirizine (ZYRTEC) 10 MG tablet TAKE 1 TABLET BY MOUTH ONCE DAILY 28 tablet 97     Cholecalciferol (VITAMIN D3) 50 MCG (2000 UT) CAPS TAKE TWO CAPSULES (4000 UNITS) BY MOUTH ONCE DAILY 56 capsule 97     ciprofloxacin (CIPRO) 500 MG tablet Take 500 mg by mouth 2 times daily       CO2-Releasing (-TWO) SUPP Place rectally daily as needed       CRANBERRY CONCENTRATE 500 MG CAPS TAKE 1 CAPSULE BY MOUTH ONCE DAILY 28 capsule 97     DESITIN 13 % CREA APPLY TOPICALLY TO AREAS OF REDNESS  "OR RASH WITH EACH BRIEF CHANGE 113 g 97     ibuprofen (ADVIL/MOTRIN) 400 MG tablet TAKE 1 TABLET BY MOUTH TWICE DAILY AS NEEDED FOR PAIN 30 tablet 97     insulin pen needle (B-D U/F) 31G X 5 MM miscellaneous USE DAILY WITH TYMLOS AS DIRECTED 100 each 4     Multiple Vitamins-Minerals (TAB-A-DAWSON) TABS TAKE 1 TABLET BY MOUTH ONCE DAILY 28 tablet 97     MYRBETRIQ 25 MG 24 hr tablet TAKE 1 TABLET BY MOUTH ONCE DAILY 28 tablet 97     nystatin (MYCOSTATIN) 771494 UNIT/GM external powder APPLY IN BETWEEN THE TOES TWICE DAILY UNTIL HEALED;THEN TWICE DAILY AS NEEDED 30 g 97     polyethylene glycol (MIRALAX/GLYCOLAX) powder MIX 17GM OF POWDER IN 8OZ OF WATER UNTIL COMPLETELY DISSOLVED. DRINK SOLUTION DAILY AS NEEDED 527 g 98     Probiotic Product (PROBIOTIC DAILY) CAPS Take 1 capsule by mouth daily 28 capsule 98     senna-docusate (SENEXON-S) 8.6-50 MG tablet Take 2 tablets by mouth 2 times daily 62 tablet 12     vitamin C (ASCORBIC ACID) 500 MG tablet TAKE 1 TABLET BY MOUTH ONCE DAILY 28 tablet 97     REVIEW OF SYSTEMS:  4 point ROS including Respiratory, CV, GI and , other than that noted in the HPI,  is negative    Objective:  Temp 95.2  F (35.1  C)   Ht 1.753 m (5' 9\")   Wt 72.6 kg (160 lb)   SpO2 97%   BMI 23.63 kg/m    Exam:  GENERAL APPEARANCE:  Alert, in no distress, pleasant, cooperative  RESP: no respiratory distress, patient is on room air  CV: . Edema none in bilateral lower extremities. teds are in place  M/S: Gait and station, wheelchair bound, no tenderness or swelling of the joints; moves upper extremities, minimal movement to LEs   : clear yellow urine.   SKIN:  Inspection and palpation of skin and subcutaneous tissue: skin warm, dry and intact without rashes but exam is limited  NEURO: no facial asymmetry, no speech deficits and able to follow directions  PSYCH:  insight and judgement intact, memory intact, affect and mood normal    Labs:   reviewed    ASSESSMENT/PLAN:  (T83.511A,  N39.0) Urinary " tract infection associated with indwelling urethral catheter, initial encounter (H)  (primary encounter diagnosis)  Comment: having discharge in relation to the catheter earlier this week. She was started on cipro without taking a urine specimen and discharge has subsided. No sediment in the urine and no bladder spasms.   Plan: ciprofloxacin 500 mg BID with last dose on 6/2  --PCA will be changing out ruby catheter today.     Electronically signed by:  CAROL Dupont CNP                   Sincerely,        CAROL Dupont CNP

## 2021-05-30 ENCOUNTER — RECORDS - HEALTHEAST (OUTPATIENT)
Dept: ADMINISTRATIVE | Facility: CLINIC | Age: 76
End: 2021-05-30

## 2021-06-01 ENCOUNTER — RECORDS - HEALTHEAST (OUTPATIENT)
Dept: ADMINISTRATIVE | Facility: CLINIC | Age: 76
End: 2021-06-01

## 2021-06-02 ENCOUNTER — RECORDS - HEALTHEAST (OUTPATIENT)
Dept: ADMINISTRATIVE | Facility: CLINIC | Age: 76
End: 2021-06-02

## 2021-07-27 ENCOUNTER — TELEPHONE (OUTPATIENT)
Dept: FAMILY MEDICINE | Facility: CLINIC | Age: 76
End: 2021-07-27

## 2021-07-27 NOTE — TELEPHONE ENCOUNTER
[facility] Call [facility]    Phone Message    May a detailed message be left on voicemail: yes     Reason for Call: Other: Patient is due for a Prolia injection the end of September 2021.  Patient is wondering if she needs a bone scan done prior to the September 2021 injection?  Please follow up with patient.  Thank you!     Action Taken: Message routed to:  Other:  [facility] RN POOL    Travel Screening: Not Applicable

## 2021-07-27 NOTE — TELEPHONE ENCOUNTER
Returned call to patient. Notes are not clear if DEXA is due. However, last visit in August 2020 indicates need for visit when Prolia due in September. Voicemail left encouraging patient to schedule.

## 2021-09-09 ENCOUNTER — ASSISTED LIVING VISIT (OUTPATIENT)
Dept: GERIATRICS | Facility: CLINIC | Age: 76
End: 2021-09-09
Payer: COMMERCIAL

## 2021-09-09 VITALS — HEIGHT: 69 IN | WEIGHT: 160 LBS | BODY MASS INDEX: 23.7 KG/M2 | TEMPERATURE: 95.1 F | OXYGEN SATURATION: 94 %

## 2021-09-09 DIAGNOSIS — N31.9 NEUROGENIC BLADDER: ICD-10-CM

## 2021-09-09 DIAGNOSIS — G35 MS (MULTIPLE SCLEROSIS) (H): ICD-10-CM

## 2021-09-09 DIAGNOSIS — M81.0 OSTEOPOROSIS, UNSPECIFIED OSTEOPOROSIS TYPE, UNSPECIFIED PATHOLOGICAL FRACTURE PRESENCE: ICD-10-CM

## 2021-09-09 DIAGNOSIS — Z00.00 ANNUAL PHYSICAL EXAM: Primary | ICD-10-CM

## 2021-09-09 DIAGNOSIS — Z97.8 FOLEY CATHETER IN PLACE: ICD-10-CM

## 2021-09-09 DIAGNOSIS — G82.50 SPASTIC QUADRIPLEGIA (H): ICD-10-CM

## 2021-09-09 ASSESSMENT — MIFFLIN-ST. JEOR: SCORE: 1285.14

## 2021-09-09 NOTE — LETTER
9/9/2021        RE: Cristina Stevens  [facility]  [address]  [place] [facility] [address]        [place] [facility]  Chief Complaint   Patient presents with     Annual Visit     [place] [facility] Record Number:  [id number]  Place of Service where encounter took place:  [facility] ([facility]) [[id number]]    HPI:    Cristina Stevens  is a 76 year old  (1945), who is being seen today for an annual comprehensive visit. HPI information obtained from: facility chart records, facility staff, patient report and [facility] chart review.     Muna was visited today while in her room, sitting in the wheelchair. She is preparing to go out to therapy where she has been going three times per week for the past several weeks. She feels that this has been beneficial for her upper extremities. She has not had a urinary tract infection since her catheter has been changed out every three weeks rather than four weeks. She is sleeping well. Having regular bowel movements.     ALLERGIES: Nitrofuran derivatives, Tetracyclines, Ampicillin, Cefadroxil, Cephalexin, Levofloxacin, Sulfa drugs, and Sulfasalazine  PAST MEDICAL HISTORY:  has a past medical history of Acute blood loss anemia (6/19/2018), Anemia (9/25/2012), At risk for impaired skin integrity (12/14/2014), Chronic constipation (7/12/2018), Chronic pain (9/25/2012), Closed fracture of neck of right femur (H) (5/20/2018), Closed fracture of right tibial plateau (7/9/2018), Frequent UTI (9/25/2012), Heat intolerance (5/26/2018), Immobility (6/8/2018), Kidney stone (6/19/2014), MDRO (multiple drug resistant organisms) resistance (5/31/2013), Multiple sclerosis (H) (5/10/2011), Neurogenic bladder (6/8/2018), Neuromuscular respiratory weakness (9/26/2014), OAB (overactive bladder) (6/2/2017), Osteoporosis (6/16/2006), Scoliosis associated with other condition (9/10/2014), Screening for osteoporosis (7/10/2018), 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  PAST SURGICAL HISTORY:  has a past surgical history that includes Percutaneous Placement Intravascular Stent Cervical Carotid Artery (05/2011) and PARTIAL HIP REPLACEMENT (Right, 05/20/2018).  IMMUNIZATIONS:  Immunization History   Administered Date(s) Administered     DTaP, Unspecified 04/19/2010     FLU 6-35 months 10/26/2009     Flu, Unspecified 10/13/2014     Influenza (H1N1) 01/27/2010     Influenza (High Dose) 3 valent vaccine 09/16/2011, 09/25/2012, 10/01/2013, 09/23/2015, 10/07/2016, 11/03/2017, 10/05/2018, 10/01/2019     Influenza (IIV3) PF 11/18/2002, 11/03/2005, 10/22/2007, 10/30/2008, 09/27/2010     Influenza, Quad, High Dose, Pf, 65yr+ (Fluzone HD) 09/29/2020     Pneumo Conj 13-V (2010&after) 02/24/2016     Pneumococcal 23 valent 12/31/2008, 11/12/2013     TDAP Vaccine (Adacel) 04/19/2010     Td (Adult), Adsorbed 08/19/1999     Zoster vaccine, live 12/31/2008     Above immunizations pulled from [facility]. [facility] and facility records also reconciled. Outstanding information sent to  to update [facility].  Future immunizations are not needed at this point as all recommended immunizations are up to date.     Current Outpatient Medications   Medication Sig Dispense Refill     amoxicillin (AMOXIL) 250 MG chewable tablet CHEW AND SWALLOW 8 TABLETS (2000 MG) BY MOUTH 1 HOUR PRIOR TO DENTAL 8 tablet 97     armodafinil (NUVIGIL) 150 MG TABS tablet 1 TABLET ORALLY EVERY MORNING 30 tablet 4     CALMOSEPTINE 0.44-20.6 % OINT ointment APPLY TO AFFECTED AREA(S) TOPICALLY TO BUTTOCKS AS NEEDED WITH BRIEF CHANGES 113 g 97     cephALEXin (KEFLEX) 250 MG capsule TAKE 1 CAPSULE BY MOUTH ONCE DAILY FOR PREVENTIVE 31 capsule 97     cetirizine (ZYRTEC) 10 MG tablet TAKE 1 TABLET BY MOUTH ONCE DAILY 28 tablet 97     Cholecalciferol (VITAMIN D3) 50 MCG (2000 UT) CAPS TAKE TWO CAPSULES (4000 UNITS) BY MOUTH ONCE DAILY 56 capsule 97      CO2-Releasing (-TWO) SUPP Place rectally daily as needed       CRANBERRY CONCENTRATE 500 MG CAPS TAKE 1 CAPSULE BY MOUTH ONCE DAILY 28 capsule 97     DESITIN 13 % CREA APPLY TOPICALLY TO AREAS OF REDNESS OR RASH WITH EACH BRIEF CHANGE 113 g 97     ibuprofen (ADVIL/MOTRIN) 400 MG tablet TAKE 1 TABLET BY MOUTH TWICE DAILY AS NEEDED FOR PAIN 30 tablet 97     insulin pen needle (B-D U/F) 31G X 5 MM miscellaneous USE DAILY WITH TYMLOS AS DIRECTED 100 each 4     Multiple Vitamins-Minerals (TAB-A-DAWSON) TABS TAKE 1 TABLET BY MOUTH ONCE DAILY 28 tablet 97     MYRBETRIQ 25 MG 24 hr tablet TAKE 1 TABLET BY MOUTH ONCE DAILY 28 tablet 97     nystatin (MYCOSTATIN) 240066 UNIT/GM external powder APPLY IN BETWEEN THE TOES TWICE DAILY UNTIL HEALED;THEN TWICE DAILY AS NEEDED 30 g 97     polyethylene glycol (MIRALAX/GLYCOLAX) powder MIX 17GM OF POWDER IN 8OZ OF WATER UNTIL COMPLETELY DISSOLVED. DRINK SOLUTION DAILY AS NEEDED 527 g 98     Probiotic Product (PROBIOTIC DAILY) CAPS Take 1 capsule by mouth daily 28 capsule 98     senna-docusate (SENEXON-S) 8.6-50 MG tablet Take 2 tablets by mouth 2 times daily 62 tablet 12     vitamin C (ASCORBIC ACID) 500 MG tablet TAKE 1 TABLET BY MOUTH ONCE DAILY 28 tablet 97     Case Management:  I have reviewed the Assisted Living care plan, current immunizations and preventive care/cancer screening. .Future cancer screening is not clinically indicated secondary to age/goals of care Patient's desire to return to the community is not present. Current Level of Care is appropriate.    Advance Directive Discussion:    I reviewed the current advanced directives as reflected in EPIC, the POLST and the facility chart, and verified the congruency of orders      Team Discussion:  I communicated with the appropriate disciplines involved with the Plan of Care:   Nursing    Patient's goal is pain control and comfort.  Information reviewed:  Medications, vital signs, orders, and nursing notes.    ROS:  4 point  "ROS including Respiratory, CV, GI and , other than that noted in the HPI,  is negative    Vitals:  Temp (!) 95.1  F (35.1  C)   Ht 1.753 m (5' 9\")   Wt 72.6 kg (160 lb)   SpO2 94%   BMI 23.63 kg/m   Body mass index is 23.63 kg/m .  Exam:  GENERAL APPEARANCE:  Alert, in no distress, pleasant, cooperative  EYES: no discharge or mattering  RESP: no respiratory distress, no cough,  patient is on room air  CV: regular rate.  Edema none in bilateral lower extremities. teds are in place  M/S: Gait and station, wheelchair bound, no tenderness or swelling of the joints; moves upper extremities R>L, minimal movement to LEs, has spastic movements. Contractures to hands.    : clear yellow urine.   SKIN:  Inspection and palpation of skin and subcutaneous tissue: skin warm, dry and intact without rashes   NEURO: no facial asymmetry, no speech deficits and able to follow directions  PSYCH:  insight and judgement intact, memory intact, affect and mood normal    Lab/Diagnostic data:   CBC RESULTS: Recent Labs   Lab Test 03/19/19  0904 03/19/19  0000 07/24/18  0550 07/21/18  0655 07/20/18  0828   WBC  --   --  8.5 12.6* 21.5*   RBC  --   --   --  4.08 4.75   HGB 12.6 12.6  --  11.2* 12.6   HCT  --   --   --  34.1* 41.4   MCV  --   --   --  84 87   MCH  --   --   --  27.5 26.5*   MCHC  --   --   --  32.8 30.4*   RDW  --   --   --  14.7* 14.7*   PLT  --   --   --  401 485*       Last Basic Metabolic Panel:  Recent Labs   Lab Test 09/30/20  1023 11/18/19  1224 09/24/18  1705 07/20/18  0828   NA  --   --  136 135*   POTASSIUM  --   --  4.4 4.3   CHLORIDE  --   --  103 103   BRENDA 9.4 9.7 9.4 9.6   CO2  --   --  25 21*   BUN  --   --  14 15   CR  --   --  0.74 0.62   GLC  --   --  101* 97       TSH   Date Value Ref Range Status   03/19/2019 2.58 0.30 - 5.00 uIU/mL Final   03/19/2019 2.58 0.30 - 5.00 uIU/mL Final       ASSESSMENT/PLAN  Multiple Sclerosis  Spastic quad  Wheelchair bound and uses a mechanical lift for transfers. No " longer on baclofen and she is doing well. On a bowel program with good results. Last neurology appointment was virtual on 8/23/21. Per her note, there is concern over Bandar voice but did not order speech therapy at this time given Muna is going out to Step therapy (they don't have speech therapists) so she is going to monitor for now as she is socializing more.   -- continues on armodafinil 150 mg daily   -- follow up with neurology as PM&R as per their recommendations. Currently having PT/OT 3-4 times per week.   --follow up with Dr. Macario of neurology as scheduled this fall.       Neurogenic bladder  Ruby catheter chronic  Requires ruby change outs every three weeks. Her PCA whom is also a nurse has been changing out her catheters every three weeks and it is going well. She has not had a UTI or bladder spasms for the past year since the catheter changes have been scheduled every three weeks.  Clear yellow urine in the ruby bag today without sediment.    --follow up with urologist PRN   --continue with myrbetriq 25 mg daily  --cephalexin 250 mg daily for UTI prophylaxis  --change out ruby catheter every three weeks.      Osteoporosis  Follows with Dr. Erica Abrams for osteo and was started on Tymos in 2018.  --continues with abaloparatide 3120 mcg injection daily.  -- continues on cholecalciferol 4000 units daily    Electronically signed by:  CAROL Dupont CNP               Sincerely,        CAROL Duopnt CNP

## 2021-09-09 NOTE — LETTER
9/9/2021        RE: Cristina Stevens  [facility]  [address]  [place] [address]        [place] [facility]  Chief Complaint   Patient presents with     Annual Visit     [place] Medical Record Number:  [id number]  Place of Service where encounter took place:  [facility] ([facility]) [[id number]]    HPI:    Cristina Stevens  is a 76 year old  (1945), who is being seen today for an annual comprehensive visit. HPI information obtained from: {FGS HPI:994638}.  Today's concerns are:  {FGS DX:070618}  {HTN}    ALLERGIES: Nitrofuran derivatives, Tetracyclines, Ampicillin, Cefadroxil, Cephalexin, Levofloxacin, Sulfa drugs, and Sulfasalazine  PAST MEDICAL HISTORY:  has a past medical history of Acute blood loss anemia (6/19/2018), Anemia (9/25/2012), At risk for impaired skin integrity (12/14/2014), Chronic constipation (7/12/2018), Chronic pain (9/25/2012), Closed fracture of neck of right femur (H) (5/20/2018), Closed fracture of right tibial plateau (7/9/2018), Frequent UTI (9/25/2012), Heat intolerance (5/26/2018), Immobility (6/8/2018), Kidney stone (6/19/2014), MDRO (multiple drug resistant organisms) resistance (5/31/2013), Multiple sclerosis (H) (5/10/2011), Neurogenic bladder (6/8/2018), Neuromuscular respiratory weakness (9/26/2014), OAB (overactive bladder) (6/2/2017), Osteoporosis (6/16/2006), Scoliosis associated with other condition (9/10/2014), Screening for osteoporosis (7/10/2018), 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  PAST SURGICAL HISTORY:  has no past surgical history on file.  IMMUNIZATIONS:  Immunization History   Administered Date(s) Administered     DTaP, Unspecified 04/19/2010     FLU 6-35 months 10/26/2009     Flu, Unspecified 10/13/2014     Influenza (H1N1) 01/27/2010     Influenza (High Dose) 3 valent vaccine 09/16/2011, 09/25/2012, 10/01/2013, 09/23/2015, 10/07/2016, 11/03/2017, 10/05/2018, 10/01/2019      Influenza (IIV3) PF 11/18/2002, 11/03/2005, 10/22/2007, 10/30/2008, 09/27/2010     Influenza, Quad, High Dose, Pf, 65yr+ (Fluzone HD) 09/29/2020     Pneumo Conj 13-V (2010&after) 02/24/2016     Pneumococcal 23 valent 12/31/2008, 11/12/2013     TDAP Vaccine (Adacel) 04/19/2010     Td (Adult), Adsorbed 08/19/1999     Zoster vaccine, live 12/31/2008     Above immunizations pulled from [facility]. [facility] and facility records also reconciled. Outstanding information sent to  to update [facility] ***.  Future immunizations {fgs future immunization:004697}  ***  Current Outpatient Medications   Medication Sig Dispense Refill     amoxicillin (AMOXIL) 250 MG chewable tablet CHEW AND SWALLOW 8 TABLETS (2000 MG) BY MOUTH 1 HOUR PRIOR TO DENTAL 8 tablet 97     armodafinil (NUVIGIL) 150 MG TABS tablet 1 TABLET ORALLY EVERY MORNING 30 tablet 4     CALMOSEPTINE 0.44-20.6 % OINT ointment APPLY TO AFFECTED AREA(S) TOPICALLY TO BUTTOCKS AS NEEDED WITH BRIEF CHANGES 113 g 97     cephALEXin (KEFLEX) 250 MG capsule TAKE 1 CAPSULE BY MOUTH ONCE DAILY FOR PREVENTIVE 31 capsule 97     cetirizine (ZYRTEC) 10 MG tablet TAKE 1 TABLET BY MOUTH ONCE DAILY 28 tablet 97     Cholecalciferol (VITAMIN D3) 50 MCG (2000 UT) CAPS TAKE TWO CAPSULES (4000 UNITS) BY MOUTH ONCE DAILY 56 capsule 97     CO2-Releasing (-TWO) SUPP Place rectally daily as needed       CRANBERRY CONCENTRATE 500 MG CAPS TAKE 1 CAPSULE BY MOUTH ONCE DAILY 28 capsule 97     DESITIN 13 % CREA APPLY TOPICALLY TO AREAS OF REDNESS OR RASH WITH EACH BRIEF CHANGE 113 g 97     ibuprofen (ADVIL/MOTRIN) 400 MG tablet TAKE 1 TABLET BY MOUTH TWICE DAILY AS NEEDED FOR PAIN 30 tablet 97     insulin pen needle (B-D U/F) 31G X 5 MM miscellaneous USE DAILY WITH TYMLOS AS DIRECTED 100 each 4     Multiple Vitamins-Minerals (TAB-A-DAWSON) TABS TAKE 1 TABLET BY MOUTH ONCE DAILY 28 tablet 97     MYRBETRIQ 25 MG 24 hr tablet TAKE 1 TABLET BY MOUTH ONCE DAILY 28 tablet 97      "nystatin (MYCOSTATIN) 542787 UNIT/GM external powder APPLY IN BETWEEN THE TOES TWICE DAILY UNTIL HEALED;THEN TWICE DAILY AS NEEDED 30 g 97     polyethylene glycol (MIRALAX/GLYCOLAX) powder MIX 17GM OF POWDER IN 8OZ OF WATER UNTIL COMPLETELY DISSOLVED. DRINK SOLUTION DAILY AS NEEDED 527 g 98     Probiotic Product (PROBIOTIC DAILY) CAPS Take 1 capsule by mouth daily 28 capsule 98     senna-docusate (SENEXON-S) 8.6-50 MG tablet Take 2 tablets by mouth 2 times daily 62 tablet 12     vitamin C (ASCORBIC ACID) 500 MG tablet TAKE 1 TABLET BY MOUTH ONCE DAILY 28 tablet 97     {Providers Please double check the med list (in the plan section >> meds & orders tab) and Discontinue any of the meds flagged by the TC to be discontinued}  ***  Case Management:  I have reviewed the {fgsannualcareplan1:696427} .{fgs cancer screenin} Patient's desire to return to the community is {FGS RETURN TO COMMUNITY:193966}. Current Level of Care is appropriate.    Advance Directive Discussion:    I reviewed the current advanced directives as reflected in EPIC, the POLST and the facility chart, and verified the congruency of orders ***. I contacted the first party *** and {ADVANCED DIRECTIVE DISCUSSION:851617} plan of Care.  I {DID/DID NOT:210763} review the advance directives with the resident.     Team Discussion:  I communicated with the appropriate disciplines involved with the Plan of Care:   {NURSING HOME DISCIPLINES:218660}  Patient's goal is {fgsgoal:613471}.  Information reviewed:  Medications, vital signs, orders, and nursing notes.    ROS:  {xytcdn38:936322}    Vitals:  Temp (!) 95.1  F (35.1  C)   Ht 1.753 m (5' 9\")   Wt 72.6 kg (160 lb)   SpO2 94%   BMI 23.63 kg/m   Body mass index is 23.63 kg/m .  Exam:  {Nursing home physical exam :374968} {2 in each of 9 for comp exam}    Lab/Diagnostic data:   {fgslab:325267}    ASSESSMENT/PLAN  {FGS DX:886261}  {HTN}    {fgsorders:316803}  ***    Electronically signed by:  Melva " Tova ***  {Providers Please double check the med list (in the plan section >> meds & orders tab) and Discontinue any of the meds flagged by the TC to be discontinued}            Sincerely,        CAROL Dupont CNP

## 2021-09-09 NOTE — PROGRESS NOTES
[facility]  Chief Complaint   Patient presents with     Annual Visit     [facility] Record Number:  [id number]  Place of Service where encounter took place:  [facility] ([facility]) [[id number]]    HPI:    Cristina Stevens  is a 76 year old  (1945), who is being seen today for an annual comprehensive visit. HPI information obtained from: facility chart records, facility staff, patient report and [facility] chart review.     Muna was visited today while in her room, sitting in the wheelchair. She is preparing to go out to therapy where she has been going three times per week for the past several weeks. She feels that this has been beneficial for her upper extremities. She has not had a urinary tract infection since her catheter has been changed out every three weeks rather than four weeks. She is sleeping well. Having regular bowel movements.     ALLERGIES: Nitrofuran derivatives, Tetracyclines, Ampicillin, Cefadroxil, Cephalexin, Levofloxacin, Sulfa drugs, and Sulfasalazine  PAST MEDICAL HISTORY:  has a past medical history of Acute blood loss anemia (6/19/2018), Anemia (9/25/2012), At risk for impaired skin integrity (12/14/2014), Chronic constipation (7/12/2018), Chronic pain (9/25/2012), Closed fracture of neck of right femur (H) (5/20/2018), Closed fracture of right tibial plateau (7/9/2018), Frequent UTI (9/25/2012), Heat intolerance (5/26/2018), Immobility (6/8/2018), Kidney stone (6/19/2014), MDRO (multiple drug resistant organisms) resistance (5/31/2013), Multiple sclerosis (H) (5/10/2011), Neurogenic bladder (6/8/2018), Neuromuscular respiratory weakness (9/26/2014), OAB (overactive bladder) (6/2/2017), Osteoporosis (6/16/2006), Scoliosis associated with other condition (9/10/2014), Screening for osteoporosis (7/10/2018), 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  PAST SURGICAL HISTORY:  has a past surgical history that includes  Percutaneous Placement Intravascular Stent Cervical Carotid Artery (05/2011) and PARTIAL HIP REPLACEMENT (Right, 05/20/2018).  IMMUNIZATIONS:  Immunization History   Administered Date(s) Administered     DTaP, Unspecified 04/19/2010     FLU 6-35 months 10/26/2009     Flu, Unspecified 10/13/2014     Influenza (H1N1) 01/27/2010     Influenza (High Dose) 3 valent vaccine 09/16/2011, 09/25/2012, 10/01/2013, 09/23/2015, 10/07/2016, 11/03/2017, 10/05/2018, 10/01/2019     Influenza (IIV3) PF 11/18/2002, 11/03/2005, 10/22/2007, 10/30/2008, 09/27/2010     Influenza, Quad, High Dose, Pf, 65yr+ (Fluzone HD) 09/29/2020     Pneumo Conj 13-V (2010&after) 02/24/2016     Pneumococcal 23 valent 12/31/2008, 11/12/2013     TDAP Vaccine (Adacel) 04/19/2010     Td (Adult), Adsorbed 08/19/1999     Zoster vaccine, live 12/31/2008     Above immunizations pulled from [employer]. [facility] and facility records also reconciled. Outstanding information sent to  to update [employer] [place] [employer].  Future immunizations are not needed at this point as all recommended immunizations are up to date.     Current Outpatient Medications   Medication Sig Dispense Refill     amoxicillin (AMOXIL) 250 MG chewable tablet CHEW AND SWALLOW 8 TABLETS (2000 MG) BY MOUTH 1 HOUR PRIOR TO DENTAL 8 tablet 97     armodafinil (NUVIGIL) 150 MG TABS tablet 1 TABLET ORALLY EVERY MORNING 30 tablet 4     CALMOSEPTINE 0.44-20.6 % OINT ointment APPLY TO AFFECTED AREA(S) TOPICALLY TO BUTTOCKS AS NEEDED WITH BRIEF CHANGES 113 g 97     cephALEXin (KEFLEX) 250 MG capsule TAKE 1 CAPSULE BY MOUTH ONCE DAILY FOR PREVENTIVE 31 capsule 97     cetirizine (ZYRTEC) 10 MG tablet TAKE 1 TABLET BY MOUTH ONCE DAILY 28 tablet 97     Cholecalciferol (VITAMIN D3) 50 MCG (2000 UT) CAPS TAKE TWO CAPSULES (4000 UNITS) BY MOUTH ONCE DAILY 56 capsule 97     CO2-Releasing (-TWO) SUPP Place rectally daily as needed       CRANBERRY CONCENTRATE 500 MG CAPS TAKE 1 CAPSULE BY MOUTH ONCE DAILY 28  capsule 97     DESITIN 13 % CREA APPLY TOPICALLY TO AREAS OF REDNESS OR RASH WITH EACH BRIEF CHANGE 113 g 97     ibuprofen (ADVIL/MOTRIN) 400 MG tablet TAKE 1 TABLET BY MOUTH TWICE DAILY AS NEEDED FOR PAIN 30 tablet 97     insulin pen needle (B-D U/F) 31G X 5 MM miscellaneous USE DAILY WITH TYMLOS AS DIRECTED 100 each 4     Multiple Vitamins-Minerals (TAB-A-DAWSON) TABS TAKE 1 TABLET BY MOUTH ONCE DAILY 28 tablet 97     MYRBETRIQ 25 MG 24 hr tablet TAKE 1 TABLET BY MOUTH ONCE DAILY 28 tablet 97     nystatin (MYCOSTATIN) 393729 UNIT/GM external powder APPLY IN BETWEEN THE TOES TWICE DAILY UNTIL HEALED;THEN TWICE DAILY AS NEEDED 30 g 97     polyethylene glycol (MIRALAX/GLYCOLAX) powder MIX 17GM OF POWDER IN 8OZ OF WATER UNTIL COMPLETELY DISSOLVED. DRINK SOLUTION DAILY AS NEEDED 527 g 98     Probiotic Product (PROBIOTIC DAILY) CAPS Take 1 capsule by mouth daily 28 capsule 98     senna-docusate (SENEXON-S) 8.6-50 MG tablet Take 2 tablets by mouth 2 times daily 62 tablet 12     vitamin C (ASCORBIC ACID) 500 MG tablet TAKE 1 TABLET BY MOUTH ONCE DAILY 28 tablet 97     Case Management:  I have reviewed the Assisted Living care plan, current immunizations and preventive care/cancer screening. .Future cancer screening is not clinically indicated secondary to age/goals of care Patient's desire to return to the community is not present. Current Level of Care is appropriate.    Advance Directive Discussion:    I reviewed the current advanced directives as reflected in EPIC, the POLST and the facility chart, and verified the congruency of orders      Team Discussion:  I communicated with the appropriate disciplines involved with the Plan of Care:   Nursing    Patient's goal is pain control and comfort.  Information reviewed:  Medications, vital signs, orders, and nursing notes.    ROS:  4 point ROS including Respiratory, CV, GI and , other than that noted in the HPI,  is negative    Vitals:  Temp (!) 95.1  F (35.1  C)   Ht  "1.753 m (5' 9\")   Wt 72.6 kg (160 lb)   SpO2 94%   BMI 23.63 kg/m   Body mass index is 23.63 kg/m .  Exam:  GENERAL APPEARANCE:  Alert, in no distress, pleasant, cooperative  EYES: no discharge or mattering  RESP: no respiratory distress, no cough,  patient is on room air  CV: regular rate.  Edema none in bilateral lower extremities. teds are in place  M/S: Gait and station, wheelchair bound, no tenderness or swelling of the joints; moves upper extremities R>L, minimal movement to LEs, has spastic movements. Contractures to hands.    : clear yellow urine.   SKIN:  Inspection and palpation of skin and subcutaneous tissue: skin warm, dry and intact without rashes   NEURO: no facial asymmetry, no speech deficits and able to follow directions  PSYCH:  insight and judgement intact, memory intact, affect and mood normal    Lab/Diagnostic data:   CBC RESULTS: Recent Labs   Lab Test 03/19/19  0904 03/19/19  0000 07/24/18  0550 07/21/18  0655 07/20/18  0828   WBC  --   --  8.5 12.6* 21.5*   RBC  --   --   --  4.08 4.75   HGB 12.6 12.6  --  11.2* 12.6   HCT  --   --   --  34.1* 41.4   MCV  --   --   --  84 87   MCH  --   --   --  27.5 26.5*   MCHC  --   --   --  32.8 30.4*   RDW  --   --   --  14.7* 14.7*   PLT  --   --   --  401 485*       Last Basic Metabolic Panel:  Recent Labs   Lab Test 09/30/20  1023 11/18/19  1224 09/24/18  1705 07/20/18  0828   NA  --   --  136 135*   POTASSIUM  --   --  4.4 4.3   CHLORIDE  --   --  103 103   BRENDA 9.4 9.7 9.4 9.6   CO2  --   --  25 21*   BUN  --   --  14 15   CR  --   --  0.74 0.62   GLC  --   --  101* 97       TSH   Date Value Ref Range Status   03/19/2019 2.58 0.30 - 5.00 uIU/mL Final   03/19/2019 2.58 0.30 - 5.00 uIU/mL Final       ASSESSMENT/PLAN  Multiple Sclerosis  Spastic quad  Wheelchair bound and uses a mechanical lift for transfers. No longer on baclofen and she is doing well. On a bowel program with good results. Last neurology appointment was virtual on 8/23/21. Per " her note, there is concern over Bandar voice but did not order speech therapy at this time given Muna is going out to [facility] (they don't have speech therapists) so she is going to monitor for now as she is socializing more.   -- continues on armodafinil 150 mg daily   -- follow up with neurology as PM&R as per their recommendations. Currently having PT/OT 3-4 times per week.   --follow up with Dr. Macario of neurology as scheduled this fall.       Neurogenic bladder  Ruby catheter chronic  Requires ruby change outs every three weeks. Her PCA whom is also a nurse has been changing out her catheters every three weeks and it is going well. She has not had a UTI or bladder spasms for the past year since the catheter changes have been scheduled every three weeks.  Clear yellow urine in the ruby bag today without sediment.    --follow up with urologist PRN   --continue with myrbetriq 25 mg daily  --cephalexin 250 mg daily for UTI prophylaxis  --change out ruby catheter every three weeks.      Osteoporosis  Follows with Dr. Erica Abrams for osteo and was started on Tymos in 2018.  --continues with abaloparatide 3120 mcg injection daily.  -- continues on cholecalciferol 4000 units daily    Electronically signed by:  CAROL Dupont CNP

## 2021-09-15 ENCOUNTER — LAB REQUISITION (OUTPATIENT)
Dept: LAB | Facility: CLINIC | Age: 76
End: 2021-09-15
Payer: COMMERCIAL

## 2021-09-15 DIAGNOSIS — Z11.59 ENCOUNTER FOR SCREENING FOR OTHER VIRAL DISEASES: ICD-10-CM

## 2021-09-16 PROCEDURE — U0003 INFECTIOUS AGENT DETECTION BY NUCLEIC ACID (DNA OR RNA); SEVERE ACUTE RESPIRATORY SYNDROME CORONAVIRUS 2 (SARS-COV-2) (CORONAVIRUS DISEASE [COVID-19]), AMPLIFIED PROBE TECHNIQUE, MAKING USE OF HIGH THROUGHPUT TECHNOLOGIES AS DESCRIBED BY CMS-2020-01-R: HCPCS | Mod: ORL | Performed by: NURSE PRACTITIONER

## 2021-09-16 PROCEDURE — U0005 INFEC AGEN DETEC AMPLI PROBE: HCPCS | Mod: ORL

## 2021-09-17 LAB — SARS-COV-2 RNA RESP QL NAA+PROBE: NEGATIVE

## 2021-09-24 DIAGNOSIS — M81.0 SENILE OSTEOPOROSIS: Primary | ICD-10-CM

## 2021-09-24 RX ORDER — CHOLECALCIFEROL (VITAMIN D3) 50 MCG
TABLET ORAL
Qty: 56 TABLET | Refills: 10 | Status: SHIPPED | OUTPATIENT
Start: 2021-09-24 | End: 2022-08-30

## 2021-09-25 ENCOUNTER — HEALTH MAINTENANCE LETTER (OUTPATIENT)
Age: 76
End: 2021-09-25

## 2021-09-26 DIAGNOSIS — Z92.29 PERSONAL HISTORY OF DRUG THERAPY: ICD-10-CM

## 2021-09-26 DIAGNOSIS — M81.0 SENILE OSTEOPOROSIS: Primary | ICD-10-CM

## 2021-09-27 ENCOUNTER — OFFICE VISIT (OUTPATIENT)
Dept: FAMILY MEDICINE | Facility: CLINIC | Age: 76
End: 2021-09-27
Attending: FAMILY MEDICINE
Payer: COMMERCIAL

## 2021-09-27 ENCOUNTER — TELEPHONE (OUTPATIENT)
Dept: OBGYN | Facility: CLINIC | Age: 76
End: 2021-09-27

## 2021-09-27 ENCOUNTER — LAB (OUTPATIENT)
Dept: LAB | Facility: CLINIC | Age: 76
End: 2021-09-27
Attending: FAMILY MEDICINE
Payer: COMMERCIAL

## 2021-09-27 VITALS — HEART RATE: 69 BPM | SYSTOLIC BLOOD PRESSURE: 141 MMHG | DIASTOLIC BLOOD PRESSURE: 84 MMHG

## 2021-09-27 DIAGNOSIS — M81.0 SENILE OSTEOPOROSIS: ICD-10-CM

## 2021-09-27 DIAGNOSIS — Z91.81 AT HIGH RISK FOR FALLS: ICD-10-CM

## 2021-09-27 DIAGNOSIS — M81.0 OSTEOPOROSIS: ICD-10-CM

## 2021-09-27 DIAGNOSIS — M81.0 SENILE OSTEOPOROSIS: Primary | ICD-10-CM

## 2021-09-27 DIAGNOSIS — Z92.29 HX OF DRUG THERAPY: Primary | ICD-10-CM

## 2021-09-27 LAB
ANION GAP SERPL CALCULATED.3IONS-SCNC: 5 MMOL/L (ref 3–14)
BUN SERPL-MCNC: 27 MG/DL (ref 7–30)
CALCIUM SERPL-MCNC: 9.5 MG/DL (ref 8.5–10.1)
CHLORIDE BLD-SCNC: 105 MMOL/L (ref 94–109)
CO2 SERPL-SCNC: 27 MMOL/L (ref 20–32)
CREAT SERPL-MCNC: 0.71 MG/DL (ref 0.52–1.04)
GFR SERPL CREATININE-BSD FRML MDRD: 84 ML/MIN/1.73M2
GLUCOSE BLD-MCNC: 81 MG/DL (ref 70–99)
MAGNESIUM SERPL-MCNC: 1.9 MG/DL (ref 1.6–2.3)
PHOSPHATE SERPL-MCNC: 2.9 MG/DL (ref 2.5–4.5)
POTASSIUM BLD-SCNC: 4 MMOL/L (ref 3.4–5.3)
SODIUM SERPL-SCNC: 137 MMOL/L (ref 133–144)

## 2021-09-27 PROCEDURE — 80048 BASIC METABOLIC PNL TOTAL CA: CPT

## 2021-09-27 PROCEDURE — 83735 ASSAY OF MAGNESIUM: CPT

## 2021-09-27 PROCEDURE — 84100 ASSAY OF PHOSPHORUS: CPT

## 2021-09-27 PROCEDURE — 36415 COLL VENOUS BLD VENIPUNCTURE: CPT

## 2021-09-27 PROCEDURE — 99214 OFFICE O/P EST MOD 30 MIN: CPT | Performed by: FAMILY MEDICINE

## 2021-09-27 PROCEDURE — G0463 HOSPITAL OUTPT CLINIC VISIT: HCPCS

## 2021-09-27 ASSESSMENT — PAIN SCALES - GENERAL: PAINLEVEL: NO PAIN (0)

## 2021-09-27 NOTE — PROGRESS NOTES
9-26-21 pt needs prolia - was on Tymlos till 4/2020  Last seen 8/2020 and due for prolia - order placed - needs BMP also  Erica Abrams MD, PhD

## 2021-09-27 NOTE — NURSING NOTE
prolia   Insurance check still not approved and labs are still in process-  I will call patient back to clinic when it is ok to give prolia shot.  Patient and MD aware of the plan.

## 2021-09-27 NOTE — PROGRESS NOTES
Cristina is a  76 year old female post menopausal] [GR1, P1] that presents today for osteoporosis follow up patient was last seen 8/2020 virtually. Pt was on  Tymlos for 18 months and stopped end of April 2020.  She has MS and wheel chair bound.  She is not immunosuppressed. Currently on prolia and due for an injection.    Referring Physician:Kimberlee Castro NP     HPI      Have you ever had a bone density test? Yes  Where = [facility]  When = 8/2020  Spine Tscore = -2.4  Left neck Tscore = NA  Total left hip Tscore = NA  Right neck Tscore = NA  Total Right hip Tscore = NA  Radius  T= -4.4 .   Have you received any x-ray dye or contrast in the last ten days? No  How many servings of dairy products do you consume per day? Around 6 servings Type: yogurt, 2 string cheeses, milk 3 times a day   Do you take a multi-vitamin daily? Yes  Do you take a vitamin D supplement? Yes 2000IU/day   Do you take a calcium supplement daily?  Calcium citrate 2/day    Do you take a supplement containing strontium? No  Are you exposed to natural sunlight at least 20 minutes three times a week? Yes    Social History   reports that she quit smoking about 36 years ago. She started smoking about 59 years ago. She has never used smokeless tobacco. She reports current alcohol use of about 1.0 standard drinks of alcohol per week. She reports that she does not use drugs.  Do you smoke cigarettes? No  Do you exercise? Yes. Details: PT three times a week, MS specific, No standing currently   Do you drink alcohol? No    Medication History  Have you used any of the following medications?   Actonel (Risedronate): No   Aredia (Pamidronate): No   Boniva (Ibindronate): No   Didronil (Etidronate): No   Evista (Raloxifene): No   Fosamax (Alendronate): No   Forteo (Parathyroid hormone) injections: No   tymlos  - 10/2018-4-2020   HCTZ (Thiazide): No   Calcitonin nasal spray: No   Reclast or Zometa (Zolendronate): No   Prolia (Denosumab): yes started  10/2020  - has had 2 shots  - last one March 30.2021     Current Outpatient Medications   Medication Sig Dispense Refill     armodafinil (NUVIGIL) 150 MG TABS tablet 1 TABLET ORALLY EVERY MORNING 30 tablet 4     CALMOSEPTINE 0.44-20.6 % OINT ointment APPLY TO AFFECTED AREA(S) TOPICALLY TO BUTTOCKS AS NEEDED WITH BRIEF CHANGES 113 g 97     cephALEXin (KEFLEX) 250 MG capsule TAKE 1 CAPSULE BY MOUTH ONCE DAILY FOR PREVENTIVE 31 capsule 97     cetirizine (ZYRTEC) 10 MG tablet TAKE 1 TABLET BY MOUTH ONCE DAILY 28 tablet 97     CO2-Releasing (-TWO) SUPP Place rectally daily as needed       CRANBERRY CONCENTRATE 500 MG CAPS TAKE 1 CAPSULE BY MOUTH ONCE DAILY 28 capsule 97     DESITIN 13 % CREA APPLY TOPICALLY TO AREAS OF REDNESS OR RASH WITH EACH BRIEF CHANGE 113 g 97     ibuprofen (ADVIL/MOTRIN) 400 MG tablet TAKE 1 TABLET BY MOUTH TWICE DAILY AS NEEDED FOR PAIN 30 tablet 97     Multiple Vitamins-Minerals (TAB-A-DAWSON) TABS TAKE 1 TABLET BY MOUTH ONCE DAILY 28 tablet 97     MYRBETRIQ 25 MG 24 hr tablet TAKE 1 TABLET BY MOUTH ONCE DAILY 28 tablet 97     nystatin (MYCOSTATIN) 302850 UNIT/GM external powder APPLY IN BETWEEN THE TOES TWICE DAILY UNTIL HEALED;THEN TWICE DAILY AS NEEDED 30 g 97     polyethylene glycol (MIRALAX/GLYCOLAX) powder MIX 17GM OF POWDER IN 8OZ OF WATER UNTIL COMPLETELY DISSOLVED. DRINK SOLUTION DAILY AS NEEDED 527 g 98     Probiotic Product (PROBIOTIC DAILY) CAPS Take 1 capsule by mouth daily 28 capsule 98     senna-docusate (SENEXON-S) 8.6-50 MG tablet Take 2 tablets by mouth 2 times daily 62 tablet 12     vitamin C (ASCORBIC ACID) 500 MG tablet TAKE 1 TABLET BY MOUTH ONCE DAILY 28 tablet 97     VITAMIN D3 50 MCG (2000 UT) tablet 2 TABLETS (4000U) ORALLY DAILY 56 tablet 10     amoxicillin (AMOXIL) 250 MG chewable tablet CHEW AND SWALLOW 8 TABLETS (2000 MG) BY MOUTH 1 HOUR PRIOR TO DENTAL (Patient not taking: Reported on 9/27/2021) 8 tablet 97          Allergies   Allergen Reactions     Nitrofuran  Derivatives Visual Disturbance     Hallucinations     Tetracyclines GI Disturbance     Ampicillin GI Disturbance     Cefadroxil Muscle Pain (Myalgia)     Cephalexin Diarrhea and GI Disturbance     Levofloxacin Other (See Comments)     Phlebitis with IV infusion     Sulfa Drugs Nausea and Vomiting, GI Disturbance and Unknown     Reaction occurred as a child     Sulfasalazine Nausea and Vomiting       Past Medical History    Family History   Problem Relation Age of Onset     Hypertension Mother      Cerebrovascular Disease Mother      Coronary Artery Disease Father        ROS:  General: none  Head/Eyes: none  Ears/Nose/Throat: none at baseline  Cardiovascular: none  Respiratory: none  Gastrointestinal: none  Breast: none  Genitourinary: none  Sexual Function: none  Musculoskeletal: none at baseline, in wheel chair, no more back pain  Skin: none  Neurological: none  Mental Health: none, even mood  Endocrine: none    Clinic Measurements  Vitals: BP (!) 141/84   Pulse 69   BMI= There is no height or weight on file to calculate BMI.    Physical exam  Constitutional: dehabilitated appearing woman in no acute distress. Wheel chair bound  Psychological: appropriate mood.  Neck: No thyroidmegaly.    Cardiovascular: regular rate and rhythm, normal S1 and S2, no murmurs,  Respiratory: unable to hear at bases but upper fields clear to auscultation, no wheezes or crackles  Musculoskeletal: no edema  Motor strength diminished bilaterally UE and LE  - unable to walk  Spine: limited exam - can't stand   Skin: no concerning lesions, no jaundice.  Neurological: cranial nerves intact, weak voice,  no tremor       LAB  Vertebra; Fracture Assessment: NA   Dexa Scan: 8/2020    FRAX Assessment Tool: [N/A, on prolia   Risk Factors:   T scores, MS, inactivity, hx of fracture     ASSESSMENT:  This is a 77 yo PM female with MS who is wheelchair-bound and has OP by T scores and is at high risk for future fracture.  She was on Tymlos from Oct  2018-April 2020.  She started prolia Sept 2020 and has had 2 shots with no side effects.  We discussed calcium and vit D and she needs a BMP today and is due for prolia.     PLAN:  Patient should take 1200mg of calcium/day in divided doses  - food and all supplements -  and vitamin D3 2000IU/day. Don't get over 1500mg/day  Skip the calcium pill if you are getting 3 glasses of milk and a yogurt/day  Vitamin D 2000IU/day   Get DXA 8/2022 on same machine - at the [facility] on [address]  See me 1 year   Check on dose of calcium citrate and send in by my chart  Prolia today and in 6 months  - 3/2022 - you need a BMP prior to the prolia shot   Pt can't get prolia till 10/10 - per insurance.    35 minutes spent on the date of the encounter doing chart review, history and exam, documentation and further activities per the note.  5 minutes prechart, 15 minutes review history and limited exam by me with the patient , 10 minutes teaching with resident  10 minutes post charting.   [id number]}    Thank you for allowing me to participate in the care of your patient.  Please do not hesitate to call with questions or concerns.    Sincerely,    Erica Abrams MD, PhD  CC  Kimberlee Castro NP

## 2021-09-27 NOTE — PATIENT INSTRUCTIONS
Patient should take 1200mg of calcium/day in divided doses  - food and all supplements -  and vitamin D3 2000IU/day. Don't get over 1500mg/day  Skip the calcium pill if you are getting 3 glasses of milk and a yogurt/day  Vitamin D 2000IU/day   Get DXA 8/2022 on same machine - at the [facility] on [address]  See me 1 year   Check on dose of calcium citrate and send in by my chart  Prolia today and in 6 months  - 3/2022 - you need a BMP prior to the prolia shot

## 2021-09-27 NOTE — LETTER
9/27/2021       RE: Cristina Stevens  [address]   [address]  [facility] [address]     Dear Colleague,    Thank you for referring your patient, Cristina Stevens, to the [facility] [place] at [facility]. Please see a copy of my visit note below.    Cristina is a  76 year old female post menopausal] [GR1, P1] that presents today for osteoporosis follow up patient was last seen 8/2020 virtually. Pt was on  Tymlos for 18 months and stopped end of April 2020.  She has MS and wheel chair bound.  She is not immunosuppressed. Currently on prolia and due for an injection.    Referring Physician:Kimberlee Castro NP     HPI      Have you ever had a bone density test? Yes  Where = [facility]  When = 8/2020  Spine Tscore = -2.4  Left neck Tscore = NA  Total left hip Tscore = NA  Right neck Tscore = NA  Total Right hip Tscore = NA  Radius  T= -4.4 .   Have you received any x-ray dye or contrast in the last ten days? No  How many servings of dairy products do you consume per day? Around 6 servings Type: yogurt, 2 string cheeses, milk 3 times a day   Do you take a multi-vitamin daily? Yes  Do you take a vitamin D supplement? Yes 2000IU/day   Do you take a calcium supplement daily?  Calcium citrate 2/day    Do you take a supplement containing strontium? No  Are you exposed to natural sunlight at least 20 minutes three times a week? Yes    Social History   reports that she quit smoking about 36 years ago. She started smoking about 59 years ago. She has never used smokeless tobacco. She reports current alcohol use of about 1.0 standard drinks of alcohol per week. She reports that she does not use drugs.  Do you smoke cigarettes? No  Do you exercise? Yes. Details: PT three times a week, MS specific, No standing currently   Do you drink alcohol? No    Medication History  Have you used any of the following medications?   Actonel (Risedronate): No   Aredia  (Pamidronate): No   Boniva (Ibindronate): No   Didronil (Etidronate): No   Evista (Raloxifene): No   Fosamax (Alendronate): No   Forteo (Parathyroid hormone) injections: No   tymlos  - 10/2018-4-2020   HCTZ (Thiazide): No   Calcitonin nasal spray: No   Reclast or Zometa (Zolendronate): No   Prolia (Denosumab): yes started 10/2020  - has had 2 shots  - last one March 30.2021     Current Outpatient Medications   Medication Sig Dispense Refill     armodafinil (NUVIGIL) 150 MG TABS tablet 1 TABLET ORALLY EVERY MORNING 30 tablet 4     CALMOSEPTINE 0.44-20.6 % OINT ointment APPLY TO AFFECTED AREA(S) TOPICALLY TO BUTTOCKS AS NEEDED WITH BRIEF CHANGES 113 g 97     cephALEXin (KEFLEX) 250 MG capsule TAKE 1 CAPSULE BY MOUTH ONCE DAILY FOR PREVENTIVE 31 capsule 97     cetirizine (ZYRTEC) 10 MG tablet TAKE 1 TABLET BY MOUTH ONCE DAILY 28 tablet 97     CO2-Releasing (-TWO) SUPP Place rectally daily as needed       CRANBERRY CONCENTRATE 500 MG CAPS TAKE 1 CAPSULE BY MOUTH ONCE DAILY 28 capsule 97     DESITIN 13 % CREA APPLY TOPICALLY TO AREAS OF REDNESS OR RASH WITH EACH BRIEF CHANGE 113 g 97     ibuprofen (ADVIL/MOTRIN) 400 MG tablet TAKE 1 TABLET BY MOUTH TWICE DAILY AS NEEDED FOR PAIN 30 tablet 97     Multiple Vitamins-Minerals (TAB-A-DAWSON) TABS TAKE 1 TABLET BY MOUTH ONCE DAILY 28 tablet 97     MYRBETRIQ 25 MG 24 hr tablet TAKE 1 TABLET BY MOUTH ONCE DAILY 28 tablet 97     nystatin (MYCOSTATIN) 759525 UNIT/GM external powder APPLY IN BETWEEN THE TOES TWICE DAILY UNTIL HEALED;THEN TWICE DAILY AS NEEDED 30 g 97     polyethylene glycol (MIRALAX/GLYCOLAX) powder MIX 17GM OF POWDER IN 8OZ OF WATER UNTIL COMPLETELY DISSOLVED. DRINK SOLUTION DAILY AS NEEDED 527 g 98     Probiotic Product (PROBIOTIC DAILY) CAPS Take 1 capsule by mouth daily 28 capsule 98     senna-docusate (SENEXON-S) 8.6-50 MG tablet Take 2 tablets by mouth 2 times daily 62 tablet 12     vitamin C (ASCORBIC ACID) 500 MG tablet TAKE 1 TABLET BY MOUTH ONCE DAILY 28  tablet 97     VITAMIN D3 50 MCG (2000 UT) tablet 2 TABLETS (4000U) ORALLY DAILY 56 tablet 10     amoxicillin (AMOXIL) 250 MG chewable tablet CHEW AND SWALLOW 8 TABLETS (2000 MG) BY MOUTH 1 HOUR PRIOR TO DENTAL (Patient not taking: Reported on 9/27/2021) 8 tablet 97          Allergies   Allergen Reactions     Nitrofuran Derivatives Visual Disturbance     Hallucinations     Tetracyclines GI Disturbance     Ampicillin GI Disturbance     Cefadroxil Muscle Pain (Myalgia)     Cephalexin Diarrhea and GI Disturbance     Levofloxacin Other (See Comments)     Phlebitis with IV infusion     Sulfa Drugs Nausea and Vomiting, GI Disturbance and Unknown     Reaction occurred as a child     Sulfasalazine Nausea and Vomiting       Past Medical History    Family History   Problem Relation Age of Onset     Hypertension Mother      Cerebrovascular Disease Mother      Coronary Artery Disease Father        ROS:  General: none  Head/Eyes: none  Ears/Nose/Throat: none at baseline  Cardiovascular: none  Respiratory: none  Gastrointestinal: none  Breast: none  Genitourinary: none  Sexual Function: none  Musculoskeletal: none at baseline, in wheel chair, no more back pain  Skin: none  Neurological: none  Mental Health: none, even mood  Endocrine: none    Clinic Measurements  Vitals: BP (!) 141/84   Pulse 69   BMI= There is no height or weight on file to calculate BMI.    Physical exam  Constitutional: dehabilitated appearing woman in no acute distress. Wheel chair bound  Psychological: appropriate mood.  Neck: No thyroidmegaly.    Cardiovascular: regular rate and rhythm, normal S1 and S2, no murmurs,  Respiratory: unable to hear at bases but upper fields clear to auscultation, no wheezes or crackles  Musculoskeletal: no edema  Motor strength diminished bilaterally UE and LE  - unable to walk  Spine: limited exam - can't stand   Skin: no concerning lesions, no jaundice.  Neurological: cranial nerves intact, weak voice,  no tremor        LAB  Vertebra; Fracture Assessment: NA   Dexa Scan: 8/2020    FRAX Assessment Tool: [N/A, on prolia   Risk Factors:   T scores, MS, inactivity, hx of fracture     ASSESSMENT:  This is a 75 yo PM female with MS who is wheelchair-bound and has OP by T scores and is at high risk for future fracture.  She was on Tymlos from Oct 2018-April 2020.  She started prolia Sept 2020 and has had 2 shots with no side effects.  We discussed calcium and vit D and she needs a BMP today and is due for prolia.     PLAN:  Patient should take 1200mg of calcium/day in divided doses  - food and all supplements -  and vitamin D3 2000IU/day. Don't get over 1500mg/day  Skip the calcium pill if you are getting 3 glasses of milk and a yogurt/day  Vitamin D 2000IU/day   Get DXA 8/2022 on same machine - at the [facility] [address]  See me 1 year   Check on dose of calcium citrate and send in by my chart  Prolia today and in 6 months  - 3/2022 - you need a BMP prior to the prolia shot   Pt can't get prolia till 10/10 - per insurance.    35 minutes spent on the date of the encounter doing chart review, history and exam, documentation and further activities per the note.  5 minutes prechart, 15 minutes review history and limited exam by me with the patient , 10 minutes teaching with resident  10 minutes post charting.   [id number]}    Thank you for allowing me to participate in the care of your patient.  Please do not hesitate to call with questions or concerns.    Sincerely,    Erica Abrams MD, PhD  CC  Kimberlee Castro NP

## 2021-09-29 ENCOUNTER — TELEPHONE (OUTPATIENT)
Dept: OBGYN | Facility: CLINIC | Age: 76
End: 2021-09-29

## 2021-09-29 NOTE — TELEPHONE ENCOUNTER
Left message with care giver that ok to get prolia shot  Please call clinic to schedule a nurse visit.

## 2021-09-30 ENCOUNTER — ALLIED HEALTH/NURSE VISIT (OUTPATIENT)
Dept: OBGYN | Facility: CLINIC | Age: 76
End: 2021-09-30
Payer: COMMERCIAL

## 2021-09-30 DIAGNOSIS — M81.0 SENILE OSTEOPOROSIS: Primary | ICD-10-CM

## 2021-09-30 PROCEDURE — 96372 THER/PROPH/DIAG INJ SC/IM: CPT | Performed by: FAMILY MEDICINE

## 2021-09-30 PROCEDURE — 999N000103 HC STATISTIC NO CHARGE FACILITY FEE

## 2021-09-30 PROCEDURE — 250N000011 HC RX IP 250 OP 636: Performed by: FAMILY MEDICINE

## 2021-09-30 RX ADMIN — DENOSUMAB 60 MG: 60 INJECTION SUBCUTANEOUS at 10:02

## 2021-09-30 NOTE — NURSING NOTE
Chief Complaint   Patient presents with     Allied Health Visit     Prolia injection           Clinic Administered Medication Documentation    Administrations This Visit     denosumab (PROLIA) injection 60 mg     Admin Date  09/30/2021 Action  Given Dose  60 mg Route  Subcutaneous Site  Right Arm Administered By  Emilee Mercado LPN    Ordering Provider: Erica Abrams MD PhD    NDC: 86904-981-10    Lot#: [id number]    : AMGEN    Patient Supplied?: No                  Prolia Documentation    Prior to injection, verified patient identity using patient's name and date of birth. Medication was administered. Please see MAR and medication order for additional information. Patient instructed to remain in clinic for 15 minutes.    Indication: Prolia  (denosumab) is a prescription medicine used to treat osteoporosis in patients who:     Are at high risk for fracture, meaning patients who have had a fracture related to osteoporosis, or who have multiple risk factors for fracture.    Cannot use another osteoporosis medicine or other osteoporosis medicines did not work well.    The timeline for early/late injections would be 4 weeks early and any time after the 6 month peace. If a patient receives their injection late, then the subsequent injection would be 6 months from the date that they actually received the injection.    When was the last injection?    Was the last injection at least 6 months ago? Yes  Has the prior authorization been completed?  Yes  Is there an active order (within the past 365 days) in the chart?  Yes  Patient denied having dental work involving the bone in the past 6 months?  Yes  Patient denies a plan to dental work involving the bone in the next 6 months? Yes    The following steps were completed to comply with the REMS program for Prolia:    Confirms that patient received education from RN or provider via the Medication Guide and Patient Counseling Chart, including the serious risks  of Prolia  and the symptoms of each risk.    Told the patient to seek prompt medical attention if they have signs or symptoms of any of the serious risks, as described in the Medication Guide and Patient Counseling Chart that was reviewed between the patient and RN or LP.    Provided each patient a copy of the Medication Guide and Patient Brochure.      Was entire vial of medication used? Yes  Vial/Syringe: Single dose vial  Expiration Date:  10/2023  Was the medication not being billed by clinic? No       Emilee Mercado LPN

## 2021-10-14 ENCOUNTER — LAB (OUTPATIENT)
Dept: LAB | Facility: CLINIC | Age: 76
End: 2021-10-14
Payer: COMMERCIAL

## 2021-10-14 DIAGNOSIS — M81.0 SENILE OSTEOPOROSIS: ICD-10-CM

## 2021-10-14 DIAGNOSIS — M81.0 SENILE OSTEOPOROSIS: Primary | ICD-10-CM

## 2021-10-14 LAB
CALCIUM SERPL-MCNC: 9.7 MG/DL (ref 8.5–10.1)
MAGNESIUM SERPL-MCNC: 2.1 MG/DL (ref 1.6–2.3)
PHOSPHATE SERPL-MCNC: 2.9 MG/DL (ref 2.5–4.5)

## 2021-10-14 PROCEDURE — 84100 ASSAY OF PHOSPHORUS: CPT

## 2021-10-14 PROCEDURE — 82310 ASSAY OF CALCIUM: CPT

## 2021-10-14 PROCEDURE — 36415 COLL VENOUS BLD VENIPUNCTURE: CPT

## 2021-10-14 PROCEDURE — 83735 ASSAY OF MAGNESIUM: CPT

## 2021-11-07 DIAGNOSIS — G47.429 NARCOLEPSY DUE TO UNDERLYING CONDITION WITHOUT CATAPLEXY: ICD-10-CM

## 2021-11-08 RX ORDER — ARMODAFINIL 150 MG/1
TABLET ORAL
Qty: 30 TABLET | Refills: 4 | Status: SHIPPED | OUTPATIENT
Start: 2021-11-08 | End: 2022-04-04

## 2021-12-03 NOTE — LETTER
8/21/2020        [facility]  [address]  [address]  [facility] [address]        [facility]  [place] Medical Record Number: [id number]  Place of Service where encounter took place: [facility] [[id number]]  Chief Complaint   Patient presents with     RECHECK       HPI:    Cristina Stevens is a 75 year old (1945), who is being seen today for an episodic care visit. HPI information obtained from: facility chart records, facility staff, patient report and [facility] chart review.     Ms. Stevens was visited today while in her room, sitting in the wheelchair. States that she had a virtual appointment with her neurologist yesterday and baclofen was discontinued. She also reports that she has had constipation. Denies pain or discomfort. No shortness of breath. Catheter has been functioning well and has not had UTIs recently. She has been sleeping well.      Past Medical and Surgical History reviewed in Epic today.    MEDICATIONS:  Current Outpatient Medications   Medication Sig Dispense Refill     senna-docusate (SENEXON-S) 8.6-50 MG tablet Take 2 tablets by mouth 2 times daily 62 tablet 12     amoxicillin (AMOXIL) 250 MG chewable tablet CHEW AND SWALLOW 8 TABLETS (2000 MG) BY MOUTH 1 HOUR PRIOR TO DENTAL 8 tablet 97     armodafinil (NUVIGIL) 150 MG TABS tablet TAKE 1 TABLET BY MOUTH EVERY MORNING 30 tablet 4     Bacillus Coagulans-Inulin (PROBIOTIC FORMULA) 1-250 BILLION-MG CAPS TAKE 1 CAPSULE BY MOUTH ONCE DAILY 28 capsule 97     CALMOSEPTINE 0.44-20.6 % OINT ointment APPLY TO AFFECTED AREA(S) TOPICALLY TO BUTTOCKS AS NEEDED WITH BRIEF CHANGES 113 g 97     cephALEXin (KEFLEX) 250 MG capsule TAKE 1 CAPSULE BY MOUTH ONCE DAILY FOR PREVENTIVE 31 capsule 97     cetirizine (ZYRTEC) 10 MG tablet TAKE 1 TABLET BY MOUTH ONCE DAILY 31 tablet PRN     Cholecalciferol (VITAMIN D3) 50 MCG (2000 UT) CAPS TAKE TWO CAPSULES (4000  Initial assessment for Maria Nyhan, newly diagnosed with metastatic pancreatic cancer. Meeting with pt included pt, son, and . Patient was assessed for the following barriers:    Communication:       Yes    No  -Ability to read/write,understand [place]       [x]      []    -Ability to talk with family/children,friends about diagnosis     [x]      []    -Other:  Comments/Referrals/Services provided:  and son present during interview  Employment/Financial/Legal:     Yes    No  -Loss of employment/income         [x]      []    -Insurance coverage          [x]      []     -Needs help applying for Social Security/Disability/FMLA     []      [x]     -Help with Co-Pays for office visits         []      [x]    -Medication assistance/medical supplies or equipment     []      [x]    -Difficulty paying bills: utilities/housing       []      [x]    -Means to buy food                     [x]      []    -Legal issues           []      [x]    -Other:  Comments/Referrals/Services provided: Patient is retired. She has insurance with Medicare/Medicaid. No financial concerns at this time  Psychosocial        Yes    No  Housing:  -Homeless           []      [x]    -Extended care needs: long term care/home care/Hospice     []      [x]    -Other:  Comments/Referrals/Services provided: Patient lives with  in  Home. Transportation:       Yes    No  -Lack of vehicle or public transportation options      []      [x]    -Funds need for public transportation: bus/taxi      []      [x]    -Other:  Comments/Referrals/Services provided: No transportation concerns at this time.  drives patient to appointment. Support system:       Yes No  -Family members at home: spouse/significant other/children    [x]      []    -Has a support system         [x]      []    Other:  Comments/Referrals/Services provided: Patient lives with .  Has good family support,  is main UNITS) BY MOUTH ONCE DAILY 56 capsule 97     CO2-Releasing (-TWO) SUPP Place rectally daily as needed       CRANBERRY CONCENTRATE 500 MG CAPS TAKE 1 CAPSULE BY MOUTH ONCE DAILY 28 capsule 97     DESITIN 13 % CREA APPLY TOPICALLY TO AREAS OF REDNESS OR RASH WITH EACH BRIEF CHANGE 113 g 97     Diaper Rash Products (DESITIN) OINT Externally apply topically as needed       ibuprofen (ADVIL/MOTRIN) 400 MG tablet TAKE 1 TABLET BY MOUTH TWICE DAILY AS NEEDED FOR PAIN 30 tablet 11     insulin pen needle (B-D U/F) 31G X 5 MM miscellaneous USE DAILY WITH TYMLOS AS DIRECTED 100 each 4     Multiple Vitamins-Minerals (TAB-A-DAWSON) TABS TAKE 1 TABLET BY MOUTH ONCE DAILY 28 tablet 97     MYRBETRIQ 25 MG 24 hr tablet TAKE 1 TABLET BY MOUTH ONCE DAILY 28 tablet 97     nystatin (MYCOSTATIN) 281450 UNIT/GM external powder APPLY IN BETWEEN THE TOES TWICE DAILY UNTIL HEALED;THEN TWICE DAILY AS NEEDED 30 g 97     polyethylene glycol (MIRALAX/GLYCOLAX) powder MIX 17GM OF POWDER IN 8OZ OF WATER UNTIL COMPLETELY DISSOLVED. DRINK SOLUTION DAILY AS NEEDED 527 g 98     Probiotic Product (PROBIOTIC DAILY) CAPS Take 1 capsule by mouth daily 28 capsule 98     solifenacin (VESICARE) 10 MG tablet TAKE 1 TABLET BY MOUTH ONCE DAILY 28 tablet 97     TYMLOS 3120 MCG/1.56ML SOPN injection INJECT 0.04 ML (80MCG) SUBCUTANEOUSLY ONCE DAILY INTO ABDOMEN. ROTATE INJECTION SITES DAILY. 1.56 mL 5     vitamin C (ASCORBIC ACID) 500 MG tablet Take 1 tablet (500 mg) by mouth daily 31 tablet 98     REVIEW OF SYSTEMS:  4 point ROS including Respiratory, CV, GI and , other than that noted in the HPI,  is negative    Objective:  /74   Pulse 62   Temp 97.6  F (36.4  C)   SpO2 98%   Exam:  GENERAL APPEARANCE:  Alert, in no distress, pleasant, cooperative  RESP: no respiratory distress, patient is on room air  CV: . Edema none in bilateral lower extremities.  M/S: Gait and station, wheelchair bound, no tenderness or swelling of the joints; moves upper  support. Spirituality:        Yes No  -Spiritual issues          []      [x]    -Cultural concerns          []      [x]    -Other:  -Comments/Referrals/Services provided: No spiritual or cultural concerns today. Sexuality:        Yes No  -Body image concerns                     []      [x]    -Relationships/significant other issues        []      [x]    -Other:  Comments/Referrals/Services provided: No concerns expressed. Coping:        Yes    No  -Able to manage emotions         [x]      []    -Feeling fearful or anxious         [x]      []    -Interest in attending support groups        []      [x]    -Cancer related pain/control         [x]      []    -Other:  Comments/Referrals/Services provided: Pt is a bit fearful about new pancreatic diagnosis. Referred to . Tobacco dependency:      Yes No  -Currently smokes: cigarettes/cigars        []      [x]    -Uses smokeless tobacco         []      [x]    -Other:  Comments/Referrals/Services provided: Patient denies smoking. Education/review of Disease process and Management  Yes No  -Explanation of navigator role/contact information      [x]      []    New Patient Guide for Breast Cancer provided                      []     []         -Pathology/Staging          [x]      []    -Diagnostic tests          [x]      []    -Genetic testing needed         [x]      []    -Treatment options/plan: surgery/chemotherapy/radiation     [x]      []    -Mediport placement as needed                              [x]      []    -Tobacco cessation as needed        []      [x]    -Need for a second opinion         []      [x]    -Importance of bringing family/friends to medical appts     [x]      []   -Other:  Patient/family verbalized understanding of treatment plan: Yes. Dr. Damon Velazquez Discussed treatment plan with patient and family. Patient will start chemo next week with Folfox- first cycle is on 12/7/21. Chemo teaching is scheduled for 12/6/21.  Injectafer is scheduled extremities, minimal movement to LEs  SKIN:  Inspection and palpation of skin and subcutaneous tissue: skin warm, dry and intact without rashes but exam is limited  NEURO: no facial asymmetry, no speech deficits and able to follow directions  PSYCH:  insight and judgement intact, memory intact, affect and mood normal    Labs:   Reviewed in care everywhere.     ASSESSMENT/PLAN:  Multiple Sclerosis  Spastic quad  Wheelchair bound and uses a mechanical lift for transfers. Baclofen was discontinued by neurologist yesterday.   -- continues on armodafinil 150 mg daily   -- follow up with neurology as PM&R as per them   --follow up with Dr. Macario of neurology      Neurogenic bladder  Ruby catheter chronic  Requires ruby change outs every three weeks. Her PCA whom is also a nurse will now be changing out the catheters.  --follow up with urologist as she is likley due for botox bladder injection but will likely not be able to do so due to COVID pandemic.     --continue with vesicare 10 mg daily      Osteoporosis  Follows with Dr. Erica Abrams for osteo and was started on Tymos in 2018.  --continues with abaloparatide 3120 mcg injection daily.  -- continues on cholecalciferol 4000 units daily     Electronically signed by:  CAROL Dupont CNP         Sincerely,        CAROL Dupont CNP     for 21. US of lower extremities also ordered. Follow up appointment with Dr. Behzad Ham will be scheduled after chemo teaching. NCCN Distress Tool    Jacey Ny  was assessed for management of distress using the NCCN Distress Thermometer for Patients tool. Mild to moderate distress  Scorin-4        Yes    No    -Practical/physical issues       []      [x]      -Provide community resources      []      []      -Provide list of support groups      []      []      -Provide list of national organizations and websites               []      []      -Provide ongoing supportive care by oncology medical team        [x]      []                  Comments/Referrals/Services provided:  Yes. Score-10. Referred to -[facility]    Moderate to severe distress  Scorin or greater                   Yes    No    -Navigator consulted with MD      [x]      []     -Navigator follow up         [x]      []     -Spiritual concerns        []      [x]     -Emotional/family concerns       []      [x]     -Refer to /mental health professional/PCP   [x]      []      Comments/Referral/Services provided:  Yes. Referred to .

## 2021-12-10 ENCOUNTER — ASSISTED LIVING VISIT (OUTPATIENT)
Dept: GERIATRICS | Facility: CLINIC | Age: 76
End: 2021-12-10
Payer: COMMERCIAL

## 2021-12-10 VITALS
HEIGHT: 69 IN | TEMPERATURE: 92 F | HEART RATE: 71 BPM | BODY MASS INDEX: 35.55 KG/M2 | RESPIRATION RATE: 14 BRPM | WEIGHT: 240 LBS | OXYGEN SATURATION: 95 %

## 2021-12-10 DIAGNOSIS — Z00.00 ENCOUNTER FOR MEDICARE ANNUAL WELLNESS EXAM: ICD-10-CM

## 2021-12-10 DIAGNOSIS — G47.429 NARCOLEPSY DUE TO UNDERLYING CONDITION WITHOUT CATAPLEXY: ICD-10-CM

## 2021-12-10 DIAGNOSIS — G35 MS (MULTIPLE SCLEROSIS) (H): Primary | ICD-10-CM

## 2021-12-10 DIAGNOSIS — G82.50 SPASTIC QUADRIPLEGIA (H): ICD-10-CM

## 2021-12-10 DIAGNOSIS — N31.9 NEUROGENIC BLADDER: ICD-10-CM

## 2021-12-10 DIAGNOSIS — K59.09 CHRONIC CONSTIPATION: ICD-10-CM

## 2021-12-10 DIAGNOSIS — Z97.8 FOLEY CATHETER IN PLACE: ICD-10-CM

## 2021-12-10 ASSESSMENT — MIFFLIN-ST. JEOR: SCORE: 1648.01

## 2021-12-10 NOTE — PATIENT INSTRUCTIONS
Patient Education   Personalized Prevention Plan  You are due for the preventive services outlined below.  Your care team is available to assist you in scheduling these services.  If you have already completed any of these items, please share that information with your care team to update in your medical record.  Health Maintenance Due   Topic Date Due     URINE DRUG SCREEN  Never done     Mammogram  Never done     Colorectal Cancer Screening  Never done     Cholesterol Lab  Never done     Zoster (Shingles) Vaccine (2 of 3) 02/25/2009     Diptheria Tetanus Pertussis (DTAP/TDAP/TD) Vaccine (2 - Tdap) 04/19/2020     PHQ-2  01/01/2021     FALL RISK ASSESSMENT  11/23/2021     Annual Wellness Visit  11/23/2021

## 2021-12-10 NOTE — LETTER
12/10/2021        RE: Cristina Stevens  [address]  [place] [facility] [address]        [facility]    Chief Complaint   Patient presents with     Wellness Visit     HPI:  Cristina Stevens is a 76 year old  (1945), who is being seen today for an episodic care visit at: [facility] ([facility]) [[id number]]. Today's concern is:     Diagnoses       Codes Comments    MS (multiple sclerosis) (H)    -  Primary G35     Spastic quadriplegia (H)     G82.50     Neurogenic bladder     N31.9     Edwards catheter in place     Z97.8     Narcolepsy due to underlying condition without cataplexy     G47.429     Chronic constipation     K59.09     Encounter for Medicare annual wellness exam     Z00.00         Came to see Muna today as this NP is a [place] provider to [place] in the [place].  She was also due to a Medicare Annual Wellness Visit too.    Found Muna in her apartment and she was working at her desk.  Sat and talked about her diagnoses, medications and getting to know each other.  Muna has been doing fairly well.  She uses an electric wheelchair for her mobility and stays in it most of the day.  She is able to recline back so she can off load her bottom.       Allergies, and PMH/PSH reviewed in EPIC today.    Current Outpatient Medications   Medication Sig Dispense Refill     amoxicillin (AMOXIL) 250 MG chewable tablet CHEW AND SWALLOW 8 TABLETS (2000 MG) BY MOUTH 1 HOUR PRIOR TO DENTAL (Patient not taking: Reported on 9/27/2021) 8 tablet 97     armodafinil (NUVIGIL) 150 MG TABS tablet 1 TABLET ORALLY EVERY MORNING (DX: NARCOLEPSY) 30 tablet 4     CALMOSEPTINE 0.44-20.6 % OINT ointment APPLY TO AFFECTED AREA(S) TOPICALLY TO BUTTOCKS AS NEEDED WITH BRIEF CHANGES 113 g 97     cephALEXin (KEFLEX) 250 MG capsule TAKE 1 CAPSULE BY MOUTH ONCE DAILY FOR PREVENTIVE 31 capsule 97     cetirizine (ZYRTEC) 10 MG tablet TAKE 1 TABLET BY MOUTH ONCE DAILY 28 tablet 97      "CO2-Releasing (-TWO) SUPP Place rectally daily as needed       CRANBERRY CONCENTRATE 500 MG CAPS TAKE 1 CAPSULE BY MOUTH ONCE DAILY 28 capsule 97     DESITIN 13 % CREA APPLY TOPICALLY TO AREAS OF REDNESS OR RASH WITH EACH BRIEF CHANGE 113 g 97     ibuprofen (ADVIL/MOTRIN) 400 MG tablet TAKE 1 TABLET BY MOUTH TWICE DAILY AS NEEDED FOR PAIN 30 tablet 97     Multiple Vitamins-Minerals (TAB-A-DAWSON) TABS TAKE 1 TABLET BY MOUTH ONCE DAILY 28 tablet 97     MYRBETRIQ 25 MG 24 hr tablet TAKE 1 TABLET BY MOUTH ONCE DAILY 28 tablet 97     nystatin (MYCOSTATIN) 630494 UNIT/GM external powder APPLY IN BETWEEN THE TOES TWICE DAILY UNTIL HEALED;THEN TWICE DAILY AS NEEDED 30 g 97     polyethylene glycol (MIRALAX/GLYCOLAX) powder MIX 17GM OF POWDER IN 8OZ OF WATER UNTIL COMPLETELY DISSOLVED. DRINK SOLUTION DAILY AS NEEDED 527 g 98     Probiotic Product (PROBIOTIC DAILY) CAPS Take 1 capsule by mouth daily 28 capsule 98     senna-docusate (SENEXON-S) 8.6-50 MG tablet Take 2 tablets by mouth 2 times daily 62 tablet 12     vitamin C (ASCORBIC ACID) 500 MG tablet TAKE 1 TABLET BY MOUTH ONCE DAILY 28 tablet 97     VITAMIN D3 50 MCG (2000 UT) tablet 2 TABLETS (4000U) ORALLY DAILY 56 tablet 10       REVIEW OF SYSTEMS:  10 point ROS of systems including Constitutional, Eyes, Respiratory, Cardiovascular, Gastroenterology, Genitourinary, Integumentary, Musculoskeletal, Psychiatric were all negative except for pertinent positives noted in my HPI.    Objective:   Pulse 71   Temp (!) 92  F (33.3  C)   Resp 14   Ht 1.753 m (5' 9\")   Wt 108.9 kg (240 lb)   SpO2 95%   BMI 35.44 kg/m    GENERAL APPEARANCE:  Alert, in no distress, appears healthy, oriented, cooperative  EYES:  EOM normal, conjunctiva and lids normal, PERRL  RESP:  respiratory effort and palpation of chest normal, lungs clear to auscultation , no respiratory distress  CV:  Palpation and auscultation of heart done , regular rate and rhythm, no murmur, rub, or gallop, +1 edema " bilaterally and wears ADONIS stockings  ABDOMEN:  normal bowel sounds, soft, nontender, no hepatosplenomegaly or other masses, states she has a BM qod, no guarding or rebound  :    catheter present and draining clear yellow urine  M/S:   Gait and station abnormal non-ambulatory.  assist of 2 for transfers and use of a full mechanical lift.  SKIN:  pink / pale, warm and dry.  has an alternating air mattress on her bed as she is unable to turn self at night.    PSYCH:  oriented X 3, normal insight, judgement and memory, affect and mood normal      Most Recent 3 CBC's:Recent Labs   Lab Test 03/19/19  0904 03/19/19  0000 07/31/18  1024 07/24/18  0550 07/21/18  0655 07/20/18  0828 06/25/18  0625   WBC  --   --   --  8.5 12.6* 21.5* 7.2   HGB 12.6 12.6 11.4*  --  11.2* 12.6 12.1   MCV  --   --   --   --  84 87 92   PLT  --   --   --   --  401 485* 301     Most Recent 3 BMP's:Recent Labs   Lab Test 10/14/21  1520 09/27/21  1452 09/30/20  1023 11/18/19  1224 09/24/18  1705 07/20/18  0828   NA  --  137  --   --  136 135*   POTASSIUM  --  4.0  --   --  4.4 4.3   CHLORIDE  --  105  --   --  103 103   CO2  --  27  --   --  25 21*   BUN  --  27  --   --  14 15   CR  --  0.71  --   --  0.74 0.62   ANIONGAP  --  5  --   --  8 11   BRENDA 9.7 9.5 9.4   < > 9.4 9.6   GLC  --  81  --   --  101* 97    < > = values in this interval not displayed.     Most Recent TSH and T4:Recent Labs   Lab Test 03/19/19  0904   TSH 2.58       Assessment/Plan:  (G35) MS (multiple sclerosis) (H)  (primary encounter diagnosis)  (G82.50) Spastic quadriplegia (H)  Comment: no medications.  Does have a nice electric chair that allows her to be active in her environment given that her mobility is limited.  She feels her health is good right now.      (N31.9) Neurogenic bladder  (Z97.8) Edwards catheter in place  Comment: currently taking Keflex prophylactic 250mg po daily.  No changes.       (G47.429) Narcolepsy due to underlying condition without  "cataplexy  Comment: currently takes Nuvigil 150mg every AM.  Did well staying awake during visit today.      (K59.09) Chronic constipation  Comment: currently on Senna-S 2 tab twice a day and then Miralax PRN daily.  Muna had no concerns today about her constipation regimen.      (Z00.00) Encounter for Medicare annual wellness exam  Comment: - no medication changes.  Able to fully participate in answers below.  Muna was happy to meet this NP and would like at times just to have her door opened and say hello!      Orders:  No new orders.  No labs to be checked from this visit.    Electronically signed by: CAROL Rodríguez CNP     Annual Wellness Visit    Are you in the first 12 months of your Medicare Part B coverage?  No    Physical Health:    In general, how would you rate your overall physical health? fair    Outside of work, how many days during the week do you exercise?2-3 days/week    Outside of work, approximately how many minutes a day do you exercise?15-30 minutes    If you drink alcohol do you typically have >3 drinks per day or >7 drinks per week? No    Do you usually eat at least 4 servings of fruit and vegetables a day, include whole grains & fiber and avoid regularly eating high fat or \"junk\" foods? NO    Do you have any problems taking medications regularly? No    Do you have any side effects from medications? none    Needs assistance for the following daily activities: transportation, shopping, preparing meals, housework, bathing, laundry and taking medicine    Which of the following safety concerns are present in your home?  none identified     Hearing impairment: No    In the past 6 months, have you been bothered by leaking of urine? no    Mental Health:    In general, how would you rate your overall mental or emotional health? excellent      PHQ-2 Score:       PHQ-2 ( 1999 [employer]) 12/10/2021 11/27/2020   Q1: Little interest or pleasure in doing things 0 0   Q2: Feeling down, " depressed or hopeless 0 0   PHQ-2 Score 0 0   PHQ-2 Total Score (12-17 Years)- Positive if 3 or more points; Administer PHQ-A if positive - 0          Do you feel safe in your environment? Yes    Have you ever done Advance Care Planning? (For example, a Health Directive, POLST, or a discussion with a medical provider or your loved ones about your wishes)? Yes, advance care planning is on file.    Fall risk:  Fallen 2 or more times in the past year?: No  Any fall with injury in the past year?: No    Cognitive Screenin) Repeat 3 items (Leader, Season, Table)  Able to repeat the 3 words  2) Clock draw:  Waved her fingers to simulate what a clock is and positions of the time asked.  11:10  3) 3 item recall: Recalls 3 objects  Results: 3 items recalled: COGNITIVE IMPAIRMENT LESS LIKELY    Mini-CogTM Copyright S Esme. Licensed by the author for use in [facility]; reprinted with permission (shruti@[email or web address]). All rights reserved.      Do you have sleep apnea, excessive snoring or daytime drowsiness?: no    Current providers sharing in care for this patient include:   Patient Care Team:  Love Lucas APRN CNP as PCP - General (Geriatric Medicine)  Kiley Paul MD as MD (Internal Medicine)  Erica Abrams MD PhD as MD (Family Practice)  ([facility]), [facility] Erica Estrada MD PhD as Assigned PCP  Zulay Guido as Other (see comments)    CAROL Rodríguez CNP                      Sincerely,        CAROL Rodríguez CNP

## 2022-01-10 ENCOUNTER — TELEPHONE (OUTPATIENT)
Dept: GERIATRICS | Facility: CLINIC | Age: 77
End: 2022-01-10
Payer: COMMERCIAL

## 2022-01-10 NOTE — TELEPHONE ENCOUNTER
PA Initiation    Medication: armodafinil (NUVIGIL) 150 MG TABS tablet- INITIATED  Insurance Company: [employer] ([facility]) - Phone [phone] Fax [phone]  Pharmacy Filling the Rx: [facility] - [place], [place] - [address]  Filling Pharmacy Phone: [phone]  Filling Pharmacy Fax: [phone]  Start Date: 1/10/2022

## 2022-01-10 NOTE — TELEPHONE ENCOUNTER
Prior Authorization Approval    Authorization Effective Date: 1/10/2022  Authorization Expiration Date: 12/31/2022  Medication: armodafinil (NUVIGIL) 150 MG TABS tablet- APPROVED  Approved Dose/Quantity: 30 PER 30  Reference #: [id number]   Insurance Company: [employer] ([facility]) - Phone [phone] Fax [phone]  Expected CoPay:       CoPay Card Available:      Foundation Assistance Needed:    Which Pharmacy is filling the prescription (Not needed for infusion/clinic administered): [place], [place] - [address] [place] [address]  Pharmacy Notified: Yes, pharmacy has a paid claim  Patient Notified: Pharmacy will notify patient when ready          Central Prior Authorization Team  Phone: [phone]

## 2022-01-15 ENCOUNTER — HEALTH MAINTENANCE LETTER (OUTPATIENT)
Age: 77
End: 2022-01-15

## 2022-01-18 ENCOUNTER — TELEPHONE (OUTPATIENT)
Dept: GERIATRICS | Facility: CLINIC | Age: 77
End: 2022-01-18
Payer: COMMERCIAL

## 2022-01-18 NOTE — TELEPHONE ENCOUNTER
Yasmeen from [facility] is calling and requesting a verbal approve to send catheter supplies to patient overnight this evening.    Please call Gram directly at [phone] for approval.    Will forward to PCP for review.    Naomi Becker RN

## 2022-01-27 NOTE — CONFIDENTIAL NOTE
Did not order anything in this visit as the specifics are not there to order.  Muna orders privately extra catheter suppies and needs a script for them.    [facility] orders them through medicare and Muna orders extra out of pocket with [facility] who is affiliated with [facility]    Electronically signed by Love Lucas RN, CNP

## 2022-02-25 ENCOUNTER — ASSISTED LIVING VISIT (OUTPATIENT)
Dept: GERIATRICS | Facility: CLINIC | Age: 77
End: 2022-02-25
Payer: COMMERCIAL

## 2022-02-25 VITALS — BODY MASS INDEX: 35.44 KG/M2 | WEIGHT: 240 LBS | OXYGEN SATURATION: 98 % | TEMPERATURE: 92.1 F

## 2022-02-25 DIAGNOSIS — G70.9 NEUROMUSCULAR RESPIRATORY WEAKNESS (H): ICD-10-CM

## 2022-02-25 DIAGNOSIS — G35 MULTIPLE SCLEROSIS (H): Primary | ICD-10-CM

## 2022-02-25 DIAGNOSIS — G82.50 SPASTIC QUADRIPLEGIA (H): ICD-10-CM

## 2022-02-25 DIAGNOSIS — N31.9 NEUROGENIC BLADDER: ICD-10-CM

## 2022-02-25 DIAGNOSIS — J99 NEUROMUSCULAR RESPIRATORY WEAKNESS (H): ICD-10-CM

## 2022-02-25 DIAGNOSIS — Z97.8 FOLEY CATHETER IN PLACE: ICD-10-CM

## 2022-02-25 DIAGNOSIS — N39.0 FREQUENT UTI: ICD-10-CM

## 2022-02-25 NOTE — PROGRESS NOTES
[facility]    Chief Complaint   Patient presents with     RECHECK     HPI:  Cristina Stevens is a 76 year old  (1945), who is being seen today for an episodic care visit at: [facility] [place]) [[id number]]. Today's concern is:     Diagnoses       Codes Comments    Multiple sclerosis (H)    -  Primary G35     Spastic quadriplegia (H)     G82.50     Neuromuscular respiratory weakness (H)     G70.9, J99     Neurogenic bladder     N31.9     Edwards catheter in place     Z97.8     Frequent UTI     N39.0         Came to see Muna today due to receiving a notice from one of her catheter suppliers for updated information of her catheter use.    Found Muna up in her electric w/c eating a light lunch in her apartment.  She recognized this NP right away and happy to answer questions.  Overall she has been stable.  No acute respiratory issues.  Mainly stays to herself in her room or extended care area.  She is dependent on staff for most of her cares.    Allergies, and PMH/PSH reviewed in [facility] today.    Current Outpatient Medications   Medication Sig Dispense Refill     DULoxetine (CYMBALTA) 30 MG capsule Take 1 capsule (30 mg) by mouth daily       amoxicillin (AMOXIL) 250 MG chewable tablet CHEW AND SWALLOW 8 TABLETS (2000 MG) BY MOUTH 1 HOUR PRIOR TO DENTAL (Patient not taking: Reported on 9/27/2021) 8 tablet 97     armodafinil (NUVIGIL) 150 MG TABS tablet 1 TABLET ORALLY EVERY MORNING (DX: NARCOLEPSY) 30 tablet 4     CALMOSEPTINE 0.44-20.6 % OINT ointment APPLY TO AFFECTED AREA(S) TOPICALLY TO BUTTOCKS AS NEEDED WITH BRIEF CHANGES 113 g 97     cephALEXin (KEFLEX) 250 MG capsule TAKE 1 CAPSULE BY MOUTH ONCE DAILY FOR PREVENTIVE 31 capsule 97     cetirizine (ZYRTEC) 10 MG tablet TAKE 1 TABLET BY MOUTH ONCE DAILY 28 tablet 97     CO2-Releasing (-TWO) SUPP Place rectally daily as needed       CRANBERRY CONCENTRATE 500 MG CAPS TAKE 1 CAPSULE BY MOUTH ONCE DAILY 28  capsule 97     DESITIN 13 % CREA APPLY TOPICALLY TO AREAS OF REDNESS OR RASH WITH EACH BRIEF CHANGE 113 g 97     ibuprofen (ADVIL/MOTRIN) 400 MG tablet TAKE 1 TABLET BY MOUTH TWICE DAILY AS NEEDED FOR PAIN 30 tablet 97     Multiple Vitamins-Minerals (TAB-A-DAWSON) TABS TAKE 1 TABLET BY MOUTH ONCE DAILY 28 tablet 97     MYRBETRIQ 25 MG 24 hr tablet TAKE 1 TABLET BY MOUTH ONCE DAILY 28 tablet 97     nystatin (MYCOSTATIN) 234959 UNIT/GM external powder APPLY IN BETWEEN THE TOES TWICE DAILY UNTIL HEALED;THEN TWICE DAILY AS NEEDED 30 g 97     polyethylene glycol (MIRALAX/GLYCOLAX) powder MIX 17GM OF POWDER IN 8OZ OF WATER UNTIL COMPLETELY DISSOLVED. DRINK SOLUTION DAILY AS NEEDED 527 g 98     Probiotic Product (PROBIOTIC DAILY) CAPS Take 1 capsule by mouth daily 28 capsule 98     senna-docusate (SENEXON-S) 8.6-50 MG tablet Take 2 tablets by mouth 2 times daily 62 tablet 12     vitamin C (ASCORBIC ACID) 500 MG tablet TAKE 1 TABLET BY MOUTH ONCE DAILY 28 tablet 97     VITAMIN D3 50 MCG (2000 UT) tablet 2 TABLETS (4000U) ORALLY DAILY 56 tablet 10       REVIEW OF SYSTEMS:  4 point ROS including Respiratory, CV, GI and , other than that noted in the HPI,  is negative    Objective:   Temp (!) 92.1  F (33.4  C)   Wt 108.9 kg (240 lb)   SpO2 98%   BMI 35.44 kg/m    GENERAL APPEARANCE:  Alert, in no distress, appears healthy, oriented, cooperative  EYES:  EOM normal, conjunctiva and lids normal, PERRL  RESP:  respiratory effort and palpation of chest normal, no respiratory distress, diminished breath sounds in the bases.  does not use oxygen.  no adventitous sounds heard  CV:  Palpation and auscultation of heart done , regular rate and rhythm, no murmur, rub, or gallop, no edema  ABDOMEN:  normal bowel sounds, soft, nontender, no hepatosplenomegaly or other masses  M/S:   Gait and station abnormal lift used for transfers, electric w/c for moility.  has a hospital bed to assist with positioning.    SKIN:  pink, warm and  dry.  NEURO:   Cranial nerves 2-12 are normal tested and grossly at patient's baseline  PSYCH:  oriented X 3, normal insight, judgement and memory, affect and mood normal   Catheter is patent and draining pale urine.        Most Recent 3 CBC's:Recent Labs   Lab Test 03/19/19  0904 03/19/19  0000 07/31/18  1024 07/24/18  0550 07/21/18  0655 07/20/18  0828 06/25/18  0625   WBC  --   --   --  8.5 12.6* 21.5* 7.2   HGB 12.6 12.6 11.4*  --  11.2* 12.6 12.1   MCV  --   --   --   --  84 87 92   PLT  --   --   --   --  401 485* 301     Most Recent 3 BMP's:Recent Labs   Lab Test 10/14/21  1520 09/27/21  1452 09/30/20  1023 11/18/19  1224 09/24/18  1705 07/20/18  0828   NA  --  137  --   --  136 135*   POTASSIUM  --  4.0  --   --  4.4 4.3   CHLORIDE  --  105  --   --  103 103   CO2  --  27  --   --  25 21*   BUN  --  27  --   --  14 15   CR  --  0.71  --   --  0.74 0.62   ANIONGAP  --  5  --   --  8 11   BRENDA 9.7 9.5 9.4   < > 9.4 9.6   GLC  --  81  --   --  101* 97    < > = values in this interval not displayed.       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  Is followed by [facility].  Just had a telephone update with that doctor and Muna was taken off Gabapentin as she felt she was not sleeping well.  Was started on Cymbalta 30mg po daily.    Muna denied not sleeping well.    On 1/4/22, Muna had a botox injection to help with her spasticity.    Muna continues to be safe in her environment.  She had no concerns today.  chooses her own activities.  No recent injuries.  Plan: DULoxetine (CYMBALTA) 30 MG capsule    (N31.9) Neurogenic bladder  (Z97.8) Edwards catheter in place  (N39.0) Frequent UTI  Comment: when doing a search in Epic, Muna has had a catheter  since 2016 or before that time.  Muna states she has the chronic catheter due to her MS.  Due to frequent UTIs her Urologist decided to have the catheter get changed every 3 weeks and that has helped keep her UTIs at bay.  Remains on Myrbetric 25mg po daily for overactive bladder.  Muna does have someone change the catheter every 3 weeks.  Having a catheter is indefinite.  No trial void.      Orders:  No new orders.  Will send this note to [address] [place] [address] to comply with their need for documentation.      Electronically signed by: CAROL Rodríguez CNP

## 2022-02-25 NOTE — LETTER
2/25/2022        RE: Cristina Stevens  [address]  [address]  [facility] [address]        [facility] GERIATRICS    Chief Complaint   Patient presents with     RECHECK     HPI:  Cristina Stevens is a 76 year old  (1945), who is being seen today for an episodic care visit at: [facility] ([facility]) [[id number]]. Today's concern is:     Diagnoses       Codes Comments    Multiple sclerosis (H)    -  Primary G35     Spastic quadriplegia (H)     G82.50     Neuromuscular respiratory weakness (H)     G70.9, J99     Neurogenic bladder     N31.9     Edwards catheter in place     Z97.8     Frequent UTI     N39.0         Came to see Muna today due to receiving a notice from one of her catheter suppliers for updated information of her catheter use.    Found Muna up in her electric w/c eating a light lunch in her apartment.  She recognized this NP right away and happy to answer questions.  Overall she has been stable.  No acute respiratory issues.  Mainly stays to herself in her room or extended care area.  She is dependent on staff for most of her cares.    Allergies, and PMH/PSH reviewed in EPIC today.    Current Outpatient Medications   Medication Sig Dispense Refill     DULoxetine (CYMBALTA) 30 MG capsule Take 1 capsule (30 mg) by mouth daily       amoxicillin (AMOXIL) 250 MG chewable tablet CHEW AND SWALLOW 8 TABLETS (2000 MG) BY MOUTH 1 HOUR PRIOR TO DENTAL (Patient not taking: Reported on 9/27/2021) 8 tablet 97     armodafinil (NUVIGIL) 150 MG TABS tablet 1 TABLET ORALLY EVERY MORNING (DX: NARCOLEPSY) 30 tablet 4     CALMOSEPTINE 0.44-20.6 % OINT ointment APPLY TO AFFECTED AREA(S) TOPICALLY TO BUTTOCKS AS NEEDED WITH BRIEF CHANGES 113 g 97     cephALEXin (KEFLEX) 250 MG capsule TAKE 1 CAPSULE BY MOUTH ONCE DAILY FOR PREVENTIVE 31 capsule 97     cetirizine (ZYRTEC) 10 MG tablet TAKE 1 TABLET BY MOUTH ONCE DAILY 28 tablet 97     CO2-Releasing (-TWO) SUPP Place  rectally daily as needed       CRANBERRY CONCENTRATE 500 MG CAPS TAKE 1 CAPSULE BY MOUTH ONCE DAILY 28 capsule 97     DESITIN 13 % CREA APPLY TOPICALLY TO AREAS OF REDNESS OR RASH WITH EACH BRIEF CHANGE 113 g 97     ibuprofen (ADVIL/MOTRIN) 400 MG tablet TAKE 1 TABLET BY MOUTH TWICE DAILY AS NEEDED FOR PAIN 30 tablet 97     Multiple Vitamins-Minerals (TAB-A-DAWSON) TABS TAKE 1 TABLET BY MOUTH ONCE DAILY 28 tablet 97     MYRBETRIQ 25 MG 24 hr tablet TAKE 1 TABLET BY MOUTH ONCE DAILY 28 tablet 97     nystatin (MYCOSTATIN) 613078 UNIT/GM external powder APPLY IN BETWEEN THE TOES TWICE DAILY UNTIL HEALED;THEN TWICE DAILY AS NEEDED 30 g 97     polyethylene glycol (MIRALAX/GLYCOLAX) powder MIX 17GM OF POWDER IN 8OZ OF WATER UNTIL COMPLETELY DISSOLVED. DRINK SOLUTION DAILY AS NEEDED 527 g 98     Probiotic Product (PROBIOTIC DAILY) CAPS Take 1 capsule by mouth daily 28 capsule 98     senna-docusate (SENEXON-S) 8.6-50 MG tablet Take 2 tablets by mouth 2 times daily 62 tablet 12     vitamin C (ASCORBIC ACID) 500 MG tablet TAKE 1 TABLET BY MOUTH ONCE DAILY 28 tablet 97     VITAMIN D3 50 MCG (2000 UT) tablet 2 TABLETS (4000U) ORALLY DAILY 56 tablet 10       REVIEW OF SYSTEMS:  4 point ROS including Respiratory, CV, GI and , other than that noted in the HPI,  is negative    Objective:   Temp (!) 92.1  F (33.4  C)   Wt 108.9 kg (240 lb)   SpO2 98%   BMI 35.44 kg/m    GENERAL APPEARANCE:  Alert, in no distress, appears healthy, oriented, cooperative  EYES:  EOM normal, conjunctiva and lids normal, PERRL  RESP:  respiratory effort and palpation of chest normal, no respiratory distress, diminished breath sounds in the bases.  does not use oxygen.  no adventitous sounds heard  CV:  Palpation and auscultation of heart done , regular rate and rhythm, no murmur, rub, or gallop, no edema  ABDOMEN:  normal bowel sounds, soft, nontender, no hepatosplenomegaly or other masses  M/S:   Gait and station abnormal lift used for transfers,  electric w/c for [employer].  has a hospital bed to assist with positioning.    SKIN:  pink, warm and dry.  NEURO:   Cranial nerves 2-12 are normal tested and grossly at patient's baseline  PSYCH:  oriented X 3, normal insight, judgement and memory, affect and mood normal   Catheter is patent and draining pale urine.        Most Recent 3 CBC's:Recent Labs   Lab Test 03/19/19  0904 03/19/19  0000 07/31/18  1024 07/24/18  0550 07/21/18  0655 07/20/18  0828 06/25/18  0625   WBC  --   --   --  8.5 12.6* 21.5* 7.2   HGB 12.6 12.6 11.4*  --  11.2* 12.6 12.1   MCV  --   --   --   --  84 87 92   PLT  --   --   --   --  401 485* 301     Most Recent 3 BMP's:Recent Labs   Lab Test 10/14/21  1520 09/27/21  1452 09/30/20  1023 11/18/19  1224 09/24/18  1705 07/20/18  0828   NA  --  137  --   --  136 135*   POTASSIUM  --  4.0  --   --  4.4 4.3   CHLORIDE  --  105  --   --  103 103   CO2  --  27  --   --  25 21*   BUN  --  27  --   --  14 15   CR  --  0.71  --   --  0.74 0.62   ANIONGAP  --  5  --   --  8 11   BRENDA 9.7 9.5 9.4   < > 9.4 9.6   GLC  --  81  --   --  101* 97    < > = values in this interval not displayed.       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  Is followed by [facility].  Just had a telephone update with that doctor and Muna was taken off Gabapentin as she felt she was not sleeping well.  Was started on Cymbalta 30mg po daily.    Muna denied not sleeping well.    On 1/4/22, Muna had a botox injection to help with her spasticity.    Muna continues to be safe in her environment.  She had no concerns today.  chooses her own activities.  No recent injuries.  Plan: DULoxetine (CYMBALTA) 30 MG capsule    (N31.9) Neurogenic bladder  (Z97.8) Edwards catheter  in place  (N39.0) Frequent UTI  Comment: when doing a search in Epic, Muna has had a catheter since 2016 or before that time.  Muna states she has the chronic catheter due to her MS.  Due to frequent UTIs her Urologist decided to have the catheter get changed every 3 weeks and that has helped keep her UTIs at bay.  Remains on Myrbetric 25mg po daily for overactive bladder.  Muna does have someone change the catheter every 3 weeks.  Having a catheter is indefinite.  No trial void.      Orders:  No new orders.  Will send this note to [address] [place] [address] to comply with their need for documentation.      Electronically signed by: CAROL Rodríguez CNP             Sincerely,        CAROL Rodríguez CNP

## 2022-02-26 RX ORDER — DULOXETIN HYDROCHLORIDE 30 MG/1
30 CAPSULE, DELAYED RELEASE ORAL DAILY
Start: 2022-02-26 | End: 2022-03-30

## 2022-03-02 PROBLEM — Z13.820 SCREENING FOR OSTEOPOROSIS: Status: ACTIVE | Noted: 2018-07-10

## 2022-03-02 PROBLEM — N32.81 OAB (OVERACTIVE BLADDER): Status: ACTIVE | Noted: 2017-06-02

## 2022-03-15 DIAGNOSIS — N32.81 OVERACTIVE BLADDER: ICD-10-CM

## 2022-03-15 DIAGNOSIS — Z78.9 TAKES DIETARY SUPPLEMENTS: ICD-10-CM

## 2022-03-15 DIAGNOSIS — Z29.89 NEED FOR PROPHYLAXIS AGAINST URINARY TRACT INFECTION: ICD-10-CM

## 2022-03-15 DIAGNOSIS — T78.40XS ALLERGY, SEQUELA: ICD-10-CM

## 2022-03-15 RX ORDER — MIRABEGRON 25 MG/1
25 TABLET, FILM COATED, EXTENDED RELEASE ORAL DAILY
Qty: 28 TABLET | Refills: 97 | Status: SHIPPED | OUTPATIENT
Start: 2022-03-15 | End: 2022-11-14

## 2022-03-15 RX ORDER — MULTIVITAMIN WITH FOLIC ACID 400 MCG
1 TABLET ORAL DAILY
Qty: 28 TABLET | Refills: 97 | Status: SHIPPED | OUTPATIENT
Start: 2022-03-15

## 2022-03-15 RX ORDER — ZINC OXIDE 13 %
1 CREAM (GRAM) TOPICAL DAILY
Qty: 28 CAPSULE | Refills: 98 | Status: SHIPPED | OUTPATIENT
Start: 2022-03-15

## 2022-03-15 RX ORDER — EVENING PRIMROSE OIL 500 MG
CAPSULE ORAL
Qty: 28 CAPSULE | Refills: 97 | Status: SHIPPED | OUTPATIENT
Start: 2022-03-15 | End: 2022-09-06

## 2022-03-15 RX ORDER — CETIRIZINE HYDROCHLORIDE 10 MG/1
10 TABLET ORAL DAILY
Qty: 28 TABLET | Refills: 97 | Status: SHIPPED | OUTPATIENT
Start: 2022-03-15 | End: 2023-09-05

## 2022-03-18 ENCOUNTER — ASSISTED LIVING VISIT (OUTPATIENT)
Dept: GERIATRICS | Facility: CLINIC | Age: 77
End: 2022-03-18
Payer: COMMERCIAL

## 2022-03-18 VITALS — BODY MASS INDEX: 35.55 KG/M2 | OXYGEN SATURATION: 96 % | HEIGHT: 69 IN | WEIGHT: 240 LBS

## 2022-03-18 DIAGNOSIS — I96 GANGRENE OF TOE (H): Primary | ICD-10-CM

## 2022-03-18 DIAGNOSIS — G35 MS (MULTIPLE SCLEROSIS) (H): ICD-10-CM

## 2022-03-18 DIAGNOSIS — Z48.00 ENCOUNTER FOR CHANGE OF DRESSING: ICD-10-CM

## 2022-03-18 DIAGNOSIS — R03.0 ELEVATED BLOOD PRESSURE READING WITHOUT DIAGNOSIS OF HYPERTENSION: ICD-10-CM

## 2022-03-18 NOTE — LETTER
3/18/2022        RE: Cristina Stevens  [address]  [address]  [facility] [address]        [facility] GERIATRICS    Chief Complaint   Patient presents with     RECHECK     HPI:  Cristina Stevens is a 76 year old  (1945), who is being seen today for an episodic care visit at: [facility] ([facility]) [[id number]]. Today's concern is:     Diagnoses       Codes Comments    Gangrene of toe (H)    -  Primary I96     MS (multiple sclerosis) (H)     G35     Elevated blood pressure reading without diagnosis of hypertension     R03.0         Came to see Muna today as she waiting for this NP to do the dressing change to her left foot and 5th digit being gangrene.    Last week she of her left foot to this nurse practitioner.  Gently attempted to let her know that the toe did not look good and would need to be seen by a vascular surgeon.  She knew that this nurse practitioner was can be coming on Friday but she has a care attendant that she pays privately that came over and took her to [facility] emergency room due to complaints of increased pain in that foot..  Muna has been monitoring this foot over last 4 months and feels it has gotten worse with time.  This is the first time hearing about it this week.    Emergency room did put her on Augmentin for cellulitis and then prescribed a treatment for her until she has her visit with podiatry.  Today The wound this nurse practitioner to change the dressing in order to take a good look at the foot.  Per her request to take a picture of her toe on her phone just to compare it from the day before.    Allergies, and PMH/PSH reviewed in EPIC today.    Current Outpatient Medications   Medication Sig Dispense Refill     amoxicillin (AMOXIL) 250 MG chewable tablet CHEW AND SWALLOW 8 TABLETS (2000 MG) BY MOUTH 1 HOUR PRIOR TO DENTAL (Patient not taking: Reported on 9/27/2021) 8 tablet 97     armodafinil (NUVIGIL) 150 MG TABS  "tablet 1 TABLET ORALLY EVERY MORNING (DX: NARCOLEPSY) 30 tablet 4     CALMOSEPTINE 0.44-20.6 % OINT ointment APPLY TO AFFECTED AREA(S) TOPICALLY TO BUTTOCKS AS NEEDED WITH BRIEF CHANGES 113 g 97     cephALEXin (KEFLEX) 250 MG capsule TAKE 1 CAPSULE BY MOUTH ONCE DAILY FOR PREVENTIVE 31 capsule 97     cetirizine (ZYRTEC) 10 MG tablet Take 1 tablet (10 mg) by mouth daily 28 tablet 97     CO2-Releasing (-TWO) SUPP Place rectally daily as needed       Cranberry (CRANBERRY CONCENTRATE) 500 MG CAPS TAKE 1 CAPSULE BY MOUTH ONCE DAILY 28 capsule 97     DESITIN 13 % CREA APPLY TOPICALLY TO AREAS OF REDNESS OR RASH WITH EACH BRIEF CHANGE 113 g 97     ibuprofen (ADVIL/MOTRIN) 400 MG tablet TAKE 1 TABLET BY MOUTH TWICE DAILY AS NEEDED FOR PAIN 30 tablet 97     mirabegron (MYRBETRIQ) 25 MG 24 hr tablet Take 1 tablet (25 mg) by mouth daily 28 tablet 97     Multiple Vitamin (TAB-A-DAWSON) TABS Take 1 tablet by mouth daily 28 tablet 97     nystatin (MYCOSTATIN) 459488 UNIT/GM external powder APPLY IN BETWEEN THE TOES TWICE DAILY UNTIL HEALED;THEN TWICE DAILY AS NEEDED 30 g 97     polyethylene glycol (MIRALAX/GLYCOLAX) powder MIX 17GM OF POWDER IN 8OZ OF WATER UNTIL COMPLETELY DISSOLVED. DRINK SOLUTION DAILY AS NEEDED 527 g 98     Probiotic Product (PROBIOTIC DAILY) CAPS Take 1 capsule by mouth daily 28 capsule 98     senna-docusate (SENEXON-S) 8.6-50 MG tablet Take 2 tablets by mouth 2 times daily 62 tablet 12     vitamin C (ASCORBIC ACID) 500 MG tablet TAKE 1 TABLET BY MOUTH ONCE DAILY 28 tablet 97     VITAMIN D3 50 MCG (2000 UT) tablet 2 TABLETS (4000U) ORALLY DAILY 56 tablet 10       REVIEW OF SYSTEMS:  4 point ROS including Respiratory, CV, GI and , other than that noted in the HPI,  is negative    Objective:   Ht 1.753 m (5' 9\")   Wt 108.9 kg (240 lb)   SpO2 96%   BMI 35.44 kg/m    GENERAL APPEARANCE:  Alert, in no distress, oriented, cooperative  EYES:  EOM normal, conjunctiva and lids normal, PERRL  RESP:  respiratory " effort and palpation of chest normal, lungs clear to auscultation , no respiratory distress  CV:  Palpation and auscultation of heart done , regular rate and rhythm, no murmur, rub, or gallop, tops of her feet have +2 edema and more puffy.    ABDOMEN:  normal bowel sounds, soft, nontender, no hepatosplenomegaly or other masses, no guarding or rebound  :    catheter in place draining clear yellow urine  M/S:   Gait and station abnormal non-ambulatory, uses an electric w/c for mobility, has a hospital bed for positioning  SKIN:  5th digit left foot is 1/2 gangrene.  lower 1/2 is pink.  no odor.  inbetween the 4th and 5th was open skin and now drier as idodine is being used daily, gauze between the 4th and 5th didgit and wrap with kerlix then ACE banadage.  foot is then is a blue soft boot to keep pressure off  PSYCH:  oriented X 3, normal insight, judgement and memory, affect and mood normal    C-Reactive Protein 0.0 - 0.7 mg/dL 1.1 High        WBC 3.5 - 10.5 x10(9)/L 7.1    RBC 3.90 - 5.03 x10(12)/L 5.08 High     Hemoglobin 12.0 - 15.5 g/dL 14.1    HCT 34.9 - 44.5 % 44.8 High     MCV 80.0 - 100.0 fL 88.2    MCH 27.6 - 33.3 pg 27.8    MCHC 31.5 - 35.2 g/dL 31.5    RDW 11.9 - 15.5 % 14.7    Platelets 150 - 450 x10(9)/L 240    Automated NRBC <=0 /100 WBC 0    Neutrophil Absolute 1.7 - 7.0 10(9)/L 4.0    Lymphocyte Absolute 1.0 - 4.8 10(9)/L 1.8    Monocytes Absolute 0.2 - 0.9 10(9)/L 0.8    Eosinophil Absolute 0.0 - 0.5 10(9)/L 0.3    Basophil Absolute 0.0 - 0.3 10(9)/L 0.1    Immature Gran % 0.0 - 0.5 % 0.0        Assessment/Plan:  (I96) Gangrene of toe (H)  (primary encounter diagnosis)  (Z48.00)  Encounter forChange of dressing  Comment: Today did change The dressing on the left foot.  Iodine is used for the fifth toe and between the fourth and fifth toe due to an open area between the toes and then her fifth toe is gangrene residential distal portion.  Put gauze in between the fourth and fifth digit wrapped the foot  and a Kerlix and then Ace wrap from her toes to below her knee.  She does have a blue boot that she rests her foot and that prevents from pressure.  Her appointment with the podiatrist will be until 28 March.  She stated she will have a do the dressing change in the morning.  Did talk to her about possibility of amputation of the fifth digit.  But do believe that probably do some testing of her circulation.    (G35) MS (multiple sclerosis) (H)  Comment: With these multiple sclerosis, she has very limited with her mobility as she is not very.  Easy for her to bump into things and not realize it and that is probably what happened with her toe.  Continue to give her support in any way that she asks.  Muna asked for her duloxetine to be discontinued.  She stated her neurologist put her on the duloxetine thinking she had neuropathy, but they states she does not.  DC duloxetine after EOD dosing for 3 doses    (R03.0) Elevated blood pressure reading without diagnosis of hypertension  Comment: Likely ask for her blood pressure to be done today and so did take it.  It was 142/78.  Feel she is getting stressed out and do not see any real blood pressures on her chart  Will give an order today to check blood pressure 3 times a week for 1 month.  If they are elevated staff can update this nurse practitioner so medicines can be addressed.      Orders:  1.  B/P checks 3x week for 4 weeks - HTN, possible  2.  Decrease Cymbalta to 30mg po every other day for 3 doses then discontinue      Electronically signed by: CAROL Rodríguez CNP             Sincerely,        CAROL Rodríguez CNP

## 2022-03-18 NOTE — PROGRESS NOTES
[facility] GERIATRICS    Chief Complaint   Patient presents with     RECHECK     HPI:  Cristina Stevens is a 76 year old  (1945), who is being seen today for an episodic care visit at: [facility] ([facility]) [[id number]]. Today's concern is:     Diagnoses       Codes Comments    Gangrene of toe (H)    -  Primary I96     MS (multiple sclerosis) (H)     G35     Elevated blood pressure reading without diagnosis of hypertension     R03.0         Came to see Muna today as she waiting for this NP to do the dressing change to her left foot and 5th digit being gangrene.    Last week she of her left foot to this nurse practitioner.  Gently attempted to let her know that the toe did not look good and would need to be seen by a vascular surgeon.  She knew that this nurse practitioner was can be coming on Friday but she has a care attendant that she pays privately that came over and took her to [facility] emergency room due to complaints of increased pain in that foot..  Muna has been monitoring this foot over last 4 months and feels it has gotten worse with time.  This is the first time hearing about it this week.    Emergency room did put her on Augmentin for cellulitis and then prescribed a treatment for her until she has her visit with podiatry.  Today The wound this nurse practitioner to change the dressing in order to take a good look at the foot.  Per her request to take a picture of her toe on her phone just to compare it from the day before.    Allergies, and PMH/PSH reviewed in EPIC today.    Current Outpatient Medications   Medication Sig Dispense Refill     amoxicillin (AMOXIL) 250 MG chewable tablet CHEW AND SWALLOW 8 TABLETS (2000 MG) BY MOUTH 1 HOUR PRIOR TO DENTAL (Patient not taking: Reported on 9/27/2021) 8 tablet 97     armodafinil (NUVIGIL) 150 MG TABS tablet 1 TABLET ORALLY EVERY MORNING (DX: NARCOLEPSY) 30 tablet 4     CALMOSEPTINE 0.44-20.6 % OINT  "ointment APPLY TO AFFECTED AREA(S) TOPICALLY TO BUTTOCKS AS NEEDED WITH BRIEF CHANGES 113 g 97     cephALEXin (KEFLEX) 250 MG capsule TAKE 1 CAPSULE BY MOUTH ONCE DAILY FOR PREVENTIVE 31 capsule 97     cetirizine (ZYRTEC) 10 MG tablet Take 1 tablet (10 mg) by mouth daily 28 tablet 97     CO2-Releasing (-TWO) SUPP Place rectally daily as needed       Cranberry (CRANBERRY CONCENTRATE) 500 MG CAPS TAKE 1 CAPSULE BY MOUTH ONCE DAILY 28 capsule 97     DESITIN 13 % CREA APPLY TOPICALLY TO AREAS OF REDNESS OR RASH WITH EACH BRIEF CHANGE 113 g 97     ibuprofen (ADVIL/MOTRIN) 400 MG tablet TAKE 1 TABLET BY MOUTH TWICE DAILY AS NEEDED FOR PAIN 30 tablet 97     mirabegron (MYRBETRIQ) 25 MG 24 hr tablet Take 1 tablet (25 mg) by mouth daily 28 tablet 97     Multiple Vitamin (TAB-A-DAWSON) TABS Take 1 tablet by mouth daily 28 tablet 97     nystatin (MYCOSTATIN) 941433 UNIT/GM external powder APPLY IN BETWEEN THE TOES TWICE DAILY UNTIL HEALED;THEN TWICE DAILY AS NEEDED 30 g 97     polyethylene glycol (MIRALAX/GLYCOLAX) powder MIX 17GM OF POWDER IN 8OZ OF WATER UNTIL COMPLETELY DISSOLVED. DRINK SOLUTION DAILY AS NEEDED 527 g 98     Probiotic Product (PROBIOTIC DAILY) CAPS Take 1 capsule by mouth daily 28 capsule 98     senna-docusate (SENEXON-S) 8.6-50 MG tablet Take 2 tablets by mouth 2 times daily 62 tablet 12     vitamin C (ASCORBIC ACID) 500 MG tablet TAKE 1 TABLET BY MOUTH ONCE DAILY 28 tablet 97     VITAMIN D3 50 MCG (2000 UT) tablet 2 TABLETS (4000U) ORALLY DAILY 56 tablet 10       REVIEW OF SYSTEMS:  4 point ROS including Respiratory, CV, GI and , other than that noted in the HPI,  is negative    Objective:   Ht 1.753 m (5' 9\")   Wt 108.9 kg (240 lb)   SpO2 96%   BMI 35.44 kg/m    GENERAL APPEARANCE:  Alert, in no distress, oriented, cooperative  EYES:  EOM normal, conjunctiva and lids normal, PERRL  RESP:  respiratory effort and palpation of chest normal, lungs clear to auscultation , no respiratory distress  CV:  " Palpation and auscultation of heart done , regular rate and rhythm, no murmur, rub, or gallop, tops of her feet have +2 edema and more puffy.    ABDOMEN:  normal bowel sounds, soft, nontender, no hepatosplenomegaly or other masses, no guarding or rebound  :    catheter in place draining clear yellow urine  M/S:   Gait and station abnormal non-ambulatory, uses an electric w/c for mobility, has a hospital bed for positioning  SKIN:  5th digit left foot is 1/2 gangrene.  lower 1/2 is pink.  no odor.  inbetween the 4th and 5th was open skin and now drier as idodine is being used daily, gauze between the 4th and 5th didgit and wrap with kerlix then ACE banadage.  foot is then is a blue soft boot to keep pressure off  PSYCH:  oriented X 3, normal insight, judgement and memory, affect and mood normal    C-Reactive Protein 0.0 - 0.7 mg/dL 1.1 High        WBC 3.5 - 10.5 x10(9)/L 7.1    RBC 3.90 - 5.03 x10(12)/L 5.08 High     Hemoglobin 12.0 - 15.5 g/dL 14.1    HCT 34.9 - 44.5 % 44.8 High     MCV 80.0 - 100.0 fL 88.2    MCH 27.6 - 33.3 pg 27.8    MCHC 31.5 - 35.2 g/dL 31.5    RDW 11.9 - 15.5 % 14.7    Platelets 150 - 450 x10(9)/L 240    Automated NRBC <=0 /100 WBC 0    Neutrophil Absolute 1.7 - 7.0 10(9)/L 4.0    Lymphocyte Absolute 1.0 - 4.8 10(9)/L 1.8    Monocytes Absolute 0.2 - 0.9 10(9)/L 0.8    Eosinophil Absolute 0.0 - 0.5 10(9)/L 0.3    Basophil Absolute 0.0 - 0.3 10(9)/L 0.1    Immature Gran % 0.0 - 0.5 % 0.0        Assessment/Plan:  (I96) Gangrene of toe (H)  (primary encounter diagnosis)  (Z48.00)  Encounter forChange of dressing  Comment: Today did change The dressing on the left foot.  Iodine is used for the fifth toe and between the fourth and fifth toe due to an open area between the toes and then her fifth toe is gangrene residential distal portion.  Put gauze in between the fourth and fifth digit wrapped the foot and a Kerlix and then Ace wrap from her toes to below her knee.  She does have a blue boot that she  rests her foot and that prevents from pressure.  Her appointment with the podiatrist will be until 28 March.  She stated she will have a do the dressing change in the morning.  Did talk to her about possibility of amputation of the fifth digit.  But do believe that probably do some testing of her circulation.    (G35) MS (multiple sclerosis) (H)  Comment: With these multiple sclerosis, she has very limited with her mobility as she is not very.  Easy for her to bump into things and not realize it and that is probably what happened with her toe.  Continue to give her support in any way that she asks.  Muna asked for her duloxetine to be discontinued.  She stated her neurologist put her on the duloxetine thinking she had neuropathy, but they states she does not.  DC duloxetine after EOD dosing for 3 doses    (R03.0) Elevated blood pressure reading without diagnosis of hypertension  Comment: Likely ask for her blood pressure to be done today and so did take it.  It was 142/78.  Feel she is getting stressed out and do not see any real blood pressures on her chart  Will give an order today to check blood pressure 3 times a week for 1 month.  If they are elevated staff can update this nurse practitioner so medicines can be addressed.      Orders:  1.  B/P checks 3x week for 4 weeks - HTN, possible  2.  Decrease Cymbalta to 30mg po every other day for 3 doses then discontinue      Electronically signed by: CAROL Rodríguez CNP

## 2022-03-25 ENCOUNTER — ASSISTED LIVING VISIT (OUTPATIENT)
Dept: GERIATRICS | Facility: CLINIC | Age: 77
End: 2022-03-25
Payer: COMMERCIAL

## 2022-03-25 VITALS
DIASTOLIC BLOOD PRESSURE: 94 MMHG | SYSTOLIC BLOOD PRESSURE: 152 MMHG | HEIGHT: 69 IN | BODY MASS INDEX: 35.55 KG/M2 | WEIGHT: 240 LBS | OXYGEN SATURATION: 95 % | HEART RATE: 97 BPM

## 2022-03-25 DIAGNOSIS — Z97.8 FOLEY CATHETER IN PLACE: ICD-10-CM

## 2022-03-25 DIAGNOSIS — N31.9 NEUROGENIC BLADDER: ICD-10-CM

## 2022-03-25 DIAGNOSIS — I73.9 PAD (PERIPHERAL ARTERY DISEASE) (H): ICD-10-CM

## 2022-03-25 DIAGNOSIS — G35 MS (MULTIPLE SCLEROSIS) (H): ICD-10-CM

## 2022-03-25 DIAGNOSIS — I96 GANGRENE OF TOE (H): Primary | ICD-10-CM

## 2022-03-25 NOTE — LETTER
3/25/2022        RE: Cristina Stevens  [address]  [address]  [facility] [address]        [facility]ERIATR[facility]    Chief Complaint   Patient presents with     RECHECK     HPI:  Cristina Stevens is a 76 year old  (1945), who is being seen today for an episodic care visit at: [facility] ([facility]) [[id number]]. Today's concern is:     Diagnoses       Codes Comments    Gangrene of toe (H)    -  Primary I96     PAD (peripheral artery disease) (H)     I73.9     MS (multiple sclerosis) (H)     G35     Neurogenic bladder     N31.9     Edwards catheter in place     Z97.8         Came to see Muna with intention of changing the bandage to her left foot where her 5th digit is partially gangrene.  She has not had her appointment yet for evaluation of the toe yet by podiatry.  This will occur next week.    The RA had done the dressing to the foot but able to see the toe still.  Did not undo the ace wraps and removed the gauze as able to see her toes clearly.      Muna and this NP spoke a little about what most likely will occur.  Suspect they will want testing on her blood flow and then evaluation of a plan.  Warned her the worse case senario would be to remove the toe.      Allergies, and PMH/PSH reviewed in [facility] today.    Current Outpatient Medications   Medication Sig Dispense Refill     amoxicillin (AMOXIL) 250 MG chewable tablet CHEW AND SWALLOW 8 TABLETS (2000 MG) BY MOUTH 1 HOUR PRIOR TO DENTAL (Patient not taking: Reported on 9/27/2021) 8 tablet 97     armodafinil (NUVIGIL) 150 MG TABS tablet 1 TABLET ORALLY EVERY MORNING (DX:NARCOLEPSY) 30 tablet 4     CALMOSEPTINE 0.44-20.6 % OINT ointment APPLY TO AFFECTED AREA(S) TOPICALLY TO BUTTOCKS AS NEEDED WITH BRIEF CHANGES 113 g 97     cephALEXin (KEFLEX) 250 MG capsule TAKE 1 CAPSULE BY MOUTH ONCE DAILY FOR PREVENTIVE 31 capsule 97     cetirizine (ZYRTEC) 10 MG tablet Take 1 tablet (10 mg) by mouth daily 28 tablet  "97     CO2-Releasing (-TWO) SUPP Place rectally daily as needed       Cranberry (CRANBERRY CONCENTRATE) 500 MG CAPS TAKE 1 CAPSULE BY MOUTH ONCE DAILY 28 capsule 97     DESITIN 13 % CREA APPLY TOPICALLY TO AREAS OF REDNESS OR RASH WITH EACH BRIEF CHANGE 113 g 97     ibuprofen (ADVIL/MOTRIN) 400 MG tablet TAKE 1 TABLET BY MOUTH TWICE DAILY AS NEEDED FOR PAIN 30 tablet 97     mirabegron (MYRBETRIQ) 25 MG 24 hr tablet Take 1 tablet (25 mg) by mouth daily 28 tablet 97     Multiple Vitamin (TAB-A-DAWSON) TABS Take 1 tablet by mouth daily 28 tablet 97     nystatin (MYCOSTATIN) 365476 UNIT/GM external powder APPLY IN BETWEEN THE TOES TWICE DAILY UNTIL HEALED;THEN TWICE DAILY AS NEEDED 30 g 97     polyethylene glycol (MIRALAX/GLYCOLAX) powder MIX 17GM OF POWDER IN 8OZ OF WATER UNTIL COMPLETELY DISSOLVED. DRINK SOLUTION DAILY AS NEEDED 527 g 98     Probiotic Product (PROBIOTIC DAILY) CAPS Take 1 capsule by mouth daily 28 capsule 98     senna-docusate (SENEXON-S) 8.6-50 MG tablet Take 2 tablets by mouth 2 times daily 62 tablet 12     vitamin C (ASCORBIC ACID) 500 MG tablet TAKE 1 TABLET BY MOUTH ONCE DAILY 28 tablet 97     VITAMIN D3 50 MCG (2000 UT) tablet 2 TABLETS (4000U) ORALLY DAILY 56 tablet 10       REVIEW OF SYSTEMS:  4 point ROS including Respiratory, CV, GI and , other than that noted in the HPI,  is negative    Objective:   BP (!) 152/94   Pulse 97   Ht 1.753 m (5' 9\")   Wt 108.9 kg (240 lb)   SpO2 95%   BMI 35.44 kg/m    GENERAL APPEARANCE:  Alert, in no distress, oriented, cooperative  EYES:  EOM normal, conjunctiva and lids normal  RESP:  respiratory effort and palpation of chest normal, lungs clear to auscultation , no respiratory distress  CV:  Palpation and auscultation of heart done , regular rate and rhythm, no murmur, rub, or gallop, feet have swelling present +2.  toes are swollen except the gangrene toe.  ABDOMEN:  normal bowel sounds, soft, nontender, no hepatosplenomegaly or other masses, no " guarding or rebound  M/S:   Gait and station abnormal non ambulatory, lift used for transfers, has an electric wheelchair that is used  SKIN:  5th digit shows gangrene on more of the proximal end of the toe.  betadine is used to the toe and between the 4th and 5th digit.  gauze between those toes.  wrap with kerlix and then ace wrap  PSYCH:  oriented X 3, normal insight, judgement and memory, affect and mood normal  Does have a catheter placed that drains out pale urine into a leg bag.  No odor.  No sediment.    WBC 3.5 - 10.5 x10(9)/L 7.1    RBC 3.90 - 5.03 x10(12)/L 5.08 High     Hemoglobin 12.0 - 15.5 g/dL 14.1    HCT 34.9 - 44.5 % 44.8 High     MCV 80.0 - 100.0 fL 88.2    MCH 27.6 - 33.3 pg 27.8    MCHC 31.5 - 35.2 g/dL 31.5    RDW 11.9 - 15.5 % 14.7    Platelets 150 - 450 x10(9)/L 240    Automated NRBC <=0 /100 WBC 0    Neutrophil Absolute 1.7 - 7.0 10(9)/L 4.0    Lymphocyte Absolute 1.0 - 4.8 10(9)/L 1.8    Monocytes Absolute 0.2 - 0.9 10(9)/L 0.8    Eosinophil Absolute 0.0 - 0.5 10(9)/L 0.3    Basophil Absolute 0.0 - 0.3 10(9)/L 0.1    Immature Gran % 0.0 - 0.5 % 0.0          Assessment/Plan:  (I96) Gangrene of toe (H)  (primary encounter diagnosis)  (I73.9) PAD (peripheral artery disease) (H)  Comment: - going next week for initial evaluation after her trip to the ED at [facility].  Toe appears stable at this time.  She like pictures to be taken so she can compare.  She is praying that this will resolve without a surgery.  Continue with the treatment to the 5th digit daily with the betadine and gauze.    Legs are less swollen in AM compared to after lunch or evening.      (G35) MS (multiple sclerosis) (H)  Comment: main reason why she needs assisted living.  She seems happy here.  Will be moving to the apartment next door early April.  One bedroom versus her studio right now.    No changes to medications.      (N31.9) Neurogenic bladder  (Z97.8) Edwards catheter in place  Comment: continues with the  catheter long term due to her neurogenic bladder.  She orders extra supplies herself beyond what medication buys.  These supplies come from [address] [place] [address].    No signs of infection.  Muna has the catheter changed every 3 weeks as this helps keep away her hx of cystitis.        Orders:  No new orders    Electronically signed by: CAROL Rodríguez CNP             Sincerely,        CAROL Rodríguez CNP

## 2022-03-29 ENCOUNTER — LAB REQUISITION (OUTPATIENT)
Dept: LAB | Facility: CLINIC | Age: 77
End: 2022-03-29
Payer: COMMERCIAL

## 2022-03-29 DIAGNOSIS — Z11.59 ENCOUNTER FOR SCREENING FOR OTHER VIRAL DISEASES: ICD-10-CM

## 2022-03-29 PROCEDURE — U0005 INFEC AGEN DETEC AMPLI PROBE: HCPCS | Mod: ORL | Performed by: NURSE PRACTITIONER

## 2022-03-30 LAB — SARS-COV-2 RNA RESP QL NAA+PROBE: NEGATIVE

## 2022-04-04 DIAGNOSIS — G47.429 NARCOLEPSY DUE TO UNDERLYING CONDITION WITHOUT CATAPLEXY: ICD-10-CM

## 2022-04-04 RX ORDER — ARMODAFINIL 150 MG/1
TABLET ORAL
Qty: 30 TABLET | Refills: 4 | Status: SHIPPED | OUTPATIENT
Start: 2022-04-04 | End: 2022-09-30

## 2022-04-07 ENCOUNTER — LAB REQUISITION (OUTPATIENT)
Dept: LAB | Facility: CLINIC | Age: 77
End: 2022-04-07
Payer: COMMERCIAL

## 2022-04-07 DIAGNOSIS — Z11.59 ENCOUNTER FOR SCREENING FOR OTHER VIRAL DISEASES: ICD-10-CM

## 2022-04-08 ENCOUNTER — ASSISTED LIVING VISIT (OUTPATIENT)
Dept: GERIATRICS | Facility: CLINIC | Age: 77
End: 2022-04-08
Payer: COMMERCIAL

## 2022-04-08 VITALS
HEIGHT: 69 IN | BODY MASS INDEX: 35.55 KG/M2 | DIASTOLIC BLOOD PRESSURE: 93 MMHG | SYSTOLIC BLOOD PRESSURE: 139 MMHG | TEMPERATURE: 97.7 F | OXYGEN SATURATION: 97 % | WEIGHT: 240 LBS

## 2022-04-08 DIAGNOSIS — I10 PRIMARY HYPERTENSION: Primary | ICD-10-CM

## 2022-04-08 DIAGNOSIS — I96 GANGRENE OF TOE (H): ICD-10-CM

## 2022-04-08 DIAGNOSIS — E66.01 MORBID OBESITY (H): ICD-10-CM

## 2022-04-08 DIAGNOSIS — G35 MS (MULTIPLE SCLEROSIS) (H): ICD-10-CM

## 2022-04-08 DIAGNOSIS — I73.9 PAD (PERIPHERAL ARTERY DISEASE) (H): ICD-10-CM

## 2022-04-08 PROCEDURE — U0005 INFEC AGEN DETEC AMPLI PROBE: HCPCS | Mod: ORL | Performed by: NURSE PRACTITIONER

## 2022-04-08 NOTE — LETTER
4/8/2022        RE: Cristina Stevens  [address]  [address]  [facility] [address]        [facility] GERIATR[facility]    Chief Complaint   Patient presents with     RECHECK     HPI:  Cristina Stevens is a 76 year old  (1945), who is being seen today for an episodic care visit at: [facility] ([facility]) [[id number]]. Today's concern is:     Diagnoses       Codes Comments    Primary hypertension    -  Primary I10     Morbid obesity (H)     E66.01     PAD (peripheral artery disease) (H)     I73.9     Gangrene of toe (H)     I96     MS (multiple sclerosis) (H)     G35         Came to see Muna today for a follow up of her Hypertension and then what the latest was for her gangrene toe on left foot.    When Muna went to see the podiatrist, her B/P was very high that she went right away to a [facility] clinic to see a provider about her B/P.  This NP had staff checking her B/P's 3x week for a month to get some idea of what her pressures were.  Well muna returned with Lisinopril 10mg daily.  Staff updated this NP about the medication and so kindly was trying to exchange a conversation with Muna about why she went to the clinic for her B/P's.    Since started on the Lisinopril, looking at her pressures now for evaluation.  Staff have been taking them but her RA takes them and Muna has been documented on her phone as she was going back on Tuesday to see the prescriber of the Lisinopril.  Great opportunity to bring this up again of why going there when this NP is considered her primary provider.  Muna did not look at this NP as her primary as she did not go to a clinic to see anyone.  She has been seeing a NP here for some time, but want not putting the pieces together.  Explained the roll again and she would prefer not to go to the clinic for the follow up.  Now she knows how this NP works.      Also this week per request, documentation sent in again to [employer] so her supplies can continue to be sent to her.      Allergies, and PMH/PSH reviewed in [facility] today.    Current Outpatient Medications   Medication Sig Dispense Refill     amLODIPine (NORVASC) 2.5 MG tablet Take 1 tablet (2.5 mg) by mouth every evening       amoxicillin (AMOXIL) 250 MG chewable tablet CHEW AND SWALLOW 8 TABLETS (2000 MG) BY MOUTH 1 HOUR PRIOR TO DENTAL 8 tablet 97     lisinopril (ZESTRIL) 10 MG tablet Take 1 tablet (10 mg) by mouth in the morning.       armodafinil (NUVIGIL) 150 MG TABS tablet 1 TABLET ORALLY EVERY MORNING (DX:NARCOLEPSY) 30 tablet 4     CALMOSEPTINE 0.44-20.6 % OINT ointment APPLY TO AFFECTED AREA(S) TOPICALLY TO BUTTOCKS AS NEEDED WITH BRIEF CHANGES 113 g 97     cephALEXin (KEFLEX) 250 MG capsule TAKE 1 CAPSULE BY MOUTH ONCE DAILY FOR PREVENTIVE 31 capsule 97     cetirizine (ZYRTEC) 10 MG tablet Take 1 tablet (10 mg) by mouth daily 28 tablet 97     CO2-Releasing (-TWO) SUPP Place rectally daily as needed       Cranberry (CRANBERRY CONCENTRATE) 500 MG CAPS TAKE 1 CAPSULE BY MOUTH ONCE DAILY 28 capsule 97     DESITIN 13 % CREA APPLY TOPICALLY TO AREAS OF REDNESS OR RASH WITH EACH BRIEF CHANGE 113 g 97     ibuprofen (ADVIL/MOTRIN) 400 MG tablet TAKE 1 TABLET BY MOUTH TWICE DAILY AS NEEDED FOR PAIN 30 tablet 97     mirabegron (MYRBETRIQ) 25 MG 24 hr tablet Take 1 tablet (25 mg) by mouth daily 28 tablet 97     Multiple Vitamin (TAB-A-DAWSON) TABS Take 1 tablet by mouth daily 28 tablet 97     nystatin (MYCOSTATIN) 270439 UNIT/GM external powder APPLY IN BETWEEN THE TOES TWICE DAILY UNTIL HEALED;THEN TWICE DAILY AS NEEDED 30 g 97     polyethylene glycol (MIRALAX/GLYCOLAX) powder MIX 17GM OF POWDER IN 8OZ OF WATER UNTIL COMPLETELY DISSOLVED. DRINK SOLUTION DAILY AS NEEDED 527 g 98     Probiotic Product (PROBIOTIC DAILY) CAPS Take 1 capsule by mouth daily 28 capsule 98     senna-docusate (SENEXON-S) 8.6-50 MG tablet Take 2 tablets by mouth 2 times daily 62 tablet 12     vitamin C  "(ASCORBIC ACID) 500 MG tablet TAKE 1 TABLET BY MOUTH ONCE DAILY 28 tablet 97     VITAMIN D3 50 MCG (2000 UT) tablet 2 TABLETS (4000U) ORALLY DAILY 56 tablet 10       REVIEW OF SYSTEMS:  4 point ROS including Respiratory, CV, GI and , other than that noted in the HPI,  is negative    Objective:   BP (!) 139/93   Temp 97.7  F (36.5  C)   Ht 1.753 m (5' 9\")   Wt 108.9 kg (240 lb)   SpO2 97%   BMI 35.44 kg/m    New apartment and loving her new space.  Alert and oriented x3.    Removed the dressing of the left foot so this NP and NP student could see it.  Foot sits in a soft blue boot to prevent pressure.  Had to move her leg bag from her left leg in order to remove the ace wrap.  Took pictures of the toe on Muna's camera per request.    Due to being morning time, minimal edema but toes are dusky purplish.  The 5th digit is \"clean\" where the distal end of the toe is normal skin color.  The proximal part of the toe shows the gangrene.  Hard to know if improved.  No pain.    Muna showed the doppler study the did on her legs and by the time they got to the left distal side of leg, the waveforms were barely present.      Heart rate regular and strong.  Today's B/P on a manual cuff was 148/86.  Others are ranging in the 130-140's/high 90's.    Lungs are clear.  Abdomen is round and soft to touch.    Recent labs in [facility] reviewed by me today.     Assessment/Plan:  (I10) Primary hypertension  (primary encounter diagnosis)  Comment: spoke about if her pressures are done with an electric or manual cuff.  Sometimes there can be such a difference.  Do feel her diastolic number needs to come down.  Discussed adding amlodipine 2.5mg in the evening and leaving the lisinopril 10mg in the AM.  Muna is in agreement and then she stated she will cancel the Tuesday appointment at the clinic.  Plan: lisinopril (ZESTRIL) 10 MG tablet, amLODIPine         (NORVASC) 2.5 MG tablet    (E66.01) Morbid obesity (H)  Comment: BMI 35%.  She is " aware of how her weight affects her HTN and circulatory issues.  Her intake is more than her energy output since she is wheelchair bound and non-ambulatory.  Muna is not a big snack person.    Discussed impaction of weight on her health.      (I73.9) PAD (peripheral artery disease) (H)  (I96) Gangrene of toe (H)  Comment: Muna goes on Monday to have the next plan of care presented to her for her toe.  She stated she will let this NP know what happens.  Did let her know that if surgery is involved, this NP can do a H and P.      (G35) MS (multiple sclerosis) (H)  Comment: stable with no recent changes.  No particular medications.  Does have a hx with Neurology.      Orders:  1.  Amlodipine 2.5mg po every evening - HTN  2.  In one week start daily B/P's for 4 days to monitor medication.  Can discontinue the other B/P check orders.    Electronically signed by: CAROL Rodríguez CNP               Sincerely,        CAROL Rodríguez CNP

## 2022-04-08 NOTE — PROGRESS NOTES
[facility] GERIATRICS    Chief Complaint   Patient presents with     RECHECK     HPI:  Cristina Stevens is a 76 year old  (1945), who is being seen today for an episodic care visit at: [facility] ([facility]) [[id number]]. Today's concern is:     Diagnoses       Codes Comments    Primary hypertension    -  Primary I10     Morbid obesity (H)     E66.01     PAD (peripheral artery disease) (H)     I73.9     Gangrene of toe (H)     I96     MS (multiple sclerosis) (H)     G35         Came to see Muna today for a follow up of her Hypertension and then what the latest was for her gangrene toe on left foot.    When Muna went to see the podiatrist, her B/P was very high that she went right away to a [facility] clinic to see a provider about her B/P.  This NP had staff checking her B/P's 3x week for a month to get some idea of what her pressures were.  Well muna returned with Lisinopril 10mg daily.  Staff updated this NP about the medication and so kindly was trying to exchange a conversation with Muna about why she went to the clinic for her B/P's.    Since started on the Lisinopril, looking at her pressures now for evaluation.  Staff have been taking them but her RA takes them and Muna has been documented on her phone as she was going back on Tuesday to see the prescriber of the Lisinopril.  Great opportunity to bring this up again of why going there when this NP is considered her primary provider.  Muna did not look at this NP as her primary as she did not go to a clinic to see anyone.  She has been seeing a NP here for some time, but want not putting the pieces together.  Explained the roll again and she would prefer not to go to the clinic for the follow up.  Now she knows how this NP works.      Also this week per request, documentation sent in again to [address] [place] [address] so her supplies can continue to be sent to her.      Allergies, and PMH/PSH reviewed in [facility]  Referral signed  Form signed and in my box  Please fax today.    Current Outpatient Medications   Medication Sig Dispense Refill     amLODIPine (NORVASC) 2.5 MG tablet Take 1 tablet (2.5 mg) by mouth every evening       amoxicillin (AMOXIL) 250 MG chewable tablet CHEW AND SWALLOW 8 TABLETS (2000 MG) BY MOUTH 1 HOUR PRIOR TO DENTAL 8 tablet 97     lisinopril (ZESTRIL) 10 MG tablet Take 1 tablet (10 mg) by mouth in the morning.       armodafinil (NUVIGIL) 150 MG TABS tablet 1 TABLET ORALLY EVERY MORNING (DX:NARCOLEPSY) 30 tablet 4     CALMOSEPTINE 0.44-20.6 % OINT ointment APPLY TO AFFECTED AREA(S) TOPICALLY TO BUTTOCKS AS NEEDED WITH BRIEF CHANGES 113 g 97     cephALEXin (KEFLEX) 250 MG capsule TAKE 1 CAPSULE BY MOUTH ONCE DAILY FOR PREVENTIVE 31 capsule 97     cetirizine (ZYRTEC) 10 MG tablet Take 1 tablet (10 mg) by mouth daily 28 tablet 97     CO2-Releasing (-TWO) SUPP Place rectally daily as needed       Cranberry (CRANBERRY CONCENTRATE) 500 MG CAPS TAKE 1 CAPSULE BY MOUTH ONCE DAILY 28 capsule 97     DESITIN 13 % CREA APPLY TOPICALLY TO AREAS OF REDNESS OR RASH WITH EACH BRIEF CHANGE 113 g 97     ibuprofen (ADVIL/MOTRIN) 400 MG tablet TAKE 1 TABLET BY MOUTH TWICE DAILY AS NEEDED FOR PAIN 30 tablet 97     mirabegron (MYRBETRIQ) 25 MG 24 hr tablet Take 1 tablet (25 mg) by mouth daily 28 tablet 97     Multiple Vitamin (TAB-A-DAWSON) TABS Take 1 tablet by mouth daily 28 tablet 97     nystatin (MYCOSTATIN) 175212 UNIT/GM external powder APPLY IN BETWEEN THE TOES TWICE DAILY UNTIL HEALED;THEN TWICE DAILY AS NEEDED 30 g 97     polyethylene glycol (MIRALAX/GLYCOLAX) powder MIX 17GM OF POWDER IN 8OZ OF WATER UNTIL COMPLETELY DISSOLVED. DRINK SOLUTION DAILY AS NEEDED 527 g 98     Probiotic Product (PROBIOTIC DAILY) CAPS Take 1 capsule by mouth daily 28 capsule 98     senna-docusate (SENEXON-S) 8.6-50 MG tablet Take 2 tablets by mouth 2 times daily 62 tablet 12     vitamin C (ASCORBIC ACID) 500 MG tablet TAKE 1 TABLET BY MOUTH ONCE DAILY 28 tablet 97     VITAMIN D3 50 MCG  "(2000 UT) tablet 2 TABLETS (4000U) ORALLY DAILY 56 tablet 10       REVIEW OF SYSTEMS:  4 point ROS including Respiratory, CV, GI and , other than that noted in the HPI,  is negative    Objective:   BP (!) 139/93   Temp 97.7  F (36.5  C)   Ht 1.753 m (5' 9\")   Wt 108.9 kg (240 lb)   SpO2 97%   BMI 35.44 kg/m    New apartment and loving her new space.  Alert and oriented x3.    Removed the dressing of the left foot so this NP and NP student could see it.  Foot sits in a soft blue boot to prevent pressure.  Had to move her leg bag from her left leg in order to remove the ace wrap.  Took pictures of the toe on Muna's camera per request.    Due to being morning time, minimal edema but toes are dusky purplish.  The 5th digit is \"clean\" where the distal end of the toe is normal skin color.  The proximal part of the toe shows the gangrene.  Hard to know if improved.  No pain.    Muna showed the doppler study the did on her legs and by the time they got to the left distal side of leg, the waveforms were barely present.      Heart rate regular and strong.  Today's B/P on a manual cuff was 148/86.  Others are ranging in the 130-140's/high 90's.    Lungs are clear.  Abdomen is round and soft to touch.    Recent labs in [facility] reviewed by me today.     Assessment/Plan:  (I10) Primary hypertension  (primary encounter diagnosis)  Comment: spoke about if her pressures are done with an electric or manual cuff.  Sometimes there can be such a difference.  Do feel her diastolic number needs to come down.  Discussed adding amlodipine 2.5mg in the evening and leaving the lisinopril 10mg in the AM.  Muna is in agreement and then she stated she will cancel the Tuesday appointment at the clinic.  Plan: lisinopril (ZESTRIL) 10 MG tablet, amLODIPine         (NORVASC) 2.5 MG tablet    (E66.01) Morbid obesity (H)  Comment: BMI 35%.  She is aware of how her weight affects her HTN and circulatory issues.  Her intake is more than her " energy output since she is wheelchair bound and non-ambulatory.  Muna is not a big snack person.    Discussed impaction of weight on her health.      (I73.9) PAD (peripheral artery disease) (H)  (I96) Gangrene of toe (H)  Comment: Muna goes on Monday to have the next plan of care presented to her for her toe.  She stated she will let this NP know what happens.  Did let her know that if surgery is involved, this NP can do a H and P.      (G35) MS (multiple sclerosis) (H)  Comment: stable with no recent changes.  No particular medications.  Does have a hx with Neurology.      Orders:  1.  Amlodipine 2.5mg po every evening - HTN  2.  In one week start daily B/P's for 4 days to monitor medication.  Can discontinue the other B/P check orders.    Electronically signed by: CAROL Rodríguez CNP

## 2022-04-09 LAB — SARS-COV-2 RNA RESP QL NAA+PROBE: NEGATIVE

## 2022-04-10 PROBLEM — I10 PRIMARY HYPERTENSION: Status: ACTIVE | Noted: 2022-04-10

## 2022-04-10 PROBLEM — E66.01 MORBID OBESITY (H): Status: ACTIVE | Noted: 2022-04-10

## 2022-04-10 RX ORDER — AMLODIPINE BESYLATE 2.5 MG/1
2.5 TABLET ORAL EVERY EVENING
Start: 2022-04-10 | End: 2022-04-13

## 2022-04-10 RX ORDER — LISINOPRIL 10 MG/1
10 TABLET ORAL DAILY
Start: 2022-03-29 | End: 2022-05-23

## 2022-04-10 NOTE — PROGRESS NOTES
[facility] GERIATRICS    Chief Complaint   Patient presents with     RECHECK     HPI:  Cristina Stevens is a 76 year old  (1945), who is being seen today for an episodic care visit at: [facility] [[id number]]. Today's concern is:     Diagnoses       Codes Comments    Gangrene of toe (H)    -  Primary I96     PAD (peripheral artery disease) (H)     I73.9     MS (multiple sclerosis) (H)     G35     Neurogenic bladder     N31.9     Edwards catheter in place     Z97.8         Came to see Muna with intention of changing the bandage to her left foot where her 5th digit is partially gangrene.  She has not had her appointment yet for evaluation of the toe yet by podiatry.  This will occur next week.    The RA had done the dressing to the foot but able to see the toe still.  Did not undo the ace wraps and removed the gauze as able to see her toes clearly.      Muna and this NP spoke a little about what most likely will occur.  Suspect they will want testing on her blood flow and then evaluation of a plan.  Warned her the worse case senario would be to remove the toe.      Allergies, and PMH/PSH reviewed in EPIC today.    Current Outpatient Medications   Medication Sig Dispense Refill     amoxicillin (AMOXIL) 250 MG chewable tablet CHEW AND SWALLOW 8 TABLETS (2000 MG) BY MOUTH 1 HOUR PRIOR TO DENTAL (Patient not taking: Reported on 9/27/2021) 8 tablet 97     armodafinil (NUVIGIL) 150 MG TABS tablet 1 TABLET ORALLY EVERY MORNING (DX:NARCOLEPSY) 30 tablet 4     CALMOSEPTINE 0.44-20.6 % OINT ointment APPLY TO AFFECTED AREA(S) TOPICALLY TO BUTTOCKS AS NEEDED WITH BRIEF CHANGES 113 g 97     cephALEXin (KEFLEX) 250 MG capsule TAKE 1 CAPSULE BY MOUTH ONCE DAILY FOR PREVENTIVE 31 capsule 97     cetirizine (ZYRTEC) 10 MG tablet Take 1 tablet (10 mg) by mouth daily 28 tablet 97     CO2-Releasing (-TWO) SUPP Place rectally daily as needed       Cranberry (CRANBERRY  "CONCENTRATE) 500 MG CAPS TAKE 1 CAPSULE BY MOUTH ONCE DAILY 28 capsule 97     DESITIN 13 % CREA APPLY TOPICALLY TO AREAS OF REDNESS OR RASH WITH EACH BRIEF CHANGE 113 g 97     ibuprofen (ADVIL/MOTRIN) 400 MG tablet TAKE 1 TABLET BY MOUTH TWICE DAILY AS NEEDED FOR PAIN 30 tablet 97     mirabegron (MYRBETRIQ) 25 MG 24 hr tablet Take 1 tablet (25 mg) by mouth daily 28 tablet 97     Multiple Vitamin (TAB-A-DAWSON) TABS Take 1 tablet by mouth daily 28 tablet 97     nystatin (MYCOSTATIN) 996429 UNIT/GM external powder APPLY IN BETWEEN THE TOES TWICE DAILY UNTIL HEALED;THEN TWICE DAILY AS NEEDED 30 g 97     polyethylene glycol (MIRALAX/GLYCOLAX) powder MIX 17GM OF POWDER IN 8OZ OF WATER UNTIL COMPLETELY DISSOLVED. DRINK SOLUTION DAILY AS NEEDED 527 g 98     Probiotic Product (PROBIOTIC DAILY) CAPS Take 1 capsule by mouth daily 28 capsule 98     senna-docusate (SENEXON-S) 8.6-50 MG tablet Take 2 tablets by mouth 2 times daily 62 tablet 12     vitamin C (ASCORBIC ACID) 500 MG tablet TAKE 1 TABLET BY MOUTH ONCE DAILY 28 tablet 97     VITAMIN D3 50 MCG (2000 UT) tablet 2 TABLETS (4000U) ORALLY DAILY 56 tablet 10       REVIEW OF SYSTEMS:  4 point ROS including Respiratory, CV, GI and , other than that noted in the HPI,  is negative    Objective:   BP (!) 152/94   Pulse 97   Ht 1.753 m (5' 9\")   Wt 108.9 kg (240 lb)   SpO2 95%   BMI 35.44 kg/m    GENERAL APPEARANCE:  Alert, in no distress, oriented, cooperative  EYES:  EOM normal, conjunctiva and lids normal  RESP:  respiratory effort and palpation of chest normal, lungs clear to auscultation , no respiratory distress  CV:  Palpation and auscultation of heart done , regular rate and rhythm, no murmur, rub, or gallop, feet have swelling present +2.  toes are swollen except the gangrene toe.  ABDOMEN:  normal bowel sounds, soft, nontender, no hepatosplenomegaly or other masses, no guarding or rebound  M/S:   Gait and station abnormal non ambulatory, lift used for transfers, " has an electric wheelchair that is used  SKIN:  5th digit shows gangrene on more of the proximal end of the toe.  betadine is used to the toe and between the 4th and 5th digit.  gauze between those toes.  wrap with kerlix and then ace wrap  PSYCH:  oriented X 3, normal insight, judgement and memory, affect and mood normal  Does have a catheter placed that drains out pale urine into a leg bag.  No odor.  No sediment.    WBC 3.5 - 10.5 x10(9)/L 7.1    RBC 3.90 - 5.03 x10(12)/L 5.08 High     Hemoglobin 12.0 - 15.5 g/dL 14.1    HCT 34.9 - 44.5 % 44.8 High     MCV 80.0 - 100.0 fL 88.2    MCH 27.6 - 33.3 pg 27.8    MCHC 31.5 - 35.2 g/dL 31.5    RDW 11.9 - 15.5 % 14.7    Platelets 150 - 450 x10(9)/L 240    Automated NRBC <=0 /100 WBC 0    Neutrophil Absolute 1.7 - 7.0 10(9)/L 4.0    Lymphocyte Absolute 1.0 - 4.8 10(9)/L 1.8    Monocytes Absolute 0.2 - 0.9 10(9)/L 0.8    Eosinophil Absolute 0.0 - 0.5 10(9)/L 0.3    Basophil Absolute 0.0 - 0.3 10(9)/L 0.1    Immature Gran % 0.0 - 0.5 % 0.0          Assessment/Plan:  (I96) Gangrene of toe (H)  (primary encounter diagnosis)  (I73.9) PAD (peripheral artery disease) (H)  Comment: - going next week for initial evaluation after her trip to the ED at [facility].  Toe appears stable at this time.  She like pictures to be taken so she can compare.  She is praying that this will resolve without a surgery.  Continue with the treatment to the 5th digit daily with the betadine and gauze.    Legs are less swollen in AM compared to after lunch or evening.      (G35) MS (multiple sclerosis) (H)  Comment: main reason why she needs assisted living.  She seems happy here.  Will be moving to the apartment next door early April.  One bedroom versus her studio right now.    No changes to medications.      (N31.9) Neurogenic bladder  (Z97.8) Edwards catheter in place  Comment: continues with the catheter long term due to her neurogenic bladder.  She orders extra supplies herself beyond what  medication buys.  These supplies come from [address] [place] [address].    No signs of infection.  Muna has the catheter changed every 3 weeks as this helps keep away her hx of cystitis.        Orders:  No new orders    Electronically signed by: CAROL Rodríguez CNP

## 2022-04-13 DIAGNOSIS — I10 PRIMARY HYPERTENSION: ICD-10-CM

## 2022-04-13 RX ORDER — AMLODIPINE BESYLATE 2.5 MG/1
TABLET ORAL
Qty: 30 TABLET | Refills: 11 | Status: SHIPPED | OUTPATIENT
Start: 2022-04-13 | End: 2023-03-14

## 2022-04-15 ENCOUNTER — OFFICE VISIT (OUTPATIENT)
Dept: GERIATRICS | Facility: CLINIC | Age: 77
End: 2022-04-15
Payer: COMMERCIAL

## 2022-04-15 VITALS
BODY MASS INDEX: 35.55 KG/M2 | TEMPERATURE: 97.4 F | DIASTOLIC BLOOD PRESSURE: 65 MMHG | OXYGEN SATURATION: 95 % | WEIGHT: 240 LBS | HEART RATE: 65 BPM | HEIGHT: 69 IN | SYSTOLIC BLOOD PRESSURE: 123 MMHG

## 2022-04-15 DIAGNOSIS — G82.50 SPASTIC QUADRIPLEGIA (H): ICD-10-CM

## 2022-04-15 DIAGNOSIS — I10 PRIMARY HYPERTENSION: ICD-10-CM

## 2022-04-15 DIAGNOSIS — G35 MS (MULTIPLE SCLEROSIS) (H): ICD-10-CM

## 2022-04-15 DIAGNOSIS — I73.9 PAD (PERIPHERAL ARTERY DISEASE) (H): Primary | ICD-10-CM

## 2022-04-15 DIAGNOSIS — Z01.818 PRE-OP EXAM: ICD-10-CM

## 2022-04-15 DIAGNOSIS — N31.9 NEUROGENIC BLADDER: ICD-10-CM

## 2022-04-15 DIAGNOSIS — Z97.8 FOLEY CATHETER IN PLACE: ICD-10-CM

## 2022-04-15 DIAGNOSIS — I96 GANGRENE OF TOE (H): ICD-10-CM

## 2022-04-15 NOTE — LETTER
4/15/2022        RE: Cristina Stevens  [address]  [place] [facility] [place] [address]        [facility]  [address] [place] [address]  Phone: [phone]  Fax: [phone]  Primary Provider: Hayder Lucas  Pre-op Performing Provider: HAYDER LUCAS      PREOPERATIVE EVALUATION:  Today's date: 4/15/2022    Cristina Stevens is a 76 year old female who presents for a preoperative evaluation.    Surgical Information:  Surgery/Procedure: IR Arteriogram left lower extremity  Surgery Location: [facility]  Surgeon: Tom Mejía MD  Surgery Date: 4/20/22  Time of Surgery: to be at Hospital at 11am  Where patient plans to recover: Other: at home extended care in assisted living facility  Fax number for surgical facility: surgeon's fax:  [phone]    Type of Anesthesia Anticipated: to be determined    Assessment & Plan     The proposed surgical procedure is considered INTERMEDIATE risk.    (I73.9) PAD (peripheral artery disease) (H)  (primary encounter diagnosis)  (I96) Gangrene of toe (H)  Comment: Muna will be having surgery for a stent placement as her circulation is impaired down to her left foot.  This is evident with her 5th digit having gangrene present.  Pre op being done today.  Labs will be Monday and can be accessed through care everywhere since she will be having surgery through [facility].    Not on ASA and able to take medications except Zestril in the AM of procedure.    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  No specific medications.  Does go in periodically for Botox injections due to spasticity.      (I10) Primary hypertension  Comment: have been trying to get her B/P under control.  She went to a clinic and started her on Zestril 10mg daily.  Her Diastolic pressures were above 90 and so this NP started her on low dose of Amlodipine  2.5mg in evening.  Blood pressures are in better control over all  Now range in the 120-130's/60-80's.    Plan: Hold Zestril AM of procedure day.     (N31.9) Neurogenic bladder  (Z97.8) Ruby catheter in place  Comment: long standing ruby which is changed every 3 weeks which helps cut her infections down.    Plan: plan is to keep the catheter, she asked if it would be removed and this NP did not think so.    (Z01.818) Pre-op exam  Comment: reviewing her medications and her major diagnoses.        Possible Sleep Apnea: Patient reports already having a sleep study in the distant past.   Implanted Device:  No implanted device.    Risks and Recommendations:  The patient has the following additional risks and recommendations for perioperative complications:   - History of urinary retention - has a neurogenic bladder with ruby catheter placement that is changed every 3 weeks.      Medication Instructions:  Patient instructed no Lisinopril morning of procedure.    RECOMMENDATION:  APPROVAL GIVEN to proceed with proposed procedure, without further diagnostic evaluation.    Review of external notes as documented above       60 minutes spent on the date of the encounter doing chart review, history and exam, documentation and further activities per the note        Subjective     HPI related to upcoming procedure: 5th digit of left foot and 2 other toes started out black on toes nails and the 5th digit started turning gangrene and before this NP could come out to see her, she went to [facility] for evaluation due to pain.  From there she received referrals to appropriate specialist (Podiatry and Vascular) for treatment.  No pain now but has a Betadine treatment every morning that staff do for her and wrap it with a ACE bandage.        No flowsheet data found.    Health Care Directive:  Patient has a Health Care Directive on file      Preoperative Review of :   reviewed - no record of controlled substances  prescribed.    Status of Chronic Conditions:  HYPERTENSION - Patient has longstanding history of HTN , currently denies any symptoms referable to elevated blood pressure. Specifically denies chest pain, palpitations, dyspnea, orthopnea, PND or peripheral edema. Blood pressure readings have been in normal range. Current medication regimen is as listed below. Patient denies any side effects of medication.  Both Lisinopril and amlodipine have been started recently.      Review of Systems  Constitutional, neuro, ENT, endocrine, pulmonary, cardiac, gastrointestinal, genitourinary, musculoskeletal, integument and psychiatric systems are negative, except as otherwise noted.    Patient Active Problem List    Diagnosis Date Noted     Primary hypertension 04/10/2022     Priority: Medium     Morbid obesity (H) 04/10/2022     Priority: Medium     At high risk for falls 11/20/2019     Priority: Medium     Chronic constipation 07/12/2018     Priority: Medium     Screening for osteoporosis 07/10/2018     Priority: Medium     Overview:   [id number]    Formatting of this note might be different from the original.  [id number]       Closed fracture of right tibial plateau 07/09/2018     Priority: Medium     Acute blood loss anemia 06/19/2018     Priority: Medium     Neurogenic bladder 06/08/2018     Priority: Medium     Immobility 06/08/2018     Priority: Medium     Heat intolerance 05/26/2018     Priority: Medium     Closed fracture of neck of right femur (H) 05/20/2018     Priority: Medium     OAB (overactive bladder) 06/02/2017     Priority: Medium     Overview:   Added automatically from request for surgery [id number]    Formatting of this note might be different from the original.  Added automatically from request for surgery [id number]       At risk for impaired skin integrity 12/14/2014     Priority: Medium     Overview:    Bilateral lower extremity plegia, truncal weakness, right arm profoundly weak.      Formatting of  this note might be different from the original.   Bilateral lower extremity plegia, truncal weakness, right arm profoundly weak.       Neuromuscular respiratory weakness (H) 09/26/2014     Priority: Medium     Overview:   PFTs Sept 2014:  difficulty performing all PFT maneuvers.   Results not reproducible thus may not be accurate. No airway obstruction. FEV1 and FVC both significantly decreased. There was a significant response to bronchodilator. FEV1 improved by 15% after bronchodilator. Slow vital capacity  severely decreased at 44% predicted.DLCO severely decreased at 16% predicted.   NIF  severely decreased at -34.    Formatting of this note might be different from the original.  PFTs Sept 2014:  difficulty performing all PFT maneuvers.   Results not reproducible thus may not be accurate. No airway obstruction. FEV1 and FVC both significantly decreased. There was a significant response to bronchodilator. FEV1 improved by 15% after bronchodilator. Slow vital capacity  severely decreased at 44% predicted.DLCO severely decreased at 16% predicted.   NIF  severely decreased at -34.       Wheelchair dependence 09/26/2014     Priority: Medium     Scoliosis associated with other condition 09/10/2014     Priority: Medium     Overview:   Weakness of right worse than left axial muscles, left leaning thoracic kyphoscoliosis    Formatting of this note might be different from the original.  Weakness of right worse than left axial muscles, left leaning thoracic kyphoscoliosis       Kidney stone 06/19/2014     Priority: Medium     Overview:   7/2014- lithotripsy.-Dr Zazueta.    Formatting of this note might be different from the original.  7/2014- lithotripsy.-Dr Zazueta.       MDRO (multiple drug resistant organisms) resistance 05/31/2013     Priority: Medium     Overview:   MRSA  5/27/2013  Site: Urine  [facility]    Positive MRSA  Urine  7/10/2013  [facility]    Urine  12/19/2014 5/14/2014  MRSA  ISOLATED  Urine  [facility]    Formatting of this note might be different from the original.  MRSA  5/27/2013  Site: Urine  [facility]    Positive MRSA  Urine  7/10/2013  [facility]    Urine  12/19/2014 5/14/2014  MRSA ISOLATED  Urine  [facility]       Anemia 09/25/2012     Priority: Medium     Chronic pain 09/25/2012     Priority: Medium     Overview:   Right arm and shoulder- botox helpful but short lasting.  - Acupuncture once weekly.  --Standing frame 5d/week for 30min.    Formatting of this note might be different from the original.  Right arm and shoulder- botox helpful but short lasting.  - Acupuncture once weekly.  --Standing frame 5d/week for 30min.       Frequent UTI 09/25/2012     Priority: Medium     Overview:   --UTI avg q 2weeks - has ruby cath, only tx if symptomatic per urology- fever, chills, hematuria, mentation changes.  --Tiazidine not helpful in past.  --has had Urodynamic studies in the past  --Seen at [facility] in the past- started Vesicare.    Formatting of this note might be different from the original.  --UTI avg q 2weeks - has ruby cath, only tx if symptomatic per urology- fever, chills, hematuria, mentation changes.  --Tiazidine not helpful in past.  --has had Urodynamic studies in the past  --Seen at [facility] in the past- started Vesica.       Spasticity 10/10/2011     Priority: Medium     Tetraparesis (H) 10/10/2011     Priority: Medium     Overview:   BLE severe weakness right worse than left, BUE weakness right worse than left.  Relies on left hand    Formatting of this note might be different from the original.  BLE severe weakness right worse than left, BUE weakness right worse than left.  Relies on left hand       Venous malformation 12/03/2010     Priority: Medium     Active advance directive 07/15/2010     Priority: Medium     MS (multiple sclerosis) (H) 02/03/2009     Priority: Medium     Overview:   Primary Progressive, symptomatic about age  61, diagnosed age 62.  Never been on DMT medications.  S/p jugular catheterization for CCSVI x 3.  Overview:   Primary- onset 2007, w/c dependent.  --No benefit from oral steroids in past.- on Baclofen only.  --Dr Morataya- [facility] - [phone]  --Dr Richardson, [facility]- sees for spasticity/ rehab    Formatting of this note might be different from the original.  Primary Progressive, symptomatic about age 61, diagnosed age 62.  Never been on DMT medications.  S/p jugular catheterization for CCSVI x 3.  Formatting of this note might be different from the original.  Primary- onset 2007, w/c dependent.  --No benefit from oral steroids in past.- on Baclofen only.  --Dr Morataya- [facility] - [phone]  --Dr Richardson, [facility]- sees for spasticity/ rehab       Osteoporosis 06/16/2006     Priority: Medium     Overview:   Epic     Formatting of this note might be different from the original.  [facility]        Past Medical History:   Diagnosis Date     Acute blood loss anemia 6/19/2018     Anemia 9/25/2012     At risk for impaired skin integrity 12/14/2014    Overview:   Bilateral lower extremity plegia, truncal weakness, right arm profoundly weak.       Chronic constipation 7/12/2018     Chronic pain 9/25/2012    Overview:  Right arm and shoulder- botox helpful but short lasting. - Acupuncture once weekly. --Standing frame 5d/week for 30min.     Closed fracture of neck of right femur (H) 5/20/2018     Closed fracture of right tibial plateau 7/9/2018     Frequent UTI 9/25/2012    Overview:  --UTI avg q 2weeks - has ruby cath, only tx if symptomatic per urology- fever, chills, hematuria, mentation changes. --Tiazidine not helpful in past. --has had Urodynamic studies in the past --Seen at [facility] in the past- started Vesicare.     Heat intolerance 5/26/2018     Immobility 6/8/2018     Kidney stone 6/19/2014    Overview:  7/2014- lithotripsy.-  Carlita.     MDRO (multiple drug resistant organisms) resistance 5/31/2013    Overview:  MRSA 5/27/2013 Site: Urine [facility]  Positive MRSA Urine 7/10/2013 [facility]  Urine 12/19/2014 5/14/2014 MRSA ISOLATED Urine [facility]     Multiple sclerosis (H) 5/10/2011     Neurogenic bladder 6/8/2018     Neuromuscular respiratory weakness 9/26/2014    Overview:  PFTs Sept 2014:  difficulty performing all PFT maneuvers.   Results not reproducible thus may not be accurate. No airway obstruction. FEV1 and FVC both significantly decreased. There was a significant response to bronchodilator. FEV1 improved by 15% after bronchodilator. Slow vital capacity  severely decreased at 44% predicted.DLCO severely decreased at 16% predicted.   NIF  severely decreased at -34.     OAB (overactive bladder) 6/2/2017    Overview:  Added automatically from request for surgery [id number]     Osteoporosis 6/16/2006    Overview:  Epic      Scoliosis associated with other condition 9/10/2014    Overview:  Weakness of right worse than left axial muscles, left leaning thoracic kyphoscoliosis     Screening for osteoporosis 7/10/2018    Overview:  [id number]     Spastic quadriplegia (H) 10/10/2011     Spasticity 10/10/2011     Past Surgical History:   Procedure Laterality Date     AS PARTIAL HIP REPLACEMENT Right 05/20/2018     PERCUTANEOUS PLACEMENT INTRAVASCULAR STENT CERVICAL CAROTID ARTERY  05/2011     Current Outpatient Medications   Medication Sig Dispense Refill     amLODIPine (NORVASC) 2.5 MG tablet 1 TABLET ORALLY EVERY EVENING (DX: HYPERTENSION) 30 tablet 11     amoxicillin (AMOXIL) 250 MG chewable tablet CHEW AND SWALLOW 8 TABLETS (2000 MG) BY MOUTH 1 HOUR PRIOR TO DENTAL 8 tablet 97     armodafinil (NUVIGIL) 150 MG TABS tablet 1 TABLET ORALLY EVERY MORNING (DX:NARCOLEPSY) 30 tablet 4     CALMOSEPTINE 0.44-20.6 % OINT ointment APPLY TO AFFECTED AREA(S) TOPICALLY TO BUTTOCKS AS NEEDED WITH BRIEF CHANGES 113 g 97      cephALEXin (KEFLEX) 250 MG capsule TAKE 1 CAPSULE BY MOUTH ONCE DAILY FOR PREVENTIVE 31 capsule 97     cetirizine (ZYRTEC) 10 MG tablet Take 1 tablet (10 mg) by mouth daily 28 tablet 97     CO2-Releasing (-TWO) SUPP Place rectally daily as needed       Cranberry (CRANBERRY CONCENTRATE) 500 MG CAPS TAKE 1 CAPSULE BY MOUTH ONCE DAILY 28 capsule 97     DESITIN 13 % CREA APPLY TOPICALLY TO AREAS OF REDNESS OR RASH WITH EACH BRIEF CHANGE 113 g 97     ibuprofen (ADVIL/MOTRIN) 400 MG tablet TAKE 1 TABLET BY MOUTH TWICE DAILY AS NEEDED FOR PAIN 30 tablet 97     lisinopril (ZESTRIL) 10 MG tablet Take 1 tablet (10 mg) by mouth in the morning.       mirabegron (MYRBETRIQ) 25 MG 24 hr tablet Take 1 tablet (25 mg) by mouth daily 28 tablet 97     Multiple Vitamin (TAB-A-DAWSON) TABS Take 1 tablet by mouth daily 28 tablet 97     nystatin (MYCOSTATIN) 418904 UNIT/GM external powder APPLY IN BETWEEN THE TOES TWICE DAILY UNTIL HEALED;THEN TWICE DAILY AS NEEDED 30 g 97     polyethylene glycol (MIRALAX/GLYCOLAX) powder MIX 17GM OF POWDER IN 8OZ OF WATER UNTIL COMPLETELY DISSOLVED. DRINK SOLUTION DAILY AS NEEDED 527 g 98     Probiotic Product (PROBIOTIC DAILY) CAPS Take 1 capsule by mouth daily 28 capsule 98     senna-docusate (SENEXON-S) 8.6-50 MG tablet Take 2 tablets by mouth 2 times daily 62 tablet 12     vitamin C (ASCORBIC ACID) 500 MG tablet TAKE 1 TABLET BY MOUTH ONCE DAILY 28 tablet 97     VITAMIN D3 50 MCG (2000 UT) tablet 2 TABLETS (4000U) ORALLY DAILY 56 tablet 10       Allergies   Allergen Reactions     Nitrofuran Derivatives Visual Disturbance     Hallucinations     Tetracyclines GI Disturbance     Ampicillin GI Disturbance     Cefadroxil Muscle Pain (Myalgia)     Cephalexin Diarrhea and GI Disturbance     Levofloxacin Other (See Comments)     Phlebitis with IV infusion     Sulfa Drugs Nausea and Vomiting, GI Disturbance and Unknown     Reaction occurred as a child     Sulfasalazine Nausea and Vomiting        Social  "History     Tobacco Use     Smoking status: Former Smoker     Start date: 1962     Quit date: 1985     Years since quittin.3     Smokeless tobacco: Never Used   Substance Use Topics     Alcohol use: Yes     Alcohol/week: 1.0 standard drink     Types: 1 Standard drinks or equivalent per week     Family History   Problem Relation Age of Onset     Hypertension Mother      Cerebrovascular Disease Mother      Coronary Artery Disease Father      History   Drug Use Unknown         Objective     /65   Pulse 65   Temp 97.4  F (36.3  C)   Ht 1.753 m (5' 9\")   Wt 108.9 kg (240 lb)   SpO2 95%   BMI 35.44 kg/m      Physical Exam    GENERAL APPEARANCE: healthy, alert and no distress     EYES: EOMI, PERRL, has reading glasses but does not use them much.     HENT: ear canals and TM's normal and nose and mouth without ulcers or lesions, no hearing aides, no dentures     NECK: no adenopathy, no asymmetry, masses, or scars and thyroid normal to palpation     RESP: lungs clear to auscultation - no rales, rhonchi or wheezes     CV: regular rates and rhythm, normal S1 S2, no S3 or S4 and no murmur, click or rub     ABDOMEN:  soft, nontender, no HSM or masses and bowel sounds normal     GU_female: ruby catheter to straight drainage, last change was 22     MS: due to MS, hand grasps weaker and uses a reacher to manipulate her environment.  Uses an electric wheelchair. 4 point lift for transfers.     SKIN: no suspicious lesions or rashes     NEURO: speech normal, gait abnormal non-ambulatory, mentation intact and able to give directions of her cares, has feeling in her feet/hand and effects from MS     PSYCH: mentation appears normal. and affect normal/bright     LYMPHATICS: No cervical adenopathy    Recent Labs   Lab Test 21  1452      POTASSIUM 4.0   CR 0.71        Diagnostics:  Labs pending at this time.  Results will be reviewed when available.  On  will have HgB, BMP drawn at AL.    "   Chose not to do a EKG at facility.  If needed can be done be done at hospital.     Revised Cardiac Risk Index (RCRI):  The patient has the following serious cardiovascular risks for perioperative complications:   - No serious cardiac risks = 0 points     RCRI Interpretation: 0 points: Class I (very low risk - 0.4% complication rate)           Signed Electronically by: CAROL Rodríguez CNP  Copy of this evaluation report is provided to requesting physician.            Sincerely,        CAROL Rodríguez CNP

## 2022-04-15 NOTE — PROGRESS NOTES
[facility] GERIATRICS  [address] [place] [address]  Phone: [phone]  Fax: [phone]  Primary Provider: Hayder Lucas  Pre-op Performing Provider: HAYDER LUCAS      PREOPERATIVE EVALUATION:  Today's date: 4/15/2022    Cristina Stevens is a 76 year old female who presents for a preoperative evaluation.    Surgical Information:  Surgery/Procedure: IR Arteriogram left lower extremity  Surgery Location: [facility]  Surgeon: Tom Mejía MD  Surgery Date: 4/20/22  Time of Surgery: to be at Hospital at 11am  Where patient plans to recover: Other: at home extended care in assisted living facility  Fax number for surgical facility: surgeon's fax:  [phone]    Type of Anesthesia Anticipated: to be determined    Assessment & Plan     The proposed surgical procedure is considered INTERMEDIATE risk.    (I73.9) PAD (peripheral artery disease) (H)  (primary encounter diagnosis)  (I96) Gangrene of toe (H)  Comment: Muna will be having surgery for a stent placement as her circulation is impaired down to her left foot.  This is evident with her 5th digit having gangrene present.  Pre op being done today.  Labs will be Monday and can be accessed through care everywhere since she will be having surgery through [facility].    Not on ASA and able to take medications except Zestril in the AM of procedure.    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  No specific medications.  Does go in periodically for Botox injections due to spasticity.      (I10) Primary hypertension  Comment: have been trying to get her B/P under control.  She went to a clinic and started her on Zestril 10mg daily.  Her Diastolic pressures were above 90 and so this NP started her on low dose of Amlodipine 2.5mg in evening.  Blood pressures are in better control over all  Now range in the 120-130's/60-80's.     Plan: Hold Zestril AM of procedure day.     (N31.9) Neurogenic bladder  (Z97.8) Ruby catheter in place  Comment: long standing ruby which is changed every 3 weeks which helps cut her infections down.    Plan: plan is to keep the catheter, she asked if it would be removed and this NP did not think so.    (Z01.818) Pre-op exam  Comment: reviewing her medications and her major diagnoses.        Possible Sleep Apnea: Patient reports already having a sleep study in the distant past.   Implanted Device:  No implanted device.    Risks and Recommendations:  The patient has the following additional risks and recommendations for perioperative complications:   - History of urinary retention - has a neurogenic bladder with ruby catheter placement that is changed every 3 weeks.      Medication Instructions:  Patient instructed no Lisinopril morning of procedure.    RECOMMENDATION:  APPROVAL GIVEN to proceed with proposed procedure, without further diagnostic evaluation.    Review of external notes as documented above       60 minutes spent on the date of the encounter doing chart review, history and exam, documentation and further activities per the note        Subjective     HPI related to upcoming procedure: 5th digit of left foot and 2 other toes started out black on toes nails and the 5th digit started turning gangrene and before this NP could come out to see her, she went to [facility] for evaluation due to pain.  From there she received referrals to appropriate specialist (Podiatry and Vascular) for treatment.  No pain now but has a Betadine treatment every morning that staff do for her and wrap it with a ACE bandage.        No flowsheet data found.    Health Care Directive:  Patient has a Health Care Directive on file      Preoperative Review of :   reviewed - no record of controlled substances prescribed.    Status of Chronic Conditions:  HYPERTENSION - Patient has longstanding history of HTN , currently  denies any symptoms referable to elevated blood pressure. Specifically denies chest pain, palpitations, dyspnea, orthopnea, PND or peripheral edema. Blood pressure readings have been in normal range. Current medication regimen is as listed below. Patient denies any side effects of medication.  Both Lisinopril and amlodipine have been started recently.      Review of Systems  Constitutional, neuro, ENT, endocrine, pulmonary, cardiac, gastrointestinal, genitourinary, musculoskeletal, integument and psychiatric systems are negative, except as otherwise noted.    Patient Active Problem List    Diagnosis Date Noted     Primary hypertension 04/10/2022     Priority: Medium     Morbid obesity (H) 04/10/2022     Priority: Medium     At high risk for falls 11/20/2019     Priority: Medium     Chronic constipation 07/12/2018     Priority: Medium     Screening for osteoporosis 07/10/2018     Priority: Medium     Overview:   [id number]    Formatting of this note might be different from the original.  [id number]       Closed fracture of right tibial plateau 07/09/2018     Priority: Medium     Acute blood loss anemia 06/19/2018     Priority: Medium     Neurogenic bladder 06/08/2018     Priority: Medium     Immobility 06/08/2018     Priority: Medium     Heat intolerance 05/26/2018     Priority: Medium     Closed fracture of neck of right femur (H) 05/20/2018     Priority: Medium     OAB (overactive bladder) 06/02/2017     Priority: Medium     Overview:   Added automatically from request for surgery [id number]    Formatting of this note might be different from the original.  Added automatically from request for surgery [id number]       At risk for impaired skin integrity 12/14/2014     Priority: Medium     Overview:    Bilateral lower extremity plegia, truncal weakness, right arm profoundly weak.      Formatting of this note might be different from the original.   Bilateral lower extremity plegia, truncal weakness, right arm  profoundly weak.       Neuromuscular respiratory weakness (H) 09/26/2014     Priority: Medium     Overview:   PFTs Sept 2014:  difficulty performing all PFT maneuvers.   Results not reproducible thus may not be accurate. No airway obstruction. FEV1 and FVC both significantly decreased. There was a significant response to bronchodilator. FEV1 improved by 15% after bronchodilator. Slow vital capacity  severely decreased at 44% predicted.DLCO severely decreased at 16% predicted.   NIF  severely decreased at -34.    Formatting of this note might be different from the original.  PFTs Sept 2014:  difficulty performing all PFT maneuvers.   Results not reproducible thus may not be accurate. No airway obstruction. FEV1 and FVC both significantly decreased. There was a significant response to bronchodilator. FEV1 improved by 15% after bronchodilator. Slow vital capacity  severely decreased at 44% predicted.DLCO severely decreased at 16% predicted.   NIF  severely decreased at -34.       Wheelchair dependence 09/26/2014     Priority: Medium     Scoliosis associated with other condition 09/10/2014     Priority: Medium     Overview:   Weakness of right worse than left axial muscles, left leaning thoracic kyphoscoliosis    Formatting of this note might be different from the original.  Weakness of right worse than left axial muscles, left leaning thoracic kyphoscoliosis       Kidney stone 06/19/2014     Priority: Medium     Overview:   7/2014- lithotripsy.-Dr Zazueta.    Formatting of this note might be different from the original.  7/2014- lithotripsy.-Dr Zazueta.       MDRO (multiple drug resistant organisms) resistance 05/31/2013     Priority: Medium     Overview:   MRSA  5/27/2013  Site: Urine  [facility]    Positive MRSA  Urine  7/10/2013  [place] [facility]    Urine  12/19/2014 5/14/2014  MRSA ISOLATED  Urine  [facility]    Formatting of this note might be different from the original.  MRSA  5/27/2013  Site:  Urine  [facility]    Positive MRSA  Urine  7/10/2013  [place] [facility]    Urine  12/19/2014 5/14/2014  MRSA ISOLATED  Urine  [facility]       Anemia 09/25/2012     Priority: Medium     Chronic pain 09/25/2012     Priority: Medium     Overview:   Right arm and shoulder- botox helpful but short lasting.  - Acupuncture once weekly.  --Standing frame 5d/week for 30min.    Formatting of this note might be different from the original.  Right arm and shoulder- botox helpful but short lasting.  - Acupuncture once weekly.  --Standing frame 5d/week for 30min.       Frequent UTI 09/25/2012     Priority: Medium     Overview:   --UTI avg q 2weeks - has ruby cath, only tx if symptomatic per urology- fever, chills, hematuria, mentation changes.  --Tiazidine not helpful in past.  --has had Urodynamic studies in the past  --Seen at [facility] in the past- started Vesicare.    Formatting of this note might be different from the original.  --UTI avg q 2weeks - has ruby cath, only tx if symptomatic per urology- fever, chills, hematuria, mentation changes.  --Tiazidine not helpful in past.  --has had Urodynamic studies in the past  --Seen at [facility] in the past- started Vesicare.       Spasticity 10/10/2011     Priority: Medium     Tetraparesis (H) 10/10/2011     Priority: Medium     Overview:   BLE severe weakness right worse than left, BUE weakness right worse than left.  Relies on left hand    Formatting of this note might be different from the original.  BLE severe weakness right worse than left, BUE weakness right worse than left.  Relies on left hand       Venous malformation 12/03/2010     Priority: Medium     Active advance directive 07/15/2010     Priority: Medium     MS (multiple sclerosis) (H) 02/03/2009     Priority: Medium     Overview:   Primary Progressive, symptomatic about age 61, diagnosed age 62.  Never been on DMT medications.  S/p jugular catheterization for CCSVI x 3.  Overview:   Primary-  onset 2007, w/c dependent.  --No benefit from oral steroids in past.- on Baclofen only.  --Dr Morataya- [facility] [place] - [phone]  --Dr Richardson [facility]- sees for spasticity/ rehab    Formatting of this note might be different from the original.  Primary Progressive, symptomatic about age 61, diagnosed age 62.  Never been on DMT medications.  S/p jugular catheterization for CCSVI x 3.  Formatting of this note might be different from the original.  Primary- onset 2007, w/c dependent.  --No benefit from oral steroids in past.- on Baclofen only.  --Dr Morataya- [facility] [place] - [phone]  --Dr Richardson [facility]- sees for spasticity/ rehab       Osteoporosis 06/16/2006     Priority: Medium     Overview:   Epic     Formatting of this note might be different from the original.  [facility]        Past Medical History:   Diagnosis Date     Acute blood loss anemia 6/19/2018     Anemia 9/25/2012     At risk for impaired skin integrity 12/14/2014    Overview:   Bilateral lower extremity plegia, truncal weakness, right arm profoundly weak.       Chronic constipation 7/12/2018     Chronic pain 9/25/2012    Overview:  Right arm and shoulder- botox helpful but short lasting. - Acupuncture once weekly. --Standing frame 5d/week for 30min.     Closed fracture of neck of right femur (H) 5/20/2018     Closed fracture of right tibial plateau 7/9/2018     Frequent UTI 9/25/2012    Overview:  --UTI avg q 2weeks - has ruby cath, only tx if symptomatic per urology- fever, chills, hematuria, mentation changes. --Tiazidine not helpful in past. --has had Urodynamic studies in the past --Seen at [facility] in the past- started Vesicare.     Heat intolerance 5/26/2018     Immobility 6/8/2018     Kidney stone 6/19/2014    Overview:  7/2014- lithotripsy.-Dr Zazueta.     MDRO (multiple drug resistant organisms) resistance 5/31/2013    Overview:  MRSA 5/27/2013 Site: Urine  [facility]  Positive MRSA Urine 7/10/2013 [place] [facility]  Urine 12/19/2014 5/14/2014 MRSA ISOLATED Urine [facility]     Multiple sclerosis (H) 5/10/2011     Neurogenic bladder 6/8/2018     Neuromuscular respiratory weakness 9/26/2014    Overview:  PFTs Sept 2014:  difficulty performing all PFT maneuvers.   Results not reproducible thus may not be accurate. No airway obstruction. FEV1 and FVC both significantly decreased. There was a significant response to bronchodilator. FEV1 improved by 15% after bronchodilator. Slow vital capacity  severely decreased at 44% predicted.DLCO severely decreased at 16% predicted.   NIF  severely decreased at -34.     OAB (overactive bladder) 6/2/2017    Overview:  Added automatically from request for surgery [id number]     Osteoporosis 6/16/2006    Overview:  Epic      Scoliosis associated with other condition 9/10/2014    Overview:  Weakness of right worse than left axial muscles, left leaning thoracic kyphoscoliosis     Screening for osteoporosis 7/10/2018    Overview:  [id number]     Spastic quadriplegia (H) 10/10/2011     Spasticity 10/10/2011     Past Surgical History:   Procedure Laterality Date     AS PARTIAL HIP REPLACEMENT Right 05/20/2018     PERCUTANEOUS PLACEMENT INTRAVASCULAR STENT CERVICAL CAROTID ARTERY  05/2011     Current Outpatient Medications   Medication Sig Dispense Refill     amLODIPine (NORVASC) 2.5 MG tablet 1 TABLET ORALLY EVERY EVENING (DX: HYPERTENSION) 30 tablet 11     amoxicillin (AMOXIL) 250 MG chewable tablet CHEW AND SWALLOW 8 TABLETS (2000 MG) BY MOUTH 1 HOUR PRIOR TO DENTAL 8 tablet 97     armodafinil (NUVIGIL) 150 MG TABS tablet 1 TABLET ORALLY EVERY MORNING (DX:NARCOLEPSY) 30 tablet 4     CALMOSEPTINE 0.44-20.6 % OINT ointment APPLY TO AFFECTED AREA(S) TOPICALLY TO BUTTOCKS AS NEEDED WITH BRIEF CHANGES 113 g 97     cephALEXin (KEFLEX) 250 MG capsule TAKE 1 CAPSULE BY MOUTH ONCE DAILY FOR PREVENTIVE 31 capsule 97     cetirizine  (ZYRTEC) 10 MG tablet Take 1 tablet (10 mg) by mouth daily 28 tablet 97     CO2-Releasing (-TWO) SUPP Place rectally daily as needed       Cranberry (CRANBERRY CONCENTRATE) 500 MG CAPS TAKE 1 CAPSULE BY MOUTH ONCE DAILY 28 capsule 97     DESITIN 13 % CREA APPLY TOPICALLY TO AREAS OF REDNESS OR RASH WITH EACH BRIEF CHANGE 113 g 97     ibuprofen (ADVIL/MOTRIN) 400 MG tablet TAKE 1 TABLET BY MOUTH TWICE DAILY AS NEEDED FOR PAIN 30 tablet 97     lisinopril (ZESTRIL) 10 MG tablet Take 1 tablet (10 mg) by mouth in the morning.       mirabegron (MYRBETRIQ) 25 MG 24 hr tablet Take 1 tablet (25 mg) by mouth daily 28 tablet 97     Multiple Vitamin (TAB-A-DAWSON) TABS Take 1 tablet by mouth daily 28 tablet 97     nystatin (MYCOSTATIN) 292038 UNIT/GM external powder APPLY IN BETWEEN THE TOES TWICE DAILY UNTIL HEALED;THEN TWICE DAILY AS NEEDED 30 g 97     polyethylene glycol (MIRALAX/GLYCOLAX) powder MIX 17GM OF POWDER IN 8OZ OF WATER UNTIL COMPLETELY DISSOLVED. DRINK SOLUTION DAILY AS NEEDED 527 g 98     Probiotic Product (PROBIOTIC DAILY) CAPS Take 1 capsule by mouth daily 28 capsule 98     senna-docusate (SENEXON-S) 8.6-50 MG tablet Take 2 tablets by mouth 2 times daily 62 tablet 12     vitamin C (ASCORBIC ACID) 500 MG tablet TAKE 1 TABLET BY MOUTH ONCE DAILY 28 tablet 97     VITAMIN D3 50 MCG (2000 UT) tablet 2 TABLETS (4000U) ORALLY DAILY 56 tablet 10       Allergies   Allergen Reactions     Nitrofuran Derivatives Visual Disturbance     Hallucinations     Tetracyclines GI Disturbance     Ampicillin GI Disturbance     Cefadroxil Muscle Pain (Myalgia)     Cephalexin Diarrhea and GI Disturbance     Levofloxacin Other (See Comments)     Phlebitis with IV infusion     Sulfa Drugs Nausea and Vomiting, GI Disturbance and Unknown     Reaction occurred as a child     Sulfasalazine Nausea and Vomiting        Social History     Tobacco Use     Smoking status: Former Smoker     Start date: 1/1/1962     Quit date: 1/1/1985     Years  "since quittin.3     Smokeless tobacco: Never Used   Substance Use Topics     Alcohol use: Yes     Alcohol/week: 1.0 standard drink     Types: 1 Standard drinks or equivalent per week     Family History   Problem Relation Age of Onset     Hypertension Mother      Cerebrovascular Disease Mother      Coronary Artery Disease Father      History   Drug Use Unknown         Objective     /65   Pulse 65   Temp 97.4  F (36.3  C)   Ht 1.753 m (5' 9\")   Wt 108.9 kg (240 lb)   SpO2 95%   BMI 35.44 kg/m      Physical Exam    GENERAL APPEARANCE: healthy, alert and no distress     EYES: EOMI, PERRL, has reading glasses but does not use them much.     HENT: ear canals and TM's normal and nose and mouth without ulcers or lesions, no hearing aides, no dentures     NECK: no adenopathy, no asymmetry, masses, or scars and thyroid normal to palpation     RESP: lungs clear to auscultation - no rales, rhonchi or wheezes     CV: regular rates and rhythm, normal S1 S2, no S3 or S4 and no murmur, click or rub     ABDOMEN:  soft, nontender, no HSM or masses and bowel sounds normal     GU_female: ruby catheter to straight drainage, last change was 22     MS: due to MS, hand grasps weaker and uses a reacher to manipulate her environment.  Uses an electric wheelchair. 4 point lift for transfers.     SKIN: no suspicious lesions or rashes     NEURO: speech normal, gait abnormal non-ambulatory, mentation intact and able to give directions of her cares, has feeling in her feet/hand and effects from MS     PSYCH: mentation appears normal. and affect normal/bright     LYMPHATICS: No cervical adenopathy    Recent Labs   Lab Test 21  1452      POTASSIUM 4.0   CR 0.71        Diagnostics:  Labs pending at this time.  Results will be reviewed when available.  On  will have HgB, BMP drawn at AL.      Chose not to do a EKG at facility.  If needed can be done be done at hospital.     Revised Cardiac Risk Index " (RCRI):  The patient has the following serious cardiovascular risks for perioperative complications:   - No serious cardiac risks = 0 points     RCRI Interpretation: 0 points: Class I (very low risk - 0.4% complication rate)           Signed Electronically by: CAROL Rodríguez CNP  Copy of this evaluation report is provided to requesting physician.

## 2022-04-18 ENCOUNTER — LAB REQUISITION (OUTPATIENT)
Dept: LAB | Facility: CLINIC | Age: 77
End: 2022-04-18
Payer: COMMERCIAL

## 2022-04-18 DIAGNOSIS — I10 ESSENTIAL (PRIMARY) HYPERTENSION: ICD-10-CM

## 2022-04-18 DIAGNOSIS — Z11.52 ENCOUNTER FOR SCREENING FOR COVID-19: ICD-10-CM

## 2022-04-18 PROCEDURE — U0003 INFECTIOUS AGENT DETECTION BY NUCLEIC ACID (DNA OR RNA); SEVERE ACUTE RESPIRATORY SYNDROME CORONAVIRUS 2 (SARS-COV-2) (CORONAVIRUS DISEASE [COVID-19]), AMPLIFIED PROBE TECHNIQUE, MAKING USE OF HIGH THROUGHPUT TECHNOLOGIES AS DESCRIBED BY CMS-2020-01-R: HCPCS | Mod: ORL | Performed by: NURSE PRACTITIONER

## 2022-04-19 LAB
ANION GAP SERPL CALCULATED.3IONS-SCNC: 10 MMOL/L (ref 5–18)
BUN SERPL-MCNC: 17 MG/DL (ref 8–28)
CALCIUM SERPL-MCNC: 10 MG/DL (ref 8.5–10.5)
CHLORIDE BLD-SCNC: 105 MMOL/L (ref 98–107)
CO2 SERPL-SCNC: 23 MMOL/L (ref 22–31)
CREAT SERPL-MCNC: 0.74 MG/DL (ref 0.6–1.1)
GFR SERPL CREATININE-BSD FRML MDRD: 84 ML/MIN/1.73M2
GLUCOSE BLD-MCNC: 89 MG/DL (ref 70–125)
HGB BLD-MCNC: 14.9 G/DL (ref 11.7–15.7)
POTASSIUM BLD-SCNC: 4.8 MMOL/L (ref 3.5–5)
SARS-COV-2 RNA RESP QL NAA+PROBE: NEGATIVE
SODIUM SERPL-SCNC: 138 MMOL/L (ref 136–145)

## 2022-04-19 PROCEDURE — 80048 BASIC METABOLIC PNL TOTAL CA: CPT | Mod: ORL | Performed by: NURSE PRACTITIONER

## 2022-04-19 PROCEDURE — P9604 ONE-WAY ALLOW PRORATED TRIP: HCPCS | Mod: ORL | Performed by: NURSE PRACTITIONER

## 2022-04-19 PROCEDURE — 85018 HEMOGLOBIN: CPT | Mod: ORL | Performed by: NURSE PRACTITIONER

## 2022-04-19 PROCEDURE — 36415 COLL VENOUS BLD VENIPUNCTURE: CPT | Mod: ORL | Performed by: NURSE PRACTITIONER

## 2022-04-28 ENCOUNTER — ASSISTED LIVING VISIT (OUTPATIENT)
Dept: GERIATRICS | Facility: CLINIC | Age: 77
End: 2022-04-28
Payer: COMMERCIAL

## 2022-04-28 VITALS
OXYGEN SATURATION: 92 % | WEIGHT: 240 LBS | DIASTOLIC BLOOD PRESSURE: 79 MMHG | BODY MASS INDEX: 35.55 KG/M2 | HEIGHT: 69 IN | SYSTOLIC BLOOD PRESSURE: 119 MMHG | TEMPERATURE: 97.2 F

## 2022-04-28 DIAGNOSIS — G82.50 SPASTIC QUADRIPLEGIA (H): ICD-10-CM

## 2022-04-28 DIAGNOSIS — I10 PRIMARY HYPERTENSION: ICD-10-CM

## 2022-04-28 DIAGNOSIS — I73.9 PAD (PERIPHERAL ARTERY DISEASE) (H): Primary | ICD-10-CM

## 2022-04-28 DIAGNOSIS — I96 GANGRENE OF TOE (H): ICD-10-CM

## 2022-04-28 DIAGNOSIS — G35 MS (MULTIPLE SCLEROSIS) (H): ICD-10-CM

## 2022-04-28 NOTE — PROGRESS NOTES
[facility] GERIATRICS    Chief Complaint   Patient presents with     RECHECK     HPI:  Cristina Stevens is a 76 year old  (1945), who is being seen today for an episodic care visit at: [facility] ([facility] [[id number]]. 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  On 5/5/22 she is having a fem/pop procedure done.  Surgery center called this NP earlier this week asking for her H and P from middle of April to be sent.  Muna stated that attempting to open her vein last week was not successful.    Discussed with her the plans that they plan to keep her in the hospital for a few days.  She feels she will be able to come back here afterwards.  Did speak about if she would need to have TCU or if they can manage her here.  Will alert the staff to know of her few days gone.    Met Muna's helper today that goes with her to most appointments.  She is a big help for Muna given Muna's restrictions of her MS and spastic muscles.  Today they went to therapy.     Allergies, and PMH/PSH reviewed in EPIC today.    Current Outpatient Medications   Medication Sig Dispense Refill     amLODIPine (NORVASC) 2.5 MG tablet 1 TABLET ORALLY EVERY EVENING (DX: HYPERTENSION) 30 tablet 11     amoxicillin (AMOXIL) 250 MG chewable tablet CHEW AND SWALLOW 8 TABLETS (2000 MG) BY MOUTH 1 HOUR PRIOR TO DENTAL 8 tablet 97     armodafinil (NUVIGIL) 150 MG TABS tablet 1 TABLET ORALLY EVERY MORNING (DX:NARCOLEPSY) 30 tablet 4     CALMOSEPTINE 0.44-20.6 % OINT ointment APPLY TO AFFECTED AREA(S) TOPICALLY TO BUTTOCKS AS NEEDED WITH BRIEF CHANGES 113 g 97     cephALEXin (KEFLEX) 250 MG capsule TAKE 1 CAPSULE BY MOUTH ONCE DAILY FOR PREVENTIVE 31  "capsule 97     cetirizine (ZYRTEC) 10 MG tablet Take 1 tablet (10 mg) by mouth daily 28 tablet 97     CO2-Releasing (-TWO) SUPP Place rectally daily as needed       Cranberry (CRANBERRY CONCENTRATE) 500 MG CAPS TAKE 1 CAPSULE BY MOUTH ONCE DAILY 28 capsule 97     DESITIN 13 % CREA APPLY TOPICALLY TO AREAS OF REDNESS OR RASH WITH EACH BRIEF CHANGE 113 g 97     ibuprofen (ADVIL/MOTRIN) 400 MG tablet TAKE 1 TABLET BY MOUTH TWICE DAILY AS NEEDED FOR PAIN 30 tablet 97     lisinopril (ZESTRIL) 10 MG tablet Take 1 tablet (10 mg) by mouth in the morning.       mirabegron (MYRBETRIQ) 25 MG 24 hr tablet Take 1 tablet (25 mg) by mouth daily 28 tablet 97     Multiple Vitamin (TAB-A-DAWSON) TABS Take 1 tablet by mouth daily 28 tablet 97     nystatin (MYCOSTATIN) 112347 UNIT/GM external powder APPLY IN BETWEEN THE TOES TWICE DAILY UNTIL HEALED;THEN TWICE DAILY AS NEEDED 30 g 97     polyethylene glycol (MIRALAX/GLYCOLAX) powder MIX 17GM OF POWDER IN 8OZ OF WATER UNTIL COMPLETELY DISSOLVED. DRINK SOLUTION DAILY AS NEEDED 527 g 98     Probiotic Product (PROBIOTIC DAILY) CAPS Take 1 capsule by mouth daily 28 capsule 98     senna-docusate (SENEXON-S) 8.6-50 MG tablet Take 2 tablets by mouth 2 times daily 62 tablet 12     vitamin C (ASCORBIC ACID) 500 MG tablet TAKE 1 TABLET BY MOUTH ONCE DAILY 28 tablet 97     VITAMIN D3 50 MCG (2000 UT) tablet 2 TABLETS (4000U) ORALLY DAILY 56 tablet 10       REVIEW OF SYSTEMS:  4 point ROS including Respiratory, CV, GI and , other than that noted in the HPI,  is negative    Objective:   /79   Temp 97.2  F (36.2  C)   Ht 1.753 m (5' 9\")   Wt 108.9 kg (240 lb)   SpO2 92%   BMI 35.44 kg/m    GENERAL APPEARANCE:  Alert, in no distress, appears healthy, oriented, cooperative  EYES:  EOM normal, conjunctiva and lids normal  RESP:  respiratory effort and palpation of chest normal, lungs clear to auscultation , no respiratory distress  CV:  Palpation and auscultation of heart done , regular " rate and rhythm, no murmur, rub, or gallop, +1 edema  ABDOMEN:  normal bowel sounds, soft, nontender, no hepatosplenomegaly or other masses, no guarding or rebound  :    catheter present and draining yellow urine  M/S:   Gait and station abnormal non-ambulatory.  4 point lift used for transfers.  able to manuvuer an electric w/c  SKIN:  switched her left strap boot out for the blue cushion boot and left toes are swollen and dusky in color.  gauze was between her 4th and 5th toe.  5th toe continues to be 1/2 narcrotic.  PSYCH:  oriented X 3, normal insight, judgement and memory, affect and mood normal      Most Recent 3 CBC's:Recent Labs   Lab Test 04/19/22  0815 03/19/19  0904 03/19/19  0000 07/31/18  1024 07/24/18  0550 07/21/18  0655 07/20/18  0828 06/25/18  0625   WBC  --   --   --   --  8.5 12.6* 21.5* 7.2   HGB 14.9 12.6 12.6   < >  --  11.2* 12.6 12.1   MCV  --   --   --   --   --  84 87 92   PLT  --   --   --   --   --  401 485* 301    < > = values in this interval not displayed.     Most Recent 3 BMP's:Recent Labs   Lab Test 04/19/22  0815 10/14/21  1520 09/27/21  1452 11/18/19  1224 09/24/18  1705     --  137  --  136   POTASSIUM 4.8  --  4.0  --  4.4   CHLORIDE 105  --  105  --  103   CO2 23  --  27  --  25   BUN 17  --  27  --  14   CR 0.74  --  0.71  --  0.74   ANIONGAP 10  --  5  --  8   BRENDA 10.0 9.7 9.5   < > 9.4   GLC 89  --  81  --  101*    < > = values in this interval not displayed.       Assessment/Plan:  (I73.9) PAD (peripheral artery disease) (H)  (primary encounter diagnosis)  (I96) Gangrene of toe (H)  Comment: - does not need another H & P due to having one in last 30 days.  She knows her options of having surgery or not doing anything and risk future amputations and or infection.  Has been instructed by surgeon for taking baby ASA now until surgery date.    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  Had a therapy session.  Will be in the  hospital for a few days.  Muna is a full care person here and so probably would be fine to come back for rehab.  She knows the hospital ant make recommendations.    (I10) Primary hypertension  Comment: blood pressures improved since adding Norvasc.  Every once in a while Muna will send her B/P.  117/70.      Orders:  1.  Morning of 5/5/22, Do not give Lisinopril prior to procedure.      Electronically signed by: CAROL Rodríguez CNP

## 2022-04-28 NOTE — LETTER
4/28/2022        RE: Cristina Stevens  [address]  [address]  [place] [address]        [facility] GERIATRICS    Chief Complaint   Patient presents with     RECHECK     HPI:  Cristina Stevens is a 76 year old  (1945), who is being seen today for an episodic care visit at: [facility] [[id number]]. 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  On 5/5/22 she is having a fem/pop procedure done.  Surgery center called this NP earlier this week asking for her H and P from middle of April to be sent.  Muna stated that attempting to open her vein last week was not successful.    Discussed with her the plans that they plan to keep her in the hospital for a few days.  She feels she will be able to come back here afterwards.  Did speak about if she would need to have TCU or if they can manage her here.  Will alert the staff to know of her few days gone.    Met Muna's helper today that goes with her to most appointments.  She is a big help for Muna given Muna's restrictions of her MS and spastic muscles.  Today they went to therapy.     Allergies, and PMH/PSH reviewed in EPIC today.    Current Outpatient Medications   Medication Sig Dispense Refill     amLODIPine (NORVASC) 2.5 MG tablet 1 TABLET ORALLY EVERY EVENING (DX: HYPERTENSION) 30 tablet 11     amoxicillin (AMOXIL) 250 MG chewable tablet CHEW AND SWALLOW 8 TABLETS (2000 MG) BY MOUTH 1 HOUR PRIOR TO DENTAL 8 tablet 97     armodafinil (NUVIGIL) 150 MG TABS tablet 1 TABLET ORALLY EVERY MORNING (DX:NARCOLEPSY) 30 tablet 4     CALMOSEPTINE 0.44-20.6 % OINT ointment APPLY TO AFFECTED AREA(S) TOPICALLY TO BUTTOCKS AS NEEDED WITH BRIEF CHANGES  "113 g 97     cephALEXin (KEFLEX) 250 MG capsule TAKE 1 CAPSULE BY MOUTH ONCE DAILY FOR PREVENTIVE 31 capsule 97     cetirizine (ZYRTEC) 10 MG tablet Take 1 tablet (10 mg) by mouth daily 28 tablet 97     CO2-Releasing (-TWO) SUPP Place rectally daily as needed       Cranberry (CRANBERRY CONCENTRATE) 500 MG CAPS TAKE 1 CAPSULE BY MOUTH ONCE DAILY 28 capsule 97     DESITIN 13 % CREA APPLY TOPICALLY TO AREAS OF REDNESS OR RASH WITH EACH BRIEF CHANGE 113 g 97     ibuprofen (ADVIL/MOTRIN) 400 MG tablet TAKE 1 TABLET BY MOUTH TWICE DAILY AS NEEDED FOR PAIN 30 tablet 97     lisinopril (ZESTRIL) 10 MG tablet Take 1 tablet (10 mg) by mouth in the morning.       mirabegron (MYRBETRIQ) 25 MG 24 hr tablet Take 1 tablet (25 mg) by mouth daily 28 tablet 97     Multiple Vitamin (TAB-A-DAWSON) TABS Take 1 tablet by mouth daily 28 tablet 97     nystatin (MYCOSTATIN) 642451 UNIT/GM external powder APPLY IN BETWEEN THE TOES TWICE DAILY UNTIL HEALED;THEN TWICE DAILY AS NEEDED 30 g 97     polyethylene glycol (MIRALAX/GLYCOLAX) powder MIX 17GM OF POWDER IN 8OZ OF WATER UNTIL COMPLETELY DISSOLVED. DRINK SOLUTION DAILY AS NEEDED 527 g 98     Probiotic Product (PROBIOTIC DAILY) CAPS Take 1 capsule by mouth daily 28 capsule 98     senna-docusate (SENEXON-S) 8.6-50 MG tablet Take 2 tablets by mouth 2 times daily 62 tablet 12     vitamin C (ASCORBIC ACID) 500 MG tablet TAKE 1 TABLET BY MOUTH ONCE DAILY 28 tablet 97     VITAMIN D3 50 MCG (2000 UT) tablet 2 TABLETS (4000U) ORALLY DAILY 56 tablet 10       REVIEW OF SYSTEMS:  4 point ROS including Respiratory, CV, GI and , other than that noted in the HPI,  is negative    Objective:   /79   Temp 97.2  F (36.2  C)   Ht 1.753 m (5' 9\")   Wt 108.9 kg (240 lb)   SpO2 92%   BMI 35.44 kg/m    GENERAL APPEARANCE:  Alert, in no distress, appears healthy, oriented, cooperative  EYES:  EOM normal, conjunctiva and lids normal  RESP:  respiratory effort and palpation of chest normal, lungs clear " to auscultation , no respiratory distress  CV:  Palpation and auscultation of heart done , regular rate and rhythm, no murmur, rub, or gallop, +1 edema  ABDOMEN:  normal bowel sounds, soft, nontender, no hepatosplenomegaly or other masses, no guarding or rebound  :    catheter present and draining yellow urine  M/S:   Gait and station abnormal non-ambulatory.  4 point lift used for transfers.  able to manuvuer an electric w/c  SKIN:  switched her left strap boot out for the blue cushion boot and left toes are swollen and dusky in color.  gauze was between her 4th and 5th toe.  5th toe continues to be 1/2 narcrotic.  PSYCH:  oriented X 3, normal insight, judgement and memory, affect and mood normal      Most Recent 3 CBC's:Recent Labs   Lab Test 04/19/22  0815 03/19/19  0904 03/19/19  0000 07/31/18  1024 07/24/18  0550 07/21/18  0655 07/20/18  0828 06/25/18  0625   WBC  --   --   --   --  8.5 12.6* 21.5* 7.2   HGB 14.9 12.6 12.6   < >  --  11.2* 12.6 12.1   MCV  --   --   --   --   --  84 87 92   PLT  --   --   --   --   --  401 485* 301    < > = values in this interval not displayed.     Most Recent 3 BMP's:Recent Labs   Lab Test 04/19/22  0815 10/14/21  1520 09/27/21  1452 11/18/19  1224 09/24/18  1705     --  137  --  136   POTASSIUM 4.8  --  4.0  --  4.4   CHLORIDE 105  --  105  --  103   CO2 23  --  27  --  25   BUN 17  --  27  --  14   CR 0.74  --  0.71  --  0.74   ANIONGAP 10  --  5  --  8   BRENDA 10.0 9.7 9.5   < > 9.4   GLC 89  --  81  --  101*    < > = values in this interval not displayed.       Assessment/Plan:  (I73.9) PAD (peripheral artery disease) (H)  (primary encounter diagnosis)  (I96) Gangrene of toe (H)  Comment: - does not need another H & P due to having one in last 30 days.  She knows her options of having surgery or not doing anything and risk future amputations and or infection.  Has been instructed by surgeon for taking baby ASA now until surgery date.    (G35) MS (multiple sclerosis)  (H)  (G82.50) Spastic quadriplegia (H)  Comment: stable.  Had a therapy session.  Will be in the hospital for a few days.  Muna is a full care person here and so probably would be fine to come back for rehab.  She knows the hospital ant make recommendations.    (I10) Primary hypertension  Comment: blood pressures improved since adding Norvasc.  Every once in a while Muna will send her B/P.  117/70.      Orders:  1.  Morning of 5/5/22, Do not give Lisinopril prior to procedure.      Electronically signed by: CAROL Rodríguez CNP             Sincerely,        CAROL Rodríguez CNP

## 2022-05-09 DIAGNOSIS — I73.9 PVD (PERIPHERAL VASCULAR DISEASE) (H): Primary | ICD-10-CM

## 2022-05-09 RX ORDER — OXYCODONE HYDROCHLORIDE 5 MG/1
5 TABLET ORAL
Qty: 30 TABLET | Refills: 0 | Status: SHIPPED | OUTPATIENT
Start: 2022-05-09 | End: 2022-05-20

## 2022-05-10 NOTE — TELEPHONE ENCOUNTER
Recent fem-pop surgery and returned to [facility] with range order oxycodone 5-10mg every 4 hours.    Decided to change to 5mg every 3 hours prn for pain.    Sent script to [facility] and to nursing at [facility]    Electronically signed by Love Lucas RN, CNP

## 2022-05-12 DIAGNOSIS — I82.409 ACUTE THROMBOEMBOLISM OF DEEP VEINS OF LOWER EXTREMITY (H): Primary | ICD-10-CM

## 2022-05-12 RX ORDER — APIXABAN 5 MG/1
TABLET, FILM COATED ORAL
Qty: 24 TABLET | Refills: 11 | Status: SHIPPED | OUTPATIENT
Start: 2022-05-12 | End: 2022-09-28

## 2022-05-13 ENCOUNTER — ASSISTED LIVING VISIT (OUTPATIENT)
Dept: GERIATRICS | Facility: CLINIC | Age: 77
End: 2022-05-13
Payer: COMMERCIAL

## 2022-05-13 VITALS
BODY MASS INDEX: 35.44 KG/M2 | TEMPERATURE: 97.2 F | WEIGHT: 240 LBS | OXYGEN SATURATION: 98 % | SYSTOLIC BLOOD PRESSURE: 119 MMHG | DIASTOLIC BLOOD PRESSURE: 79 MMHG

## 2022-05-13 DIAGNOSIS — G82.50 SPASTIC QUADRIPLEGIA (H): ICD-10-CM

## 2022-05-13 DIAGNOSIS — I73.9 PAD (PERIPHERAL ARTERY DISEASE) (H): Primary | ICD-10-CM

## 2022-05-13 DIAGNOSIS — G35 MS (MULTIPLE SCLEROSIS) (H): ICD-10-CM

## 2022-05-13 DIAGNOSIS — I10 PRIMARY HYPERTENSION: ICD-10-CM

## 2022-05-13 DIAGNOSIS — N31.9 NEUROGENIC BLADDER: ICD-10-CM

## 2022-05-13 DIAGNOSIS — Z98.890 S/P VASCULAR SURGERY: ICD-10-CM

## 2022-05-13 DIAGNOSIS — K59.09 CHRONIC CONSTIPATION: ICD-10-CM

## 2022-05-13 DIAGNOSIS — R33.9 URINARY RETENTION WITH INCOMPLETE BLADDER EMPTYING: ICD-10-CM

## 2022-05-13 DIAGNOSIS — Z97.8 FOLEY CATHETER IN PLACE: ICD-10-CM

## 2022-05-13 NOTE — PROGRESS NOTES
[facility] GERIATRICS    Chief Complaint   Patient presents with     RECHECK     HPI:  Cristina Stevens is a 76 year old  (1945), who is being seen today for an episodic care visit at: [facility] ([facility]) [[id number]]. Today's concern is: HOSPITAL RETURN FROM [facility] 5/522 THROUGH 5/9/22.      Seeing Muna today for follow up after having a short stay at [facility] where on 5/5/22 she underwent a left common femoral artery to tibioperoneal trunk bypass using reverse greater saphenous cryo vein.  Muna had severe PAD with necrotic toe and heel wounds.  Was under the care of vascular surgery and opted to have surgical intervention.  It is noted that the procedure went without complications.  Able to be discharged back to [facility] where she lives in the extended care area and receives full assist of cares.      Mnua does have treatments to the incisions that are done by the staff of the facility.  Muna is very particular on the treatment and following orders from the doctors closely.    Knows her follow up appointments.  Her friend and helper assists with going with her as able to be another set of eyes and ears.      Came to see Muna today per her request.  Her friend was there too and so able to put a face with the name.  Got up to date on what is happening with Muna.  Did not remove the bandages to her left toes as she stated they look the same.  5th digit is 1/2 to 3/4 necrotic but looks no worse.  They tell her she has circulation back to her leg again.      Spoke about [employer] as they are looking for documentation of her neurogenic bladder as the help supplement ongoing catheter supplies.  Will send over this visit with up to date information for their records.      Allergies, and PMH/PSH reviewed in EPIC today.    Current Outpatient Medications   Medication Sig Dispense Refill     aspirin (ASA) 81 MG chewable tablet Take  1 tablet (81 mg) by mouth daily       senna-docusate (SENEXON-S) 8.6-50 MG tablet Take 2 tablets by mouth daily as needed for constipation 62 tablet 12     amLODIPine (NORVASC) 2.5 MG tablet 1 TABLET ORALLY EVERY EVENING (DX: HYPERTENSION) 30 tablet 11     amoxicillin (AMOXIL) 250 MG chewable tablet CHEW AND SWALLOW 8 TABLETS (2000 MG) BY MOUTH 1 HOUR PRIOR TO DENTAL 8 tablet 97     armodafinil (NUVIGIL) 150 MG TABS tablet 1 TABLET ORALLY EVERY MORNING (DX:NARCOLEPSY) 30 tablet 4     CALMOSEPTINE 0.44-20.6 % OINT ointment APPLY TO AFFECTED AREA(S) TOPICALLY TO BUTTOCKS AS NEEDED WITH BRIEF CHANGES 113 g 97     cephALEXin (KEFLEX) 250 MG capsule TAKE 1 CAPSULE BY MOUTH ONCE DAILY FOR PREVENTIVE 31 capsule 97     cetirizine (ZYRTEC) 10 MG tablet Take 1 tablet (10 mg) by mouth daily 28 tablet 97     CO2-Releasing (-TWO) SUPP Place rectally daily as needed       Cranberry (CRANBERRY CONCENTRATE) 500 MG CAPS TAKE 1 CAPSULE BY MOUTH ONCE DAILY 28 capsule 97     DESITIN 13 % CREA APPLY TOPICALLY TO AREAS OF REDNESS OR RASH WITH EACH BRIEF CHANGE 113 g 97     ELIQUIS ANTICOAGULANT 5 MG tablet 1 TABLET ORALLY 2 TIMES DAILY. DO NOT START BEFORE MAY 14TH,2022 (DX: VENOUS THROMBOEMBOLISM) 24 tablet 11     ibuprofen (ADVIL/MOTRIN) 400 MG tablet TAKE 1 TABLET BY MOUTH TWICE DAILY AS NEEDED FOR PAIN 30 tablet 97     mirabegron (MYRBETRIQ) 25 MG 24 hr tablet Take 1 tablet (25 mg) by mouth daily 28 tablet 97     Multiple Vitamin (TAB-A-DAWSON) TABS Take 1 tablet by mouth daily 28 tablet 97     nystatin (MYCOSTATIN) 921642 UNIT/GM external powder APPLY IN BETWEEN THE TOES TWICE DAILY UNTIL HEALED;THEN TWICE DAILY AS NEEDED 30 g 97     oxyCODONE (ROXICODONE) 5 MG tablet 1 TABLET ORALLY EVERY 3 HOURS AS NEEDED (DX: PAIN) CONTROLLED MEDICATION ? TO BE STORED IN DOUBLE LOCKED STORAGE 30 tablet 0     polyethylene glycol (MIRALAX/GLYCOLAX) powder MIX 17GM OF POWDER IN 8OZ OF WATER UNTIL COMPLETELY DISSOLVED. DRINK SOLUTION DAILY AS NEEDED  527 g 98     Probiotic Product (PROBIOTIC DAILY) CAPS Take 1 capsule by mouth daily 28 capsule 98     vitamin C (ASCORBIC ACID) 500 MG tablet TAKE 1 TABLET BY MOUTH ONCE DAILY 28 tablet 97     VITAMIN D3 50 MCG (2000 UT) tablet 2 TABLETS (4000U) ORALLY DAILY 56 tablet 10       REVIEW OF SYSTEMS:  4 point ROS including Respiratory, CV, GI and , other than that noted in the HPI,  is negative    Objective:   /79   Temp 97.2  F (36.2  C)   Wt 108.9 kg (240 lb)   SpO2 98%   BMI 35.44 kg/m    GENERAL APPEARANCE:  Alert, in no distress, appears healthy, oriented, cooperative  EYES:  EOM normal, conjunctiva and lids normal, PERRL, does have glasses  RESP:  respiratory effort and palpation of chest normal, lungs clear to auscultation , no respiratory distress  CV:  Palpation and auscultation of heart done , regular rate and rhythm, no murmur, rub, or gallop, +1+2 edema around ankles/tops of feet.  ABDOMEN:  abdomen is large, round, soft to touch.  decreased bowel sounds, no guarding or rebound  :    has a permanent catheter present due to neurogenic bladder due to her MS.  wears a leg bag and draining clear yellow urine in good amounts.  M/S:   Gait and station abnormal non-ambulatory, lift for transfers, uses an electric w/c for mobility that she is able to control with her hand, due to her MS she does have poor hand grasp but can manipulate her environment.  no movement in legs  SKIN:  unable to see incisions but Muna points out where they are.  toes are bright pink on left foot and 5th toe is hidden in dressing  PSYCH:  oriented X 3, normal insight, judgement and memory, affect and mood normal    5/9/22  Platelets 150 - 450 x10(9)/L 231        Assessment/Plan:  (I73.9) PAD (peripheral artery disease) (H)  (primary encounter diagnosis)  (Z98.890) S/P vascular surgery  Comment: Muna seems to be doing well at home.  She states she directs the nurses about not touching her incisions but is happy with the care.     Now is on ASA 81mg daily and Eliquis 5mg po BID.    Has a follow up appointment in the future which unable to see through care everywhere.  Between muna and her friend, they arrange the rides.    No new orders from this NP today.  Checking to make sure there is nothing she needs this NP to do in regards to her PAD.  Plan: aspirin (ASA) 81 MG chewable tablet    (I10) Primary hypertension  Comment: it is noted that Muna was taken off the Lisinopril 10mg daily and left on the Norvasc 2.5mg po daily.  No B/P's recorded since return.  Lisinopril was started by another provider when muna went into a clinic.  This NP added the Norvasc due to elevated Diastolic numbers and her pressures came down nicely.    Facility will take her vitals at wellness visits.  Continue to monitor at visits.    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  She is in the extended care portion of [facility] and is content.  Staff assists with cares, transfers, meal prep as well as delivering her medications.  Her friend helps her with more personal things as well as escorting her to appointments and helping with getting supplies she needs.  Muna does go for therapy sessions twice a week for her MS.  This NP does not see any advancement of her disease process over the last 7-8 months of knowing her.    (N31.9) Neurogenic bladder  (Z97.8) Ruby catheter in place  Comment: due to Muna's MS, she has a neurogenic bladder that is a permanent condition and will be lifetime.  Able to see documentation for neurogenic bladder back to 2009 in care everywhere.  Muna has a indwelling ruby catheter that is continuous (she does not self cath or anyone else cath her intermittently).  Due to chronic UTIs, the catheter is changed every 3 weeks to avoid infections.  She does take Cranberry tabs as well to help the pH of the urine.  Keflex 250mg daily  for prevention is done.    Urine appears yellow with no sediment and voids in large amounts.  Muna has lived with this for some time and will direct staff how to handle and position the leg bag for comfort.    (K59.09) Chronic constipation  Comment: just updating her epic chart as her Senna-S order is a PRN.  Plan: senna-docusate (SENEXON-S) 8.6-50 MG tablet      Orders:  No new orders written today.  She has everything in place.  This NP will be sending note to [address] [place] [address] for their records to support her need for catheter supplies for insurance purposes.    Electronically signed by: CAROL Rodríguez CNP       Addendum:  (R33.9) Urinary retention with incomplete bladder emptying  (N31.9) Neurogenic bladder  (Z97.8) Ruby catheter in place  (G35)  MS multiple Sclerosis  Comment: due to Muna's MS, Muna as urinary retention that has led to her incomplete bladder emptying and has left her with a neurogenic bladder.  This is a permanent condition and will be lifetime.  Able to see documentation for urinary retention/neurogenic bladder back to 2009 in care everywhere.  Muna has a indwelling ruby catheter that is continuous (she does not self cath or anyone else cath her intermittently).  Due to chronic UTIs, the catheter is changed every 3 weeks to avoid infections.  She does take Cranberry tabs as well to help the pH of the urine.  Keflex 250mg daily for prevention is done.    Urine appears yellow with no sediment and voids in large amounts.  Muna has lived with this for some time and will direct staff how to handle and position the leg bag for comfort.    Electronically signed by Love Lucas RN, CNP  6/2/22

## 2022-05-13 NOTE — LETTER
5/13/2022        RE: Cristina Stevens  [address]  [address]  [facility] [address]        [facility] GERIATRICS    Chief Complaint   Patient presents with     RECHECK     HPI:  Cristina Stevens is a 76 year old  (1945), who is being seen today for an episodic care visit at: [facility] ([facility]) [[id number]]. Today's concern is: HOSPITAL RETURN FROM [facility] 5/522 THROUGH 5/9/22.      Seeing Muna today for follow up after having a short stay at [facility] where on 5/5/22 she underwent a left common femoral artery to tibioperoneal trunk bypass using reverse greater saphenous cryo vein.  Muna had severe PAD with necrotic toe and heel wounds.  Was under the care of vascular surgery and opted to have surgical intervention.  It is noted that the procedure went without complications.  Able to be discharged back to [facility] where she lives in the extended care area and receives full assist of cares.      Muna does have treatments to the incisions that are done by the staff of the facility.  Muna is very particular on the treatment and following orders from the doctors closely.    Knows her follow up appointments.  Her friend and helper assists with going with her as able to be another set of eyes and ears.      Came to see Muna today per her request.  Her friend was there too and so able to put a face with the name.  Got up to date on what is happening with Muna.  Did not remove the bandages to her left toes as she stated they look the same.  5th digit is 1/2 to 3/4 necrotic but looks no worse.  They tell her she has circulation back to her leg again.      Spoke about [employer] as they are looking for documentation of her neurogenic bladder as the help supplement ongoing catheter supplies.  Will send over this visit with up to date information for their records.      Allergies, and PMH/PSH reviewed in EPIC  today.    Current Outpatient Medications   Medication Sig Dispense Refill     aspirin (ASA) 81 MG chewable tablet Take 1 tablet (81 mg) by mouth daily       senna-docusate (SENEXON-S) 8.6-50 MG tablet Take 2 tablets by mouth daily as needed for constipation 62 tablet 12     amLODIPine (NORVASC) 2.5 MG tablet 1 TABLET ORALLY EVERY EVENING (DX: HYPERTENSION) 30 tablet 11     amoxicillin (AMOXIL) 250 MG chewable tablet CHEW AND SWALLOW 8 TABLETS (2000 MG) BY MOUTH 1 HOUR PRIOR TO DENTAL 8 tablet 97     armodafinil (NUVIGIL) 150 MG TABS tablet 1 TABLET ORALLY EVERY MORNING (DX:NARCOLEPSY) 30 tablet 4     CALMOSEPTINE 0.44-20.6 % OINT ointment APPLY TO AFFECTED AREA(S) TOPICALLY TO BUTTOCKS AS NEEDED WITH BRIEF CHANGES 113 g 97     cephALEXin (KEFLEX) 250 MG capsule TAKE 1 CAPSULE BY MOUTH ONCE DAILY FOR PREVENTIVE 31 capsule 97     cetirizine (ZYRTEC) 10 MG tablet Take 1 tablet (10 mg) by mouth daily 28 tablet 97     CO2-Releasing (-TWO) SUPP Place rectally daily as needed       Cranberry (CRANBERRY CONCENTRATE) 500 MG CAPS TAKE 1 CAPSULE BY MOUTH ONCE DAILY 28 capsule 97     DESITIN 13 % CREA APPLY TOPICALLY TO AREAS OF REDNESS OR RASH WITH EACH BRIEF CHANGE 113 g 97     ELIQUIS ANTICOAGULANT 5 MG tablet 1 TABLET ORALLY 2 TIMES DAILY. DO NOT START BEFORE MAY 14TH,2022 (DX: VENOUS THROMBOEMBOLISM) 24 tablet 11     ibuprofen (ADVIL/MOTRIN) 400 MG tablet TAKE 1 TABLET BY MOUTH TWICE DAILY AS NEEDED FOR PAIN 30 tablet 97     mirabegron (MYRBETRIQ) 25 MG 24 hr tablet Take 1 tablet (25 mg) by mouth daily 28 tablet 97     Multiple Vitamin (TAB-A-DAWSON) TABS Take 1 tablet by mouth daily 28 tablet 97     nystatin (MYCOSTATIN) 539626 UNIT/GM external powder APPLY IN BETWEEN THE TOES TWICE DAILY UNTIL HEALED;THEN TWICE DAILY AS NEEDED 30 g 97     oxyCODONE (ROXICODONE) 5 MG tablet 1 TABLET ORALLY EVERY 3 HOURS AS NEEDED (DX: PAIN) CONTROLLED MEDICATION ? TO BE STORED IN DOUBLE LOCKED STORAGE 30 tablet 0     polyethylene glycol  (MIRALAX/GLYCOLAX) powder MIX 17GM OF POWDER IN 8OZ OF WATER UNTIL COMPLETELY DISSOLVED. DRINK SOLUTION DAILY AS NEEDED 527 g 98     Probiotic Product (PROBIOTIC DAILY) CAPS Take 1 capsule by mouth daily 28 capsule 98     vitamin C (ASCORBIC ACID) 500 MG tablet TAKE 1 TABLET BY MOUTH ONCE DAILY 28 tablet 97     VITAMIN D3 50 MCG (2000 UT) tablet 2 TABLETS (4000U) ORALLY DAILY 56 tablet 10       REVIEW OF SYSTEMS:  4 point ROS including Respiratory, CV, GI and , other than that noted in the HPI,  is negative    Objective:   /79   Temp 97.2  F (36.2  C)   Wt 108.9 kg (240 lb)   SpO2 98%   BMI 35.44 kg/m    GENERAL APPEARANCE:  Alert, in no distress, appears healthy, oriented, cooperative  EYES:  EOM normal, conjunctiva and lids normal, PERRL, does have glasses  RESP:  respiratory effort and palpation of chest normal, lungs clear to auscultation , no respiratory distress  CV:  Palpation and auscultation of heart done , regular rate and rhythm, no murmur, rub, or gallop, +1+2 edema around ankles/tops of feet.  ABDOMEN:  abdomen is large, round, soft to touch.  decreased bowel sounds, no guarding or rebound  :    has a permanent catheter present due to neurogenic bladder due to her MS.  wears a leg bag and draining clear yellow urine in good amounts.  M/S:   Gait and station abnormal non-ambulatory, lift for transfers, uses an electric w/c for mobility that she is able to control with her hand, due to her MS she does have poor hand grasp but can manipulate her environment.  no movement in legs  SKIN:  unable to see incisions but Muna points out where they are.  toes are bright pink on left foot and 5th toe is hidden in dressing  PSYCH:  oriented X 3, normal insight, judgement and memory, affect and mood normal    5/9/22  Platelets 150 - 450 x10(9)/L 231        Assessment/Plan:  (I73.9) PAD (peripheral artery disease) (H)  (primary encounter diagnosis)  (Z98.890) S/P vascular surgery  Comment: Muna seems  to be doing well at home.  She states she directs the nurses about not touching her incisions but is happy with the care.    Now is on ASA 81mg daily and Eliquis 5mg po BID.    Has a follow up appointment in the future which unable to see through care everywhere.  Between muna and her friend, they arrange the rides.    No new orders from this NP today.  Checking to make sure there is nothing she needs this NP to do in regards to her PAD.  Plan: aspirin (ASA) 81 MG chewable tablet    (I10) Primary hypertension  Comment: it is noted that Muna was taken off the Lisinopril 10mg daily and left on the Norvasc 2.5mg po daily.  No B/P's recorded since return.  Lisinopril was started by another provider when muna went into a clinic.  This NP added the Norvasc due to elevated Diastolic numbers and her pressures came down nicely.    Facility will take her vitals at wellness visits.  Continue to monitor at visits.    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  She is in the extended care portion of [facility] and is content.  Staff assists with cares, transfers, meal prep as well as delivering her medications.  Her friend helps her with more personal things as well as escorting her to appointments and helping with getting supplies she needs.  Muna does go for therapy sessions twice a week for her MS.  This NP does not see any advancement of her disease process over the last 7-8 months of knowing her.    (N31.9) Neurogenic bladder  (Z97.8) Ruby catheter in place  Comment: due to Muna's MS, she has a neurogenic bladder that is a permanent condition and will be lifetime.  Able to see documentation for neurogenic bladder back to 2009 in care everywhere.  Muna has a indwelling ruby catheter that is continuous (she does not self cath or anyone else cath her intermittently).  Due to chronic UTIs, the catheter is changed  every 3 weeks to avoid infections.  She does take Cranberry tabs as well to help the pH of the urine.  Keflex 250mg daily for prevention is done.    Urine appears yellow with no sediment and voids in large amounts.  Muna has lived with this for some time and will direct staff how to handle and position the leg bag for comfort.    (K59.09) Chronic constipation  Comment: just updating her epic chart as her Senna-S order is a PRN.  Plan: senna-docusate (SENEXON-S) 8.6-50 MG tablet      Orders:  No new orders written today.  She has everything in place.  This NP will be sending note to [address] [place] [address] for their records to support her need for catheter supplies for insurance purposes.    Electronically signed by: CAROL Rodríguez CNP           Sincerely,        CAROL Rodríguez CNP

## 2022-05-19 DIAGNOSIS — I73.9 PVD (PERIPHERAL VASCULAR DISEASE) (H): ICD-10-CM

## 2022-05-20 ENCOUNTER — LAB REQUISITION (OUTPATIENT)
Dept: LAB | Facility: CLINIC | Age: 77
End: 2022-05-20
Payer: COMMERCIAL

## 2022-05-20 DIAGNOSIS — Z11.59 ENCOUNTER FOR SCREENING FOR OTHER VIRAL DISEASES: ICD-10-CM

## 2022-05-20 RX ORDER — OXYCODONE HYDROCHLORIDE 5 MG/1
TABLET ORAL
Qty: 30 TABLET | Refills: 0 | Status: SHIPPED | OUTPATIENT
Start: 2022-05-20 | End: 2022-08-12

## 2022-05-23 RX ORDER — AMOXICILLIN 250 MG
2 CAPSULE ORAL DAILY PRN
Qty: 62 TABLET | Refills: 12
Start: 2022-05-09

## 2022-05-23 RX ORDER — ASPIRIN 81 MG/1
81 TABLET, CHEWABLE ORAL DAILY
Start: 2022-05-02 | End: 2022-06-13

## 2022-05-24 PROCEDURE — U0003 INFECTIOUS AGENT DETECTION BY NUCLEIC ACID (DNA OR RNA); SEVERE ACUTE RESPIRATORY SYNDROME CORONAVIRUS 2 (SARS-COV-2) (CORONAVIRUS DISEASE [COVID-19]), AMPLIFIED PROBE TECHNIQUE, MAKING USE OF HIGH THROUGHPUT TECHNOLOGIES AS DESCRIBED BY CMS-2020-01-R: HCPCS | Mod: ORL | Performed by: NURSE PRACTITIONER

## 2022-05-25 LAB — SARS-COV-2 RNA RESP QL NAA+PROBE: NEGATIVE

## 2022-05-31 ENCOUNTER — LAB REQUISITION (OUTPATIENT)
Dept: LAB | Facility: CLINIC | Age: 77
End: 2022-05-31
Payer: COMMERCIAL

## 2022-05-31 DIAGNOSIS — Z11.59 ENCOUNTER FOR SCREENING FOR OTHER VIRAL DISEASES: ICD-10-CM

## 2022-06-02 PROCEDURE — U0005 INFEC AGEN DETEC AMPLI PROBE: HCPCS | Mod: ORL | Performed by: NURSE PRACTITIONER

## 2022-06-03 LAB — SARS-COV-2 RNA RESP QL NAA+PROBE: NEGATIVE

## 2022-06-07 ENCOUNTER — LAB REQUISITION (OUTPATIENT)
Dept: LAB | Facility: CLINIC | Age: 77
End: 2022-06-07
Payer: COMMERCIAL

## 2022-06-07 DIAGNOSIS — Z11.59 ENCOUNTER FOR SCREENING FOR OTHER VIRAL DISEASES: ICD-10-CM

## 2022-06-08 PROCEDURE — U0003 INFECTIOUS AGENT DETECTION BY NUCLEIC ACID (DNA OR RNA); SEVERE ACUTE RESPIRATORY SYNDROME CORONAVIRUS 2 (SARS-COV-2) (CORONAVIRUS DISEASE [COVID-19]), AMPLIFIED PROBE TECHNIQUE, MAKING USE OF HIGH THROUGHPUT TECHNOLOGIES AS DESCRIBED BY CMS-2020-01-R: HCPCS | Mod: ORL | Performed by: NURSE PRACTITIONER

## 2022-06-09 ENCOUNTER — DOCUMENTATION ONLY (OUTPATIENT)
Dept: NEUROLOGY | Facility: CLINIC | Age: 77
End: 2022-06-09
Payer: COMMERCIAL

## 2022-06-09 LAB — SARS-COV-2 RNA RESP QL NAA+PROBE: NEGATIVE

## 2022-06-09 NOTE — PROGRESS NOTES
[facility] re-certification note signed by Dr Macario faxed back to [facility].    Maryse Andrade RN

## 2022-06-13 ENCOUNTER — LAB REQUISITION (OUTPATIENT)
Dept: LAB | Facility: CLINIC | Age: 77
End: 2022-06-13
Payer: COMMERCIAL

## 2022-06-13 DIAGNOSIS — R82.90 UNSPECIFIED ABNORMAL FINDINGS IN URINE: ICD-10-CM

## 2022-06-13 DIAGNOSIS — I73.9 PAD (PERIPHERAL ARTERY DISEASE) (H): ICD-10-CM

## 2022-06-13 LAB
ALBUMIN UR-MCNC: NEGATIVE MG/DL
APPEARANCE UR: CLEAR
BACTERIA #/AREA URNS HPF: ABNORMAL /HPF
BILIRUB UR QL STRIP: NEGATIVE
COLOR UR AUTO: YELLOW
GLUCOSE UR STRIP-MCNC: NEGATIVE MG/DL
HGB UR QL STRIP: NEGATIVE
KETONES UR STRIP-MCNC: NEGATIVE MG/DL
LEUKOCYTE ESTERASE UR QL STRIP: ABNORMAL
MUCOUS THREADS #/AREA URNS LPF: PRESENT /LPF
NITRATE UR QL: NEGATIVE
PH UR STRIP: 6.5 [PH] (ref 5–7)
RBC URINE: 0 /HPF
SP GR UR STRIP: 1.02 (ref 1–1.03)
SQUAMOUS EPITHELIAL: 1 /HPF
UROBILINOGEN UR STRIP-MCNC: <2 MG/DL
WBC URINE: 8 /HPF

## 2022-06-13 PROCEDURE — 87086 URINE CULTURE/COLONY COUNT: CPT | Mod: ORL

## 2022-06-13 PROCEDURE — 81001 URINALYSIS AUTO W/SCOPE: CPT | Mod: ORL | Performed by: NURSE PRACTITIONER

## 2022-06-13 PROCEDURE — 87086 URINE CULTURE/COLONY COUNT: CPT | Mod: ORL | Performed by: NURSE PRACTITIONER

## 2022-06-13 PROCEDURE — 81001 URINALYSIS AUTO W/SCOPE: CPT | Mod: ORL

## 2022-06-13 RX ORDER — ASPIRIN 81 MG/1
81 TABLET, CHEWABLE ORAL DAILY
Qty: 30 TABLET | Refills: 11 | Status: SHIPPED | OUTPATIENT
Start: 2022-06-13 | End: 2022-06-15

## 2022-06-15 DIAGNOSIS — I73.9 PAD (PERIPHERAL ARTERY DISEASE) (H): ICD-10-CM

## 2022-06-15 RX ORDER — ASPIRIN 81 MG
TABLET,CHEWABLE ORAL
Qty: 30 TABLET | Refills: 11 | Status: SHIPPED | OUTPATIENT
Start: 2022-06-15 | End: 2023-05-10

## 2022-06-16 LAB
BACTERIA UR CULT: ABNORMAL
BACTERIA UR CULT: ABNORMAL

## 2022-06-29 PROCEDURE — 81001 URINALYSIS AUTO W/SCOPE: CPT | Mod: ORL | Performed by: NURSE PRACTITIONER

## 2022-06-29 PROCEDURE — 87086 URINE CULTURE/COLONY COUNT: CPT | Mod: ORL | Performed by: NURSE PRACTITIONER

## 2022-07-08 ENCOUNTER — ASSISTED LIVING VISIT (OUTPATIENT)
Dept: GERIATRICS | Facility: CLINIC | Age: 77
End: 2022-07-08
Payer: COMMERCIAL

## 2022-07-08 VITALS — TEMPERATURE: 97.2 F | SYSTOLIC BLOOD PRESSURE: 119 MMHG | OXYGEN SATURATION: 98 % | DIASTOLIC BLOOD PRESSURE: 79 MMHG

## 2022-07-08 DIAGNOSIS — Z97.8 FOLEY CATHETER IN PLACE: ICD-10-CM

## 2022-07-08 DIAGNOSIS — G35 MS (MULTIPLE SCLEROSIS) (H): ICD-10-CM

## 2022-07-08 DIAGNOSIS — N39.0 URINARY TRACT INFECTION ASSOCIATED WITH INDWELLING URETHRAL CATHETER, SUBSEQUENT ENCOUNTER: Primary | ICD-10-CM

## 2022-07-08 DIAGNOSIS — T83.511D URINARY TRACT INFECTION ASSOCIATED WITH INDWELLING URETHRAL CATHETER, SUBSEQUENT ENCOUNTER: Primary | ICD-10-CM

## 2022-07-08 DIAGNOSIS — N31.9 NEUROGENIC BLADDER: ICD-10-CM

## 2022-07-08 NOTE — PROGRESS NOTES
[facility] GERIATRICS    Chief Complaint   Patient presents with     RECHECK     HPI:  Cristina Stevens is a 77 year old  (1945), who is being seen today for an episodic care visit at: [facility] ([facility]) [[id number]]. Today's concern is:     Diagnoses       Codes Comments    Urinary tract infection associated with indwelling urethral catheter, subsequent encounter    -  Primary T83.511D, N39.0     Neurogenic bladder     N31.9     Edwards catheter in place     Z97.8     MS (multiple sclerosis) (H)     G35         Came to see Muna today after she had a trip to the [facility] ED for her catheter issues.  Troubles with urine starting around 1pm on 7/1 and then by 4am on 7/2, no urine in the bag, abdominal pain and distention.  Requested to go into the ED.  Suprapubic catheter replaced and two upcoming appointments in September.  Started on Cipro as well.      Results returned and changed her to Pen VK on 7/2/22.      Muna is up in her electric wheelchair.  Catheter leg bag was just emptied as the aide had just left.  Muna stated that the urine is flowing well and no abdominal discomfort.        Allergies, and PMH/PSH reviewed in EPIC today.    Current Outpatient Medications   Medication Sig Dispense Refill     amLODIPine (NORVASC) 2.5 MG tablet 1 TABLET ORALLY EVERY EVENING (DX: HYPERTENSION) 30 tablet 11     amoxicillin (AMOXIL) 250 MG chewable tablet CHEW AND SWALLOW 8 TABLETS (2000 MG) BY MOUTH 1 HOUR PRIOR TO DENTAL 8 tablet 97     armodafinil (NUVIGIL) 150 MG TABS tablet 1 TABLET ORALLY EVERY MORNING (DX:NARCOLEPSY) 30 tablet 4     ASPIRIN LOW DOSE 81 MG chewable tablet 1 TABLET ORALLY DAILY 30 tablet 11     CALMOSEPTINE 0.44-20.6 % OINT ointment APPLY TO AFFECTED AREA(S) TOPICALLY TO BUTTOCKS AS NEEDED WITH BRIEF CHANGES 113 g 97     cetirizine (ZYRTEC) 10 MG tablet Take 1 tablet (10 mg) by mouth daily 28 tablet 97     CO2-Releasing (-TWO) SUPP Place  rectally daily as needed       DESITIN 13 % CREA APPLY TOPICALLY TO AREAS OF REDNESS OR RASH WITH EACH BRIEF CHANGE 113 g 97     ELIQUIS ANTICOAGULANT 5 MG tablet 1 TABLET ORALLY 2 TIMES DAILY. DO NOT START BEFORE MAY 14TH,2022 (DX: VENOUS THROMBOEMBOLISM) 24 tablet 11     ibuprofen (ADVIL/MOTRIN) 400 MG tablet TAKE 1 TABLET BY MOUTH TWICE DAILY AS NEEDED FOR PAIN 30 tablet 97     mirabegron (MYRBETRIQ) 25 MG 24 hr tablet Take 1 tablet (25 mg) by mouth daily 28 tablet 97     Multiple Vitamin (TAB-A-DAWSON) TABS Take 1 tablet by mouth daily 28 tablet 97     nystatin (MYCOSTATIN) 538975 UNIT/GM external powder APPLY IN BETWEEN THE TOES TWICE DAILY UNTIL HEALED;THEN TWICE DAILY AS NEEDED 30 g 97     oxyCODONE (ROXICODONE) 5 MG tablet Take 1 tablet (5 mg) by mouth every 3 hours as needed for severe pain 30 tablet 0     polyethylene glycol (MIRALAX/GLYCOLAX) powder MIX 17GM OF POWDER IN 8OZ OF WATER UNTIL COMPLETELY DISSOLVED. DRINK SOLUTION DAILY AS NEEDED 527 g 98     Probiotic Product (PROBIOTIC DAILY) CAPS Take 1 capsule by mouth daily 28 capsule 98     senna-docusate (SENEXON-S) 8.6-50 MG tablet Take 2 tablets by mouth daily as needed for constipation 62 tablet 12     VITAMIN D3 50 MCG (2000 UT) tablet 2 TABLETS (4000U) DAILY 56 tablet 11       REVIEW OF SYSTEMS:  4 point ROS including Respiratory, CV, GI and , other than that noted in the HPI,  is negative    Objective:   /79   Temp 97.2  F (36.2  C)   SpO2 98%   GENERAL APPEARANCE:  Alert, in no distress, appears healthy, oriented, cooperative  EYES:  EOM normal, conjunctiva and lids normal, does have corrective lenses  RESP:  respiratory effort and palpation of chest normal, no respiratory distress  CV:  Palpation and auscultation of heart done , regular rate and rhythm, no murmur, rub, or gallop, no edema  ABDOMEN:  normal bowel sounds, soft, nontender, no hepatosplenomegaly or other masses, no guarding or rebound  :    superpubic catheter draining to  leg bag and no concerns  M/S:   Gait and station abnormal non-ambulatory.  lift for transfers.  uses an electric w/c.  limited ROM or muscle control of extremities  SKIN:  pink, warm and dry.  left 5th digit is healed  PSYCH:  oriented X 3, normal insight, judgement and memory, affect and mood normal, speaks softly and slowly      Most Recent 3 CBC's:Recent Labs   Lab Test 04/19/22  0815 03/19/19  0904 03/19/19  0000 07/31/18  1024 07/24/18  0550 07/21/18  0655 07/20/18  0828 06/25/18  0625   WBC  --   --   --   --  8.5 12.6* 21.5* 7.2   HGB 14.9 12.6 12.6   < >  --  11.2* 12.6 12.1   MCV  --   --   --   --   --  84 87 92   PLT  --   --   --   --   --  401 485* 301    < > = values in this interval not displayed.     Most Recent 3 BMP's:Recent Labs   Lab Test 04/19/22  0815 10/14/21  1520 09/27/21  1452 11/18/19  1224 09/24/18  1705     --  137  --  136   POTASSIUM 4.8  --  4.0  --  4.4   CHLORIDE 105  --  105  --  103   CO2 23  --  27  --  25   BUN 17  --  27  --  14   CR 0.74  --  0.71  --  0.74   ANIONGAP 10  --  5  --  8   BRENDA 10.0 9.7 9.5   < > 9.4   GLC 89  --  81  --  101*    < > = values in this interval not displayed.       Assessment/Plan:  (T83.511D,  N39.0) Urinary tract infection associated with indwelling urethral catheter, subsequent encounter  (primary encounter diagnosis)  (N31.9) Neurogenic bladder  (Z97.8) Edwards catheter in place  Comment: Pen VK 500mg po BID for 7 days started on 7/2 and so should be finished soon.  She feels well.  No new orders today.    (G35) MS (multiple sclerosis) (H)  Comment: no treatment at this time.  No advancement seen nor has she mentioned changes.      Orders:  No new orders      Electronically signed by: Love Lucas, APRN CNP

## 2022-07-08 NOTE — LETTER
7/8/2022        RE: Cristina Stevens  [address]  [address]  [place] [address]        [facility] GERIATRICS    Chief Complaint   Patient presents with     RECHECK     HPI:  Cristina Stevens is a 77 year old  (1945), who is being seen today for an episodic care visit at: [facility] ([facility] [[id number]]. Today's concern is:     Diagnoses       Codes Comments    Urinary tract infection associated with indwelling urethral catheter, subsequent encounter    -  Primary T83.511D, N39.0     Neurogenic bladder     N31.9     Edwards catheter in place     Z97.8     MS (multiple sclerosis) (H)     G35         Came to see Muna today after she had a trip to the [facility] ED for her catheter issues.  Troubles with urine starting around 1pm on 7/1 and then by 4am on 7/2, no urine in the bag, abdominal pain and distention.  Requested to go into the ED.  Suprapubic catheter replaced and two upcoming appointments in September.  Started on Cipro as well.      Results returned and changed her to Pen VK on 7/2/22.      Muna is up in her electric wheelchair.  Catheter leg bag was just emptied as the aide had just left.  Muna stated that the urine is flowing well and no abdominal discomfort.        Allergies, and PMH/PSH reviewed in EPIC today.    Current Outpatient Medications   Medication Sig Dispense Refill     amLODIPine (NORVASC) 2.5 MG tablet 1 TABLET ORALLY EVERY EVENING (DX: HYPERTENSION) 30 tablet 11     amoxicillin (AMOXIL) 250 MG chewable tablet CHEW AND SWALLOW 8 TABLETS (2000 MG) BY MOUTH 1 HOUR PRIOR TO DENTAL 8 tablet 97     armodafinil (NUVIGIL) 150 MG TABS tablet 1 TABLET ORALLY EVERY MORNING (DX:NARCOLEPSY) 30 tablet 4     ASPIRIN LOW DOSE 81 MG chewable tablet 1 TABLET ORALLY DAILY 30 tablet 11     CALMOSEPTINE 0.44-20.6 % OINT ointment APPLY TO AFFECTED AREA(S) TOPICALLY TO BUTTOCKS AS NEEDED WITH BRIEF CHANGES 113 g 97     cetirizine (ZYRTEC) 10  MG tablet Take 1 tablet (10 mg) by mouth daily 28 tablet 97     CO2-Releasing (-TWO) SUPP Place rectally daily as needed       DESITIN 13 % CREA APPLY TOPICALLY TO AREAS OF REDNESS OR RASH WITH EACH BRIEF CHANGE 113 g 97     ELIQUIS ANTICOAGULANT 5 MG tablet 1 TABLET ORALLY 2 TIMES DAILY. DO NOT START BEFORE MAY 14TH,2022 (DX: VENOUS THROMBOEMBOLISM) 24 tablet 11     ibuprofen (ADVIL/MOTRIN) 400 MG tablet TAKE 1 TABLET BY MOUTH TWICE DAILY AS NEEDED FOR PAIN 30 tablet 97     mirabegron (MYRBETRIQ) 25 MG 24 hr tablet Take 1 tablet (25 mg) by mouth daily 28 tablet 97     Multiple Vitamin (TAB-A-DAWSON) TABS Take 1 tablet by mouth daily 28 tablet 97     nystatin (MYCOSTATIN) 675079 UNIT/GM external powder APPLY IN BETWEEN THE TOES TWICE DAILY UNTIL HEALED;THEN TWICE DAILY AS NEEDED 30 g 97     oxyCODONE (ROXICODONE) 5 MG tablet Take 1 tablet (5 mg) by mouth every 3 hours as needed for severe pain 30 tablet 0     polyethylene glycol (MIRALAX/GLYCOLAX) powder MIX 17GM OF POWDER IN 8OZ OF WATER UNTIL COMPLETELY DISSOLVED. DRINK SOLUTION DAILY AS NEEDED 527 g 98     Probiotic Product (PROBIOTIC DAILY) CAPS Take 1 capsule by mouth daily 28 capsule 98     senna-docusate (SENEXON-S) 8.6-50 MG tablet Take 2 tablets by mouth daily as needed for constipation 62 tablet 12     VITAMIN D3 50 MCG (2000 UT) tablet 2 TABLETS (4000U) DAILY 56 tablet 11       REVIEW OF SYSTEMS:  4 point ROS including Respiratory, CV, GI and , other than that noted in the HPI,  is negative    Objective:   /79   Temp 97.2  F (36.2  C)   SpO2 98%   GENERAL APPEARANCE:  Alert, in no distress, appears healthy, oriented, cooperative  EYES:  EOM normal, conjunctiva and lids normal, does have corrective lenses  RESP:  respiratory effort and palpation of chest normal, no respiratory distress  CV:  Palpation and auscultation of heart done , regular rate and rhythm, no murmur, rub, or gallop, no edema  ABDOMEN:  normal bowel sounds, soft, nontender, no  hepatosplenomegaly or other masses, no guarding or rebound  :    superpubic catheter draining to leg bag and no concerns  M/S:   Gait and station abnormal non-ambulatory.  lift for transfers.  uses an electric w/c.  limited ROM or muscle control of extremities  SKIN:  pink, warm and dry.  left 5th digit is healed  PSYCH:  oriented X 3, normal insight, judgement and memory, affect and mood normal, speaks softly and slowly      Most Recent 3 CBC's:Recent Labs   Lab Test 04/19/22  0815 03/19/19  0904 03/19/19  0000 07/31/18  1024 07/24/18  0550 07/21/18  0655 07/20/18  0828 06/25/18  0625   WBC  --   --   --   --  8.5 12.6* 21.5* 7.2   HGB 14.9 12.6 12.6   < >  --  11.2* 12.6 12.1   MCV  --   --   --   --   --  84 87 92   PLT  --   --   --   --   --  401 485* 301    < > = values in this interval not displayed.     Most Recent 3 BMP's:Recent Labs   Lab Test 04/19/22  0815 10/14/21  1520 09/27/21  1452 11/18/19  1224 09/24/18  1705     --  137  --  136   POTASSIUM 4.8  --  4.0  --  4.4   CHLORIDE 105  --  105  --  103   CO2 23  --  27  --  25   BUN 17  --  27  --  14   CR 0.74  --  0.71  --  0.74   ANIONGAP 10  --  5  --  8   BRENDA 10.0 9.7 9.5   < > 9.4   GLC 89  --  81  --  101*    < > = values in this interval not displayed.       Assessment/Plan:  (T83.511D,  N39.0) Urinary tract infection associated with indwelling urethral catheter, subsequent encounter  (primary encounter diagnosis)  (N31.9) Neurogenic bladder  (Z97.8) Edwadrs catheter in place  Comment: Pen VK 500mg po BID for 7 days started on 7/2 and so should be finished soon.  She feels well.  No new orders today.    (G35) MS (multiple sclerosis) (H)  Comment: no treatment at this time.  No advancement seen nor has she mentioned changes.      Orders:  No new orders      Electronically signed by: CAROL Rodríguez CNP           Sincerely,        CAROL Rodríguez CNP

## 2022-07-13 ENCOUNTER — LAB REQUISITION (OUTPATIENT)
Dept: LAB | Facility: CLINIC | Age: 77
End: 2022-07-13
Payer: COMMERCIAL

## 2022-07-13 DIAGNOSIS — R31.9 HEMATURIA, UNSPECIFIED: ICD-10-CM

## 2022-07-13 LAB

## 2022-07-13 PROCEDURE — 81001 URINALYSIS AUTO W/SCOPE: CPT | Mod: ORL | Performed by: NURSE PRACTITIONER

## 2022-07-13 PROCEDURE — 87088 URINE BACTERIA CULTURE: CPT | Mod: ORL | Performed by: NURSE PRACTITIONER

## 2022-07-16 LAB — BACTERIA UR CULT: ABNORMAL

## 2022-08-02 ENCOUNTER — DOCUMENTATION ONLY (OUTPATIENT)
Dept: NEUROLOGY | Facility: CLINIC | Age: 77
End: 2022-08-02

## 2022-08-04 ENCOUNTER — TRANSFERRED RECORDS (OUTPATIENT)
Dept: HEALTH INFORMATION MANAGEMENT | Facility: CLINIC | Age: 77
End: 2022-08-04

## 2022-08-07 ENCOUNTER — LAB REQUISITION (OUTPATIENT)
Dept: LAB | Facility: CLINIC | Age: 77
End: 2022-08-07
Payer: COMMERCIAL

## 2022-08-07 DIAGNOSIS — R31.9 HEMATURIA, UNSPECIFIED: ICD-10-CM

## 2022-08-07 LAB
ALBUMIN UR-MCNC: NEGATIVE MG/DL
APPEARANCE UR: ABNORMAL
BILIRUB UR QL STRIP: NEGATIVE
COLOR UR AUTO: YELLOW
GLUCOSE UR STRIP-MCNC: NEGATIVE MG/DL
HGB UR QL STRIP: NEGATIVE
KETONES UR STRIP-MCNC: NEGATIVE MG/DL
LEUKOCYTE ESTERASE UR QL STRIP: NEGATIVE
MUCOUS THREADS #/AREA URNS LPF: PRESENT /LPF
NITRATE UR QL: NEGATIVE
PH UR STRIP: 8 [PH] (ref 5–7)
RBC URINE: 0 /HPF
SP GR UR STRIP: 1.01 (ref 1–1.03)
SQUAMOUS EPITHELIAL: 1 /HPF
UROBILINOGEN UR STRIP-MCNC: NORMAL MG/DL
WBC URINE: <1 /HPF

## 2022-08-07 PROCEDURE — 81001 URINALYSIS AUTO W/SCOPE: CPT | Mod: ORL

## 2022-08-07 PROCEDURE — 87086 URINE CULTURE/COLONY COUNT: CPT | Mod: ORL

## 2022-08-09 LAB
BACTERIA UR CULT: ABNORMAL
BACTERIA UR CULT: ABNORMAL

## 2022-08-12 ENCOUNTER — ASSISTED LIVING VISIT (OUTPATIENT)
Dept: GERIATRICS | Facility: CLINIC | Age: 77
End: 2022-08-12
Payer: COMMERCIAL

## 2022-08-12 VITALS — DIASTOLIC BLOOD PRESSURE: 80 MMHG | SYSTOLIC BLOOD PRESSURE: 137 MMHG | TEMPERATURE: 97 F

## 2022-08-12 DIAGNOSIS — Z97.8 FOLEY CATHETER IN PLACE: ICD-10-CM

## 2022-08-12 DIAGNOSIS — I73.9 PVD (PERIPHERAL VASCULAR DISEASE) (H): ICD-10-CM

## 2022-08-12 DIAGNOSIS — N30.01 ACUTE CYSTITIS WITH HEMATURIA: Primary | ICD-10-CM

## 2022-08-12 DIAGNOSIS — G35 MS (MULTIPLE SCLEROSIS) (H): ICD-10-CM

## 2022-08-12 DIAGNOSIS — N31.9 NEUROGENIC BLADDER: ICD-10-CM

## 2022-08-12 DIAGNOSIS — G82.50 SPASTIC QUADRIPLEGIA (H): ICD-10-CM

## 2022-08-12 RX ORDER — OXYCODONE HYDROCHLORIDE 5 MG/1
5 TABLET ORAL
Qty: 30 TABLET | Refills: 0
Start: 2022-08-12 | End: 2023-08-18

## 2022-08-12 NOTE — LETTER
8/12/2022        RE: Cristina Stevens  [address]  [address]  [facility] [address]        [facility] GERIATRICS    Chief Complaint   Patient presents with     UTI     HPI:  Cristina Stevens is a 77 year old  (1945), who is being seen today for an episodic care visit at: [facility] [[id number]]. Today's concern is:     Diagnoses       Codes Comments    Acute cystitis with hematuria    -  Primary N30.01     Neurogenic bladder     N31.9     Edwards catheter in place     Z97.8     MS (multiple sclerosis) (H)     G35     Spastic quadriplegia (H)     G82.50         Came to see Muna today as during the week, Muna's personal helper came over late evening to help deal with Muna's indwelling catheter as there was hematuria present and sediment.  Able to change the catheter but they asked if a urine could be sent in for check of a UTI.  This NP faxed an order over to the nurses and sample sent in.    Spoke with Muna today of the results so she understood why a short course of a ABX and then reason why no standing order for a UA/UC.  She was understanding of reason and was happy enough to have a cup for collection handy in her apartment but timing is everything as the older the sample gets than crystals form.  Facility would need to also get a  over to bring the urine over as well during reasonable hours.    Allergies, and PMH/PSH reviewed in EPIC today.    Medications reconciled today.  Not discharged from a hospital stay.  Current Outpatient Medications   Medication Sig Dispense Refill     oxyCODONE (ROXICODONE) 5 MG tablet Take 1 tablet (5 mg) by mouth every 3 hours as needed for severe pain 30 tablet 0     amLODIPine (NORVASC) 2.5 MG tablet 1 TABLET ORALLY EVERY EVENING (DX: HYPERTENSION) 30 tablet 11     amoxicillin (AMOXIL) 250 MG chewable tablet CHEW AND SWALLOW 8 TABLETS (2000 MG) BY MOUTH 1 HOUR PRIOR TO DENTAL 8 tablet 97     armodafinil  (NUVIGIL) 150 MG TABS tablet 1 TABLET ORALLY EVERY MORNING (DX:NARCOLEPSY) 30 tablet 4     ASPIRIN LOW DOSE 81 MG chewable tablet 1 TABLET ORALLY DAILY 30 tablet 11     CALMOSEPTINE 0.44-20.6 % OINT ointment APPLY TO AFFECTED AREA(S) TOPICALLY TO BUTTOCKS AS NEEDED WITH BRIEF CHANGES 113 g 97     cephALEXin (KEFLEX) 250 MG capsule TAKE 1 CAPSULE BY MOUTH ONCE DAILY FOR PREVENTIVE 31 capsule 97     cetirizine (ZYRTEC) 10 MG tablet Take 1 tablet (10 mg) by mouth daily 28 tablet 97     CO2-Releasing (-TWO) SUPP Place rectally daily as needed       Cranberry (CRANBERRY CONCENTRATE) 500 MG CAPS TAKE 1 CAPSULE BY MOUTH ONCE DAILY 28 capsule 97     DESITIN 13 % CREA APPLY TOPICALLY TO AREAS OF REDNESS OR RASH WITH EACH BRIEF CHANGE 113 g 97     ELIQUIS ANTICOAGULANT 5 MG tablet 1 TABLET ORALLY 2 TIMES DAILY. DO NOT START BEFORE MAY 14TH,2022 (DX: VENOUS THROMBOEMBOLISM) 24 tablet 11     ibuprofen (ADVIL/MOTRIN) 400 MG tablet TAKE 1 TABLET BY MOUTH TWICE DAILY AS NEEDED FOR PAIN 30 tablet 97     mirabegron (MYRBETRIQ) 25 MG 24 hr tablet Take 1 tablet (25 mg) by mouth daily 28 tablet 97     Multiple Vitamin (TAB-A-ADWSON) TABS Take 1 tablet by mouth daily 28 tablet 97     nystatin (MYCOSTATIN) 777846 UNIT/GM external powder APPLY IN BETWEEN THE TOES TWICE DAILY UNTIL HEALED;THEN TWICE DAILY AS NEEDED 30 g 97     polyethylene glycol (MIRALAX/GLYCOLAX) powder MIX 17GM OF POWDER IN 8OZ OF WATER UNTIL COMPLETELY DISSOLVED. DRINK SOLUTION DAILY AS NEEDED 527 g 98     Probiotic Product (PROBIOTIC DAILY) CAPS Take 1 capsule by mouth daily 28 capsule 98     senna-docusate (SENEXON-S) 8.6-50 MG tablet Take 2 tablets by mouth daily as needed for constipation 62 tablet 12     vitamin C (ASCORBIC ACID) 500 MG tablet TAKE 1 TABLET BY MOUTH ONCE DAILY 28 tablet 97     VITAMIN D3 50 MCG (2000 UT) tablet 2 TABLETS (4000U) ORALLY DAILY 56 tablet 10       REVIEW OF SYSTEMS:  4 point ROS including Respiratory, CV, GI and , other than that  noted in the HPI,  is negative    Objective:   /80   Temp 97  F (36.1  C)   GENERAL APPEARANCE:  Alert, in no distress, appears healthy, oriented, cooperative  ENT:  Mouth and posterior oropharynx normal, moist mucous membranes, voice is soft spoken  EYES:  EOM, conjunctivae, lids, pupils and irises normal  RESP:  respiratory effort and palpation of chest normal, no respiratory distress  CV:  Palpation and auscultation of heart done , regular rate and rhythm, no murmur, rub, or gallop, trace edema on left ankle area.  no edema on right.  wearing regular shoes today  ABDOMEN:  normal bowel sounds, soft, nontender, no hepatosplenomegaly or other masses, no guarding or rebound  M/S:   Gait and station abnormal no joint enlargement.  non-ambulatory, lift for transfer, electric w/c used  SKIN:  skin is pink, dry, and warm.  no open areas    Culture 10,000-50,000 CFU/mL Pseudomonas aeruginosa Abnormal        10,000-50,000 CFU/mL Enterococcus faecalis Abnormal             Resulting Agency: [facility]       Susceptibility     Pseudomonas aeruginosa Enterococcus faecalis     YOAN YOAN     Amikacin <=2 ug/mL Susceptible       Ampicillin   <=2 ug/mL Susceptible     Cefepime 4 ug/mL Susceptible       Ceftazidime 2 ug/mL Susceptible       Ciprofloxacin <=0.25 ug/mL Susceptible       Gentamicin 4 ug/mL Susceptible       Levofloxacin 1 ug/mL Susceptible       Meropenem <=0.25 ug/mL Susceptible       Nitrofurantoin   <=16 ug/mL Susceptible     Penicillin   8 ug/mL Susceptible     Piperacillin/Tazobactam 16 ug/mL Susceptible       Tobramycin <=1 ug/mL Susceptible       Vancomycin   1 ug/mL Susceptible                   Assessment/Plan:  (N30.01) Acute cystitis with hematuria  (primary encounter diagnosis)  (N31.9) Neurogenic bladder  (Z97.8) Edwards catheter in place  Comment: explained that she will always have some organism, and chose to treat the pseudomonas in this case with 5 days of Cipro which she responds to well. She did have  the hematuria and sediment.    (G35) MS (multiple sclerosis) (H)  (G82.50) Spastic quadriplegia (H)  Comment: no advancements seen with symptoms.  Staff assist with all cares.  She can feed self.   Muna usually goes out for therapy appointments and stated today she has to find another provider as her person is leaving?  She has some leads on places and asked if she needed an order, this NP stated that would not be a problem.  She does pay for some of these services when maxed with her insurance.       (I73.9) PVD (peripheral vascular disease) (H)  Comment: just updating her order to eliminate some wording in the order about a lock box.  Plan: oxyCODONE (ROXICODONE) 5 MG tablet      Orders:  No new orders today as on a 5 day course of Cipro    Electronically signed by: CAROL Rodríguez CNP             Sincerely,        CAROL Rodríguez CNP

## 2022-08-13 NOTE — PROGRESS NOTES
[facility] GERIATRICS    Chief Complaint   Patient presents with     UTI     HPI:  Cristina Stevens is a 77 year old  (1945), who is being seen today for an episodic care visit at: [facility] [[id number]]. Today's concern is:     Diagnoses       Codes Comments    Acute cystitis with hematuria    -  Primary N30.01     Neurogenic bladder     N31.9     Edwards catheter in place     Z97.8     MS (multiple sclerosis) (H)     G35     Spastic quadriplegia (H)     G82.50         Came to see Muna today as during the week, Muna's personal helper came over late evening to help deal with Muna's indwelling catheter as there was hematuria present and sediment.  Able to change the catheter but they asked if a urine could be sent in for check of a UTI.  This NP faxed an order over to the nurses and sample sent in.    Spoke with Muna today of the results so she understood why a short course of a ABX and then reason why no standing order for a UA/UC.  She was understanding of reason and was happy enough to have a cup for collection handy in her apartment but timing is everything as the older the sample gets than crystals form.  Facility would need to also get a  over to bring the urine over as well during reasonable hours.    Allergies, and PMH/PSH reviewed in EPIC today.    Medications reconciled today.  Not discharged from a hospital stay.  Current Outpatient Medications   Medication Sig Dispense Refill     oxyCODONE (ROXICODONE) 5 MG tablet Take 1 tablet (5 mg) by mouth every 3 hours as needed for severe pain 30 tablet 0     amLODIPine (NORVASC) 2.5 MG tablet 1 TABLET ORALLY EVERY EVENING (DX: HYPERTENSION) 30 tablet 11     amoxicillin (AMOXIL) 250 MG chewable tablet CHEW AND SWALLOW 8 TABLETS (2000 MG) BY MOUTH 1 HOUR PRIOR TO DENTAL 8 tablet 97     armodafinil (NUVIGIL) 150 MG TABS tablet 1 TABLET ORALLY EVERY MORNING (DX:NARCOLEPSY) 30 tablet 4     ASPIRIN LOW  DOSE 81 MG chewable tablet 1 TABLET ORALLY DAILY 30 tablet 11     CALMOSEPTINE 0.44-20.6 % OINT ointment APPLY TO AFFECTED AREA(S) TOPICALLY TO BUTTOCKS AS NEEDED WITH BRIEF CHANGES 113 g 97     cephALEXin (KEFLEX) 250 MG capsule TAKE 1 CAPSULE BY MOUTH ONCE DAILY FOR PREVENTIVE 31 capsule 97     cetirizine (ZYRTEC) 10 MG tablet Take 1 tablet (10 mg) by mouth daily 28 tablet 97     CO2-Releasing (-TWO) SUPP Place rectally daily as needed       Cranberry (CRANBERRY CONCENTRATE) 500 MG CAPS TAKE 1 CAPSULE BY MOUTH ONCE DAILY 28 capsule 97     DESITIN 13 % CREA APPLY TOPICALLY TO AREAS OF REDNESS OR RASH WITH EACH BRIEF CHANGE 113 g 97     ELIQUIS ANTICOAGULANT 5 MG tablet 1 TABLET ORALLY 2 TIMES DAILY. DO NOT START BEFORE MAY 14TH,2022 (DX: VENOUS THROMBOEMBOLISM) 24 tablet 11     ibuprofen (ADVIL/MOTRIN) 400 MG tablet TAKE 1 TABLET BY MOUTH TWICE DAILY AS NEEDED FOR PAIN 30 tablet 97     mirabegron (MYRBETRIQ) 25 MG 24 hr tablet Take 1 tablet (25 mg) by mouth daily 28 tablet 97     Multiple Vitamin (TAB-A-DAWSON) TABS Take 1 tablet by mouth daily 28 tablet 97     nystatin (MYCOSTATIN) 128128 UNIT/GM external powder APPLY IN BETWEEN THE TOES TWICE DAILY UNTIL HEALED;THEN TWICE DAILY AS NEEDED 30 g 97     polyethylene glycol (MIRALAX/GLYCOLAX) powder MIX 17GM OF POWDER IN 8OZ OF WATER UNTIL COMPLETELY DISSOLVED. DRINK SOLUTION DAILY AS NEEDED 527 g 98     Probiotic Product (PROBIOTIC DAILY) CAPS Take 1 capsule by mouth daily 28 capsule 98     senna-docusate (SENEXON-S) 8.6-50 MG tablet Take 2 tablets by mouth daily as needed for constipation 62 tablet 12     vitamin C (ASCORBIC ACID) 500 MG tablet TAKE 1 TABLET BY MOUTH ONCE DAILY 28 tablet 97     VITAMIN D3 50 MCG (2000 UT) tablet 2 TABLETS (4000U) ORALLY DAILY 56 tablet 10       REVIEW OF SYSTEMS:  4 point ROS including Respiratory, CV, GI and , other than that noted in the HPI,  is negative    Objective:   /80   Temp 97  F (36.1  C)   GENERAL APPEARANCE:   Alert, in no distress, appears healthy, oriented, cooperative  ENT:  Mouth and posterior oropharynx normal, moist mucous membranes, voice is soft spoken  EYES:  EOM, conjunctivae, lids, pupils and irises normal  RESP:  respiratory effort and palpation of chest normal, no respiratory distress  CV:  Palpation and auscultation of heart done , regular rate and rhythm, no murmur, rub, or gallop, trace edema on left ankle area.  no edema on right.  wearing regular shoes today  ABDOMEN:  normal bowel sounds, soft, nontender, no hepatosplenomegaly or other masses, no guarding or rebound  M/S:   Gait and station abnormal no joint enlargement.  non-ambulatory, lift for transfer, electric w/c used  SKIN:  skin is pink, dry, and warm.  no open areas    Culture 10,000-50,000 CFU/mL Pseudomonas aeruginosa Abnormal        10,000-50,000 CFU/mL Enterococcus faecalis Abnormal             Resulting Agency: [facility]       Susceptibility     Pseudomonas aeruginosa Enterococcus faecalis     YOAN YOAN     Amikacin <=2 ug/mL Susceptible       Ampicillin   <=2 ug/mL Susceptible     Cefepime 4 ug/mL Susceptible       Ceftazidime 2 ug/mL Susceptible       Ciprofloxacin <=0.25 ug/mL Susceptible       Gentamicin 4 ug/mL Susceptible       Levofloxacin 1 ug/mL Susceptible       Meropenem <=0.25 ug/mL Susceptible       Nitrofurantoin   <=16 ug/mL Susceptible     Penicillin   8 ug/mL Susceptible     Piperacillin/Tazobactam 16 ug/mL Susceptible       Tobramycin <=1 ug/mL Susceptible       Vancomycin   1 ug/mL Susceptible                   Assessment/Plan:  (N30.01) Acute cystitis with hematuria  (primary encounter diagnosis)  (N31.9) Neurogenic bladder  (Z97.8) Edwards catheter in place  Comment: explained that she will always have some organism, and chose to treat the pseudomonas in this case with 5 days of Cipro which she responds to well. She did have the hematuria and sediment.    (G35) MS (multiple sclerosis) (H)  (G82.50) Spastic quadriplegia  (H)  Comment: no advancements seen with symptoms.  Staff assist with all cares.  She can feed self.   Muna usually goes out for therapy appointments and stated today she has to find another provider as her person is leaving?  She has some leads on places and asked if she needed an order, this NP stated that would not be a problem.  She does pay for some of these services when maxed with her insurance.       (I73.9) PVD (peripheral vascular disease) (H)  Comment: just updating her order to eliminate some wording in the order about a lock box.  Plan: oxyCODONE (ROXICODONE) 5 MG tablet      Orders:  No new orders today as on a 5 day course of Cipro    Electronically signed by: CAROL Rodríguez CNP

## 2022-08-16 ENCOUNTER — TRANSFERRED RECORDS (OUTPATIENT)
Dept: HEALTH INFORMATION MANAGEMENT | Facility: CLINIC | Age: 77
End: 2022-08-16

## 2022-08-16 ENCOUNTER — TELEPHONE (OUTPATIENT)
Dept: NEUROLOGY | Facility: CLINIC | Age: 77
End: 2022-08-16

## 2022-08-29 DIAGNOSIS — M81.0 SENILE OSTEOPOROSIS: ICD-10-CM

## 2022-08-29 DIAGNOSIS — Z78.9 TAKES DIETARY SUPPLEMENTS: ICD-10-CM

## 2022-08-30 RX ORDER — ASCORBIC ACID 500 MG
TABLET ORAL
Qty: 60 TABLET | Refills: 11 | Status: SHIPPED | OUTPATIENT
Start: 2022-08-30 | End: 2023-07-26

## 2022-08-30 RX ORDER — CHOLECALCIFEROL (VITAMIN D3) 50 MCG
TABLET ORAL
Qty: 56 TABLET | Refills: 11 | Status: SHIPPED | OUTPATIENT
Start: 2022-08-30 | End: 2023-07-26

## 2022-09-06 DIAGNOSIS — Z29.89 NEED FOR PROPHYLAXIS AGAINST URINARY TRACT INFECTION: ICD-10-CM

## 2022-09-06 RX ORDER — PYRIDOXINE HCL (VITAMIN B6) 100 MG
TABLET ORAL
Qty: 62 CAPSULE | Refills: 11 | Status: SHIPPED | OUTPATIENT
Start: 2022-09-06 | End: 2022-11-11

## 2022-09-12 PROCEDURE — U0005 INFEC AGEN DETEC AMPLI PROBE: HCPCS | Mod: ORL | Performed by: NURSE PRACTITIONER

## 2022-09-13 ENCOUNTER — LAB REQUISITION (OUTPATIENT)
Dept: LAB | Facility: CLINIC | Age: 77
End: 2022-09-13
Payer: COMMERCIAL

## 2022-09-13 DIAGNOSIS — Z11.59 ENCOUNTER FOR SCREENING FOR OTHER VIRAL DISEASES: ICD-10-CM

## 2022-09-14 LAB — SARS-COV-2 RNA RESP QL NAA+PROBE: NEGATIVE

## 2022-09-21 ENCOUNTER — LAB REQUISITION (OUTPATIENT)
Dept: LAB | Facility: CLINIC | Age: 77
End: 2022-09-21
Payer: COMMERCIAL

## 2022-09-21 DIAGNOSIS — Z11.59 ENCOUNTER FOR SCREENING FOR OTHER VIRAL DISEASES: ICD-10-CM

## 2022-09-21 PROCEDURE — U0003 INFECTIOUS AGENT DETECTION BY NUCLEIC ACID (DNA OR RNA); SEVERE ACUTE RESPIRATORY SYNDROME CORONAVIRUS 2 (SARS-COV-2) (CORONAVIRUS DISEASE [COVID-19]), AMPLIFIED PROBE TECHNIQUE, MAKING USE OF HIGH THROUGHPUT TECHNOLOGIES AS DESCRIBED BY CMS-2020-01-R: HCPCS | Mod: ORL | Performed by: NURSE PRACTITIONER

## 2022-09-22 LAB — SARS-COV-2 RNA RESP QL NAA+PROBE: NEGATIVE

## 2022-09-28 DIAGNOSIS — I82.409 ACUTE THROMBOEMBOLISM OF DEEP VEINS OF LOWER EXTREMITY (H): ICD-10-CM

## 2022-09-28 RX ORDER — APIXABAN 5 MG/1
TABLET, FILM COATED ORAL
Qty: 60 TABLET | Refills: 11 | Status: SHIPPED | OUTPATIENT
Start: 2022-09-28 | End: 2023-08-24

## 2022-09-30 DIAGNOSIS — G47.429 NARCOLEPSY DUE TO UNDERLYING CONDITION WITHOUT CATAPLEXY: ICD-10-CM

## 2022-09-30 RX ORDER — ARMODAFINIL 150 MG/1
TABLET ORAL
Qty: 30 TABLET | Refills: 4 | Status: SHIPPED | OUTPATIENT
Start: 2022-09-30 | End: 2023-03-13

## 2022-10-11 ENCOUNTER — TRANSFERRED RECORDS (OUTPATIENT)
Dept: HEALTH INFORMATION MANAGEMENT | Facility: CLINIC | Age: 77
End: 2022-10-11

## 2022-10-14 ENCOUNTER — LAB REQUISITION (OUTPATIENT)
Dept: LAB | Facility: CLINIC | Age: 77
End: 2022-10-14
Payer: COMMERCIAL

## 2022-10-14 DIAGNOSIS — R10.9 UNSPECIFIED ABDOMINAL PAIN: ICD-10-CM

## 2022-10-14 LAB
ALBUMIN UR-MCNC: NEGATIVE MG/DL
APPEARANCE UR: ABNORMAL
BILIRUB UR QL STRIP: NEGATIVE
COLOR UR AUTO: ABNORMAL
GLUCOSE UR STRIP-MCNC: NEGATIVE MG/DL
HGB UR QL STRIP: NEGATIVE
KETONES UR STRIP-MCNC: NEGATIVE MG/DL
LEUKOCYTE ESTERASE UR QL STRIP: NEGATIVE
NITRATE UR QL: NEGATIVE
PH UR STRIP: 7 [PH] (ref 5–7)
RBC URINE: 1 /HPF
SP GR UR STRIP: 1.01 (ref 1–1.03)
UROBILINOGEN UR STRIP-MCNC: NORMAL MG/DL
WBC URINE: 1 /HPF

## 2022-10-14 PROCEDURE — 81001 URINALYSIS AUTO W/SCOPE: CPT | Mod: ORL | Performed by: NURSE PRACTITIONER

## 2022-10-14 PROCEDURE — 87086 URINE CULTURE/COLONY COUNT: CPT | Mod: ORL | Performed by: NURSE PRACTITIONER

## 2022-10-18 LAB — BACTERIA UR CULT: ABNORMAL

## 2022-10-21 ENCOUNTER — ASSISTED LIVING VISIT (OUTPATIENT)
Dept: GERIATRICS | Facility: CLINIC | Age: 77
End: 2022-10-21
Payer: COMMERCIAL

## 2022-10-21 VITALS
SYSTOLIC BLOOD PRESSURE: 126 MMHG | OXYGEN SATURATION: 98 % | RESPIRATION RATE: 18 BRPM | TEMPERATURE: 96.2 F | BODY MASS INDEX: 35.44 KG/M2 | WEIGHT: 240 LBS | DIASTOLIC BLOOD PRESSURE: 74 MMHG | HEART RATE: 70 BPM

## 2022-10-21 DIAGNOSIS — Z97.8 FOLEY CATHETER IN PLACE: ICD-10-CM

## 2022-10-21 DIAGNOSIS — N31.9 NEUROGENIC BLADDER: ICD-10-CM

## 2022-10-21 DIAGNOSIS — G35 MS (MULTIPLE SCLEROSIS) (H): ICD-10-CM

## 2022-10-21 DIAGNOSIS — N30.01 ACUTE CYSTITIS WITH HEMATURIA: Primary | ICD-10-CM

## 2022-10-21 RX ORDER — PENICILLIN V POTASSIUM 500 MG/1
500 TABLET, FILM COATED ORAL 2 TIMES DAILY
Qty: 20 TABLET | Refills: 0 | Status: CANCELLED
Start: 2022-10-21 | End: 2022-10-31

## 2022-10-21 NOTE — LETTER
10/21/2022        RE: Cristina Stevens  [address]  [address]  [facility] [facility] [address]        [facility] GERIATRICS  ACUTE/EPISODIC VISIT    [facility] Medical Record Number:  [id number]  Place of Service where encounter took place:  [facility] ([facility] [[id number]]    Chief Complaint   Patient presents with     RECHECK       HPI:    Cristina Stevens is a 77 year old  (1945), who is being seen today for an episodic care visit.  HPI information obtained from: facility chart records, facility staff, patient report and [facility] chart review.  Diagnoses       Codes Comments    Acute cystitis with hematuria    -  Primary N30.01     Neurogenic bladder     N31.9     Ruby catheter in place     Z97.8     MS (multiple sclerosis) (H)     G35         Today's concern is:  Muna has  PMH significant for multiple sclerosis, MDRO urinary tract infections, neurogenic bladder with a chronic ruby catheter, PAD, and constipation.     She is visited in her room today. Muna is pleasant and reading on the computer. Reports that her symptoms of UTI have been relieved since being starting on antibiotic. Her urine culture grew back Enterococcus facialis that was susceptible to PCNS, Vancomycin and Nitrofurantoin.       sigifredo this week when the final UC returned, asked staff to ask Muna if she would be willing to take the Penicillin knowing it was on her allergy list but for a upset stomach.  The Nitrofurantoin she had visual disturbances and she stated she ended up in the ED.       Denies any sob, cp, nausea, vomiting, abd pain, or any other concerns at this time.     ALLERGIES:    Allergies   Allergen Reactions     Nitrofuran Derivatives Visual Disturbance     Hallucinations     Tetracyclines GI Disturbance     Ampicillin GI Disturbance     Cefadroxil Muscle Pain (Myalgia)     Cephalexin Diarrhea and GI Disturbance     Levofloxacin Other (See Comments)      Phlebitis with IV infusion     Sulfa Drugs Nausea and Vomiting, GI Disturbance and Unknown     Reaction occurred as a child     Sulfasalazine Nausea and Vomiting        MEDICATIONS:  Post Discharge Medication Reconciliation Status: patient was not discharged from an inpatient facility or TCU.    Current Outpatient Medications   Medication Sig Dispense Refill     amLODIPine (NORVASC) 2.5 MG tablet 1 TABLET ORALLY EVERY EVENING (DX: HYPERTENSION) 30 tablet 11     amoxicillin (AMOXIL) 250 MG chewable tablet CHEW AND SWALLOW 8 TABLETS (2000 MG) BY MOUTH 1 HOUR PRIOR TO DENTAL 8 tablet 97     armodafinil (NUVIGIL) 150 MG TABS tablet 1 TABLET ORALLY EVERY MORNING (DX:NARCOLEPSY) CONTROLLED MEDICATION 30 tablet 4     ASPIRIN LOW DOSE 81 MG chewable tablet 1 TABLET ORALLY DAILY 30 tablet 11     CALMOSEPTINE 0.44-20.6 % OINT ointment APPLY TO AFFECTED AREA(S) TOPICALLY TO BUTTOCKS AS NEEDED WITH BRIEF CHANGES 113 g 97     cephALEXin (KEFLEX) 250 MG capsule TAKE 1 CAPSULE BY MOUTH ONCE DAILY FOR PREVENTIVE 31 capsule 97     cetirizine (ZYRTEC) 10 MG tablet Take 1 tablet (10 mg) by mouth daily 28 tablet 97     CO2-Releasing (-TWO) SUPP Place rectally daily as needed       Cranberry (CRANBERRY CONCENTRATE) 500 MG CAPS 1 CAPSULE ORALLY 2 TIMES DAILY (DX: CHRONIC CYSTITIS) 62 capsule 11     DESITIN 13 % CREA APPLY TOPICALLY TO AREAS OF REDNESS OR RASH WITH EACH BRIEF CHANGE 113 g 97     ELIQUIS ANTICOAGULANT 5 MG tablet 1 TABLET ORALLY 2 TIMES DAILY. DO NOT START BEFORE MAY 14TH,2022 (DX: VENOUS THROMBOEMBOLISM) 60 tablet 11     ibuprofen (ADVIL/MOTRIN) 400 MG tablet TAKE 1 TABLET BY MOUTH TWICE DAILY AS NEEDED FOR PAIN 30 tablet 97     mirabegron (MYRBETRIQ) 25 MG 24 hr tablet Take 1 tablet (25 mg) by mouth daily 28 tablet 97     Multiple Vitamin (TAB-A-DAWSON) TABS Take 1 tablet by mouth daily 28 tablet 97     nystatin (MYCOSTATIN) 016322 UNIT/GM external powder APPLY IN BETWEEN THE TOES TWICE DAILY UNTIL HEALED;THEN TWICE  DAILY AS NEEDED 30 g 97     oxyCODONE (ROXICODONE) 5 MG tablet Take 1 tablet (5 mg) by mouth every 3 hours as needed for severe pain 30 tablet 0     polyethylene glycol (MIRALAX/GLYCOLAX) powder MIX 17GM OF POWDER IN 8OZ OF WATER UNTIL COMPLETELY DISSOLVED. DRINK SOLUTION DAILY AS NEEDED 527 g 98     Probiotic Product (PROBIOTIC DAILY) CAPS Take 1 capsule by mouth daily 28 capsule 98     senna-docusate (SENEXON-S) 8.6-50 MG tablet Take 2 tablets by mouth daily as needed for constipation 62 tablet 12     vitamin C (ASCORBIC ACID) 500 MG tablet 1 TABLET ORALLY 2 TIMES DAILY (DX: CHRONIC CYSTITIS) 60 tablet 11     VITAMIN D3 50 MCG (2000 UT) tablet 2 TABLETS (4000U) DAILY 56 tablet 11       REVIEW OF SYSTEMS:  4 point ROS neg other than the symptoms noted above in the HPI.    PHYSICAL EXAM:  /74   Pulse 70   Temp (!) 96.2  F (35.7  C)   Resp 18   Wt 108.9 kg (240 lb)   SpO2 98%   BMI 35.44 kg/m      Exam:  Constitutional: alert, no distress and cooperative  Head: Normocephalic.   Cardiovascular: PMI normal. No lifts, heaves, or thrills. RRR. No murmurs, clicks gallops or rub  Respiratory: Lungs clear  Gastrointestinal: Abdomen soft, non-tender. BS normal. No masses, organomegaly  : Deferred and ruby catheter present.   Musculoskeletal: uses wheelchair  Psychiatric: affect normal/bright    ASSESSMENT / PLAN:  Diagnoses       Codes Comments    Acute cystitis with hematuria    -  Primary N30.01     Neurogenic bladder     N31.9     Ruby catheter in place     Z97.8     MS (multiple sclerosis) (H)     G35           Comment:   The UA was positive and the culture grew back Enterococcus facialis which was sensitive to PCNs. Consider her allergies to sulfa and macrobid, prescribed Pen  mg BID  For 10 days. Muna is taking this medication with yogurt to prevent any stomach upset. She is tolerating medication well and has no further s/s of UTI.  Educated her on the full 10 days of the antibiotic since she has  a indwelling catheter.      Orders:  No new orders at this time as already on ABX therapy.      Electronically signed by  Donna Juarez Student NP    CAROL Rodríguez CNP    I was present with the medical student/advanced practice registered nurse, Donna Juarez, [employer] student, who participated in the service and in the documentation of this note. I have verified the history and personally performed the physical exam and medical decision making. I agree with the assessment and plan of care as documented in the note.               Sincerely,        CAROL Rodríguez CNP

## 2022-10-21 NOTE — LETTER
10/21/2022        RE: Cristina Stevens  [address]  [address]  [facility] [address]        [facility] GERIATRICS  ACUTE/EPISODIC VISIT    [facility] Medical Record Number:  [id number]  Place of Service where encounter took place:  [facility] ([facility]) [[id number]]    Chief Complaint   Patient presents with     RECHECK       HPI:    Cristina Stevens is a 77 year old  (1945), who is being seen today for an episodic care visit.  HPI information obtained from: facility chart records, facility staff, patient report and [facility] chart review.  Diagnoses       Codes Comments    Acute cystitis with hematuria    -  Primary N30.01     Neurogenic bladder     N31.9     Ruby catheter in place     Z97.8     MS (multiple sclerosis) (H)     G35         Today's concern is:  Muna has  PMH significant for multiple sclerosis, MDRO urinary tract infections, neurogenic bladder with a chronic ruby catheter, PAD, and constipation.     She is visited in her room today. Muna is pleasant and reading on the computer. Reports that her symptoms of UTI have been relieved since being starting on antibiotic. Her urine culture grew back Enterococcus facialis that was susceptible to PCNS, Vancomycin and Nitrofurantoin.       sigifredo this week when the final UC returned, asked staff to ask Muna if she would be willing to take the Penicillin knowing it was on her allergy list but for a upset stomach.  The Nitrofurantoin she had visual disturbances and she stated she ended up in the ED.       Denies any sob, cp, nausea, vomiting, abd pain, or any other concerns at this time.     ALLERGIES:    Allergies   Allergen Reactions     Nitrofuran Derivatives Visual Disturbance     Hallucinations     Tetracyclines GI Disturbance     Ampicillin GI Disturbance     Cefadroxil Muscle Pain (Myalgia)     Cephalexin Diarrhea and GI Disturbance     Levofloxacin Other (See Comments)      Phlebitis with IV infusion     Sulfa Drugs Nausea and Vomiting, GI Disturbance and Unknown     Reaction occurred as a child     Sulfasalazine Nausea and Vomiting        MEDICATIONS:  Post Discharge Medication Reconciliation Status: patient was not discharged from an inpatient facility or TCU.    Current Outpatient Medications   Medication Sig Dispense Refill     amLODIPine (NORVASC) 2.5 MG tablet 1 TABLET ORALLY EVERY EVENING (DX: HYPERTENSION) 30 tablet 11     amoxicillin (AMOXIL) 250 MG chewable tablet CHEW AND SWALLOW 8 TABLETS (2000 MG) BY MOUTH 1 HOUR PRIOR TO DENTAL 8 tablet 97     armodafinil (NUVIGIL) 150 MG TABS tablet 1 TABLET ORALLY EVERY MORNING (DX:NARCOLEPSY) ***CONTROLLED MEDICATION ? TO BE STORED IN DOUBLE LOCKED STORAGE*** 30 tablet 4     ASPIRIN LOW DOSE 81 MG chewable tablet 1 TABLET ORALLY DAILY 30 tablet 11     CALMOSEPTINE 0.44-20.6 % OINT ointment APPLY TO AFFECTED AREA(S) TOPICALLY TO BUTTOCKS AS NEEDED WITH BRIEF CHANGES 113 g 97     cephALEXin (KEFLEX) 250 MG capsule TAKE 1 CAPSULE BY MOUTH ONCE DAILY FOR PREVENTIVE 31 capsule 97     cetirizine (ZYRTEC) 10 MG tablet Take 1 tablet (10 mg) by mouth daily 28 tablet 97     CO2-Releasing (-TWO) SUPP Place rectally daily as needed       Cranberry (CRANBERRY CONCENTRATE) 500 MG CAPS 1 CAPSULE ORALLY 2 TIMES DAILY (DX: CHRONIC CYSTITIS) 62 capsule 11     DESITIN 13 % CREA APPLY TOPICALLY TO AREAS OF REDNESS OR RASH WITH EACH BRIEF CHANGE 113 g 97     ELIQUIS ANTICOAGULANT 5 MG tablet 1 TABLET ORALLY 2 TIMES DAILY. DO NOT START BEFORE MAY 14TH,2022 (DX: VENOUS THROMBOEMBOLISM) 60 tablet 11     ibuprofen (ADVIL/MOTRIN) 400 MG tablet TAKE 1 TABLET BY MOUTH TWICE DAILY AS NEEDED FOR PAIN 30 tablet 97     mirabegron (MYRBETRIQ) 25 MG 24 hr tablet Take 1 tablet (25 mg) by mouth daily 28 tablet 97     Multiple Vitamin (TAB-A-DAWSON) TABS Take 1 tablet by mouth daily 28 tablet 97     nystatin (MYCOSTATIN) 746271 UNIT/GM external powder APPLY IN BETWEEN  THE TOES TWICE DAILY UNTIL HEALED;THEN TWICE DAILY AS NEEDED 30 g 97     oxyCODONE (ROXICODONE) 5 MG tablet Take 1 tablet (5 mg) by mouth every 3 hours as needed for severe pain 30 tablet 0     polyethylene glycol (MIRALAX/GLYCOLAX) powder MIX 17GM OF POWDER IN 8OZ OF WATER UNTIL COMPLETELY DISSOLVED. DRINK SOLUTION DAILY AS NEEDED 527 g 98     Probiotic Product (PROBIOTIC DAILY) CAPS Take 1 capsule by mouth daily 28 capsule 98     senna-docusate (SENEXON-S) 8.6-50 MG tablet Take 2 tablets by mouth daily as needed for constipation 62 tablet 12     vitamin C (ASCORBIC ACID) 500 MG tablet 1 TABLET ORALLY 2 TIMES DAILY (DX: CHRONIC CYSTITIS) 60 tablet 11     VITAMIN D3 50 MCG (2000 UT) tablet 2 TABLETS (4000U) DAILY 56 tablet 11       REVIEW OF SYSTEMS:  4 point ROS neg other than the symptoms noted above in the HPI.    PHYSICAL EXAM:  /74   Pulse 70   Temp (!) 96.2  F (35.7  C)   Resp 18   Wt 108.9 kg (240 lb)   SpO2 98%   BMI 35.44 kg/m      Exam:  Constitutional: alert, no distress and cooperative  Head: Normocephalic.   Cardiovascular: PMI normal. No lifts, heaves, or thrills. RRR. No murmurs, clicks gallops or rub  Respiratory: Lungs clear  Gastrointestinal: Abdomen soft, non-tender. BS normal. No masses, organomegaly  : Deferred and ruby catheter present.   Musculoskeletal: uses wheelchair  Psychiatric: affect normal/bright    ASSESSMENT / PLAN:  Diagnoses       Codes Comments    Acute cystitis with hematuria    -  Primary N30.01     Neurogenic bladder     N31.9     Ruby catheter in place     Z97.8     MS (multiple sclerosis) (H)     G35           Comment:   The UA was positive and the culture grew back Enterococcus facialis which was sensitive to PCNs. Consider her allergies to sulfa and macrobid, prescribed Pen  mg BID  For 10 days. Muna is taking this medication with yogurt to prevent any stomach upset. She is tolerating medication well and has no further s/s of UTI.  Educated her on the  full 10 days of the antibiotic since she has a indwelling catheter.      Orders:  No new orders at this time as already on ABX therapy.      Electronically signed by  Donna Juarez Student NP    CAROL Rodríguez CNP    I was present with the medical student/advanced practice registered nurse, Donna Juarez, NP student, who participated in the service and in the documentation of this note. I have verified the history and personally performed the physical exam and medical decision making. I agree with the assessment and plan of care as documented in the note.               Sincerely,        CAROL Rodríguez CNP

## 2022-10-21 NOTE — PROGRESS NOTES
[facility] GERIATRICS  ACUTE/EPISODIC VISIT    [facility] Medical Record Number:  [id number]  Place of Service where encounter took place:  [facility] ([facility] [[id number]]    Chief Complaint   Patient presents with     RECHECK       HPI:    Cristina Stevens is a 77 year old  (1945), who is being seen today for an episodic care visit.  HPI information obtained from: facility chart records, facility staff, patient report and [facility] chart review.  Diagnoses       Codes Comments    Acute cystitis with hematuria    -  Primary N30.01     Neurogenic bladder     N31.9     Ruby catheter in place     Z97.8     MS (multiple sclerosis) (H)     G35         Today's concern is:  Muna has  PMH significant for multiple sclerosis, MDRO urinary tract infections, neurogenic bladder with a chronic ruby catheter, PAD, and constipation.     She is visited in her room today. Muna is pleasant and reading on the computer. Reports that her symptoms of UTI have been relieved since being starting on antibiotic. Her urine culture grew back Enterococcus facialis that was susceptible to PCNS, Vancomycin and Nitrofurantoin.       sigifredo this week when the final UC returned, asked staff to ask Muna if she would be willing to take the Penicillin knowing it was on her allergy list but for a upset stomach.  The Nitrofurantoin she had visual disturbances and she stated she ended up in the ED.       Denies any sob, cp, nausea, vomiting, abd pain, or any other concerns at this time.     ALLERGIES:    Allergies   Allergen Reactions     Nitrofuran Derivatives Visual Disturbance     Hallucinations     Tetracyclines GI Disturbance     Ampicillin GI Disturbance     Cefadroxil Muscle Pain (Myalgia)     Cephalexin Diarrhea and GI Disturbance     Levofloxacin Other (See Comments)     Phlebitis with IV infusion     Sulfa Drugs Nausea and Vomiting, GI Disturbance and Unknown     Reaction occurred  as a child     Sulfasalazine Nausea and Vomiting        MEDICATIONS:  Post Discharge Medication Reconciliation Status: patient was not discharged from an inpatient facility or TCU.    Current Outpatient Medications   Medication Sig Dispense Refill     amLODIPine (NORVASC) 2.5 MG tablet 1 TABLET ORALLY EVERY EVENING (DX: HYPERTENSION) 30 tablet 11     amoxicillin (AMOXIL) 250 MG chewable tablet CHEW AND SWALLOW 8 TABLETS (2000 MG) BY MOUTH 1 HOUR PRIOR TO DENTAL 8 tablet 97     armodafinil (NUVIGIL) 150 MG TABS tablet 1 TABLET ORALLY EVERY MORNING (DX:NARCOLEPSY) CONTROLLED MEDICATION 30 tablet 4     ASPIRIN LOW DOSE 81 MG chewable tablet 1 TABLET ORALLY DAILY 30 tablet 11     CALMOSEPTINE 0.44-20.6 % OINT ointment APPLY TO AFFECTED AREA(S) TOPICALLY TO BUTTOCKS AS NEEDED WITH BRIEF CHANGES 113 g 97     cephALEXin (KEFLEX) 250 MG capsule TAKE 1 CAPSULE BY MOUTH ONCE DAILY FOR PREVENTIVE 31 capsule 97     cetirizine (ZYRTEC) 10 MG tablet Take 1 tablet (10 mg) by mouth daily 28 tablet 97     CO2-Releasing (-TWO) SUPP Place rectally daily as needed       Cranberry (CRANBERRY CONCENTRATE) 500 MG CAPS 1 CAPSULE ORALLY 2 TIMES DAILY (DX: CHRONIC CYSTITIS) 62 capsule 11     DESITIN 13 % CREA APPLY TOPICALLY TO AREAS OF REDNESS OR RASH WITH EACH BRIEF CHANGE 113 g 97     ELIQUIS ANTICOAGULANT 5 MG tablet 1 TABLET ORALLY 2 TIMES DAILY. DO NOT START BEFORE MAY 14TH,2022 (DX: VENOUS THROMBOEMBOLISM) 60 tablet 11     ibuprofen (ADVIL/MOTRIN) 400 MG tablet TAKE 1 TABLET BY MOUTH TWICE DAILY AS NEEDED FOR PAIN 30 tablet 97     mirabegron (MYRBETRIQ) 25 MG 24 hr tablet Take 1 tablet (25 mg) by mouth daily 28 tablet 97     Multiple Vitamin (TAB-A-DAWSON) TABS Take 1 tablet by mouth daily 28 tablet 97     nystatin (MYCOSTATIN) 258858 UNIT/GM external powder APPLY IN BETWEEN THE TOES TWICE DAILY UNTIL HEALED;THEN TWICE DAILY AS NEEDED 30 g 97     oxyCODONE (ROXICODONE) 5 MG tablet Take 1 tablet (5 mg) by mouth every 3 hours as needed  for severe pain 30 tablet 0     polyethylene glycol (MIRALAX/GLYCOLAX) powder MIX 17GM OF POWDER IN 8OZ OF WATER UNTIL COMPLETELY DISSOLVED. DRINK SOLUTION DAILY AS NEEDED 527 g 98     Probiotic Product (PROBIOTIC DAILY) CAPS Take 1 capsule by mouth daily 28 capsule 98     senna-docusate (SENEXON-S) 8.6-50 MG tablet Take 2 tablets by mouth daily as needed for constipation 62 tablet 12     vitamin C (ASCORBIC ACID) 500 MG tablet 1 TABLET ORALLY 2 TIMES DAILY (DX: CHRONIC CYSTITIS) 60 tablet 11     VITAMIN D3 50 MCG (2000 UT) tablet 2 TABLETS (4000U) DAILY 56 tablet 11       REVIEW OF SYSTEMS:  4 point ROS neg other than the symptoms noted above in the HPI.    PHYSICAL EXAM:  /74   Pulse 70   Temp (!) 96.2  F (35.7  C)   Resp 18   Wt 108.9 kg (240 lb)   SpO2 98%   BMI 35.44 kg/m      Exam:  Constitutional: alert, no distress and cooperative  Head: Normocephalic.   Cardiovascular: PMI normal. No lifts, heaves, or thrills. RRR. No murmurs, clicks gallops or rub  Respiratory: Lungs clear  Gastrointestinal: Abdomen soft, non-tender. BS normal. No masses, organomegaly  : Deferred and ruby catheter present.   Musculoskeletal: uses wheelchair  Psychiatric: affect normal/bright    ASSESSMENT / PLAN:  Diagnoses       Codes Comments    Acute cystitis with hematuria    -  Primary N30.01     Neurogenic bladder     N31.9     Ruby catheter in place     Z97.8     MS (multiple sclerosis) (H)     G35           Comment:   The UA was positive and the culture grew back Enterococcus facialis which was sensitive to PCNs. Consider her allergies to sulfa and macrobid, prescribed Pen  mg BID  For 10 days. Muna is taking this medication with yogurt to prevent any stomach upset. She is tolerating medication well and has no further s/s of UTI.  Educated her on the full 10 days of the antibiotic since she has a indwelling catheter.      Addendum:  Muna has had a permenant neurogenic bladder that she has a indwelling  catheter (16F) that is minimally changed every 3 weeks with the use of piston syring, catheter changing kit and sterile gloves.  Sterile water or normal saline is used for irrigation.  irrigation is done first when plugged before another catheter change.  If this is all unsuccessful, then she must go to the ED for evaluation and change.  Twice this year so far she has been in the ED.  Lately, Muna has been having complications of plugging of catheter and then requires irrigation and changing of catheter.   Urine is sent in for UC check if symptoms of UTI.      Orders:  No new orders at this time as already on ABX therapy.      Electronically signed by  Donna Juarez Student NP    CAROL Rodríguez CNP    I was present with the medical student/advanced practice registered nurse, Donna Juarez, FERM[employer]N student, who participated in the service and in the documentation of this note. I have verified the history and personally performed the physical exam and medical decision making. I agree with the assessment and plan of care as documented in the note.

## 2022-10-26 DIAGNOSIS — N39.0 FREQUENT UTI: ICD-10-CM

## 2022-10-26 DIAGNOSIS — N31.9 NEUROGENIC BLADDER: Primary | ICD-10-CM

## 2022-10-26 DIAGNOSIS — Z97.8 FOLEY CATHETER IN PLACE: ICD-10-CM

## 2022-10-26 NOTE — CONFIDENTIAL NOTE
Order for sterile water placed to [facility] for catheter irrigation.    Attempted to order the normal Saline from [address] [place] [address] but they stated that is on back order and needs to come from the pharmacy.    Anticipate issues with the sterile water order and if so, will correct it so that she gets irrigation solution one way or another.    Electronically signed by Love Lucas RN, CNP

## 2022-11-09 ENCOUNTER — LAB REQUISITION (OUTPATIENT)
Dept: LAB | Facility: CLINIC | Age: 77
End: 2022-11-09
Payer: COMMERCIAL

## 2022-11-09 DIAGNOSIS — N39.0 URINARY TRACT INFECTION, SITE NOT SPECIFIED: ICD-10-CM

## 2022-11-09 LAB
ALBUMIN UR-MCNC: NEGATIVE MG/DL
APPEARANCE UR: ABNORMAL
BILIRUB UR QL STRIP: NEGATIVE
COLOR UR AUTO: ABNORMAL
GLUCOSE UR STRIP-MCNC: NEGATIVE MG/DL
HGB UR QL STRIP: ABNORMAL
KETONES UR STRIP-MCNC: NEGATIVE MG/DL
LEUKOCYTE ESTERASE UR QL STRIP: ABNORMAL
NITRATE UR QL: NEGATIVE
PH UR STRIP: 7.5 [PH] (ref 5–7)
RBC URINE: 6 /HPF
SP GR UR STRIP: 1.01 (ref 1–1.03)
UROBILINOGEN UR STRIP-MCNC: NORMAL MG/DL
WBC CLUMPS #/AREA URNS HPF: PRESENT /HPF
WBC URINE: 149 /HPF

## 2022-11-09 PROCEDURE — 81001 URINALYSIS AUTO W/SCOPE: CPT | Mod: ORL | Performed by: NURSE PRACTITIONER

## 2022-11-09 PROCEDURE — 87088 URINE BACTERIA CULTURE: CPT | Mod: ORL | Performed by: NURSE PRACTITIONER

## 2022-11-11 ENCOUNTER — ASSISTED LIVING VISIT (OUTPATIENT)
Dept: GERIATRICS | Facility: CLINIC | Age: 77
End: 2022-11-11
Payer: COMMERCIAL

## 2022-11-11 DIAGNOSIS — N39.0 FREQUENT UTI: ICD-10-CM

## 2022-11-11 DIAGNOSIS — N31.9 NEUROGENIC BLADDER: ICD-10-CM

## 2022-11-11 DIAGNOSIS — Z97.8 FOLEY CATHETER IN PLACE: ICD-10-CM

## 2022-11-11 DIAGNOSIS — N39.0 URINARY TRACT INFECTION ASSOCIATED WITH INDWELLING URETHRAL CATHETER, INITIAL ENCOUNTER (H): Primary | ICD-10-CM

## 2022-11-11 DIAGNOSIS — T83.511A URINARY TRACT INFECTION ASSOCIATED WITH INDWELLING URETHRAL CATHETER, INITIAL ENCOUNTER (H): Primary | ICD-10-CM

## 2022-11-11 DIAGNOSIS — R68.2 DRY MOUTH: ICD-10-CM

## 2022-11-11 RX ORDER — METHENAMINE HIPPURATE 1000 MG/1
1 TABLET ORAL 2 TIMES DAILY
Qty: 60 TABLET | Refills: 4 | Status: SHIPPED | OUTPATIENT
Start: 2022-11-11 | End: 2023-03-14

## 2022-11-11 RX ORDER — PENICILLIN V POTASSIUM 500 MG/1
500 TABLET, FILM COATED ORAL 2 TIMES DAILY
Qty: 14 TABLET | Refills: 0 | Status: SHIPPED | OUTPATIENT
Start: 2022-11-11 | End: 2022-11-18

## 2022-11-11 NOTE — LETTER
11/11/2022        RE: Cristina Stevens  [address]  [address]  [facility] [address]        [facility] GERIATRICS  ACUTE/EPISODIC VISIT    [facility] Medical Record Number:  [id number]  Place of Service where encounter took place:  [facility] ([facility]) [[id number]]    Chief Complaint   Patient presents with     RECHECK       HPI:    Cirstina Stevens is a 77 year old  (1945), who is being seen today for an episodic care visit.  HPI information obtained from: facility chart records, facility staff, patient report and [facility] chart review.    Today's concern is:    Diagnoses       Codes Comments    Urinary tract infection associated with indwelling urethral catheter, initial encounter (H)    -  Primary T83.511A, N39.0     Frequent UTI     N39.0     Dry mouth     R68.2     Neurogenic bladder     N31.9     Edwards catheter in place     Z97.8         Came to see Muna today as her urine report has come back showing ,000 colonies of Enteroccocus faecialis.  Same organism as last time but then it was over [id number] colonies and treated with Pen VK for 10 days.    Muna seems to have more issues with her catheter lately with plugging and has a good aide that will come by and care for it for her.  Standing orders that they can collect urine and send it in when she has issues.      Today muna is up in her electric wheelchair.  She is trying lemon water to see if that will help acidify her urine more.  She c/o dry mouth in general as well and the size of the cranberry tablets.    Reviewed medications with her and came up with some possible changes.    ALLERGIES:    Allergies   Allergen Reactions     Nitrofuran Derivatives Visual Disturbance     Hallucinations     Tetracyclines GI Disturbance     Ampicillin GI Disturbance     Cefadroxil Muscle Pain (Myalgia)     Cephalexin Diarrhea and GI Disturbance     Levofloxacin Other (See Comments)     Phlebitis  with IV infusion     Sulfa Drugs Nausea and Vomiting, GI Disturbance and Unknown     Reaction occurred as a child     Sulfasalazine Nausea and Vomiting        MEDICATIONS:  Post Discharge Medication Reconciliation Status: patient was not discharged from an inpatient facility or TCU. Medications reconciled today    Current Outpatient Medications   Medication Sig Dispense Refill     Cranberry 250 MG TABS Give 2 tabs (500mg) po twice a day 120 tablet 11     methenamine hippurate (HIPREX) 1 g tablet Take 1 tablet (1 g) by mouth 2 times daily 60 tablet 4     penicillin V (VEETID) 500 MG tablet Take 1 tablet (500 mg) by mouth 2 times daily for 7 days 14 tablet 0     amLODIPine (NORVASC) 2.5 MG tablet 1 TABLET ORALLY EVERY EVENING (DX: HYPERTENSION) 30 tablet 11     amoxicillin (AMOXIL) 250 MG chewable tablet CHEW AND SWALLOW 8 TABLETS (2000 MG) BY MOUTH 1 HOUR PRIOR TO DENTAL 8 tablet 97     armodafinil (NUVIGIL) 150 MG TABS tablet 1 TABLET ORALLY EVERY MORNING (DX:NARCOLEPSY) CONTROLLED MEDICATION ? TO BE STORED IN DOUBLE LOCKED STORAGE 30 tablet 4     ASPIRIN LOW DOSE 81 MG chewable tablet 1 TABLET ORALLY DAILY 30 tablet 11     CALMOSEPTINE 0.44-20.6 % OINT ointment APPLY TO AFFECTED AREA(S) TOPICALLY TO BUTTOCKS AS NEEDED WITH BRIEF CHANGES 113 g 97     cetirizine (ZYRTEC) 10 MG tablet Take 1 tablet (10 mg) by mouth daily 28 tablet 97     CO2-Releasing (-TWO) SUPP Place rectally daily as needed       DESITIN 13 % CREA APPLY TOPICALLY TO AREAS OF REDNESS OR RASH WITH EACH BRIEF CHANGE 113 g 97     ELIQUIS ANTICOAGULANT 5 MG tablet 1 TABLET ORALLY 2 TIMES DAILY. DO NOT START BEFORE MAY 14TH,2022 (DX: VENOUS THROMBOEMBOLISM) 60 tablet 11     ibuprofen (ADVIL/MOTRIN) 400 MG tablet TAKE 1 TABLET BY MOUTH TWICE DAILY AS NEEDED FOR PAIN 30 tablet 97     Multiple Vitamin (TAB-A-DAWSON) TABS Take 1 tablet by mouth daily 28 tablet 97     nystatin (MYCOSTATIN) 682576 UNIT/GM external powder APPLY IN BETWEEN THE TOES TWICE DAILY  UNTIL HEALED;THEN TWICE DAILY AS NEEDED 30 g 97     oxyCODONE (ROXICODONE) 5 MG tablet Take 1 tablet (5 mg) by mouth every 3 hours as needed for severe pain 30 tablet 0     polyethylene glycol (MIRALAX/GLYCOLAX) powder MIX 17GM OF POWDER IN 8OZ OF WATER UNTIL COMPLETELY DISSOLVED. DRINK SOLUTION DAILY AS NEEDED 527 g 98     Probiotic Product (PROBIOTIC DAILY) CAPS Take 1 capsule by mouth daily 28 capsule 98     senna-docusate (SENEXON-S) 8.6-50 MG tablet Take 2 tablets by mouth daily as needed for constipation 62 tablet 12     sterile water, bottle, (WATER FOR IRRIGATION, STERILE) irrigation 60ml  prn to irrigate ruby catheter when plugged 500 mL 11     vitamin C (ASCORBIC ACID) 500 MG tablet 1 TABLET ORALLY 2 TIMES DAILY (DX: CHRONIC CYSTITIS) 60 tablet 11     VITAMIN D3 50 MCG (2000 UT) tablet 2 TABLETS (4000U) DAILY 56 tablet 11       REVIEW OF SYSTEMS:  4 point ROS neg other than the symptoms noted above in the HPI.  No chest pain no shortness of breath.  Complains of dry mouth.    PHYSICAL EXAM:  /74   Pulse 70   Temp (!) 96  F (35.6  C)   Resp 18   Wt 108.9 kg (240 lb)   BMI 35.44 kg/m       Alert and oriented x3.  Good sense of humor.  Heart rate regular and strong.  S1 and S2 heard.  No pitting edema in feet.  No respiratory distress.  Hard to get to her lungs due to her electric w/c.    Abdomen is round, soft and non-tender.    Non-ambulatory, lift is used for transfers.  Skin is pink, warm and dry.    Catheter present with yellow urine in the leg bag.      Culture 50,000-100,000 CFU/mL Enterococcus faecalis Abnormal             Resulting Agency: [facility]     Susceptibility     Enterococcus faecalis     YOAN     Ampicillin 4 ug/mL Susceptible     Nitrofurantoin <=16 ug/mL Susceptible     Penicillin 8 ug/mL Susceptible     Vancomycin 1 ug/mL Susceptible                           ASSESSMENT / PLAN:  (T83.511A,  N39.0) Urinary tract infection associated with indwelling urethral catheter, initial  encounter (H)  (primary encounter diagnosis)  Comment: will treat this organism with Pen VK 500mg BID for 7 days.  If it helps fight off an infection will try.  She has so many other allergies that limited on treatment.  Plan: penicillin V (VEETID) 500 MG tablet    (N39.0) Frequent UTI  Comment: currently does take Cranberry tablets 500mg BID.  Tablets are huge and Muna is requesting 250mg give 2 tabs twice a day for ease of administration to swallow.    Asked her if she ever tried Hiprex and she thinks she has not.  She was willing to try it and already on vitamin C.  Will monitor for side effects.  Hiprex 1gm po BID.  Plan: Cranberry 250 MG TABS, methenamine hippurate         (HIPREX) 1 g tablet    (R68.2) Dry mouth  Comment: currently on Myrbetriq 25mg po daily.  This can cause dry mouth.  Instead to treating the dry mouth with artificial saliva, will take away the medication and see if that helps.  Small dose but may make an impact on her.  Monitor for bladder spasms.      (N31.9) Neurogenic bladder  (Z97.8) Edwards catheter in place  Comment: changed every 3 weeks.  She buys extra supplies as she needs changes more often than the 1x a month allowed by medicare/insurance guidelines.  Signed that paperwork today.    Otherwise have kept her out of the ED as her aide comes to change the catheter with acute issues arise.      Orders:  1.  Pen VK 500mg po BID for 7 days for UTI  2.  Change Cranberry tabs to 250mg take 2 tabs twice a day for chronic cystitis  3.  Methemamine 1GM by mouth twice a day for chronic cystitis  4.  Discontinue Myrbetriq due to possible cause of dry mouth.      Electronically signed by  CAROL Rodríguez CNP             Sincerely,        CAROL Rodríguez CNP

## 2022-11-11 NOTE — PROGRESS NOTES
[facility] GERIATRICS  ACUTE/EPISODIC VISIT    [facility] Medical Record Number:  [id number]  Place of Service where encounter took place:  [facility] ([facility]) [[id number]]    Chief Complaint   Patient presents with     RECHECK       HPI:    Cristina Stevens is a 77 year old  (1945), who is being seen today for an episodic care visit.  HPI information obtained from: facility chart records, facility staff, patient report and [facility] chart review.    Today's concern is:    Diagnoses       Codes Comments    Urinary tract infection associated with indwelling urethral catheter, initial encounter (H)    -  Primary T83.511A, N39.0     Frequent UTI     N39.0     Dry mouth     R68.2     Neurogenic bladder     N31.9     Edwards catheter in place     Z97.8         Came to see Muna today as her urine report has come back showing ,000 colonies of Enteroccocus faecialis.  Same organism as last time but then it was over [id number] colonies and treated with Pen VK for 10 days.    Muna seems to have more issues with her catheter lately with plugging and has a good aide that will come by and care for it for her.  Standing orders that they can collect urine and send it in when she has issues.      Today muna is up in her electric wheelchair.  She is trying lemon water to see if that will help acidify her urine more.  She c/o dry mouth in general as well and the size of the cranberry tablets.    Reviewed medications with her and came up with some possible changes.    ALLERGIES:    Allergies   Allergen Reactions     Nitrofuran Derivatives Visual Disturbance     Hallucinations     Tetracyclines GI Disturbance     Ampicillin GI Disturbance     Cefadroxil Muscle Pain (Myalgia)     Cephalexin Diarrhea and GI Disturbance     Levofloxacin Other (See Comments)     Phlebitis with IV infusion     Sulfa Drugs Nausea and Vomiting, GI Disturbance and Unknown     Reaction occurred as a  child     Sulfasalazine Nausea and Vomiting        MEDICATIONS:  Post Discharge Medication Reconciliation Status: patient was not discharged from an inpatient facility or TCU. Medications reconciled today    Current Outpatient Medications   Medication Sig Dispense Refill     Cranberry 250 MG TABS Give 2 tabs (500mg) po twice a day 120 tablet 11     methenamine hippurate (HIPREX) 1 g tablet Take 1 tablet (1 g) by mouth 2 times daily 60 tablet 4     penicillin V (VEETID) 500 MG tablet Take 1 tablet (500 mg) by mouth 2 times daily for 7 days 14 tablet 0     amLODIPine (NORVASC) 2.5 MG tablet 1 TABLET ORALLY EVERY EVENING (DX: HYPERTENSION) 30 tablet 11     amoxicillin (AMOXIL) 250 MG chewable tablet CHEW AND SWALLOW 8 TABLETS (2000 MG) BY MOUTH 1 HOUR PRIOR TO DENTAL 8 tablet 97     armodafinil (NUVIGIL) 150 MG TABS tablet 1 TABLET ORALLY EVERY MORNING (DX:NARCOLEPSY) CONTROLLED MEDICATION ? TO BE STORED IN DOUBLE LOCKED STORAGE 30 tablet 4     ASPIRIN LOW DOSE 81 MG chewable tablet 1 TABLET ORALLY DAILY 30 tablet 11     CALMOSEPTINE 0.44-20.6 % OINT ointment APPLY TO AFFECTED AREA(S) TOPICALLY TO BUTTOCKS AS NEEDED WITH BRIEF CHANGES 113 g 97     cetirizine (ZYRTEC) 10 MG tablet Take 1 tablet (10 mg) by mouth daily 28 tablet 97     CO2-Releasing (-TWO) SUPP Place rectally daily as needed       DESITIN 13 % CREA APPLY TOPICALLY TO AREAS OF REDNESS OR RASH WITH EACH BRIEF CHANGE 113 g 97     ELIQUIS ANTICOAGULANT 5 MG tablet 1 TABLET ORALLY 2 TIMES DAILY. DO NOT START BEFORE MAY 14TH,2022 (DX: VENOUS THROMBOEMBOLISM) 60 tablet 11     ibuprofen (ADVIL/MOTRIN) 400 MG tablet TAKE 1 TABLET BY MOUTH TWICE DAILY AS NEEDED FOR PAIN 30 tablet 97     Multiple Vitamin (TAB-A-DAWSON) TABS Take 1 tablet by mouth daily 28 tablet 97     nystatin (MYCOSTATIN) 665092 UNIT/GM external powder APPLY IN BETWEEN THE TOES TWICE DAILY UNTIL HEALED;THEN TWICE DAILY AS NEEDED 30 g 97     oxyCODONE (ROXICODONE) 5 MG tablet Take 1 tablet (5 mg)  by mouth every 3 hours as needed for severe pain 30 tablet 0     polyethylene glycol (MIRALAX/GLYCOLAX) powder MIX 17GM OF POWDER IN 8OZ OF WATER UNTIL COMPLETELY DISSOLVED. DRINK SOLUTION DAILY AS NEEDED 527 g 98     Probiotic Product (PROBIOTIC DAILY) CAPS Take 1 capsule by mouth daily 28 capsule 98     senna-docusate (SENEXON-S) 8.6-50 MG tablet Take 2 tablets by mouth daily as needed for constipation 62 tablet 12     sterile water, bottle, (WATER FOR IRRIGATION, STERILE) irrigation 60ml  prn to irrigate ruby catheter when plugged 500 mL 11     vitamin C (ASCORBIC ACID) 500 MG tablet 1 TABLET ORALLY 2 TIMES DAILY (DX: CHRONIC CYSTITIS) 60 tablet 11     VITAMIN D3 50 MCG (2000 UT) tablet 2 TABLETS (4000U) DAILY 56 tablet 11       REVIEW OF SYSTEMS:  4 point ROS neg other than the symptoms noted above in the HPI.  No chest pain no shortness of breath.  Complains of dry mouth.    PHYSICAL EXAM:  /74   Pulse 70   Temp (!) 96  F (35.6  C)   Resp 18   Wt 108.9 kg (240 lb)   BMI 35.44 kg/m       Alert and oriented x3.  Good sense of humor.  Heart rate regular and strong.  S1 and S2 heard.  No pitting edema in feet.  No respiratory distress.  Hard to get to her lungs due to her electric w/c.    Abdomen is round, soft and non-tender.    Non-ambulatory, lift is used for transfers.  Skin is pink, warm and dry.    Catheter present with yellow urine in the leg bag.      Culture 50,000-100,000 CFU/mL Enterococcus faecalis Abnormal             Resulting Agency: [facility]     Susceptibility     Enterococcus faecalis     YOAN     Ampicillin 4 ug/mL Susceptible     Nitrofurantoin <=16 ug/mL Susceptible     Penicillin 8 ug/mL Susceptible     Vancomycin 1 ug/mL Susceptible                           ASSESSMENT / PLAN:  (T83.511A,  N39.0) Urinary tract infection associated with indwelling urethral catheter, initial encounter (H)  (primary encounter diagnosis)  Comment: will treat this organism with Pen VK 500mg BID for 7 days.   If it helps fight off an infection will try.  She has so many other allergies that limited on treatment.  Plan: penicillin V (VEETID) 500 MG tablet    (N39.0) Frequent UTI  Comment: currently does take Cranberry tablets 500mg BID.  Tablets are huge and Muna is requesting 250mg give 2 tabs twice a day for ease of administration to swallow.    Asked her if she ever tried Hiprex and she thinks she has not.  She was willing to try it and already on vitamin C.  Will monitor for side effects.  Hiprex 1gm po BID.  Plan: Cranberry 250 MG TABS, methenamine hippurate         (HIPREX) 1 g tablet    (R68.2) Dry mouth  Comment: currently on Myrbetriq 25mg po daily.  This can cause dry mouth.  Instead to treating the dry mouth with artificial saliva, will take away the medication and see if that helps.  Small dose but may make an impact on her.  Monitor for bladder spasms.      (N31.9) Neurogenic bladder  (Z97.8) Edwards catheter in place  Comment: changed every 3 weeks.  She buys extra supplies as she needs changes more often than the 1x a month allowed by medicare/insurance guidelines.  Signed that paperwork today.    Otherwise have kept her out of the ED as her aide comes to change the catheter with acute issues arise.      Orders:  1.  Pen VK 500mg po BID for 7 days for UTI  2.  Change Cranberry tabs to 250mg take 2 tabs twice a day for chronic cystitis  3.  Methemamine 1GM by mouth twice a day for chronic cystitis  4.  Discontinue Myrbetriq due to possible cause of dry mouth.      Electronically signed by  CAROL Rodríguez CNP

## 2022-11-11 NOTE — LETTER
11/11/2022        RE: Cristina Stevens  [address]  [address]  [place] [address]        [facility] GERIATRICS  ACUTE/EPISODIC VISIT    [facility] Medical Record Number:  [id number]  Place of Service where encounter took place:  [facility] [place] ([facility]) [[id number]]    Chief Complaint   Patient presents with     RECHECK       HPI:    Cristina Stevens is a 77 year old  (1945), who is being seen today for an episodic care visit.  HPI information obtained from: facility chart records, facility staff, patient report and [facility] chart review.    Today's concern is:    Diagnoses       Codes Comments    Urinary tract infection associated with indwelling urethral catheter, initial encounter (H)    -  Primary T83.511A, N39.0     Frequent UTI     N39.0     Dry mouth     R68.2     Neurogenic bladder     N31.9     Edwards catheter in place     Z97.8         Came to see Muna today as her urine report has come back showing ,000 colonies of Enteroccocus faecialis.  Same organism as last time but then it was over [id number] colonies and treated with Pen VK for 10 days.    Muna seems to have more issues with her catheter lately with plugging and has a good aide that will come by and care for it for her.  Standing orders that they can collect urine and send it in when she has issues.      Today muna is up in her electric wheelchair.  She is trying lemon water to see if that will help acidify her urine more.  She c/o dry mouth in general as well and the size of the cranberry tablets.    Reviewed medications with her and came up with some possible changes.    ALLERGIES:    Allergies   Allergen Reactions     Nitrofuran Derivatives Visual Disturbance     Hallucinations     Tetracyclines GI Disturbance     Ampicillin GI Disturbance     Cefadroxil Muscle Pain (Myalgia)     Cephalexin Diarrhea and GI Disturbance     Levofloxacin Other (See Comments)     Phlebitis  with IV infusion     Sulfa Drugs Nausea and Vomiting, GI Disturbance and Unknown     Reaction occurred as a child     Sulfasalazine Nausea and Vomiting        MEDICATIONS:  Post Discharge Medication Reconciliation Status: patient was not discharged from an inpatient facility or TCU. Medications reconciled today    Current Outpatient Medications   Medication Sig Dispense Refill     Cranberry 250 MG TABS Give 2 tabs (500mg) po twice a day 120 tablet 11     methenamine hippurate (HIPREX) 1 g tablet Take 1 tablet (1 g) by mouth 2 times daily 60 tablet 4     penicillin V (VEETID) 500 MG tablet Take 1 tablet (500 mg) by mouth 2 times daily for 7 days 14 tablet 0     amLODIPine (NORVASC) 2.5 MG tablet 1 TABLET ORALLY EVERY EVENING (DX: HYPERTENSION) 30 tablet 11     amoxicillin (AMOXIL) 250 MG chewable tablet CHEW AND SWALLOW 8 TABLETS (2000 MG) BY MOUTH 1 HOUR PRIOR TO DENTAL 8 tablet 97     armodafinil (NUVIGIL) 150 MG TABS tablet 1 TABLET ORALLY EVERY MORNING (DX:NARCOLEPSY) ***CONTROLLED MEDICATION ? TO BE STORED IN DOUBLE LOCKED STORAGE*** 30 tablet 4     ASPIRIN LOW DOSE 81 MG chewable tablet 1 TABLET ORALLY DAILY 30 tablet 11     CALMOSEPTINE 0.44-20.6 % OINT ointment APPLY TO AFFECTED AREA(S) TOPICALLY TO BUTTOCKS AS NEEDED WITH BRIEF CHANGES 113 g 97     cetirizine (ZYRTEC) 10 MG tablet Take 1 tablet (10 mg) by mouth daily 28 tablet 97     CO2-Releasing (-TWO) SUPP Place rectally daily as needed       DESITIN 13 % CREA APPLY TOPICALLY TO AREAS OF REDNESS OR RASH WITH EACH BRIEF CHANGE 113 g 97     ELIQUIS ANTICOAGULANT 5 MG tablet 1 TABLET ORALLY 2 TIMES DAILY. DO NOT START BEFORE MAY 14TH,2022 (DX: VENOUS THROMBOEMBOLISM) 60 tablet 11     ibuprofen (ADVIL/MOTRIN) 400 MG tablet TAKE 1 TABLET BY MOUTH TWICE DAILY AS NEEDED FOR PAIN 30 tablet 97     Multiple Vitamin (TAB-A-DAWSON) TABS Take 1 tablet by mouth daily 28 tablet 97     nystatin (MYCOSTATIN) 413173 UNIT/GM external powder APPLY IN BETWEEN THE TOES TWICE  DAILY UNTIL HEALED;THEN TWICE DAILY AS NEEDED 30 g 97     oxyCODONE (ROXICODONE) 5 MG tablet Take 1 tablet (5 mg) by mouth every 3 hours as needed for severe pain 30 tablet 0     polyethylene glycol (MIRALAX/GLYCOLAX) powder MIX 17GM OF POWDER IN 8OZ OF WATER UNTIL COMPLETELY DISSOLVED. DRINK SOLUTION DAILY AS NEEDED 527 g 98     Probiotic Product (PROBIOTIC DAILY) CAPS Take 1 capsule by mouth daily 28 capsule 98     senna-docusate (SENEXON-S) 8.6-50 MG tablet Take 2 tablets by mouth daily as needed for constipation 62 tablet 12     sterile water, bottle, (WATER FOR IRRIGATION, STERILE) irrigation 60ml  prn to irrigate ruby catheter when plugged 500 mL 11     vitamin C (ASCORBIC ACID) 500 MG tablet 1 TABLET ORALLY 2 TIMES DAILY (DX: CHRONIC CYSTITIS) 60 tablet 11     VITAMIN D3 50 MCG (2000 UT) tablet 2 TABLETS (4000U) DAILY 56 tablet 11       REVIEW OF SYSTEMS:  4 point ROS neg other than the symptoms noted above in the HPI.  No chest pain no shortness of breath.  Complains of dry mouth.    PHYSICAL EXAM:  /74   Pulse 70   Temp (!) 96  F (35.6  C)   Resp 18   Wt 108.9 kg (240 lb)   BMI 35.44 kg/m       Alert and oriented x3.  Good sense of humor.  Heart rate regular and strong.  S1 and S2 heard.  No pitting edema in feet.  No respiratory distress.  Hard to get to her lungs due to her electric w/c.    Abdomen is round, soft and non-tender.    Non-ambulatory, lift is used for transfers.  Skin is pink, warm and dry.    Catheter present with yellow urine in the leg bag.      Culture 50,000-100,000 CFU/mL Enterococcus faecalis Abnormal             Resulting Agency: [facility]     Susceptibility     Enterococcus faecalis     YOAN     Ampicillin 4 ug/mL Susceptible     Nitrofurantoin <=16 ug/mL Susceptible     Penicillin 8 ug/mL Susceptible     Vancomycin 1 ug/mL Susceptible                           ASSESSMENT / PLAN:  (T83.511A,  N39.0) Urinary tract infection associated with indwelling urethral catheter, initial  encounter (H)  (primary encounter diagnosis)  Comment: will treat this organism with Pen VK 500mg BID for 7 days.  If it helps fight off an infection will try.  She has so many other allergies that limited on treatment.  Plan: penicillin V (VEETID) 500 MG tablet    (N39.0) Frequent UTI  Comment: currently does take Cranberry tablets 500mg BID.  Tablets are huge and Muna is requesting 250mg give 2 tabs twice a day for ease of administration to swallow.    Asked her if she ever tried Hiprex and she thinks she has not.  She was willing to try it and already on vitamin C.  Will monitor for side effects.  Hiprex 1gm po BID.  Plan: Cranberry 250 MG TABS, methenamine hippurate         (HIPREX) 1 g tablet    (R68.2) Dry mouth  Comment: currently on Myrbetriq 25mg po daily.  This can cause dry mouth.  Instead to treating the dry mouth with artificial saliva, will take away the medication and see if that helps.  Small dose but may make an impact on her.  Monitor for bladder spasms.      (N31.9) Neurogenic bladder  (Z97.8) Edwards catheter in place  Comment: changed every 3 weeks.  She buys extra supplies as she needs changes more often than the 1x a month allowed by medicare/insurance guidelines.  Signed that paperwork today.    Otherwise have kept her out of the ED as her aide comes to change the catheter with acute issues arise.      Orders:  1.  Pen VK 500mg po BID for 7 days for UTI  2.  Change Cranberry tabs to 250mg take 2 tabs twice a day for chronic cystitis  3.  Methemamine 1GM by mouth twice a day for chronic cystitis  4.  Discontinue Myrbetriq due to possible cause of dry mouth.      Electronically signed by  CAROL Rodríguez CNP             Sincerely,        CAROL Rodríguez CNP

## 2022-11-11 NOTE — LETTER
11/11/2022        RE: Cristina Stevens  [address]  [address]  [place] [address]        [facility] GERIATRICS  ACUTE/EPISODIC VISIT    [facility] Medical Record Number:  [id number]  Place of Service where encounter took place:  [facility] [place] ([facility]) [[id number]]    Chief Complaint   Patient presents with     RECHECK       HPI:    Cristina Stevens is a 77 year old  (1945), who is being seen today for an episodic care visit.  HPI information obtained from: facility chart records, facility staff, patient report and [facility] chart review.    Today's concern is:    Diagnoses       Codes Comments    Urinary tract infection associated with indwelling urethral catheter, initial encounter (H)    -  Primary T83.511A, N39.0     Frequent UTI     N39.0     Dry mouth     R68.2     Neurogenic bladder     N31.9     Edwards catheter in place     Z97.8         Came to see Muna today as her urine report has come back showing ,000 colonies of Enteroccocus faecialis.  Same organism as last time but then it was over [id number] colonies and treated with Pen VK for 10 days.    Muna seems to have more issues with her catheter lately with plugging and has a good aide that will come by and care for it for her.  Standing orders that they can collect urine and send it in when she has issues.      Today muna is up in her electric wheelchair.  She is trying lemon water to see if that will help acidify her urine more.  She c/o dry mouth in general as well and the size of the cranberry tablets.    Reviewed medications with her and came up with some possible changes.    ALLERGIES:    Allergies   Allergen Reactions     Nitrofuran Derivatives Visual Disturbance     Hallucinations     Tetracyclines GI Disturbance     Ampicillin GI Disturbance     Cefadroxil Muscle Pain (Myalgia)     Cephalexin Diarrhea and GI Disturbance     Levofloxacin Other (See Comments)     Phlebitis  with IV infusion     Sulfa Drugs Nausea and Vomiting, GI Disturbance and Unknown     Reaction occurred as a child     Sulfasalazine Nausea and Vomiting        MEDICATIONS:  Post Discharge Medication Reconciliation Status: patient was not discharged from an inpatient facility or TCU. Medications reconciled today    Current Outpatient Medications   Medication Sig Dispense Refill     Cranberry 250 MG TABS Give 2 tabs (500mg) po twice a day 120 tablet 11     methenamine hippurate (HIPREX) 1 g tablet Take 1 tablet (1 g) by mouth 2 times daily 60 tablet 4     penicillin V (VEETID) 500 MG tablet Take 1 tablet (500 mg) by mouth 2 times daily for 7 days 14 tablet 0     amLODIPine (NORVASC) 2.5 MG tablet 1 TABLET ORALLY EVERY EVENING (DX: HYPERTENSION) 30 tablet 11     amoxicillin (AMOXIL) 250 MG chewable tablet CHEW AND SWALLOW 8 TABLETS (2000 MG) BY MOUTH 1 HOUR PRIOR TO DENTAL 8 tablet 97     armodafinil (NUVIGIL) 150 MG TABS tablet 1 TABLET ORALLY EVERY MORNING (DX:NARCOLEPSY) CONTROLLED MEDICATION ? TO BE STORED IN DOUBLE LOCKED STORAGE*** 30 tablet 4     ASPIRIN LOW DOSE 81 MG chewable tablet 1 TABLET ORALLY DAILY 30 tablet 11     CALMOSEPTINE 0.44-20.6 % OINT ointment APPLY TO AFFECTED AREA(S) TOPICALLY TO BUTTOCKS AS NEEDED WITH BRIEF CHANGES 113 g 97     cetirizine (ZYRTEC) 10 MG tablet Take 1 tablet (10 mg) by mouth daily 28 tablet 97     CO2-Releasing (-TWO) SUPP Place rectally daily as needed       DESITIN 13 % CREA APPLY TOPICALLY TO AREAS OF REDNESS OR RASH WITH EACH BRIEF CHANGE 113 g 97     ELIQUIS ANTICOAGULANT 5 MG tablet 1 TABLET ORALLY 2 TIMES DAILY. DO NOT START BEFORE MAY 14TH,2022 (DX: VENOUS THROMBOEMBOLISM) 60 tablet 11     ibuprofen (ADVIL/MOTRIN) 400 MG tablet TAKE 1 TABLET BY MOUTH TWICE DAILY AS NEEDED FOR PAIN 30 tablet 97     Multiple Vitamin (TAB-A-DAWSON) TABS Take 1 tablet by mouth daily 28 tablet 97     nystatin (MYCOSTATIN) 049333 UNIT/GM external powder APPLY IN BETWEEN THE TOES TWICE  DAILY UNTIL HEALED;THEN TWICE DAILY AS NEEDED 30 g 97     oxyCODONE (ROXICODONE) 5 MG tablet Take 1 tablet (5 mg) by mouth every 3 hours as needed for severe pain 30 tablet 0     polyethylene glycol (MIRALAX/GLYCOLAX) powder MIX 17GM OF POWDER IN 8OZ OF WATER UNTIL COMPLETELY DISSOLVED. DRINK SOLUTION DAILY AS NEEDED 527 g 98     Probiotic Product (PROBIOTIC DAILY) CAPS Take 1 capsule by mouth daily 28 capsule 98     senna-docusate (SENEXON-S) 8.6-50 MG tablet Take 2 tablets by mouth daily as needed for constipation 62 tablet 12     sterile water, bottle, (WATER FOR IRRIGATION, STERILE) irrigation 60ml  prn to irrigate ruby catheter when plugged 500 mL 11     vitamin C (ASCORBIC ACID) 500 MG tablet 1 TABLET ORALLY 2 TIMES DAILY (DX: CHRONIC CYSTITIS) 60 tablet 11     VITAMIN D3 50 MCG (2000 UT) tablet 2 TABLETS (4000U) DAILY 56 tablet 11       REVIEW OF SYSTEMS:  4 point ROS neg other than the symptoms noted above in the HPI.  No chest pain no shortness of breath.  Complains of dry mouth.    PHYSICAL EXAM:  /74   Pulse 70   Temp (!) 96  F (35.6  C)   Resp 18   Wt 108.9 kg (240 lb)   BMI 35.44 kg/m       Alert and oriented x3.  Good sense of humor.  Heart rate regular and strong.  S1 and S2 heard.  No pitting edema in feet.  No respiratory distress.  Hard to get to her lungs due to her electric w/c.    Abdomen is round, soft and non-tender.    Non-ambulatory, lift is used for transfers.  Skin is pink, warm and dry.    Catheter present with yellow urine in the leg bag.      Culture 50,000-100,000 CFU/mL Enterococcus faecalis Abnormal             Resulting Agency: [facility]     Susceptibility     Enterococcus faecalis     YOAN     Ampicillin 4 ug/mL Susceptible     Nitrofurantoin <=16 ug/mL Susceptible     Penicillin 8 ug/mL Susceptible     Vancomycin 1 ug/mL Susceptible                           ASSESSMENT / PLAN:  (T83.511A,  N39.0) Urinary tract infection associated with indwelling urethral catheter, initial  encounter (H)  (primary encounter diagnosis)  Comment: will treat this organism with Pen VK 500mg BID for 7 days.  If it helps fight off an infection will try.  She has so many other allergies that limited on treatment.  Plan: penicillin V (VEETID) 500 MG tablet    (N39.0) Frequent UTI  Comment: currently does take Cranberry tablets 500mg BID.  Tablets are huge and Muna is requesting 250mg give 2 tabs twice a day for ease of administration to swallow.    Asked her if she ever tried Hiprex and she thinks she has not.  She was willing to try it and already on vitamin C.  Will monitor for side effects.  Hiprex 1gm po BID.  Plan: Cranberry 250 MG TABS, methenamine hippurate         (HIPREX) 1 g tablet    (R68.2) Dry mouth  Comment: currently on Myrbetriq 25mg po daily.  This can cause dry mouth.  Instead to treating the dry mouth with artificial saliva, will take away the medication and see if that helps.  Small dose but may make an impact on her.  Monitor for bladder spasms.      (N31.9) Neurogenic bladder  (Z97.8) Edwards catheter in place  Comment: changed every 3 weeks.  She buys extra supplies as she needs changes more often than the 1x a month allowed by medicare/insurance guidelines.  Signed that paperwork today.    Otherwise have kept her out of the ED as her aide comes to change the catheter with acute issues arise.      Orders:  1.  Pen VK 500mg po BID for 7 days for UTI  2.  Change Cranberry tabs to 250mg take 2 tabs twice a day for chronic cystitis  3.  Methemamine 1GM by mouth twice a day for chronic cystitis  4.  Discontinue Myrbetriq due to possible cause of dry mouth.      Electronically signed by  CAROL Rodríguez CNP             Sincerely,        CAROL Rodríguez CNP

## 2022-11-13 LAB — BACTERIA UR CULT: ABNORMAL

## 2022-11-14 VITALS
RESPIRATION RATE: 18 BRPM | WEIGHT: 240 LBS | DIASTOLIC BLOOD PRESSURE: 74 MMHG | SYSTOLIC BLOOD PRESSURE: 126 MMHG | BODY MASS INDEX: 35.44 KG/M2 | HEART RATE: 70 BPM | TEMPERATURE: 96 F

## 2022-11-18 ENCOUNTER — ASSISTED LIVING VISIT (OUTPATIENT)
Dept: GERIATRICS | Facility: CLINIC | Age: 77
End: 2022-11-18
Payer: COMMERCIAL

## 2022-11-18 VITALS
SYSTOLIC BLOOD PRESSURE: 126 MMHG | BODY MASS INDEX: 35.44 KG/M2 | TEMPERATURE: 97.1 F | RESPIRATION RATE: 20 BRPM | DIASTOLIC BLOOD PRESSURE: 74 MMHG | HEART RATE: 98 BPM | WEIGHT: 240 LBS

## 2022-11-18 DIAGNOSIS — N39.0 FREQUENT UTI: ICD-10-CM

## 2022-11-18 DIAGNOSIS — Z97.8 FOLEY CATHETER IN PLACE: ICD-10-CM

## 2022-11-18 DIAGNOSIS — K59.09 CHRONIC CONSTIPATION: Primary | ICD-10-CM

## 2022-11-18 DIAGNOSIS — R68.2 DRY MOUTH: ICD-10-CM

## 2022-11-18 DIAGNOSIS — N31.9 NEUROGENIC BLADDER: ICD-10-CM

## 2022-11-18 NOTE — LETTER
11/18/2022        RE: Cristina Stevens  [address]  [address]  [place] [address]        [facility] GERIATRICS  ACUTE/EPISODIC VISIT    [facility] Medical Record Number:  [id number]  Place of Service where encounter took place:  [facility] [place] ([facility] [[id number]]    Chief Complaint   Patient presents with     RECHECK       HPI:    Cristina Stevens is a 77 year old  (1945), who is being seen today for an episodic care visit.  HPI information obtained from: facility chart records, facility staff and patient report.    Today's concern is:    Diagnoses       Codes Comments    Chronic constipation    -  Primary K59.09     Dry mouth     R68.2     Frequent UTI     N39.0     Neurogenic bladder     N31.9     Dewards catheter in place     Z97.8         Staff asked this nurse practitioner and NP student to go see Gabi today due to Muna did not want to take the bisacodyl anymore.    Muna was in her room up in her electric wheelchair.  As soon as she saw this nurse practitioner she started laughing.  During this week once communicating with Muna and her aide Rubi via text about Muna having more constipation since starting Hipprex.  He was made to try bisacodyl tablets 5 mg daily.  Rubi her aide went out and got her son since his over-the-counter as well as order was given to the staff.  Muna stated she read the directions and it mentioned that it could take 72 hours.  On day 3 she had enormous results, that to the point she does not want to take the medication anymore.  Warned her that she would probably cramp with this medication and she admitted she did, but trying to find the right medication to keep her steadily going has been difficult for her.  Did make adjustments with her suppository order this week.  Muna states today that senna is not her favorite medication as it is inconsistent.  Muna has as needed MiraLAX and she wants to keep that  regimen    Today is the last day of her penicillin for a urinary tract infection.  Muna feels her urine is fine at this time.  He also had dry mouth and so her mrybetriq was discontinued last week.  He states the dry mouth is now gone and has not had any bladder spasms.    ALLERGIES:    Allergies   Allergen Reactions     Nitrofuran Derivatives Visual Disturbance     Hallucinations     Tetracyclines GI Disturbance     Ampicillin GI Disturbance     Cefadroxil Muscle Pain (Myalgia)     Cephalexin Diarrhea and GI Disturbance     Levofloxacin Other (See Comments)     Phlebitis with IV infusion     Sulfa Drugs Nausea and Vomiting, GI Disturbance and Unknown     Reaction occurred as a child     Sulfasalazine Nausea and Vomiting        MEDICATIONS:  Post Discharge Medication Reconciliation Status: patient was not discharged from an inpatient facility or TCU. Medications reconciled today    Current Outpatient Medications   Medication Sig Dispense Refill     CO2-Releasing (-TWO) SUPP 1 supp daily prn and if not effective or falls out, may repeat again until results achieved.  May keep at bedside       amLODIPine (NORVASC) 2.5 MG tablet 1 TABLET ORALLY EVERY EVENING (DX: HYPERTENSION) 30 tablet 11     amoxicillin (AMOXIL) 250 MG chewable tablet CHEW AND SWALLOW 8 TABLETS (2000 MG) BY MOUTH 1 HOUR PRIOR TO DENTAL 8 tablet 97     armodafinil (NUVIGIL) 150 MG TABS tablet 1 TABLET ORALLY EVERY MORNING (DX:NARCOLEPSY) ***CONTROLLED MEDICATION ? TO BE STORED IN DOUBLE LOCKED STORAGE*** 30 tablet 4     ASPIRIN LOW DOSE 81 MG chewable tablet 1 TABLET ORALLY DAILY 30 tablet 11     CALMOSEPTINE 0.44-20.6 % OINT ointment APPLY TO AFFECTED AREA(S) TOPICALLY TO BUTTOCKS AS NEEDED WITH BRIEF CHANGES 113 g 97     cetirizine (ZYRTEC) 10 MG tablet Take 1 tablet (10 mg) by mouth daily 28 tablet 97     Cranberry 250 MG TABS Give 2 tabs (500mg) po twice a day 120 tablet 11     DESITIN 13 % CREA APPLY TOPICALLY TO AREAS OF REDNESS OR RASH WITH  EACH BRIEF CHANGE 113 g 97     ELIQUIS ANTICOAGULANT 5 MG tablet 1 TABLET ORALLY 2 TIMES DAILY. DO NOT START BEFORE MAY 14TH,2022 (DX: VENOUS THROMBOEMBOLISM) 60 tablet 11     ibuprofen (ADVIL/MOTRIN) 400 MG tablet TAKE 1 TABLET BY MOUTH TWICE DAILY AS NEEDED FOR PAIN 30 tablet 97     methenamine hippurate (HIPREX) 1 g tablet Take 1 tablet (1 g) by mouth 2 times daily 60 tablet 4     Multiple Vitamin (TAB-A-DAWSON) TABS Take 1 tablet by mouth daily 28 tablet 97     nystatin (MYCOSTATIN) 517400 UNIT/GM external powder APPLY IN BETWEEN THE TOES TWICE DAILY UNTIL HEALED;THEN TWICE DAILY AS NEEDED 30 g 97     oxyCODONE (ROXICODONE) 5 MG tablet Take 1 tablet (5 mg) by mouth every 3 hours as needed for severe pain 30 tablet 0     polyethylene glycol (MIRALAX/GLYCOLAX) powder MIX 17GM OF POWDER IN 8OZ OF WATER UNTIL COMPLETELY DISSOLVED. DRINK SOLUTION DAILY AS NEEDED 527 g 98     Probiotic Product (PROBIOTIC DAILY) CAPS Take 1 capsule by mouth daily 28 capsule 98     senna-docusate (SENEXON-S) 8.6-50 MG tablet Take 2 tablets by mouth daily as needed for constipation 62 tablet 12     sterile water, bottle, (WATER FOR IRRIGATION, STERILE) irrigation 60ml  prn to irrigate ruby catheter when plugged 500 mL 11     vitamin C (ASCORBIC ACID) 500 MG tablet 1 TABLET ORALLY 2 TIMES DAILY (DX: CHRONIC CYSTITIS) 60 tablet 11     VITAMIN D3 50 MCG (2000 UT) tablet 2 TABLETS (4000U) DAILY 56 tablet 11       REVIEW OF SYSTEMS:  4 point ROS neg other than the symptoms noted above in the HPI.    PHYSICAL EXAM:  /74   Pulse 98   Temp 97.1  F (36.2  C)   Resp 20   Wt 108.9 kg (240 lb)   BMI 35.44 kg/m    Muna is alert and oriented x3.  She still manages her cares verbally but is dependent on staff and her caregiver per day to day with physical needs because Muna is a paraplegic with her multiple sclerosis  Heart rate is regular and strong S1 and S2 are heard  Lungs are clear to auscultation.  Her voice is soft.  Abdomen round  soft and nontender.  Bowel sounds are present  No edema in her lower extremities  Catheter as of urine in the leg bag.    No recent B/P's done.      ASSESSMENT / PLAN:  (K59.09) Chronic constipation  (primary encounter diagnosis)  Comment: He did give bisacodyl tablets of tried and not successful.  Had changed her  -2 supp order to be daily but if falls out or not effective can use over again until results achieved.  This supp is favorable for paraplegic people  Will leave the MiraLAX and the senna on her regimen as needed    (R68.2) Dry mouth  Comment: Dry mouth is resolved.  It was from her Myrbetriq medication.    (N39.0) Frequent UTI  (N31.9) Neurogenic bladder  (Z97.8) Edwards catheter in place  Comment: Muna has had different changes now with medications.  Is off the Myrbetriq for bladder spasms, started on Hipprex 1000mg twice a day for frequent UTIs.  She already was on vitamin C daily.  Muna is a does come and change her catheter for her and together they do keep an eye on how frequent she has complications with the catheter hopefully not with some changes she will do better      Orders:  Discontinue Bisacodyl tablets today.    Electronically signed by  CAROL Rodríguez CNP               Sincerely,        CAROL Rodríguez CNP

## 2022-11-18 NOTE — PROGRESS NOTES
[facility] GERIATRICS  ACUTE/EPISODIC VISIT    [facility] Medical Record Number:  [id number]  Place of Service where encounter took place:  [facility] [place] ([facility]) [[id number]]    Chief Complaint   Patient presents with     RECHECK       HPI:    Cristina Stevens is a 77 year old  (1945), who is being seen today for an episodic care visit.  HPI information obtained from: facility chart records, facility staff and patient report.    Today's concern is:    Diagnoses       Codes Comments    Chronic constipation    -  Primary K59.09     Dry mouth     R68.2     Frequent UTI     N39.0     Neurogenic bladder     N31.9     Edwards catheter in place     Z97.8         Staff asked this nurse practitioner and NP student to go see Gabi today due to Muna did not want to take the bisacodyl anymore.    Muna was in her room up in her electric wheelchair.  As soon as she saw this nurse practitioner she started laughing.  During this week once communicating with Muna and her aide Rubi via text about Muna having more constipation since starting Hipprex.  He was made to try bisacodyl tablets 5 mg daily.  Rubi her aide went out and got her son since his over-the-counter as well as order was given to the staff.  Muna stated she read the directions and it mentioned that it could take 72 hours.  On day 3 she had enormous results, that to the point she does not want to take the medication anymore.  Warned her that she would probably cramp with this medication and she admitted she did, but trying to find the right medication to keep her steadily going has been difficult for her.  Did make adjustments with her suppository order this week.  Muna states today that senna is not her favorite medication as it is inconsistent.  Muna has as needed MiraLAX and she wants to keep that regimen    Today is the last day of her penicillin for a urinary tract infection.  Muna feels her urine is  fine at this time.  He also had dry mouth and so her mrybetriq was discontinued last week.  He states the dry mouth is now gone and has not had any bladder spasms.    ALLERGIES:    Allergies   Allergen Reactions     Nitrofuran Derivatives Visual Disturbance     Hallucinations     Tetracyclines GI Disturbance     Ampicillin GI Disturbance     Cefadroxil Muscle Pain (Myalgia)     Cephalexin Diarrhea and GI Disturbance     Levofloxacin Other (See Comments)     Phlebitis with IV infusion     Sulfa Drugs Nausea and Vomiting, GI Disturbance and Unknown     Reaction occurred as a child     Sulfasalazine Nausea and Vomiting        MEDICATIONS:  Post Discharge Medication Reconciliation Status: patient was not discharged from an inpatient facility or TCU. Medications reconciled today    Current Outpatient Medications   Medication Sig Dispense Refill     CO2-Releasing (-TWO) SUPP 1 supp daily prn and if not effective or falls out, may repeat again until results achieved.  May keep at bedside       amLODIPine (NORVASC) 2.5 MG tablet 1 TABLET ORALLY EVERY EVENING (DX: HYPERTENSION) 30 tablet 11     amoxicillin (AMOXIL) 250 MG chewable tablet CHEW AND SWALLOW 8 TABLETS (2000 MG) BY MOUTH 1 HOUR PRIOR TO DENTAL 8 tablet 97     armodafinil (NUVIGIL) 150 MG TABS tablet 1 TABLET ORALLY EVERY MORNING (DX:NARCOLEPSY) CONTROLLED MEDICATION ? TO BE STORED IN DOUBLE LOCKED STORAGE 30 tablet 4     ASPIRIN LOW DOSE 81 MG chewable tablet 1 TABLET ORALLY DAILY 30 tablet 11     CALMOSEPTINE 0.44-20.6 % OINT ointment APPLY TO AFFECTED AREA(S) TOPICALLY TO BUTTOCKS AS NEEDED WITH BRIEF CHANGES 113 g 97     cetirizine (ZYRTEC) 10 MG tablet Take 1 tablet (10 mg) by mouth daily 28 tablet 97     Cranberry 250 MG TABS Give 2 tabs (500mg) po twice a day 120 tablet 11     DESITIN 13 % CREA APPLY TOPICALLY TO AREAS OF REDNESS OR RASH WITH EACH BRIEF CHANGE 113 g 97     ELIQUIS ANTICOAGULANT 5 MG tablet 1 TABLET ORALLY 2 TIMES DAILY. DO NOT START  BEFORE MAY 14TH,2022 (DX: VENOUS THROMBOEMBOLISM) 60 tablet 11     ibuprofen (ADVIL/MOTRIN) 400 MG tablet TAKE 1 TABLET BY MOUTH TWICE DAILY AS NEEDED FOR PAIN 30 tablet 97     methenamine hippurate (HIPREX) 1 g tablet Take 1 tablet (1 g) by mouth 2 times daily 60 tablet 4     Multiple Vitamin (TAB-A-DAWSON) TABS Take 1 tablet by mouth daily 28 tablet 97     nystatin (MYCOSTATIN) 549605 UNIT/GM external powder APPLY IN BETWEEN THE TOES TWICE DAILY UNTIL HEALED;THEN TWICE DAILY AS NEEDED 30 g 97     oxyCODONE (ROXICODONE) 5 MG tablet Take 1 tablet (5 mg) by mouth every 3 hours as needed for severe pain 30 tablet 0     polyethylene glycol (MIRALAX/GLYCOLAX) powder MIX 17GM OF POWDER IN 8OZ OF WATER UNTIL COMPLETELY DISSOLVED. DRINK SOLUTION DAILY AS NEEDED 527 g 98     Probiotic Product (PROBIOTIC DAILY) CAPS Take 1 capsule by mouth daily 28 capsule 98     senna-docusate (SENEXON-S) 8.6-50 MG tablet Take 2 tablets by mouth daily as needed for constipation 62 tablet 12     sterile water, bottle, (WATER FOR IRRIGATION, STERILE) irrigation 60ml  prn to irrigate ruby catheter when plugged 500 mL 11     vitamin C (ASCORBIC ACID) 500 MG tablet 1 TABLET ORALLY 2 TIMES DAILY (DX: CHRONIC CYSTITIS) 60 tablet 11     VITAMIN D3 50 MCG (2000 UT) tablet 2 TABLETS (4000U) DAILY 56 tablet 11         REVIEW OF SYSTEMS:  4 point ROS neg other than the symptoms noted above in the HPI.    PHYSICAL EXAM:  /74   Pulse 98   Temp 97.1  F (36.2  C)   Resp 20   Wt 108.9 kg (240 lb)   BMI 35.44 kg/m    Muna is alert and oriented x3.  She still manages her cares verbally but is dependent on staff and her caregiver per day to day with physical needs because Muna is a paraplegic with her multiple sclerosis  Heart rate is regular and strong S1 and S2 are heard  Lungs are clear to auscultation.  Her voice is soft.  Abdomen round soft and nontender.  Bowel sounds are present  No edema in her lower extremities  Catheter as of urine in the  leg bag.    No recent B/P's done.      ASSESSMENT / PLAN:  (K59.09) Chronic constipation  (primary encounter diagnosis)  Comment: He did give bisacodyl tablets of tried and not successful.  Had changed her  -2 supp order to be daily but if falls out or not effective can use over again until results achieved.  This supp is favorable for paraplegic people  Will leave the MiraLAX and the senna on her regimen as needed    (R68.2) Dry mouth  Comment: Dry mouth is resolved.  It was from her Myrbetriq medication.    (N39.0) Frequent UTI  (N31.9) Neurogenic bladder  (Z97.8) Edwards catheter in place  Comment: Muna has had different changes now with medications.  Is off the Myrbetriq for bladder spasms, started on Hipprex 1000mg twice a day for frequent UTIs.  She already was on vitamin C daily.  Muna is a does come and change her catheter for her and together they do keep an eye on how frequent she has complications with the catheter hopefully not with some changes she will do better      Orders:  Discontinue Bisacodyl tablets today.    Electronically signed by  CAROL Rodríguez CNP

## 2022-11-18 NOTE — LETTER
11/18/2022        RE: Cristina Stevens  [address]  [address]  [place] [address]        [facility] GERIATRICS  ACUTE/EPISODIC VISIT    [facility] Medical Record Number:  [id number]  Place of Service where encounter took place:  [facility] [place] ([facility]) [[id number]]    Chief Complaint   Patient presents with     RECHECK       HPI:    Cristina Stevens is a 77 year old  (1945), who is being seen today for an episodic care visit.  HPI information obtained from: facility chart records, facility staff and patient report.    Today's concern is:    Diagnoses       Codes Comments    Chronic constipation    -  Primary K59.09     Dry mouth     R68.2     Frequent UTI     N39.0     Neurogenic bladder     N31.9     Edwards catheter in place     Z97.8         Staff asked this nurse practitioner and NP student to go see Gabi today due to Muna did not want to take the bisacodyl anymore.    Muna was in her room up in her electric wheelchair.  As soon as she saw this nurse practitioner she started laughing.  During this week once communicating with Muna and her aide Rubi via text about Muna having more constipation since starting Hipprex.  He was made to try bisacodyl tablets 5 mg daily.  Rubi her aide went out and got her son since his over-the-counter as well as order was given to the staff.  Muna stated she read the directions and it mentioned that it could take 72 hours.  On day 3 she had enormous results, that to the point she does not want to take the medication anymore.  Warned her that she would probably cramp with this medication and she admitted she did, but trying to find the right medication to keep her steadily going has been difficult for her.  Did make adjustments with her suppository order this week.  Muna states today that senna is not her favorite medication as it is inconsistent.  Muna has as needed MiraLAX and she wants to keep that  regimen    Today is the last day of her penicillin for a urinary tract infection.  Muna feels her urine is fine at this time.  He also had dry mouth and so her mrybetriq was discontinued last week.  He states the dry mouth is now gone and has not had any bladder spasms.    ALLERGIES:    Allergies   Allergen Reactions     Nitrofuran Derivatives Visual Disturbance     Hallucinations     Tetracyclines GI Disturbance     Ampicillin GI Disturbance     Cefadroxil Muscle Pain (Myalgia)     Cephalexin Diarrhea and GI Disturbance     Levofloxacin Other (See Comments)     Phlebitis with IV infusion     Sulfa Drugs Nausea and Vomiting, GI Disturbance and Unknown     Reaction occurred as a child     Sulfasalazine Nausea and Vomiting        MEDICATIONS:  Post Discharge Medication Reconciliation Status: patient was not discharged from an inpatient facility or TCU. Medications reconciled today    Current Outpatient Medications   Medication Sig Dispense Refill     CO2-Releasing (-TWO) SUPP 1 supp daily prn and if not effective or falls out, may repeat again until results achieved.  May keep at bedside       amLODIPine (NORVASC) 2.5 MG tablet 1 TABLET ORALLY EVERY EVENING (DX: HYPERTENSION) 30 tablet 11     amoxicillin (AMOXIL) 250 MG chewable tablet CHEW AND SWALLOW 8 TABLETS (2000 MG) BY MOUTH 1 HOUR PRIOR TO DENTAL 8 tablet 97     armodafinil (NUVIGIL) 150 MG TABS tablet 1 TABLET ORALLY EVERY MORNING (DX:NARCOLEPSY) CONTROLLED MEDICATION ? TO BE STORED IN DOUBLE LOCKED STORAGE 30 tablet 4     ASPIRIN LOW DOSE 81 MG chewable tablet 1 TABLET ORALLY DAILY 30 tablet 11     CALMOSEPTINE 0.44-20.6 % OINT ointment APPLY TO AFFECTED AREA(S) TOPICALLY TO BUTTOCKS AS NEEDED WITH BRIEF CHANGES 113 g 97     cetirizine (ZYRTEC) 10 MG tablet Take 1 tablet (10 mg) by mouth daily 28 tablet 97     Cranberry 250 MG TABS Give 2 tabs (500mg) po twice a day 120 tablet 11     DESITIN 13 % CREA APPLY TOPICALLY TO AREAS OF REDNESS OR RASH WITH EACH  BRIEF CHANGE 113 g 97     ELIQUIS ANTICOAGULANT 5 MG tablet 1 TABLET ORALLY 2 TIMES DAILY. DO NOT START BEFORE MAY 14TH,2022 (DX: VENOUS THROMBOEMBOLISM) 60 tablet 11     ibuprofen (ADVIL/MOTRIN) 400 MG tablet TAKE 1 TABLET BY MOUTH TWICE DAILY AS NEEDED FOR PAIN 30 tablet 97     methenamine hippurate (HIPREX) 1 g tablet Take 1 tablet (1 g) by mouth 2 times daily 60 tablet 4     Multiple Vitamin (TAB-A-DAWSON) TABS Take 1 tablet by mouth daily 28 tablet 97     nystatin (MYCOSTATIN) 605500 UNIT/GM external powder APPLY IN BETWEEN THE TOES TWICE DAILY UNTIL HEALED;THEN TWICE DAILY AS NEEDED 30 g 97     oxyCODONE (ROXICODONE) 5 MG tablet Take 1 tablet (5 mg) by mouth every 3 hours as needed for severe pain 30 tablet 0     polyethylene glycol (MIRALAX/GLYCOLAX) powder MIX 17GM OF POWDER IN 8OZ OF WATER UNTIL COMPLETELY DISSOLVED. DRINK SOLUTION DAILY AS NEEDED 527 g 98     Probiotic Product (PROBIOTIC DAILY) CAPS Take 1 capsule by mouth daily 28 capsule 98     senna-docusate (SENEXON-S) 8.6-50 MG tablet Take 2 tablets by mouth daily as needed for constipation 62 tablet 12     sterile water, bottle, (WATER FOR IRRIGATION, STERILE) irrigation 60ml  prn to irrigate ruby catheter when plugged 500 mL 11     vitamin C (ASCORBIC ACID) 500 MG tablet 1 TABLET ORALLY 2 TIMES DAILY (DX: CHRONIC CYSTITIS) 60 tablet 11     VITAMIN D3 50 MCG (2000 UT) tablet 2 TABLETS (4000U) DAILY 56 tablet 11         REVIEW OF SYSTEMS:  4 point ROS neg other than the symptoms noted above in the HPI.    PHYSICAL EXAM:  /74   Pulse 98   Temp 97.1  F (36.2  C)   Resp 20   Wt 108.9 kg (240 lb)   BMI 35.44 kg/m    Muna is alert and oriented x3.  She still manages her cares verbally but is dependent on staff and her caregiver per day to day with physical needs because Muna is a paraplegic with her multiple sclerosis  Heart rate is regular and strong S1 and S2 are heard  Lungs are clear to auscultation.  Her voice is soft.  Abdomen round soft  and nontender.  Bowel sounds are present  No edema in her lower extremities  Catheter as of urine in the leg bag.    No recent B/P's done.      ASSESSMENT / PLAN:  (K59.09) Chronic constipation  (primary encounter diagnosis)  Comment: He did give bisacodyl tablets of tried and not successful.  Had changed her  -2 supp order to be daily but if falls out or not effective can use over again until results achieved.  This supp is favorable for paraplegic people  Will leave the MiraLAX and the senna on her regimen as needed    (R68.2) Dry mouth  Comment: Dry mouth is resolved.  It was from her Myrbetriq medication.    (N39.0) Frequent UTI  (N31.9) Neurogenic bladder  (Z97.8) Edwards catheter in place  Comment: Muna has had different changes now with medications.  Is off the Myrbetriq for bladder spasms, started on Hipprex 1000mg twice a day for frequent UTIs.  She already was on vitamin C daily.  Muna is a does come and change her catheter for her and together they do keep an eye on how frequent she has complications with the catheter hopefully not with some changes she will do better      Orders:  Discontinue Bisacodyl tablets today.    Electronically signed by  CAROL Rodríguez CNP               Sincerely,        CAROL Rodríguez CNP

## 2022-11-20 RX ORDER — LAXATIVE 927; 618 MG/3.7G; MG/3.7G
SUPPOSITORY RECTAL
Start: 2022-11-20

## 2022-11-28 ENCOUNTER — LAB REQUISITION (OUTPATIENT)
Dept: LAB | Facility: CLINIC | Age: 77
End: 2022-11-28
Payer: COMMERCIAL

## 2022-11-28 DIAGNOSIS — N39.0 URINARY TRACT INFECTION, SITE NOT SPECIFIED: ICD-10-CM

## 2022-11-28 DIAGNOSIS — Z11.59 ENCOUNTER FOR SCREENING FOR OTHER VIRAL DISEASES: ICD-10-CM

## 2022-11-28 LAB
ALBUMIN UR-MCNC: NEGATIVE MG/DL
APPEARANCE UR: ABNORMAL
BILIRUB UR QL STRIP: NEGATIVE
COLOR UR AUTO: ABNORMAL
GLUCOSE UR STRIP-MCNC: NEGATIVE MG/DL
HGB UR QL STRIP: NEGATIVE
KETONES UR STRIP-MCNC: NEGATIVE MG/DL
LEUKOCYTE ESTERASE UR QL STRIP: ABNORMAL
NITRATE UR QL: NEGATIVE
PH UR STRIP: 7.5 [PH] (ref 5–7)
RBC URINE: 2 /HPF
SP GR UR STRIP: 1.01 (ref 1–1.03)
SQUAMOUS EPITHELIAL: 4 /HPF
TRANSITIONAL EPI: <1 /HPF
UROBILINOGEN UR STRIP-MCNC: NORMAL MG/DL
WBC URINE: 1 /HPF

## 2022-11-28 PROCEDURE — 81001 URINALYSIS AUTO W/SCOPE: CPT | Mod: ORL | Performed by: NURSE PRACTITIONER

## 2022-11-28 PROCEDURE — 81001 URINALYSIS AUTO W/SCOPE: CPT | Mod: ORL

## 2022-11-29 PROCEDURE — U0005 INFEC AGEN DETEC AMPLI PROBE: HCPCS | Mod: ORL | Performed by: NURSE PRACTITIONER

## 2022-11-30 LAB — SARS-COV-2 RNA RESP QL NAA+PROBE: NEGATIVE

## 2022-12-05 ENCOUNTER — LAB REQUISITION (OUTPATIENT)
Dept: LAB | Facility: CLINIC | Age: 77
End: 2022-12-05
Payer: COMMERCIAL

## 2022-12-05 DIAGNOSIS — Z11.59 ENCOUNTER FOR SCREENING FOR OTHER VIRAL DISEASES: ICD-10-CM

## 2022-12-06 PROCEDURE — U0003 INFECTIOUS AGENT DETECTION BY NUCLEIC ACID (DNA OR RNA); SEVERE ACUTE RESPIRATORY SYNDROME CORONAVIRUS 2 (SARS-COV-2) (CORONAVIRUS DISEASE [COVID-19]), AMPLIFIED PROBE TECHNIQUE, MAKING USE OF HIGH THROUGHPUT TECHNOLOGIES AS DESCRIBED BY CMS-2020-01-R: HCPCS | Mod: ORL | Performed by: NURSE PRACTITIONER

## 2022-12-07 LAB — SARS-COV-2 RNA RESP QL NAA+PROBE: NEGATIVE

## 2022-12-27 PROCEDURE — 81001 URINALYSIS AUTO W/SCOPE: CPT | Mod: ORL | Performed by: NURSE PRACTITIONER

## 2022-12-28 ENCOUNTER — LAB REQUISITION (OUTPATIENT)
Dept: LAB | Facility: CLINIC | Age: 77
End: 2022-12-28
Payer: COMMERCIAL

## 2022-12-28 DIAGNOSIS — N39.0 URINARY TRACT INFECTION, SITE NOT SPECIFIED: ICD-10-CM

## 2022-12-28 LAB
ALBUMIN UR-MCNC: NEGATIVE MG/DL
APPEARANCE UR: ABNORMAL
BILIRUB UR QL STRIP: NEGATIVE
COLOR UR AUTO: ABNORMAL
GLUCOSE UR STRIP-MCNC: NEGATIVE MG/DL
HGB UR QL STRIP: NEGATIVE
KETONES UR STRIP-MCNC: NEGATIVE MG/DL
LEUKOCYTE ESTERASE UR QL STRIP: NEGATIVE
NITRATE UR QL: NEGATIVE
PH UR STRIP: 7 [PH] (ref 5–7)
RBC URINE: 0 /HPF
SP GR UR STRIP: 1.01 (ref 1–1.03)
UROBILINOGEN UR STRIP-MCNC: NORMAL MG/DL
WBC URINE: 1 /HPF

## 2023-01-03 ENCOUNTER — TRANSFERRED RECORDS (OUTPATIENT)
Dept: HEALTH INFORMATION MANAGEMENT | Facility: CLINIC | Age: 78
End: 2023-01-03

## 2023-01-06 ENCOUNTER — ASSISTED LIVING VISIT (OUTPATIENT)
Dept: GERIATRICS | Facility: CLINIC | Age: 78
End: 2023-01-06
Payer: COMMERCIAL

## 2023-01-06 VITALS
DIASTOLIC BLOOD PRESSURE: 83 MMHG | RESPIRATION RATE: 18 BRPM | SYSTOLIC BLOOD PRESSURE: 164 MMHG | TEMPERATURE: 95.9 F | OXYGEN SATURATION: 97 % | HEART RATE: 62 BPM

## 2023-01-06 DIAGNOSIS — G35 MS (MULTIPLE SCLEROSIS) (H): Primary | ICD-10-CM

## 2023-01-06 DIAGNOSIS — I73.9 PVD (PERIPHERAL VASCULAR DISEASE) (H): ICD-10-CM

## 2023-01-06 DIAGNOSIS — E66.01 MORBID OBESITY (H): ICD-10-CM

## 2023-01-06 DIAGNOSIS — J99 NEUROMUSCULAR RESPIRATORY WEAKNESS (H): ICD-10-CM

## 2023-01-06 DIAGNOSIS — G82.50 TETRAPARESIS (H): ICD-10-CM

## 2023-01-06 DIAGNOSIS — N31.9 NEUROGENIC BLADDER: ICD-10-CM

## 2023-01-06 DIAGNOSIS — Z97.8 FOLEY CATHETER IN PLACE: ICD-10-CM

## 2023-01-06 DIAGNOSIS — G70.9 NEUROMUSCULAR RESPIRATORY WEAKNESS (H): ICD-10-CM

## 2023-01-06 PROCEDURE — 99349 HOME/RES VST EST MOD MDM 40: CPT | Performed by: NURSE PRACTITIONER

## 2023-01-06 NOTE — LETTER
1/6/2023        RE: Cristina Stevens  [address]  [address]  [place] [address]        [facility] GERIATRICS  ACUTE/EPISODIC VISIT    [facility] Medical Record Number:  [id number]  Place of Service where encounter took place:  [facility] ([facility]) [[id number]]    Chief Complaint   Patient presents with     RECHECK       HPI:    Cristina Stevens is a 77 year old  (1945), who is being seen today for an episodic care visit.  HPI information obtained from: facility chart records, facility staff, patient report and [facility] chart review.    Today's concern is:    Diagnoses       Codes Comments    MS (multiple sclerosis) (H)    -  Primary G35     Tetraparesis (H)     G82.50     Neuromuscular respiratory weakness (H)     G70.9, J99     PVD (peripheral vascular disease) (H)     I73.9     Morbid obesity (H)     E66.01     Neurogenic bladder     N31.9     Edwards catheter in place     Z97.8         Came to see Muna today and how she has been doing overall.  Muna resides in extended care unit at [facility].  Due to her MS she has severely limited ROM, non-ambulatory, uses an electric w/c for mobility.  She has a private aide whom helps her with her catheter changes and other issues.  Occasionally they will update this NP for any concerns.  Did do a UA/UC recently on 12/27 and UA negative so UC not done.      Found muna today up in her electric wheelchair.  Alert and oriented x3.  Smiling and had no immediate concerns.      ALLERGIES:    Allergies   Allergen Reactions     Nitrofuran Derivatives Visual Disturbance     Hallucinations     Tetracyclines GI Disturbance     Ampicillin GI Disturbance     Cefadroxil Muscle Pain (Myalgia)     Cephalexin Diarrhea and GI Disturbance     Levofloxacin Other (See Comments)     Phlebitis with IV infusion     Sulfa Drugs Nausea and Vomiting, GI Disturbance and Unknown     Reaction occurred as a child     Sulfasalazine  Nausea and Vomiting        MEDICATIONS:  Post Discharge Medication Reconciliation Status: patient was not discharged from an inpatient facility or TCU.     Current Outpatient Medications   Medication Sig Dispense Refill     amLODIPine (NORVASC) 2.5 MG tablet 1 TABLET ORALLY EVERY EVENING (DX: HYPERTENSION) 30 tablet 11     amoxicillin (AMOXIL) 250 MG chewable tablet CHEW AND SWALLOW 8 TABLETS (2000 MG) BY MOUTH 1 HOUR PRIOR TO DENTAL 8 tablet 97     armodafinil (NUVIGIL) 150 MG TABS tablet 1 TABLET ORALLY EVERY MORNING (DX:NARCOLEPSY)  30 tablet 4     ASPIRIN LOW DOSE 81 MG chewable tablet 1 TABLET ORALLY DAILY 30 tablet 11     CALMOSEPTINE 0.44-20.6 % OINT ointment APPLY TO AFFECTED AREA(S) TOPICALLY TO BUTTOCKS AS NEEDED WITH BRIEF CHANGES 113 g 97     cetirizine (ZYRTEC) 10 MG tablet Take 1 tablet (10 mg) by mouth daily 28 tablet 97     CO2-Releasing (-TWO) SUPP 1 supp daily prn and if not effective or falls out, may repeat again until results achieved.  May keep at bedside       Cranberry 250 MG TABS Give 2 tabs (500mg) po twice a day 120 tablet 11     DESITIN 13 % CREA APPLY TOPICALLY TO AREAS OF REDNESS OR RASH WITH EACH BRIEF CHANGE 113 g 97     ELIQUIS ANTICOAGULANT 5 MG tablet 1 TABLET ORALLY 2 TIMES DAILY. DO NOT START BEFORE MAY 14TH,2022 (DX: VENOUS THROMBOEMBOLISM) 60 tablet 11     ibuprofen (ADVIL/MOTRIN) 400 MG tablet TAKE 1 TABLET BY MOUTH TWICE DAILY AS NEEDED FOR PAIN 30 tablet 97     methenamine hippurate (HIPREX) 1 g tablet Take 1 tablet (1 g) by mouth 2 times daily 60 tablet 4     Multiple Vitamin (TAB-A-DAWSON) TABS Take 1 tablet by mouth daily 28 tablet 97     nystatin (MYCOSTATIN) 729988 UNIT/GM external powder APPLY IN BETWEEN THE TOES TWICE DAILY UNTIL HEALED;THEN TWICE DAILY AS NEEDED 30 g 97     oxyCODONE (ROXICODONE) 5 MG tablet Take 1 tablet (5 mg) by mouth every 3 hours as needed for severe pain 30 tablet 0     polyethylene glycol (MIRALAX/GLYCOLAX) powder MIX 17GM OF POWDER IN 8OZ OF  WATER UNTIL COMPLETELY DISSOLVED. DRINK SOLUTION DAILY AS NEEDED 527 g 98     Probiotic Product (PROBIOTIC DAILY) CAPS Take 1 capsule by mouth daily 28 capsule 98     senna-docusate (SENEXON-S) 8.6-50 MG tablet Take 2 tablets by mouth daily as needed for constipation 62 tablet 12     sterile water, bottle, (WATER FOR IRRIGATION, STERILE) irrigation 60ml  prn to irrigate ruby catheter when plugged 500 mL 11     vitamin C (ASCORBIC ACID) 500 MG tablet 1 TABLET ORALLY 2 TIMES DAILY (DX: CHRONIC CYSTITIS) 60 tablet 11     VITAMIN D3 50 MCG (2000 UT) tablet 2 TABLETS (4000U) DAILY 56 tablet 11       REVIEW OF SYSTEMS:  4 point ROS neg other than the symptoms noted above in the HPI.  No new complaint.    PHYSICAL EXAM:  BP (!) 164/83   Pulse 62   Temp (!) 95.9  F (35.5  C)   Resp 18   SpO2 97%   Voice is quiet, sometimes it is almost like a whisper.    Wears glasses.  Reads the computer and able to work her phone.  No hearing aides.    Heart rate regular and strong.  Anterior check of lungs are clear.    Abdomen is round, soft and non-tender.    Catheter leg bag present with clear urine present.  Lower extremities have +1 edema.  Self report is that her toes are fine without skin alteration.      120-160's/60-80's.    ASSESSMENT / PLAN:  (G35) MS (multiple sclerosis) (H)  (primary encounter diagnosis)  (G82.50) Tetraparesis (H)  Comment: do not feel there has been any advancement in her physical aspect of the MS.  She resides on a floor that helps her through out the day and night.  She directs her own care.      (G70.9,  J99) Neuromuscular respiratory weakness (H)  Comment: as noted above, can hear her voice changes in tone pending of her respiratory weakness.  No oxygen used.  Can see how she talks and can she the Neuromuscular weakness.      (I73.9) PVD (peripheral vascular disease) (H)  Comment: continues on Eliquis 5mg BID.  Non-ambulatory and runs the risk of clots.  Feet are clear of open areas or infection.   Will have a follow up with vascular surgery early this year given she had a significant sore on 5th digit on left foot.    (E66.01) Morbid obesity (H)  Comment: has been over a year since she has had a weight done.  At then it was at 240 lbs.  Do not feel she looks any different.  At time have come at lunch time and she only has simple things like yogurt.  Do not lecture her of her weight.  She does go for therapy outside of facility for her MS.    (N31.9) Neurogenic bladder  (Z97.8) Edwards catheter in place  Comment: typically has a catheter change every 3 weeks to help prevent infection versus every 4 weeks.  If she has complications with clogging or discoloration, her aide will come over a address the issue.  Standing order that a UA/UC can be obtained if having to change the catheter for an unscheduled time.      Orders:  No new orders    Electronically signed by  CAROL Rodríguez CNP           Sincerely,        CAROL Rodríguez CNP

## 2023-01-06 NOTE — PROGRESS NOTES
[facility] GERIATRICS  ACUTE/EPISODIC VISIT    [facility] Medical Record Number:  [id number]  Place of Service where encounter took place:  [facility] ([facility] [[id number]]    Chief Complaint   Patient presents with     RECHECK       HPI:    Cristina Stevens is a 77 year old  (1945), who is being seen today for an episodic care visit.  HPI information obtained from: facility chart records, facility staff, patient report and [facility] chart review.    Today's concern is:    Diagnoses       Codes Comments    MS (multiple sclerosis) (H)    -  Primary G35     Tetraparesis (H)     G82.50     Neuromuscular respiratory weakness (H)     G70.9, J99     PVD (peripheral vascular disease) (H)     I73.9     Morbid obesity (H)     E66.01     Neurogenic bladder     N31.9     Edwards catheter in place     Z97.8         Came to see Muna today and how she has been doing overall.  Muna resides in extended care unit at a [facility].  Due to her MS she has severely limited ROM, non-ambulatory, uses an electric w/c for mobility.  She has a private aide whom helps her with her catheter changes and other issues.  Occasionally they will update this NP for any concerns.  Did do a UA/UC recently on 12/27 and UA negative so UC not done.      Found muna today up in her electric wheelchair.  Alert and oriented x3.  Smiling and had no immediate concerns.      ALLERGIES:    Allergies   Allergen Reactions     Nitrofuran Derivatives Visual Disturbance     Hallucinations     Tetracyclines GI Disturbance     Ampicillin GI Disturbance     Cefadroxil Muscle Pain (Myalgia)     Cephalexin Diarrhea and GI Disturbance     Levofloxacin Other (See Comments)     Phlebitis with IV infusion     Sulfa Drugs Nausea and Vomiting, GI Disturbance and Unknown     Reaction occurred as a child     Sulfasalazine Nausea and Vomiting        MEDICATIONS:  Post Discharge Medication Reconciliation Status: patient was not  discharged from an inpatient facility or TCU.     Current Outpatient Medications   Medication Sig Dispense Refill     amLODIPine (NORVASC) 2.5 MG tablet 1 TABLET ORALLY EVERY EVENING (DX: HYPERTENSION) 30 tablet 11     amoxicillin (AMOXIL) 250 MG chewable tablet CHEW AND SWALLOW 8 TABLETS (2000 MG) BY MOUTH 1 HOUR PRIOR TO DENTAL 8 tablet 97     armodafinil (NUVIGIL) 150 MG TABS tablet 1 TABLET ORALLY EVERY MORNING (DX:NARCOLEPSY)  30 tablet 4     ASPIRIN LOW DOSE 81 MG chewable tablet 1 TABLET ORALLY DAILY 30 tablet 11     CALMOSEPTINE 0.44-20.6 % OINT ointment APPLY TO AFFECTED AREA(S) TOPICALLY TO BUTTOCKS AS NEEDED WITH BRIEF CHANGES 113 g 97     cetirizine (ZYRTEC) 10 MG tablet Take 1 tablet (10 mg) by mouth daily 28 tablet 97     CO2-Releasing (-TWO) SUPP 1 supp daily prn and if not effective or falls out, may repeat again until results achieved.  May keep at bedside       Cranberry 250 MG TABS Give 2 tabs (500mg) po twice a day 120 tablet 11     DESITIN 13 % CREA APPLY TOPICALLY TO AREAS OF REDNESS OR RASH WITH EACH BRIEF CHANGE 113 g 97     ELIQUIS ANTICOAGULANT 5 MG tablet 1 TABLET ORALLY 2 TIMES DAILY. DO NOT START BEFORE MAY 14TH,2022 (DX: VENOUS THROMBOEMBOLISM) 60 tablet 11     ibuprofen (ADVIL/MOTRIN) 400 MG tablet TAKE 1 TABLET BY MOUTH TWICE DAILY AS NEEDED FOR PAIN 30 tablet 97     methenamine hippurate (HIPREX) 1 g tablet Take 1 tablet (1 g) by mouth 2 times daily 60 tablet 4     Multiple Vitamin (TAB-A-DAWSON) TABS Take 1 tablet by mouth daily 28 tablet 97     nystatin (MYCOSTATIN) 213488 UNIT/GM external powder APPLY IN BETWEEN THE TOES TWICE DAILY UNTIL HEALED;THEN TWICE DAILY AS NEEDED 30 g 97     oxyCODONE (ROXICODONE) 5 MG tablet Take 1 tablet (5 mg) by mouth every 3 hours as needed for severe pain 30 tablet 0     polyethylene glycol (MIRALAX/GLYCOLAX) powder MIX 17GM OF POWDER IN 8OZ OF WATER UNTIL COMPLETELY DISSOLVED. DRINK SOLUTION DAILY AS NEEDED 527 g 98     Probiotic Product  (PROBIOTIC DAILY) CAPS Take 1 capsule by mouth daily 28 capsule 98     senna-docusate (SENEXON-S) 8.6-50 MG tablet Take 2 tablets by mouth daily as needed for constipation 62 tablet 12     sterile water, bottle, (WATER FOR IRRIGATION, STERILE) irrigation 60ml  prn to irrigate ruby catheter when plugged 500 mL 11     vitamin C (ASCORBIC ACID) 500 MG tablet 1 TABLET ORALLY 2 TIMES DAILY (DX: CHRONIC CYSTITIS) 60 tablet 11     VITAMIN D3 50 MCG (2000 UT) tablet 2 TABLETS (4000U) DAILY 56 tablet 11       REVIEW OF SYSTEMS:  4 point ROS neg other than the symptoms noted above in the HPI.  No new complaint.    PHYSICAL EXAM:  BP (!) 164/83   Pulse 62   Temp (!) 95.9  F (35.5  C)   Resp 18   SpO2 97%   Voice is quiet, sometimes it is almost like a whisper.    Wears glasses.  Reads the computer and able to work her phone.  No hearing aides.    Heart rate regular and strong.  Anterior check of lungs are clear.    Abdomen is round, soft and non-tender.    Catheter leg bag present with clear urine present.  Lower extremities have +1 edema.  Self report is that her toes are fine without skin alteration.      120-160's/60-80's.    ASSESSMENT / PLAN:  (G35) MS (multiple sclerosis) (H)  (primary encounter diagnosis)  (G82.50) Tetraparesis (H)  Comment: do not feel there has been any advancement in her physical aspect of the MS.  She resides on a floor that helps her through out the day and night.  She directs her own care.      (G70.9,  J99) Neuromuscular respiratory weakness (H)  Comment: as noted above, can hear her voice changes in tone pending of her respiratory weakness.  No oxygen used.  Can see how she talks and can she the Neuromuscular weakness.      (I73.9) PVD (peripheral vascular disease) (H)  Comment: continues on Eliquis 5mg BID.  Non-ambulatory and runs the risk of clots.  Feet are clear of open areas or infection.  Will have a follow up with vascular surgery early this year given she had a significant sore on  5th digit on left foot.    (E66.01) Morbid obesity (H)  Comment: has been over a year since she has had a weight done.  At then it was at 240 lbs.  Do not feel she looks any different.  At time have come at lunch time and she only has simple things like yogurt.  Do not lecture her of her weight.  She does go for therapy outside of facility for her MS.    (N31.9) Neurogenic bladder  (Z97.8) Edwards catheter in place  Comment: typically has a catheter change every 3 weeks to help prevent infection versus every 4 weeks.  If she has complications with clogging or discoloration, her aide will come over a address the issue.  Standing order that a UA/UC can be obtained if having to change the catheter for an unscheduled time.      Orders:  No new orders    Electronically signed by  CAROL Rodríguez CNP

## 2023-01-06 NOTE — LETTER
1/6/2023        RE: Cristina Stevens  [address]  [address]  [place] [id number]        No notes on file      Sincerely,        CAROL Rodríguez CNP

## 2023-01-14 DIAGNOSIS — R21 RASH: ICD-10-CM

## 2023-01-16 RX ORDER — NYSTATIN 100000 [USP'U]/G
POWDER TOPICAL
Qty: 60 G | Refills: 10 | Status: SHIPPED | OUTPATIENT
Start: 2023-01-16 | End: 2024-03-06

## 2023-01-18 DIAGNOSIS — M81.0 SENILE OSTEOPOROSIS: Primary | ICD-10-CM

## 2023-01-18 DIAGNOSIS — Z92.29 PERSONAL HISTORY OF DRUG THERAPY: ICD-10-CM

## 2023-01-18 NOTE — PROGRESS NOTES
1-18-23  Last seen 9/2021 - last DXA 8/2020   NTTBS  Needs DXA and BMP before the prolia. - all is ordered Get DXA and then see me.  Erica Abrams MD, PhD    3-31-23 pt had DXA 3/2023  and showing significant loss in the spine -  Last prolia looks like 10/2021 -  T score of spine and wrist both OP - she is not consistent in getting prolia every 6 months - has MS  - would consider giving IV reclast and then maybe consider evenity next year. Would hold on this prolia and see her first and review the DXA and then talk about IV reclast.  Erica Abrams MD, PhD

## 2023-01-19 PROBLEM — Z97.8 FOLEY CATHETER IN PLACE: Status: ACTIVE | Noted: 2023-01-19

## 2023-01-19 PROBLEM — I73.9 PVD (PERIPHERAL VASCULAR DISEASE) (H): Status: ACTIVE | Noted: 2023-01-19

## 2023-01-20 ENCOUNTER — TELEPHONE (OUTPATIENT)
Dept: OBGYN | Facility: CLINIC | Age: 78
End: 2023-01-20
Payer: COMMERCIAL

## 2023-02-09 NOTE — TELEPHONE ENCOUNTER
Returned call to Rubi and gave the number for [facility] to schedule DEXA scan.  Rubi also asked if Muna could have labs done on the same day as DEXA scan.  Was informed there is an outpatient lab at [facility] and to ask when she schedules DEXA.

## 2023-02-09 NOTE — TELEPHONE ENCOUNTER
[facility] Call Center    Phone Message    May a detailed message be left on voicemail: no     Reason for Call: Rubi called back stating that they have not yet heard from anyone about getting scheduled for the imaging that is needed. Writer attempted to contact the clinic but was unable to get through to anyone. Please reach out to Rubi BARTHOLOMEW. Thanks    Action Taken: Message routed to:  Other: [facility]    Travel Screening: Not Applicable

## 2023-02-21 DIAGNOSIS — R23.8 SKIN IRRITATION: ICD-10-CM

## 2023-02-21 RX ORDER — ANORECTAL OINTMENT 15.7; .44; 24; 20.6 G/100G; G/100G; G/100G; G/100G
OINTMENT TOPICAL
Qty: 113 G | Refills: 97 | Status: SHIPPED | OUTPATIENT
Start: 2023-02-21 | End: 2023-11-14

## 2023-03-12 DIAGNOSIS — G47.429 NARCOLEPSY DUE TO UNDERLYING CONDITION WITHOUT CATAPLEXY: ICD-10-CM

## 2023-03-13 ENCOUNTER — ASSISTED LIVING VISIT (OUTPATIENT)
Dept: GERIATRICS | Facility: CLINIC | Age: 78
End: 2023-03-13
Payer: COMMERCIAL

## 2023-03-13 VITALS
DIASTOLIC BLOOD PRESSURE: 82 MMHG | TEMPERATURE: 97.3 F | WEIGHT: 240 LBS | SYSTOLIC BLOOD PRESSURE: 137 MMHG | RESPIRATION RATE: 20 BRPM | BODY MASS INDEX: 35.44 KG/M2 | HEART RATE: 62 BPM

## 2023-03-13 DIAGNOSIS — N39.0 FREQUENT UTI: ICD-10-CM

## 2023-03-13 DIAGNOSIS — G35 MS (MULTIPLE SCLEROSIS) (H): Primary | ICD-10-CM

## 2023-03-13 DIAGNOSIS — G70.9 NEUROMUSCULAR RESPIRATORY WEAKNESS (H): ICD-10-CM

## 2023-03-13 DIAGNOSIS — G35 MS (MULTIPLE SCLEROSIS) (H): ICD-10-CM

## 2023-03-13 DIAGNOSIS — I10 PRIMARY HYPERTENSION: ICD-10-CM

## 2023-03-13 DIAGNOSIS — E66.01 MORBID OBESITY (H): ICD-10-CM

## 2023-03-13 DIAGNOSIS — N31.9 NEUROGENIC BLADDER: ICD-10-CM

## 2023-03-13 DIAGNOSIS — G82.50 TETRAPARESIS (H): ICD-10-CM

## 2023-03-13 DIAGNOSIS — J99 NEUROMUSCULAR RESPIRATORY WEAKNESS (H): ICD-10-CM

## 2023-03-13 DIAGNOSIS — K59.09 CHRONIC CONSTIPATION: ICD-10-CM

## 2023-03-13 PROCEDURE — 99349 HOME/RES VST EST MOD MDM 40: CPT | Performed by: NURSE PRACTITIONER

## 2023-03-13 RX ORDER — ARMODAFINIL 150 MG/1
TABLET ORAL
Qty: 30 TABLET | Refills: 4 | Status: SHIPPED | OUTPATIENT
Start: 2023-03-13 | End: 2023-10-14

## 2023-03-13 RX ORDER — BACLOFEN 10 MG/1
10 TABLET ORAL 2 TIMES DAILY
Start: 2023-03-07 | End: 2023-03-14

## 2023-03-13 NOTE — PROGRESS NOTES
[facility] GERIATRICS  ACUTE/EPISODIC VISIT    [facility] Medical Record Number:  [id number]  Place of Service where encounter took place:  [facility] [[id number]]    Chief Complaint   Patient presents with     RECHECK       HPI:    Cristina Stevens is a 77 year old  (1945), who is being seen today for an episodic care visit.  HPI information obtained from: facility chart records, facility staff, patient report and [facility] chart review.    Today's concern is:    Diagnoses       Codes Comments    MS (multiple sclerosis) (H)    -  Primary G35     Tetraparesis (H)     G82.50     Neuromuscular respiratory weakness (H)     G70.9, J99     PVD (peripheral vascular disease) (H)     I73.9     Morbid obesity (H)     E66.01     Neurogenic bladder     N31.9     Chronic constipation     K59.09         Came to see Muna today to see in general how she has been doing.  Muna did an appointment today with general surgery for her PVD.  Hx of femoral to distal bypass was done and this was a follow up visit.  Too early to see results of that visit but will assist Muna for anything she may need in relation to the vascular surgeon.        Muna resides in [facility].  Due to her multiple sclerosis she is dependent on staff to use a lift in order to transfer her from surface to surface.  She does have a private aide that comes and helps her to do things but also changes her suprapubic catheter for her.  Today and orders given to send in a sample of her urine due to cloudy appearance, urgency and sediment.    On 2/27/23 gave orders to nursing to start Baclofen 5mg twice a day x7 days and then 10mg twice a day r/t her MS.  According to her chart, the last time she had Baclofen was back in [id number].  She had it scheduled and then seen she has a PRN dosing at one point.  Not consistently scheduled beyond twice a day.  This was done per request of  Muna for spasms.      ALLERGIES:    Allergies   Allergen Reactions     Nitrofuran Derivatives Visual Disturbance     Hallucinations     Tetracyclines GI Disturbance     Ampicillin GI Disturbance     Cefadroxil Muscle Pain (Myalgia)     Cephalexin Diarrhea and GI Disturbance     Levofloxacin Other (See Comments)     Phlebitis with IV infusion     Sulfa Drugs Nausea and Vomiting, GI Disturbance and Unknown     Reaction occurred as a child     Sulfasalazine Nausea and Vomiting        MEDICATIONS:  Post Discharge Medication Reconciliation Status: patient was not discharged from an inpatient facility or TCU.     Current Outpatient Medications   Medication Sig Dispense Refill     amLODIPine (NORVASC) 2.5 MG tablet 1 TABLET ORALLY EVERY EVENING (DX: HYPERTENSION) 30 tablet 11    Baclofen 5mg 5mg twice a day for 7 days then increase to 10mg by mouth twice a day       amoxicillin (AMOXIL) 250 MG chewable tablet CHEW AND SWALLOW 8 TABLETS (2000 MG) BY MOUTH 1 HOUR PRIOR TO DENTAL 8 tablet 97     armodafinil (NUVIGIL) 150 MG TABS tablet 1 TABLET ORALLY EVERY MORNING (DX:NARCOLEPSY) CONTROLLED MEDICATION ? TO BE STORED IN DOUBLE LOCKED STORAGE 30 tablet 4     ASPIRIN LOW DOSE 81 MG chewable tablet 1 TABLET ORALLY DAILY 30 tablet 11     cetirizine (ZYRTEC) 10 MG tablet Take 1 tablet (10 mg) by mouth daily 28 tablet 97     CO2-Releasing (-TWO) SUPP 1 supp daily prn and if not effective or falls out, may repeat again until results achieved.  May keep at bedside       Cranberry 250 MG TABS Give 2 tabs (500mg) po twice a day 120 tablet 11     DESITIN 13 % CREA APPLY TOPICALLY TO AREAS OF REDNESS OR RASH WITH EACH BRIEF CHANGE 113 g 97     ELIQUIS ANTICOAGULANT 5 MG tablet 1 TABLET ORALLY 2 TIMES DAILY. DO NOT START BEFORE MAY 14TH,2022 (DX: VENOUS THROMBOEMBOLISM) 60 tablet 11     ibuprofen (ADVIL/MOTRIN) 400 MG tablet TAKE 1 TABLET BY MOUTH TWICE DAILY AS NEEDED FOR PAIN 30 tablet 97     menthol-zinc oxide (CALMOSEPTINE)  0.44-20.6 % OINT ointment APPLY TO AFFECTED AREA(S) TOPICALLY TO BUTTOCKS AS NEEDED WITH BRIEF CHANGES Strength: 0.44-20.6 % 113 g 97     methenamine hippurate (HIPREX) 1 g tablet Take 1 tablet (1 g) by mouth 2 times daily 60 tablet 4     Multiple Vitamin (TAB-A-DAWSON) TABS Take 1 tablet by mouth daily 28 tablet 97     NYAMYC 210397 UNIT/GM external powder APPLY TOPICALLY BETWEEN TOES EVERY EVENING UNTIL HEALED;THEN APPLY TOPICALLY BETWEEN TOES 2 TIMES DAILY AS NEEDED UNTIL HEALED 60 g 10     oxyCODONE (ROXICODONE) 5 MG tablet Take 1 tablet (5 mg) by mouth every 3 hours as needed for severe pain 30 tablet 0     polyethylene glycol (MIRALAX/GLYCOLAX) powder MIX 17GM OF POWDER IN 8OZ OF WATER UNTIL COMPLETELY DISSOLVED. DRINK SOLUTION DAILY AS NEEDED 527 g 98     Probiotic Product (PROBIOTIC DAILY) CAPS Take 1 capsule by mouth daily 28 capsule 98     senna-docusate (SENEXON-S) 8.6-50 MG tablet Take 2 tablets by mouth daily as needed for constipation 62 tablet 12     sterile water, bottle, (WATER FOR IRRIGATION, STERILE) irrigation 60ml  prn to irrigate ruby catheter when plugged 500 mL 11     vitamin C (ASCORBIC ACID) 500 MG tablet 1 TABLET ORALLY 2 TIMES DAILY (DX: CHRONIC CYSTITIS) 60 tablet 11     VITAMIN D3 50 MCG (2000 UT) tablet 2 TABLETS (4000U) DAILY 56 tablet 11         REVIEW OF SYSTEMS:  4 point ROS neg other than the symptoms noted above in the HPI.  No chest pain, no shortness of breath    PHYSICAL EXAM:  /82   Pulse 62   Temp 97.3  F (36.3  C)   Resp 20   Wt 108.9 kg (240 lb)   BMI 35.44 kg/m    Muna is alert and oriented x3.  She is up in her electric w/c in which she is able to control with a arthur stick.   No acute distress. Voice is soft.  Neatly groomed.  Heart rate regular and strong.  Trace edema in lower legs.  Catheter bag strapped to right leg.  Urine is clear and dark yellow.    No respiratory distress.  Abdomen is soft, round and non-tender to touch.  Bowel sounds present.    Muna  "is non-ambulatory.  She is a 4 point lift from surface to surface.    Component      Latest Ref Rng 3/14/2023  3:34 PM   Sodium      136 - 145 mmol/L 141    Potassium      3.4 - 5.3 mmol/L 4.4    Chloride      98 - 107 mmol/L 103    Carbon Dioxide (CO2)      22 - 29 mmol/L 27    Anion Gap      7 - 15 mmol/L 11    Urea Nitrogen      8.0 - 23.0 mg/dL 26.9 (H)    Creatinine      0.51 - 0.95 mg/dL 0.66    Calcium      8.8 - 10.2 mg/dL 10.5 (H)    Glucose      70 - 99 mg/dL 82    GFR Estimate      >60 mL/min/1.73m2 90         ASSESSMENT / PLAN:  (G35) MS (multiple sclerosis) (H)  (primary encounter diagnosis)  (G82.50) Tetraparesis (H)  (G70.9,  J99) Neuromuscular respiratory weakness (H)  Comment: muna resides in the [facility] extended care unit due to the amount of care she needs.  Did not receive therapy advancing with her MS.  She continues to go out of the building to get her therapy sessions.  With her voice being quiet and looking away she talks, she does have a neuromuscular respiratory weakness.  She does not require oxygen.    Muna requested to be put back on baclofen for muscle stiffness.  In looking at her history she was on it for few years ago.  We will see how she tolerates this as she will be able to let this nurse practitioner know if effective or not.    Plan: : baclofen (LIORESAL) 10 MG tablet        (E66.01) Morbid obesity (H)  Comment: There is no recent weight recorded in her assisted living chart.  May be difficult to get her weight due to the electric wheelchair.  She does not have the body strength to sit up in a regular wheelchair.  We will need staff as I have no way of obtaining a weight on her but it would be nice to get up-to-date reading.  When this nurse practitioner looks at Muna, do not see that she is put on any weight.  Her face is not \"full\".  She is not complaining that she feels too tight in her wheelchair so assume that she is stable with her weight    (N31.9) Neurogenic " bladder  Comment: Suprapubic catheter is in place.  UA UC order will be given today.  Typically Muna's friend takes care of her with text to state that she has submitted a UA UC sample to the nurses to be sent in to check for a UTI.  Do not feel that Muna is acutely symptomatic at this time and so wait to see what the results show.  Her catheter gets changed more often than once a month due to the fact that it clogs easily.  Muna does get extra supplies that she pays her own because of this issue    (K59.09) Chronic constipation  Comment: Currently Muna has as needed medication for her bowels.  Both do not get used on a regular basis and so assume she is going without troubles.        Orders:  1.  UA/UC for urgency, increase sediment    Electronically signed by  CAROL Rodríguez CNP

## 2023-03-13 NOTE — LETTER
3/13/2023        RE: Cristina Stevens  [address]  [id number]  [facility] [id number]        No notes on file      Sincerely,        CAROL Rodríguez CNP

## 2023-03-13 NOTE — LETTER
3/13/2023        RE: Cristina Stevens  [address]  [address]  [facility] [address]        [facility] GERIATRICS  ACUTE/EPISODIC VISIT    [facility] Medical Record Number:  [id number]  Place of Service where encounter took place:  [facility] [[id number]]    Chief Complaint   Patient presents with     RECHECK       HPI:    Cristina Stevens is a 77 year old  (1945), who is being seen today for an episodic care visit.  HPI information obtained from: facility chart records, facility staff, patient report and [facility] chart review.    Today's concern is:    Diagnoses       Codes Comments    MS (multiple sclerosis) (H)    -  Primary G35     Tetraparesis (H)     G82.50     Neuromuscular respiratory weakness (H)     G70.9, J99     PVD (peripheral vascular disease) (H)     I73.9     Morbid obesity (H)     E66.01     Neurogenic bladder     N31.9     Chronic constipation     K59.09         Came to see Muna today to see in general how she has been doing.  Muna did an appointment today with general surgery for her PVD.  Hx of femoral to distal bypass was done and this was a follow up visit.  Too early to see results of that visit but will assist Muna for anything she may need in relation to the vascular surgeon.        Muna resides in [facility].  Due to her multiple sclerosis she is dependent on staff to use a lift in order to transfer her from surface to surface.  She does have a private aide that comes and helps her to do things but also changes her suprapubic catheter for her.  Today and orders given to send in a sample of her urine due to cloudy appearance, urgency and sediment.    On 2/27/23 gave orders to nursing to start Baclofen 5mg twice a day x7 days and then 10mg twice a day r/t her MS.  According to her chart, the last time she had Baclofen was back in [id number].  She had it scheduled and then seen she has a PRN  dosing at one point.  Not consistently scheduled beyond twice a day.  This was done per request of Muna for spasms.      ALLERGIES:    Allergies   Allergen Reactions     Nitrofuran Derivatives Visual Disturbance     Hallucinations     Tetracyclines GI Disturbance     Ampicillin GI Disturbance     Cefadroxil Muscle Pain (Myalgia)     Cephalexin Diarrhea and GI Disturbance     Levofloxacin Other (See Comments)     Phlebitis with IV infusion     Sulfa Drugs Nausea and Vomiting, GI Disturbance and Unknown     Reaction occurred as a child     Sulfasalazine Nausea and Vomiting        MEDICATIONS:  Post Discharge Medication Reconciliation Status: patient was not discharged from an inpatient facility or TCU.     Current Outpatient Medications   Medication Sig Dispense Refill     amLODIPine (NORVASC) 2.5 MG tablet 1 TABLET ORALLY EVERY EVENING (DX: HYPERTENSION) 30 tablet 11    Baclofen 5mg 5mg twice a day for 7 days then increase to 10mg by mouth twice a day       amoxicillin (AMOXIL) 250 MG chewable tablet CHEW AND SWALLOW 8 TABLETS (2000 MG) BY MOUTH 1 HOUR PRIOR TO DENTAL 8 tablet 97     armodafinil (NUVIGIL) 150 MG TABS tablet 1 TABLET ORALLY EVERY MORNING (DX:NARCOLEPSY) CONTROLLED MEDICATION ? TO BE STORED IN DOUBLE LOCKED STORAGE 30 tablet 4     ASPIRIN LOW DOSE 81 MG chewable tablet 1 TABLET ORALLY DAILY 30 tablet 11     cetirizine (ZYRTEC) 10 MG tablet Take 1 tablet (10 mg) by mouth daily 28 tablet 97     CO2-Releasing (-TWO) SUPP 1 supp daily prn and if not effective or falls out, may repeat again until results achieved.  May keep at bedside       Cranberry 250 MG TABS Give 2 tabs (500mg) po twice a day 120 tablet 11     DESITIN 13 % CREA APPLY TOPICALLY TO AREAS OF REDNESS OR RASH WITH EACH BRIEF CHANGE 113 g 97     ELIQUIS ANTICOAGULANT 5 MG tablet 1 TABLET ORALLY 2 TIMES DAILY. DO NOT START BEFORE MAY 14TH,2022 (DX: VENOUS THROMBOEMBOLISM) 60 tablet 11     ibuprofen (ADVIL/MOTRIN) 400 MG tablet TAKE 1  TABLET BY MOUTH TWICE DAILY AS NEEDED FOR PAIN 30 tablet 97     menthol-zinc oxide (CALMOSEPTINE) 0.44-20.6 % OINT ointment APPLY TO AFFECTED AREA(S) TOPICALLY TO BUTTOCKS AS NEEDED WITH BRIEF CHANGES Strength: 0.44-20.6 % 113 g 97     methenamine hippurate (HIPREX) 1 g tablet Take 1 tablet (1 g) by mouth 2 times daily 60 tablet 4     Multiple Vitamin (TAB-A-DAWSON) TABS Take 1 tablet by mouth daily 28 tablet 97     NYAMYC 183720 UNIT/GM external powder APPLY TOPICALLY BETWEEN TOES EVERY EVENING UNTIL HEALED;THEN APPLY TOPICALLY BETWEEN TOES 2 TIMES DAILY AS NEEDED UNTIL HEALED 60 g 10     oxyCODONE (ROXICODONE) 5 MG tablet Take 1 tablet (5 mg) by mouth every 3 hours as needed for severe pain 30 tablet 0     polyethylene glycol (MIRALAX/GLYCOLAX) powder MIX 17GM OF POWDER IN 8OZ OF WATER UNTIL COMPLETELY DISSOLVED. DRINK SOLUTION DAILY AS NEEDED 527 g 98     Probiotic Product (PROBIOTIC DAILY) CAPS Take 1 capsule by mouth daily 28 capsule 98     senna-docusate (SENEXON-S) 8.6-50 MG tablet Take 2 tablets by mouth daily as needed for constipation 62 tablet 12     sterile water, bottle, (WATER FOR IRRIGATION, STERILE) irrigation 60ml  prn to irrigate ruby catheter when plugged 500 mL 11     vitamin C (ASCORBIC ACID) 500 MG tablet 1 TABLET ORALLY 2 TIMES DAILY (DX: CHRONIC CYSTITIS) 60 tablet 11     VITAMIN D3 50 MCG (2000 UT) tablet 2 TABLETS (4000U) DAILY 56 tablet 11         REVIEW OF SYSTEMS:  4 point ROS neg other than the symptoms noted above in the HPI.  No chest pain, no shortness of breath    PHYSICAL EXAM:  /82   Pulse 62   Temp 97.3  F (36.3  C)   Resp 20   Wt 108.9 kg (240 lb)   BMI 35.44 kg/m    Muna is alert and oriented x3.  She is up in her electric w/c in which she is able to control with a arthur stick.   No acute distress. Voice is soft.  Neatly groomed.  Heart rate regular and strong.  Trace edema in lower legs.  Catheter bag strapped to right leg.  Urine is clear and dark yellow.    No  "respiratory distress.  Abdomen is soft, round and non-tender to touch.  Bowel sounds present.    Muna is non-ambulatory.  She is a 4 point lift from surface to surface.    Component      Latest Ref Rng 3/14/2023  3:34 PM   Sodium      136 - 145 mmol/L 141    Potassium      3.4 - 5.3 mmol/L 4.4    Chloride      98 - 107 mmol/L 103    Carbon Dioxide (CO2)      22 - 29 mmol/L 27    Anion Gap      7 - 15 mmol/L 11    Urea Nitrogen      8.0 - 23.0 mg/dL 26.9 (H)    Creatinine      0.51 - 0.95 mg/dL 0.66    Calcium      8.8 - 10.2 mg/dL 10.5 (H)    Glucose      70 - 99 mg/dL 82    GFR Estimate      >60 mL/min/1.73m2 90         ASSESSMENT / PLAN:  (G35) MS (multiple sclerosis) (H)  (primary encounter diagnosis)  (G82.50) Tetraparesis (H)  (G70.9,  J99) Neuromuscular respiratory weakness (H)  Comment: muna resides in the [facility]'s extended care unit due to the amount of care she needs.  Did not receive therapy advancing with her MS.  She continues to go out of the building to get her therapy sessions.  With her voice being quiet and looking away she talks, she does have a neuromuscular respiratory weakness.  She does not require oxygen.    Muna requested to be put back on baclofen for muscle stiffness.  In looking at her history she was on it for few years ago.  We will see how she tolerates this as she will be able to let this nurse practitioner know if effective or not.    Plan: : baclofen (LIORESAL) 10 MG tablet        (E66.01) Morbid obesity (H)  Comment: There is no recent weight recorded in her assisted living chart.  May be difficult to get her weight due to the electric wheelchair.  She does not have the body strength to sit up in a regular wheelchair.  We will need staff as I have no way of obtaining a weight on her but it would be nice to get up-to-date reading.  When this nurse practitioner looks at Muna, do not see that she is put on any weight.  Her face is not \"full\".  She is not complaining that she feels too " tight in her wheelchair so assume that she is stable with her weight    (N31.9) Neurogenic bladder  Comment: Suprapubic catheter is in place.  UA UC order will be given today.  Typically Muna's friend takes care of her with text to state that she has submitted a UA UC sample to the nurses to be sent in to check for a UTI.  Do not feel that Muna is acutely symptomatic at this time and so wait to see what the results show.  Her catheter gets changed more often than once a month due to the fact that it clogs easily.  Muna does get extra supplies that she pays her own because of this issue    (K59.09) Chronic constipation  Comment: Currently Muna has as needed medication for her bowels.  Both do not get used on a regular basis and so assume she is going without troubles.        Orders:  1.  UA/UC for urgency, increase sediment    Electronically signed by  CAROL Rodríguez CNP           Sincerely,        CAROL Rodríguez CNP

## 2023-03-14 ENCOUNTER — ANCILLARY PROCEDURE (OUTPATIENT)
Dept: BONE DENSITY | Facility: CLINIC | Age: 78
End: 2023-03-14
Attending: FAMILY MEDICINE
Payer: COMMERCIAL

## 2023-03-14 ENCOUNTER — LAB (OUTPATIENT)
Dept: LAB | Facility: CLINIC | Age: 78
End: 2023-03-14
Payer: COMMERCIAL

## 2023-03-14 ENCOUNTER — ANCILLARY ORDERS (OUTPATIENT)
Dept: FAMILY MEDICINE | Facility: CLINIC | Age: 78
End: 2023-03-14

## 2023-03-14 DIAGNOSIS — M81.0 SENILE OSTEOPOROSIS: ICD-10-CM

## 2023-03-14 LAB
ANION GAP SERPL CALCULATED.3IONS-SCNC: 11 MMOL/L (ref 7–15)
BUN SERPL-MCNC: 26.9 MG/DL (ref 8–23)
CALCIUM SERPL-MCNC: 10.5 MG/DL (ref 8.8–10.2)
CHLORIDE SERPL-SCNC: 103 MMOL/L (ref 98–107)
CREAT SERPL-MCNC: 0.66 MG/DL (ref 0.51–0.95)
DEPRECATED HCO3 PLAS-SCNC: 27 MMOL/L (ref 22–29)
GFR SERPL CREATININE-BSD FRML MDRD: 90 ML/MIN/1.73M2
GLUCOSE SERPL-MCNC: 82 MG/DL (ref 70–99)
POTASSIUM SERPL-SCNC: 4.4 MMOL/L (ref 3.4–5.3)
SODIUM SERPL-SCNC: 141 MMOL/L (ref 136–145)

## 2023-03-14 PROCEDURE — 77080 DXA BONE DENSITY AXIAL: CPT | Performed by: INTERNAL MEDICINE

## 2023-03-14 PROCEDURE — 36415 COLL VENOUS BLD VENIPUNCTURE: CPT | Performed by: PATHOLOGY

## 2023-03-14 PROCEDURE — 80048 BASIC METABOLIC PNL TOTAL CA: CPT | Performed by: PATHOLOGY

## 2023-03-14 PROCEDURE — 77081 DXA BONE DENSITY APPENDICULR: CPT | Mod: XU | Performed by: INTERNAL MEDICINE

## 2023-03-14 RX ORDER — AMLODIPINE BESYLATE 2.5 MG/1
TABLET ORAL
Qty: 28 TABLET | Refills: 11 | Status: SHIPPED | OUTPATIENT
Start: 2023-03-14 | End: 2023-09-05

## 2023-03-14 RX ORDER — BACLOFEN 10 MG/1
TABLET ORAL
Qty: 60 TABLET | Refills: 11 | Status: SHIPPED | OUTPATIENT
Start: 2023-03-14 | End: 2023-03-15

## 2023-03-14 RX ORDER — METHENAMINE HIPPURATE 1000 MG/1
TABLET ORAL
Qty: 56 TABLET | Refills: 11 | Status: SHIPPED | OUTPATIENT
Start: 2023-03-14 | End: 2024-02-09

## 2023-03-15 ENCOUNTER — LAB REQUISITION (OUTPATIENT)
Dept: LAB | Facility: CLINIC | Age: 78
End: 2023-03-15
Payer: COMMERCIAL

## 2023-03-15 ENCOUNTER — TELEPHONE (OUTPATIENT)
Dept: FAMILY MEDICINE | Facility: CLINIC | Age: 78
End: 2023-03-15
Payer: COMMERCIAL

## 2023-03-15 DIAGNOSIS — G35 MS (MULTIPLE SCLEROSIS) (H): ICD-10-CM

## 2023-03-15 DIAGNOSIS — N39.0 URINARY TRACT INFECTION, SITE NOT SPECIFIED: ICD-10-CM

## 2023-03-15 LAB
ALBUMIN UR-MCNC: NEGATIVE MG/DL
APPEARANCE UR: CLEAR
BACTERIA #/AREA URNS HPF: ABNORMAL /HPF
BILIRUB UR QL STRIP: NEGATIVE
COLOR UR AUTO: ABNORMAL
GLUCOSE UR STRIP-MCNC: NEGATIVE MG/DL
HGB UR QL STRIP: NEGATIVE
KETONES UR STRIP-MCNC: NEGATIVE MG/DL
LEUKOCYTE ESTERASE UR QL STRIP: ABNORMAL
NITRATE UR QL: NEGATIVE
PH UR STRIP: 5.5 [PH] (ref 5–7)
RBC URINE: 1 /HPF
SP GR UR STRIP: 1.01 (ref 1–1.03)
SQUAMOUS EPITHELIAL: <1 /HPF
UROBILINOGEN UR STRIP-MCNC: NORMAL MG/DL
WBC URINE: 5 /HPF

## 2023-03-15 PROCEDURE — 87086 URINE CULTURE/COLONY COUNT: CPT | Mod: ORL | Performed by: NURSE PRACTITIONER

## 2023-03-15 PROCEDURE — 81001 URINALYSIS AUTO W/SCOPE: CPT | Mod: ORL | Performed by: NURSE PRACTITIONER

## 2023-03-15 RX ORDER — BACLOFEN 5 MG/1
5 TABLET ORAL 3 TIMES DAILY PRN
Qty: 20 TABLET | Refills: 3 | Status: SHIPPED | OUTPATIENT
Start: 2023-03-15

## 2023-03-15 NOTE — TELEPHONE ENCOUNTER
[facility] Call Center    Phone Message    May a detailed message be left on voicemail: yes     Reason for Call: Rubi, the patients caregiver, called to schedule follow up visit with Dr. Abrams after DEXA scan. Writer scheduled next available, 4/10/23. The patient is scheduled for her prolia injection on 4/3/23, and Rubi is wondering if the patient could move that to 4/10/23 so they do not have to come twice? Please review and contact Rubi. Thank you.    Action Taken: Message routed to:  Other: [facility]    Travel Screening: Not Applicable

## 2023-03-15 NOTE — TELEPHONE ENCOUNTER
Tried to reach Rubi, but received voicemail.  Left message that prolia can be given during her visit with Dr Abrams.  Could do this on 4/3 or 4/10 as there are available appointments on 4/3.  Asked to call back with her preference..

## 2023-03-16 ENCOUNTER — TRANSFERRED RECORDS (OUTPATIENT)
Dept: HEALTH INFORMATION MANAGEMENT | Facility: CLINIC | Age: 78
End: 2023-03-16
Payer: COMMERCIAL

## 2023-03-16 LAB
BACTERIA UR CULT: ABNORMAL

## 2023-03-16 NOTE — PROGRESS NOTES
"Muna and her care giver have been communicating the last two days of some symptoms she felt were possible side effects of Baclofen - like hands trembling, feeling cold, in the middle of the night felt like \"she was drowning\" and could not get enough breath.  This NP felt that those symptoms were more serious than \"side effects\"  By the morning she felt better.    Still discussed the Baclofen and decision was that she did not want to be scheduled on it but asked if could be as needed.  Willing to change her Baclofen for the following orders and new script sent to [facility].  Facility to use this as new orders    Orders:  Baclofen 5mg by mouth 3x day as needed for muscle spasms.  Discontinue Baclofen 10mg order      CAROL Rodríguez CNP    "

## 2023-03-24 ENCOUNTER — DOCUMENTATION ONLY (OUTPATIENT)
Dept: NEUROLOGY | Facility: CLINIC | Age: 78
End: 2023-03-24
Payer: COMMERCIAL

## 2023-03-24 NOTE — PROGRESS NOTES
Step therapy forms have been signed and faxed back at [phone]  Deni Lizarraga EMT 03/24/2023 7:54AM

## 2023-04-03 ENCOUNTER — TELEPHONE (OUTPATIENT)
Dept: OBGYN | Facility: CLINIC | Age: 78
End: 2023-04-03
Payer: COMMERCIAL

## 2023-04-03 NOTE — TELEPHONE ENCOUNTER
Patient aware to come 4-10-23 to se Dr. Abrams before getting prolia-  They may switch medications to IV reclast

## 2023-04-07 ENCOUNTER — TELEPHONE (OUTPATIENT)
Dept: FAMILY MEDICINE | Facility: CLINIC | Age: 78
End: 2023-04-07
Payer: COMMERCIAL

## 2023-04-07 NOTE — TELEPHONE ENCOUNTER
[facility] Call Center    Phone Message    May a detailed message be left on voicemail: yes     Reason for Call: Other: .  PT POA Rubi calling to schedule pt prolia injection. Please call Rubi     Action Taken: Message routed to:  Other: [facility]    Travel Screening: Not Applicable

## 2023-04-08 NOTE — PROGRESS NOTES
ASSESSMENT:  This is a 77-year-old postmenopausal female who has osteoporosis by T-scores. She has MS and is wheel chair bound.   She completed a course of Tymlos for 18 months and stopped around the end of April 2020.  She then was started on Prolia but last shot was April 2021. She had surgery in 2022 and with COVID missed prolia.    It does not appear that she has been on anything since then.  We discussed different options, IV Reclast, or continuing with Prolia.  She would like to continue with the Prolia and it was approved so we will give her Prolia today.  I did go over in detail calcium and vitamin D and the Prolia plan.    PLAN:  Prolia today  Get blood work in 2 wks after the shot   Next prolia is due October 10 or around then  Get a BMP (calcium) blood test 1 wk before  Make nurse visit for the shot  See me in 1 year    Patient should take 1200mg of calcium/day in divided doses (diet and all supplements)  and vitamin D3 4000IU/day.    Thank you for allowing me to participate in the care of your patient.  Please do not hesitate to call with questions or concerns.    Sincerely,    Erica Abrams MD, PhD  CC  Princess Lucas NP    Time note (e5, 40'): The total of my time (on the date of service) for this service was 40 minutes, including discussion/face-to-face, chart review, interpretation not otherwise reported, documentation, and updating of the computerized record.              Cristina is a  77 year old female /post menopausal] [GR0, P0] that presents today for osteoporosis follow up patient was last seen 9/2021.She had a course of Tymlos and finished in April 2020.  Then she had 2 prolia shots lost one in 10/2021.  pt had DXA 3/2023  and showing significant loss in the spine - comes in to discuss further treatment.    Referring Physician:  Princess Lucas NP    HPI     Have you ever had a bone density test? Yes  Where = [facility]  When = 3/2023  Spine Tscore = -2.8  LOSS 5.7%  Left neck Tscore =  NA  Total left hip Tscore = NA  Right neck Tscore = NA  Total Right hip Tscore = NA  Radius  -4.5  Have you received any x-ray dye or contrast in the last ten days? No  How many servings of dairy products do you consume per day? 4 Type: yogurt and 3 glasses milk/day - cheese   Do you take a multi-vitamin daily? Yes  Do you take a vitamin D supplement? Yes 2000iU - 2/day   Do you take a calcium supplement daily?  No  Do you take a supplement containing strontium? No  Are you exposed to natural sunlight at least 20 minutes three times a week? No    Social History   reports that she quit smoking about 38 years ago. She started smoking about 61 years ago. She has never used smokeless tobacco. She reports current alcohol use of about 1.0 standard drink of alcohol per week. She reports that she does not use drugs.  Do you smoke cigarettes? Reformed smoker   Do you exercise? Wheel chair bound  Do you drink alcohol? No    Medication History  Have you used any of the following medications?   Actonel (Risedronate): No              Aredia (Pamidronate): No              Boniva (Ibindronate): No              Didronil (Etidronate): No              Evista (Raloxifene): No              Fosamax (Alendronate): No              Forteo (Parathyroid hormone) injections: No              tymlos  - 10/2018-4-2020              HCTZ (Thiazide): No              Calcitonin nasal spray: No              Reclast or Zometa (Zolendronate): No              Prolia (Denosumab): yes started 10/2020  - had 2 shots  - last one Oct .2021             Allergies   Allergen Reactions     Nitrofuran Derivatives Visual Disturbance     Hallucinations     Tetracyclines GI Disturbance     Ampicillin GI Disturbance     Cefadroxil Muscle Pain (Myalgia)     Cephalexin Diarrhea and GI Disturbance     Levofloxacin Other (See Comments)     Phlebitis with IV infusion     Sulfa Drugs Nausea and Vomiting, GI Disturbance and Unknown     Reaction occurred as a child      Sulfasalazine Nausea and Vomiting       Past Medical History    Family History   Problem Relation Age of Onset     Hypertension Mother      Cerebrovascular Disease Mother      Coronary Artery Disease Father        ROS:  General: none  Head/Eyes: none  Ears/Nose/Throat: none  Cardiovascular: none  Respiratory: none  Gastrointestinal: none  Breast: none  Genitourinary: the catheter is getting clogged up   Sexual Function: none  Musculoskeletal: none  Skin: none  Neurological: wheelchair bound   Mental Health: none  Endocrine: none    Clinic Measurements  Vitals: BP (!) 151/83 (BP Location: Left arm, Patient Position: Chair)   Pulse 76   BMI= There is no height or weight on file to calculate BMI.    Physical exam  Constitutional: older appearing woman in no acute distress. Here with aide and wheel chair bound  Psychological: appropriate mood.  Neck: No thyroidmegaly.   Cardiovascular: regular rate and rhythm, normal S1 and S2, no murmurs, rubs or gallops,  Respiratory: clear to auscultation, no wheezes or crackles, normal breath sounds.  Musculoskeletal: ankle and lower leg edema - no movement     Spine: Curved, not tender, ROM unable   Skin: no concerning lesions, no jaundice.  Neurological: cranial nerves intact, wheel chair bound.      LAB  Vertebra; Fracture Assessment: NA  Dexa Scan: 3/2023    FRAX Assessment Tool: [N/A, on prolia  Risk Factors:    T scores, MS, inactivity, hx of fracture, reformed smoker,      Erica Abrams MD, PhD

## 2023-04-10 ENCOUNTER — OFFICE VISIT (OUTPATIENT)
Dept: FAMILY MEDICINE | Facility: CLINIC | Age: 78
End: 2023-04-10
Attending: FAMILY MEDICINE
Payer: COMMERCIAL

## 2023-04-10 VITALS — HEART RATE: 76 BPM | DIASTOLIC BLOOD PRESSURE: 83 MMHG | SYSTOLIC BLOOD PRESSURE: 151 MMHG

## 2023-04-10 DIAGNOSIS — M81.0 SENILE OSTEOPOROSIS: Primary | ICD-10-CM

## 2023-04-10 PROCEDURE — G0463 HOSPITAL OUTPT CLINIC VISIT: HCPCS | Mod: 25 | Performed by: FAMILY MEDICINE

## 2023-04-10 PROCEDURE — 99215 OFFICE O/P EST HI 40 MIN: CPT | Performed by: FAMILY MEDICINE

## 2023-04-10 PROCEDURE — 96372 THER/PROPH/DIAG INJ SC/IM: CPT | Performed by: FAMILY MEDICINE

## 2023-04-10 PROCEDURE — 250N000011 HC RX IP 250 OP 636: Performed by: FAMILY MEDICINE

## 2023-04-10 RX ADMIN — DENOSUMAB 60 MG: 60 INJECTION SUBCUTANEOUS at 16:28

## 2023-04-10 ASSESSMENT — PAIN SCALES - GENERAL: PAINLEVEL: NO PAIN (0)

## 2023-04-10 NOTE — NURSING NOTE
Clinic Administered Medication Documentation      Prolia Documentation    Indication: Prolia  (denosumab) is a prescription medicine used to treat osteoporosis in patients who:     Are at high risk for fracture, meaning patients who have had a fracture related to osteoporosis, or who have multiple risk factors for fracture.    Cannot use another osteoporosis medicine or other osteoporosis medicines did not work well.    The timeline for early/late injections would be 4 weeks early and any time after the 6 month peace. If a patient receives their injection late, then the subsequent injection would be 6 months from the date that they actually received the injection.    When was the last injection?  6 months ago  Was the last injection at least 6 months ago? Yes  Has the prior authorization been completed?  Yes  Is there an active order (written within the past 365 days, with administrations remaining, not ) in the chart?  Yes  Patient denies any dental work involving the bone (e.g. tooth extraction or dental implants) in the past 4 weeks?  Yes  Patient denies plans for any dental work involving the bone (e.g. tooth extraction or dental implants) in the next 4 weeks? Yes    The following steps were completed to comply with the REMS program for Prolia:    Confirms that patient received education from RN or provider via the Medication Guide and Patient Counseling Chart, including the serious risks of Prolia  and the symptoms of each risk.    Told the patient to seek prompt medical attention if they have signs or symptoms of any of the serious risks, as described in the Medication Guide and Patient Counseling Chart that was reviewed between the patient and RN or LP.    Provided each patient a copy of the Medication Guide and Patient Brochure.      Prior to injection, verified patient identity using patient's name and date of birth. Medication was administered. Please see MAR and medication order for additional  information. Patient instructed to remain in clinic for 15 minutes and report any adverse reaction to staff immediately.    Vial/Syringe: Syringe  Was this medication supplied by the patient? No  Patient has no refills remaining. Refill encounter opened, order pended and Routed to the provider

## 2023-04-10 NOTE — PATIENT INSTRUCTIONS
Prolia today  Get blood work in 2 wks after the shot   Next prolia is due October 10 or around then  Get a BMP (calcium) blood test 1 wk before  Make nurse visit for the shot  See me in 1 year    Patient should take 1200mg of calcium/day in divided doses (diet and all supplements)  and vitamin D3 4000IU/day.

## 2023-04-10 NOTE — TELEPHONE ENCOUNTER
Tried to reach Rubi but received voicemail.  Left message that Muna has an appointment this afternoon to discuss plan.  To call back if she wishes to discuss further.

## 2023-04-17 ENCOUNTER — LAB REQUISITION (OUTPATIENT)
Dept: LAB | Facility: CLINIC | Age: 78
End: 2023-04-17
Payer: COMMERCIAL

## 2023-04-17 DIAGNOSIS — R30.0 DYSURIA: ICD-10-CM

## 2023-04-17 LAB
ALBUMIN UR-MCNC: 30 MG/DL
APPEARANCE UR: ABNORMAL
BILIRUB UR QL STRIP: NEGATIVE
COLOR UR AUTO: YELLOW
GLUCOSE UR STRIP-MCNC: NEGATIVE MG/DL
HGB UR QL STRIP: ABNORMAL
HYALINE CASTS: 6 /LPF
KETONES UR STRIP-MCNC: NEGATIVE MG/DL
LEUKOCYTE ESTERASE UR QL STRIP: ABNORMAL
NITRATE UR QL: NEGATIVE
PH UR STRIP: 6 [PH] (ref 5–7)
RBC URINE: 74 /HPF
SP GR UR STRIP: 1.01 (ref 1–1.03)
SQUAMOUS EPITHELIAL: 17 /HPF
UROBILINOGEN UR STRIP-MCNC: NORMAL MG/DL
WBC CLUMPS #/AREA URNS HPF: PRESENT /HPF
WBC URINE: 119 /HPF

## 2023-04-17 PROCEDURE — 81001 URINALYSIS AUTO W/SCOPE: CPT | Mod: ORL | Performed by: NURSE PRACTITIONER

## 2023-04-17 PROCEDURE — 87186 SC STD MICRODIL/AGAR DIL: CPT | Mod: ORL | Performed by: NURSE PRACTITIONER

## 2023-04-18 ENCOUNTER — TELEPHONE (OUTPATIENT)
Dept: GERIATRICS | Facility: CLINIC | Age: 78
End: 2023-04-18
Payer: COMMERCIAL

## 2023-04-18 DIAGNOSIS — R82.90 ABNORMAL URINALYSIS: Primary | ICD-10-CM

## 2023-04-18 RX ORDER — PENICILLIN V POTASSIUM 250 MG/1
500 TABLET, FILM COATED ORAL 2 TIMES DAILY
Qty: 20 TABLET | Refills: 0 | Status: SHIPPED | OUTPATIENT
Start: 2023-04-18 | End: 2023-04-23

## 2023-04-18 NOTE — TELEPHONE ENCOUNTER
Muna contacted this NP inquiring about her UA and if a ABX should be ordered.   Had told her that this NP was waiting for the final to return and then order a antibiotic. Offered to order one though and she wanted to start something.     Has many sensitivities to antibiotics and if mistaken have used PCN in past.  She statesshe has taken it before.      Sent a script over to [facility] for PCN VK 500mg BID x5days. Will see what results show and change if necessary.      ORDERS:  PEN VK 500mg po BID x5 days for abnormal UA    Electronically signed by Love Lucas RN, CNP

## 2023-04-19 LAB — BACTERIA UR CULT: ABNORMAL

## 2023-04-21 ENCOUNTER — ASSISTED LIVING VISIT (OUTPATIENT)
Dept: GERIATRICS | Facility: CLINIC | Age: 78
End: 2023-04-21
Payer: COMMERCIAL

## 2023-04-21 VITALS
BODY MASS INDEX: 35.44 KG/M2 | DIASTOLIC BLOOD PRESSURE: 72 MMHG | RESPIRATION RATE: 20 BRPM | OXYGEN SATURATION: 97 % | SYSTOLIC BLOOD PRESSURE: 135 MMHG | WEIGHT: 240 LBS | HEART RATE: 59 BPM | TEMPERATURE: 96.8 F

## 2023-04-21 DIAGNOSIS — Z97.8 FOLEY CATHETER IN PLACE: ICD-10-CM

## 2023-04-21 DIAGNOSIS — Z99.3 WHEELCHAIR DEPENDENCE: ICD-10-CM

## 2023-04-21 DIAGNOSIS — G82.50 TETRAPARESIS (H): ICD-10-CM

## 2023-04-21 DIAGNOSIS — M81.0 AGE-RELATED OSTEOPOROSIS WITHOUT CURRENT PATHOLOGICAL FRACTURE: ICD-10-CM

## 2023-04-21 DIAGNOSIS — N31.9 NEUROGENIC BLADDER: ICD-10-CM

## 2023-04-21 DIAGNOSIS — G35 MS (MULTIPLE SCLEROSIS) (H): ICD-10-CM

## 2023-04-21 DIAGNOSIS — N30.01 ACUTE CYSTITIS WITH HEMATURIA: Primary | ICD-10-CM

## 2023-04-21 PROCEDURE — 99349 HOME/RES VST EST MOD MDM 40: CPT | Performed by: NURSE PRACTITIONER

## 2023-04-21 NOTE — LETTER
4/21/2023        RE: Cristina Stevens  [address]  [address]  [facility] [place] [facility] [address]        [facility] GERIATRICS  ACUTE/EPISODIC VISIT    [facility] Medical Record Number:  [id number]  Place of Service where encounter took place:  [facility] [place] ([facility]) [[id number]]    Chief Complaint   Patient presents with    RECHECK       HPI:    Cristina Stevens is a 77 year old  (1945), who is being seen today for an episodic care visit.  HPI information obtained from: facility chart records, facility staff, patient report and [facility] chart review.    Today's concern is:    Diagnoses         Codes Comments    Acute cystitis with hematuria    -  Primary N30.01     Neurogenic bladder     N31.9     Edwards catheter in place     Z97.8     MS (multiple sclerosis) (H)     G35     Tetraparesis (H)     G82.50     Wheelchair dependence     Z99.3           Doing a follow-up with Muna today due to had her urine tested and final result is back.  Due to Muna's list of allergies, had decided not to treat the UA right away, but Muna inquired if some antibiotic should have been started.  On 4/18/23, did start muna on Pen VK 500mg po BID for 5 days for abnormal UA.    Muna was appreciative that an ABX was started.  She may have a fear of ending up in the hospital due to her urine, suprapubic catheter etc.    ALLERGIES:    Allergies   Allergen Reactions    Nitrofuran Derivatives Visual Disturbance     Hallucinations    Tetracyclines GI Disturbance    Ampicillin GI Disturbance    Cefadroxil Muscle Pain (Myalgia)    Cephalexin Diarrhea and GI Disturbance    Levofloxacin Other (See Comments)     Phlebitis with IV infusion    Sulfa Drugs Nausea and Vomiting, GI Disturbance and Unknown     Reaction occurred as a child    Sulfasalazine Nausea and Vomiting        MEDICATIONS:  Post Discharge Medication Reconciliation Status: patient was not discharged from an inpatient facility or TCU.     Current Outpatient  Medications   Medication Sig Dispense Refill    amLODIPine (NORVASC) 2.5 MG tablet 1 TABLET ORALLY EVERY EVENING (DX: HYPERTENSION) 28 tablet 11    amoxicillin (AMOXIL) 250 MG chewable tablet CHEW AND SWALLOW 8 TABLETS (2000 MG) BY MOUTH 1 HOUR PRIOR TO DENTAL 8 tablet 97    armodafinil (NUVIGIL) 150 MG TABS tablet 1 TABLET ORALLY EVERY MORNING (DX: NARCOLEPSY) ***CONTROLLED MEDICATION ? TO BE STORED IN DOUBLE LOCKED STORAGE*** 30 tablet 4    ASPIRIN LOW DOSE 81 MG chewable tablet 1 TABLET ORALLY DAILY 30 tablet 11    Baclofen (LIORESAL) 5 MG tablet Take 1 tablet (5 mg) by mouth 3 times daily as needed for muscle spasms 20 tablet 3    cetirizine (ZYRTEC) 10 MG tablet Take 1 tablet (10 mg) by mouth daily 28 tablet 97    CO2-Releasing (-TWO) SUPP 1 supp daily prn and if not effective or falls out, may repeat again until results achieved.  May keep at bedside      Cranberry 250 MG TABS Give 2 tabs (500mg) po twice a day 120 tablet 11    DESITIN 13 % CREA APPLY TOPICALLY TO AREAS OF REDNESS OR RASH WITH EACH BRIEF CHANGE 113 g 97    ELIQUIS ANTICOAGULANT 5 MG tablet 1 TABLET ORALLY 2 TIMES DAILY. DO NOT START BEFORE MAY 14TH,2022 (DX: VENOUS THROMBOEMBOLISM) 60 tablet 11    ibuprofen (ADVIL/MOTRIN) 400 MG tablet TAKE 1 TABLET BY MOUTH TWICE DAILY AS NEEDED FOR PAIN 30 tablet 97    medical cannabis (Patient's own supply) Take 1 Dose by mouth See Admin Instructions (The purpose of this order is to document that the patient reports taking medical cannabis.  This is not a prescription, and is not used to certify that the patient has a qualifying medical condition.)      menthol-zinc oxide (CALMOSEPTINE) 0.44-20.6 % OINT ointment APPLY TO AFFECTED AREA(S) TOPICALLY TO BUTTOCKS AS NEEDED WITH BRIEF CHANGES Strength: 0.44-20.6 % 113 g 97    methenamine hippurate (HIPREX) 1 g tablet 1 TABLET ORALLY 2 TIMES DAILY  (DX: URINARY TRACT INFECTION) 56 tablet 11    Multiple Vitamin (TAB-A-DAWSON) TABS Take 1 tablet by mouth daily 28  tablet 97    NYAMYC 552385 UNIT/GM external powder APPLY TOPICALLY BETWEEN TOES EVERY EVENING UNTIL HEALED;THEN APPLY TOPICALLY BETWEEN TOES 2 TIMES DAILY AS NEEDED UNTIL HEALED 60 g 10    oxyCODONE (ROXICODONE) 5 MG tablet Take 1 tablet (5 mg) by mouth every 3 hours as needed for severe pain 30 tablet 0    penicillin V (VEETID) 250 MG tablet Take 2 tablets (500 mg) by mouth 2 times daily for 5 days 20 tablet 0    polyethylene glycol (MIRALAX/GLYCOLAX) powder MIX 17GM OF POWDER IN 8OZ OF WATER UNTIL COMPLETELY DISSOLVED. DRINK SOLUTION DAILY AS NEEDED 527 g 98    Probiotic Product (PROBIOTIC DAILY) CAPS Take 1 capsule by mouth daily 28 capsule 98    senna-docusate (SENEXON-S) 8.6-50 MG tablet Take 2 tablets by mouth daily as needed for constipation 62 tablet 12    sterile water, bottle, (WATER FOR IRRIGATION, STERILE) irrigation 60ml  prn to irrigate ruby catheter when plugged 500 mL 11    vitamin C (ASCORBIC ACID) 500 MG tablet 1 TABLET ORALLY 2 TIMES DAILY (DX: CHRONIC CYSTITIS) 60 tablet 11    VITAMIN D3 50 MCG (2000 UT) tablet 2 TABLETS (4000U) DAILY 56 tablet 11       REVIEW OF SYSTEMS:  4 point ROS neg other than the symptoms noted above in the HPI.  In no acute distress    PHYSICAL EXAM:  /72   Pulse 59   Temp 96.8  F (36  C)   Resp 20   Wt 108.9 kg (240 lb)   SpO2 97%   BMI 35.44 kg/m    Found Muna up in her electric wheelchair.   Do not think this NP has ever seen her laying in bed.  Muna adjusts her electric w/c to off load her bottom through out the daytime.  She had a yogurt cup on her lap tray.  Alert and oriented x3   Neatly groomed.    Muna is able to hold a conversation, voice is soft, due to her MS  Heart rate is regular S1 and S2 are heard  No respiratory distress noted, no coughing, difficult to listen to her lungs because of her wheelchair.  Muna's abdomen is round soft and no masses palpated    Muna is wheelchair dependent and four-point lift is used for transfers.  Not  amatory  Muna's skin is pink warm dry.  Does wear a catheter leg bag during the day.  It appears to be clear and dark yellow.  Always produces a lot of urine when this NP sees her.      Culture >100,000 CFU/mL Escherichia coli Abnormal             Resulting Agency: [facility]     Susceptibility     Escherichia coli     YOAN     Ampicillin 16 ug/mL Intermediate     Ampicillin/ Sulbactam 4 ug/mL Susceptible     Cefazolin <=4 ug/mL Susceptible 1     Cefepime <=1 ug/mL Susceptible     Cefoxitin <=4 ug/mL Susceptible     Ceftazidime <=1 ug/mL Susceptible     Ceftriaxone <=1 ug/mL Susceptible     Ciprofloxacin <=0.25 ug/mL Susceptible     Gentamicin <=1 ug/mL Susceptible     Levofloxacin <=0.12 ug/mL Susceptible     Nitrofurantoin <=16 ug/mL Susceptible     Piperacillin/Tazobactam <=4 ug/mL Susceptible     Tobramycin <=1 ug/mL Susceptible     Trimethoprim/Sulfamethoxazole <=1/19 ug/mL Susceptible                   ASSESSMENT / PLAN:  (N30.01) Acute cystitis with hematuria  (primary encounter diagnosis)  (N31.9) Neurogenic bladder  (Z97.8) Edwards catheter in place  Comment: He has had a suprapubic catheter for some time in which it does get changed by her private aide but every 2 to 3 weeks.  She cannot go a whole month without any change as it does prone to be clogged and then other symptoms of an infection.  Today informed her what her results were and will extend the Pen-Vee K 500 mg twice a day for 10 days total.  It is sensitive to a lot of antibiotics.  Even though the PCN is listed in the allergy list it is more of a sensitivity for GI upset.  She knows enough to eat yogurt during the time of receiving the antibiotic      (G35) MS (multiple sclerosis) (H)  (G82.50) Tetraparesis (H)  (Z99.3) Wheelchair dependence  Comment: Muna did try baclofen scheduled for a short time at the beginning of March.  Ultimately she has to have changed as needed dosing for times of spasticity.  Otherwise no acute episodes with her  MS    (M81.0) Osteoporosis, without pathological fracture  Comment: Did mention today that she did receive her Prolia injection [facility] the  [facility].  Reviewed that clinic note and it appears that she should be on 4000 units of vitamin D3 as well as calcium.  Gloriae does take a 4000 units vitamin D3 but she is not on any calcium supplement.  Her calcium level in her BMP is slightly above normal.  Now that COVID is over if it is easier to get into clinics, do believe Muna will be more compliant with getting a Prolia injection for osteoporosis.    Orders:  1.  Extend the Pen VK 500mg BID to a total of 10 days due to acute UTI.    Electronically signed by  CAROL Rodríguez CNP              Sincerely,        CAROL Rodríguez CNP

## 2023-04-21 NOTE — LETTER
4/21/2023        RE: Cristina Stevens  [address]  [address]  [place] [facility] [address]        [facility] GERIATRICS  ACUTE/EPISODIC VISIT    [facility] Medical Record Number:  [id number]  Place of Service where encounter took place:  [facility] [place] ([facility]) [[id number]]    Chief Complaint   Patient presents with     RECHECK       HPI:    Cristina Stevens is a 77 year old  (1945), who is being seen today for an episodic care visit.  HPI information obtained from: facility chart records, facility staff, patient report and [facility] chart review.    Today's concern is:    Diagnoses       Codes Comments    Acute cystitis with hematuria    -  Primary N30.01     Neurogenic bladder     N31.9     Edwards catheter in place     Z97.8     MS (multiple sclerosis) (H)     G35     Tetraparesis (H)     G82.50     Wheelchair dependence     Z99.3         Doing a follow-up with Muna today due to had her urine tested and final result is back.  Due to Muna's list of allergies, had decided not to treat the UA right away, but Muna inquired if some antibiotic should have been started.  On 4/18/23, did start muna on Pen VK 500mg po BID for 5 days for abnormal UA.    Muna was appreciative that an ABX was started.  She may have a fear of ending up in the hospital due to her urine, suprapubic catheter etc.    ALLERGIES:    Allergies   Allergen Reactions     Nitrofuran Derivatives Visual Disturbance     Hallucinations     Tetracyclines GI Disturbance     Ampicillin GI Disturbance     Cefadroxil Muscle Pain (Myalgia)     Cephalexin Diarrhea and GI Disturbance     Levofloxacin Other (See Comments)     Phlebitis with IV infusion     Sulfa Drugs Nausea and Vomiting, GI Disturbance and Unknown     Reaction occurred as a child     Sulfasalazine Nausea and Vomiting        MEDICATIONS:  Post Discharge Medication Reconciliation Status: patient was not discharged from an inpatient facility or TCU.     Current  Outpatient Medications   Medication Sig Dispense Refill     amLODIPine (NORVASC) 2.5 MG tablet 1 TABLET ORALLY EVERY EVENING (DX: HYPERTENSION) 28 tablet 11     amoxicillin (AMOXIL) 250 MG chewable tablet CHEW AND SWALLOW 8 TABLETS (2000 MG) BY MOUTH 1 HOUR PRIOR TO DENTAL 8 tablet 97     armodafinil (NUVIGIL) 150 MG TABS tablet 1 TABLET ORALLY EVERY MORNING (DX: NARCOLEPSY) CONTROLLED MEDICATION ? TO BE STORED IN DOUBLE LOCKED STORAGE 30 tablet 4     ASPIRIN LOW DOSE 81 MG chewable tablet 1 TABLET ORALLY DAILY 30 tablet 11     Baclofen (LIORESAL) 5 MG tablet Take 1 tablet (5 mg) by mouth 3 times daily as needed for muscle spasms 20 tablet 3     cetirizine (ZYRTEC) 10 MG tablet Take 1 tablet (10 mg) by mouth daily 28 tablet 97     CO2-Releasing (-TWO) SUPP 1 supp daily prn and if not effective or falls out, may repeat again until results achieved.  May keep at bedside       Cranberry 250 MG TABS Give 2 tabs (500mg) po twice a day 120 tablet 11     DESITIN 13 % CREA APPLY TOPICALLY TO AREAS OF REDNESS OR RASH WITH EACH BRIEF CHANGE 113 g 97     ELIQUIS ANTICOAGULANT 5 MG tablet 1 TABLET ORALLY 2 TIMES DAILY. DO NOT START BEFORE MAY 14TH,2022 (DX: VENOUS THROMBOEMBOLISM) 60 tablet 11     ibuprofen (ADVIL/MOTRIN) 400 MG tablet TAKE 1 TABLET BY MOUTH TWICE DAILY AS NEEDED FOR PAIN 30 tablet 97     medical cannabis (Patient's own supply) Take 1 Dose by mouth See Admin Instructions (The purpose of this order is to document that the patient reports taking medical cannabis.  This is not a prescription, and is not used to certify that the patient has a qualifying medical condition.)       menthol-zinc oxide (CALMOSEPTINE) 0.44-20.6 % OINT ointment APPLY TO AFFECTED AREA(S) TOPICALLY TO BUTTOCKS AS NEEDED WITH BRIEF CHANGES Strength: 0.44-20.6 % 113 g 97     methenamine hippurate (HIPREX) 1 g tablet 1 TABLET ORALLY 2 TIMES DAILY  (DX: URINARY TRACT INFECTION) 56 tablet 11     Multiple Vitamin (TAB-A-DAWSON) TABS Take 1  tablet by mouth daily 28 tablet 97     NYAMYC 017170 UNIT/GM external powder APPLY TOPICALLY BETWEEN TOES EVERY EVENING UNTIL HEALED;THEN APPLY TOPICALLY BETWEEN TOES 2 TIMES DAILY AS NEEDED UNTIL HEALED 60 g 10     oxyCODONE (ROXICODONE) 5 MG tablet Take 1 tablet (5 mg) by mouth every 3 hours as needed for severe pain 30 tablet 0     penicillin V (VEETID) 250 MG tablet Take 2 tablets (500 mg) by mouth 2 times daily for 5 days 20 tablet 0     polyethylene glycol (MIRALAX/GLYCOLAX) powder MIX 17GM OF POWDER IN 8OZ OF WATER UNTIL COMPLETELY DISSOLVED. DRINK SOLUTION DAILY AS NEEDED 527 g 98     Probiotic Product (PROBIOTIC DAILY) CAPS Take 1 capsule by mouth daily 28 capsule 98     senna-docusate (SENEXON-S) 8.6-50 MG tablet Take 2 tablets by mouth daily as needed for constipation 62 tablet 12     sterile water, bottle, (WATER FOR IRRIGATION, STERILE) irrigation 60ml  prn to irrigate ruby catheter when plugged 500 mL 11     vitamin C (ASCORBIC ACID) 500 MG tablet 1 TABLET ORALLY 2 TIMES DAILY (DX: CHRONIC CYSTITIS) 60 tablet 11     VITAMIN D3 50 MCG (2000 UT) tablet 2 TABLETS (4000U) DAILY 56 tablet 11       REVIEW OF SYSTEMS:  4 point ROS neg other than the symptoms noted above in the HPI.  In no acute distress    PHYSICAL EXAM:  /72   Pulse 59   Temp 96.8  F (36  C)   Resp 20   Wt 108.9 kg (240 lb)   SpO2 97%   BMI 35.44 kg/m    Found Muna up in her electric wheelchair.   Do not think this NP has ever seen her laying in bed.  Muna adjusts her electric w/c to off load her bottom through out the daytime.  She had a yogurt cup on her lap tray.  Alert and oriented x3   Neatly groomed.    Muna is able to hold a conversation, voice is soft, due to her MS  Heart rate is regular S1 and S2 are heard  No respiratory distress noted, no coughing, difficult to listen to her lungs because of her wheelchair.  Muna's abdomen is round soft and no masses palpated    Muna is wheelchair dependent and four-point lift  is used for transfers.  Not amatory  Muna's skin is pink warm dry.  Does wear a catheter leg bag during the day.  It appears to be clear and dark yellow.  Always produces a lot of urine when this NP sees her.      Culture >100,000 CFU/mL Escherichia coli Abnormal             Resulting Agency: [facility]     Susceptibility     Escherichia coli     YOAN     Ampicillin 16 ug/mL Intermediate     Ampicillin/ Sulbactam 4 ug/mL Susceptible     Cefazolin <=4 ug/mL Susceptible 1     Cefepime <=1 ug/mL Susceptible     Cefoxitin <=4 ug/mL Susceptible     Ceftazidime <=1 ug/mL Susceptible     Ceftriaxone <=1 ug/mL Susceptible     Ciprofloxacin <=0.25 ug/mL Susceptible     Gentamicin <=1 ug/mL Susceptible     Levofloxacin <=0.12 ug/mL Susceptible     Nitrofurantoin <=16 ug/mL Susceptible     Piperacillin/Tazobactam <=4 ug/mL Susceptible     Tobramycin <=1 ug/mL Susceptible     Trimethoprim/Sulfamethoxazole <=1/19 ug/mL Susceptible                   ASSESSMENT / PLAN:  (N30.01) Acute cystitis with hematuria  (primary encounter diagnosis)  (N31.9) Neurogenic bladder  (Z97.8) Edwards catheter in place  Comment: He has had a suprapubic catheter for some time in which it does get changed by her private aide but every 2 to 3 weeks.  She cannot go a whole month without any change as it does prone to be clogged and then other symptoms of an infection.  Today informed her what her results were and will extend the Pen-Vee K 500 mg twice a day for 10 days total.  It is sensitive to a lot of antibiotics.  Even though the PCN is listed in the allergy list it is more of a sensitivity for GI upset.  She knows enough to eat yogurt during the time of receiving the antibiotic      (G35) MS (multiple sclerosis) (H)  (G82.50) Tetraparesis (H)  (Z99.3) Wheelchair dependence  Comment: Muna did try baclofen scheduled for a short time at the beginning of March.  Ultimately she has to have changed as needed dosing for times of spasticity.  Otherwise no acute  episodes with her MS    (M81.0) Osteoporosis, without pathological fracture  Comment: Did mention today that she did receive her Prolia injection at the  [facility] in [facility].  Reviewed that clinic note and it appears that she should be on 4000 units of vitamin D3 as well as calcium.  Gloriae does take a 4000 units vitamin D3 but she is not on any calcium supplement.  Her calcium level in her BMP is slightly above normal.  Now that COVID is over if it is easier to get into clinics, do believe Muna will be more compliant with getting a Prolia injection for osteoporosis.    Orders:  1.  Extend the Pen VK 500mg BID to a total of 10 days due to acute UTI.    Electronically signed by  CAROL Rodríguez CNP              Sincerely,        CAROL Rodríguez CNP

## 2023-04-21 NOTE — LETTER
4/21/2023        RE: Cristina Stevens  [address]  [address]  [facility] [address]        No notes on file      Sincerely,        CAROL Rodríguez CNP

## 2023-04-21 NOTE — PROGRESS NOTES
[facility] GERIATRICS  ACUTE/EPISODIC VISIT    [facility] Medical Record Number:  [id number]  Place of Service where encounter took place:  [facility] ([facility]) [[id number]]    Chief Complaint   Patient presents with     RECHECK       HPI:    Cristina Stevens is a 77 year old  (1945), who is being seen today for an episodic care visit.  HPI information obtained from: facility chart records, facility staff, patient report and [facility] chart review.    Today's concern is:    Diagnoses       Codes Comments    Acute cystitis with hematuria    -  Primary N30.01     Neurogenic bladder     N31.9     Edwards catheter in place     Z97.8     MS (multiple sclerosis) (H)     G35     Tetraparesis (H)     G82.50     Wheelchair dependence     Z99.3         Doing a follow-up with Muna today due to had her urine tested and final result is back.  Due to Muna's list of allergies, had decided not to treat the UA right away, but Muna inquired if some antibiotic should have been started.  On 4/18/23, did start muna on Pen VK 500mg po BID for 5 days for abnormal UA.    Muna was appreciative that an ABX was started.  She may have a fear of ending up in the hospital due to her urine, suprapubic catheter etc.    ALLERGIES:    Allergies   Allergen Reactions     Nitrofuran Derivatives Visual Disturbance     Hallucinations     Tetracyclines GI Disturbance     Ampicillin GI Disturbance     Cefadroxil Muscle Pain (Myalgia)     Cephalexin Diarrhea and GI Disturbance     Levofloxacin Other (See Comments)     Phlebitis with IV infusion     Sulfa Drugs Nausea and Vomiting, GI Disturbance and Unknown     Reaction occurred as a child     Sulfasalazine Nausea and Vomiting        MEDICATIONS:  Post Discharge Medication Reconciliation Status: patient was not discharged from an inpatient facility or TCU.     Current Outpatient Medications   Medication Sig Dispense Refill     amLODIPine (NORVASC) 2.5 MG tablet 1 TABLET ORALLY  EVERY EVENING (DX: HYPERTENSION) 28 tablet 11     amoxicillin (AMOXIL) 250 MG chewable tablet CHEW AND SWALLOW 8 TABLETS (2000 MG) BY MOUTH 1 HOUR PRIOR TO DENTAL 8 tablet 97     armodafinil (NUVIGIL) 150 MG TABS tablet 1 TABLET ORALLY EVERY MORNING (DX: NARCOLEPSY) CONTROLLED MEDICATION ? TO BE STORED IN DOUBLE LOCKED STORAGE 30 tablet 4     ASPIRIN LOW DOSE 81 MG chewable tablet 1 TABLET ORALLY DAILY 30 tablet 11     Baclofen (LIORESAL) 5 MG tablet Take 1 tablet (5 mg) by mouth 3 times daily as needed for muscle spasms 20 tablet 3     cetirizine (ZYRTEC) 10 MG tablet Take 1 tablet (10 mg) by mouth daily 28 tablet 97     CO2-Releasing (-TWO) SUPP 1 supp daily prn and if not effective or falls out, may repeat again until results achieved.  May keep at bedside       Cranberry 250 MG TABS Give 2 tabs (500mg) po twice a day 120 tablet 11     DESITIN 13 % CREA APPLY TOPICALLY TO AREAS OF REDNESS OR RASH WITH EACH BRIEF CHANGE 113 g 97     ELIQUIS ANTICOAGULANT 5 MG tablet 1 TABLET ORALLY 2 TIMES DAILY. DO NOT START BEFORE MAY 14TH,2022 (DX: VENOUS THROMBOEMBOLISM) 60 tablet 11     ibuprofen (ADVIL/MOTRIN) 400 MG tablet TAKE 1 TABLET BY MOUTH TWICE DAILY AS NEEDED FOR PAIN 30 tablet 97     medical cannabis (Patient's own supply) Take 1 Dose by mouth See Admin Instructions (The purpose of this order is to document that the patient reports taking medical cannabis.  This is not a prescription, and is not used to certify that the patient has a qualifying medical condition.)       menthol-zinc oxide (CALMOSEPTINE) 0.44-20.6 % OINT ointment APPLY TO AFFECTED AREA(S) TOPICALLY TO BUTTOCKS AS NEEDED WITH BRIEF CHANGES Strength: 0.44-20.6 % 113 g 97     methenamine hippurate (HIPREX) 1 g tablet 1 TABLET ORALLY 2 TIMES DAILY  (DX: URINARY TRACT INFECTION) 56 tablet 11     Multiple Vitamin (TAB-A-DAWSON) TABS Take 1 tablet by mouth daily 28 tablet 97     NYAMYC 174133 UNIT/GM external powder APPLY TOPICALLY BETWEEN TOES EVERY  EVENING UNTIL HEALED;THEN APPLY TOPICALLY BETWEEN TOES 2 TIMES DAILY AS NEEDED UNTIL HEALED 60 g 10     oxyCODONE (ROXICODONE) 5 MG tablet Take 1 tablet (5 mg) by mouth every 3 hours as needed for severe pain 30 tablet 0     penicillin V (VEETID) 250 MG tablet Take 2 tablets (500 mg) by mouth 2 times daily for 5 days 20 tablet 0     polyethylene glycol (MIRALAX/GLYCOLAX) powder MIX 17GM OF POWDER IN 8OZ OF WATER UNTIL COMPLETELY DISSOLVED. DRINK SOLUTION DAILY AS NEEDED 527 g 98     Probiotic Product (PROBIOTIC DAILY) CAPS Take 1 capsule by mouth daily 28 capsule 98     senna-docusate (SENEXON-S) 8.6-50 MG tablet Take 2 tablets by mouth daily as needed for constipation 62 tablet 12     sterile water, bottle, (WATER FOR IRRIGATION, STERILE) irrigation 60ml  prn to irrigate ruby catheter when plugged 500 mL 11     vitamin C (ASCORBIC ACID) 500 MG tablet 1 TABLET ORALLY 2 TIMES DAILY (DX: CHRONIC CYSTITIS) 60 tablet 11     VITAMIN D3 50 MCG (2000 UT) tablet 2 TABLETS (4000U) DAILY 56 tablet 11       REVIEW OF SYSTEMS:  4 point ROS neg other than the symptoms noted above in the HPI.  In no acute distress    PHYSICAL EXAM:  /72   Pulse 59   Temp 96.8  F (36  C)   Resp 20   Wt 108.9 kg (240 lb)   SpO2 97%   BMI 35.44 kg/m    Found Muna up in her electric wheelchair.   Do not think this NP has ever seen her laying in bed.  Muna adjusts her electric w/c to off load her bottom through out the daytime.  She had a yogurt cup on her lap tray.  Alert and oriented x3   Neatly groomed.    Muna is able to hold a conversation, voice is soft, due to her MS  Heart rate is regular S1 and S2 are heard  No respiratory distress noted, no coughing, difficult to listen to her lungs because of her wheelchair.  Muna's abdomen is round soft and no masses palpated    Muna is wheelchair dependent and four-point lift is used for transfers.  Not amatory  Muna's skin is pink warm dry.  Does wear a catheter leg bag during the day.   It appears to be clear and dark yellow.  Always produces a lot of urine when this NP sees her.      Culture >100,000 CFU/mL Escherichia coli Abnormal             Resulting Agency: [facility]     Susceptibility     Escherichia coli     YOAN     Ampicillin 16 ug/mL Intermediate     Ampicillin/ Sulbactam 4 ug/mL Susceptible     Cefazolin <=4 ug/mL Susceptible 1     Cefepime <=1 ug/mL Susceptible     Cefoxitin <=4 ug/mL Susceptible     Ceftazidime <=1 ug/mL Susceptible     Ceftriaxone <=1 ug/mL Susceptible     Ciprofloxacin <=0.25 ug/mL Susceptible     Gentamicin <=1 ug/mL Susceptible     Levofloxacin <=0.12 ug/mL Susceptible     Nitrofurantoin <=16 ug/mL Susceptible     Piperacillin/Tazobactam <=4 ug/mL Susceptible     Tobramycin <=1 ug/mL Susceptible     Trimethoprim/Sulfamethoxazole <=1/19 ug/mL Susceptible                   ASSESSMENT / PLAN:  (N30.01) Acute cystitis with hematuria  (primary encounter diagnosis)  (N31.9) Neurogenic bladder  (Z97.8) Edwards catheter in place  Comment: He has had a suprapubic catheter for some time in which it does get changed by her private aide but every 2 to 3 weeks.  She cannot go a whole month without any change as it does prone to be clogged and then other symptoms of an infection.  Today informed her what her results were and will extend the Pen-Vee K 500 mg twice a day for 10 days total.  It is sensitive to a lot of antibiotics.  Even though the PCN is listed in the allergy list it is more of a sensitivity for GI upset.  She knows enough to eat yogurt during the time of receiving the antibiotic      (G35) MS (multiple sclerosis) (H)  (G82.50) Tetraparesis (H)  (Z99.3) Wheelchair dependence  Comment: Muna did try baclofen scheduled for a short time at the beginning of March.  Ultimately she has to have changed as needed dosing for times of spasticity.  Otherwise no acute episodes with her MS    (M81.0) Osteoporosis, without pathological fracture  Comment: Did mention today that she  did [facility].  Reviewed that clinic note and it appears that she should be on 4000 units of vitamin D3 as well as calcium.  Shee does take a 4000 units vitamin D3 but she is not on any calcium supplement.  Her calcium level in her BMP is slightly above normal.  Now that COVID is over if it is easier to get into clinics, do believe Muna will be more compliant with getting a Prolia injection for osteoporosis.    Orders:  1.  Extend the Pen VK 500mg BID to a total of 10 days due to acute UTI.    Electronically signed by  CAROL Rodríguez CNP

## 2023-04-22 ENCOUNTER — HEALTH MAINTENANCE LETTER (OUTPATIENT)
Age: 78
End: 2023-04-22

## 2023-05-16 ENCOUNTER — DOCUMENTATION ONLY (OUTPATIENT)
Dept: NEUROLOGY | Facility: CLINIC | Age: 78
End: 2023-05-16
Payer: COMMERCIAL

## 2023-05-16 ENCOUNTER — TRANSFERRED RECORDS (OUTPATIENT)
Dept: HEALTH INFORMATION MANAGEMENT | Facility: CLINIC | Age: 78
End: 2023-05-16
Payer: COMMERCIAL

## 2023-05-16 NOTE — PROGRESS NOTES
Plan of care received from [facility] placed in Dr. Macario's folder for review and signature.   Deni Lizarraga EMT 05/16/2023 3:54PM

## 2023-06-26 ENCOUNTER — LAB REQUISITION (OUTPATIENT)
Dept: LAB | Facility: CLINIC | Age: 78
End: 2023-06-26
Payer: COMMERCIAL

## 2023-06-26 DIAGNOSIS — R31.9 HEMATURIA, UNSPECIFIED: ICD-10-CM

## 2023-06-26 LAB
ALBUMIN UR-MCNC: 30 MG/DL
APPEARANCE UR: ABNORMAL
BACTERIA #/AREA URNS HPF: ABNORMAL /HPF
BILIRUB UR QL STRIP: NEGATIVE
COLOR UR AUTO: ABNORMAL
GLUCOSE UR STRIP-MCNC: NEGATIVE MG/DL
HGB UR QL STRIP: ABNORMAL
KETONES UR STRIP-MCNC: NEGATIVE MG/DL
LEUKOCYTE ESTERASE UR QL STRIP: ABNORMAL
NITRATE UR QL: NEGATIVE
PH UR STRIP: 5.5 [PH] (ref 5–7)
RBC URINE: >182 /HPF
SP GR UR STRIP: 1.01 (ref 1–1.03)
SQUAMOUS EPITHELIAL: 14 /HPF
UROBILINOGEN UR STRIP-MCNC: NORMAL MG/DL
WBC CLUMPS #/AREA URNS HPF: PRESENT /HPF
WBC URINE: >182 /HPF

## 2023-06-26 PROCEDURE — 87186 SC STD MICRODIL/AGAR DIL: CPT | Mod: ORL | Performed by: NURSE PRACTITIONER

## 2023-06-26 PROCEDURE — 81001 URINALYSIS AUTO W/SCOPE: CPT | Mod: ORL | Performed by: NURSE PRACTITIONER

## 2023-06-27 ENCOUNTER — ASSISTED LIVING VISIT (OUTPATIENT)
Dept: GERIATRICS | Facility: CLINIC | Age: 78
End: 2023-06-27
Payer: COMMERCIAL

## 2023-06-27 VITALS
DIASTOLIC BLOOD PRESSURE: 79 MMHG | BODY MASS INDEX: 35.44 KG/M2 | SYSTOLIC BLOOD PRESSURE: 129 MMHG | TEMPERATURE: 97.7 F | HEART RATE: 78 BPM | RESPIRATION RATE: 16 BRPM | WEIGHT: 240 LBS

## 2023-06-27 DIAGNOSIS — N39.0 FREQUENT UTI: ICD-10-CM

## 2023-06-27 DIAGNOSIS — N31.9 NEUROGENIC BLADDER: Primary | ICD-10-CM

## 2023-06-27 DIAGNOSIS — Z97.8 FOLEY CATHETER IN PLACE: ICD-10-CM

## 2023-06-27 DIAGNOSIS — G35 MS (MULTIPLE SCLEROSIS) (H): ICD-10-CM

## 2023-06-27 DIAGNOSIS — Z99.3 WHEELCHAIR DEPENDENCE: ICD-10-CM

## 2023-06-27 DIAGNOSIS — G82.50 TETRAPARESIS (H): ICD-10-CM

## 2023-06-27 PROCEDURE — 99349 HOME/RES VST EST MOD MDM 40: CPT | Performed by: NURSE PRACTITIONER

## 2023-06-27 NOTE — LETTER
6/27/2023        RE: Cristina Stevens  [address]  [place] [address]        [facility] GERIATRICS  ACUTE/EPISODIC VISIT    [facility] Medical Record Number:  [id number]  Place of Service where encounter took place:  [facility] ([facility]) [[id number]]    Chief Complaint   Patient presents with     RECHECK       HPI:    Cristina Stevens is a 78 year old  (1945), who is being seen today for an episodic care visit.  HPI information obtained from: facility chart records and patient report.    Today's concern is:    Diagnoses         Codes Comments    Neurogenic bladder    -  Primary N31.9     Edwards catheter in place     Z97.8     Frequent UTI     N39.0     MS (multiple sclerosis) (H)     G35     Tetraparesis (H)     G82.50     Wheelchair dependence     Z99.3           Came to see Muna today just to follow-up on how she is feeling as of late has been having urinary issues.  On 6/13, UA /UC was ordered due to foul smell and discoloration.  He does have a Edwards catheter in place and so staff assist in emptying her leg bag during the daytime.  Did place her on Cipro for 5 days on 6/17/2023    Found Muna as usual up in her electric wheelchair.  She does stay in her apartment and only goes out for appointments.  Typically her meals are brought to her from the dining room if she orders something.  During the day, Muna does spend a lot of time on her computer either playing games or she is always reading something.    In a good mood today.  Typically she enjoys visiting and talking about other things that her health after the formalities are done.  Most recently had a vascular surgeon appointment.  He only needs to have the left lower leg wrapped if it is extremely swollen.  Is to stay on aspirin 81 mg daily.  [facility] pharmacy happened to receive a cancel aspirin order and so it was clarified though to continue on with the baby aspirin    ALLERGIES:    Allergies   Allergen  Reactions     Nitrofuran Derivatives Visual Disturbance     Hallucinations     Tetracyclines & Related GI Disturbance     Ampicillin GI Disturbance     Cefadroxil Muscle Pain (Myalgia)     Cephalexin Diarrhea and GI Disturbance     Levofloxacin Other (See Comments)     Phlebitis with IV infusion     Sulfa Antibiotics Nausea and Vomiting, GI Disturbance and Unknown     Reaction occurred as a child     Sulfasalazine Nausea and Vomiting        MEDICATIONS:  Post Discharge Medication Reconciliation Status: patient was not discharged from an inpatient facility or TCU.     Current Outpatient Medications   Medication Sig Dispense Refill     amLODIPine (NORVASC) 2.5 MG tablet 1 TABLET ORALLY EVERY EVENING (DX: HYPERTENSION) 28 tablet 11     amoxicillin (AMOXIL) 250 MG chewable tablet CHEW AND SWALLOW 8 TABLETS (2000 MG) BY MOUTH 1 HOUR PRIOR TO DENTAL 8 tablet 97     armodafinil (NUVIGIL) 150 MG TABS tablet 1 TABLET ORALLY EVERY MORNING (DX: NARCOLEPSY) CONTROLLED MEDICATION ? TO BE STORED IN DOUBLE LOCKED STORAGE 30 tablet 4     ASPIRIN LOW DOSE 81 MG chewable tablet 1 TABLET ORALLY DAILY 28 tablet 11     Baclofen (LIORESAL) 5 MG tablet Take 1 tablet (5 mg) by mouth 3 times daily as needed for muscle spasms 20 tablet 3     cetirizine (ZYRTEC) 10 MG tablet Take 1 tablet (10 mg) by mouth daily 28 tablet 97     CO2-Releasing (-TWO) SUPP 1 supp daily prn and if not effective or falls out, may repeat again until results achieved.  May keep at bedside       Cranberry 250 MG TABS Give 2 tabs (500mg) po twice a day 120 tablet 11     DESITIN 13 % CREA APPLY TOPICALLY TO AREAS OF REDNESS OR RASH WITH EACH BRIEF CHANGE 113 g 97     ELIQUIS ANTICOAGULANT 5 MG tablet 1 TABLET ORALLY 2 TIMES DAILY. DO NOT START BEFORE MAY 14TH,2022 (DX: VENOUS THROMBOEMBOLISM) 60 tablet 11     ibuprofen (ADVIL/MOTRIN) 400 MG tablet TAKE 1 TABLET BY MOUTH TWICE DAILY AS NEEDED FOR PAIN 30 tablet 97     medical cannabis (Patient's own supply) Take 1  Dose by mouth See Admin Instructions (The purpose of this order is to document that the patient reports taking medical cannabis.  This is not a prescription, and is not used to certify that the patient has a qualifying medical condition.)       menthol-zinc oxide (CALMOSEPTINE) 0.44-20.6 % OINT ointment APPLY TO AFFECTED AREA(S) TOPICALLY TO BUTTOCKS AS NEEDED WITH BRIEF CHANGES Strength: 0.44-20.6 % 113 g 97     methenamine hippurate (HIPREX) 1 g tablet 1 TABLET ORALLY 2 TIMES DAILY  (DX: URINARY TRACT INFECTION) 56 tablet 11     Multiple Vitamin (TAB-A-DAWSON) TABS Take 1 tablet by mouth daily 28 tablet 97     NYAMYC 123896 UNIT/GM external powder APPLY TOPICALLY BETWEEN TOES EVERY EVENING UNTIL HEALED;THEN APPLY TOPICALLY BETWEEN TOES 2 TIMES DAILY AS NEEDED UNTIL HEALED 60 g 10     oxyCODONE (ROXICODONE) 5 MG tablet Take 1 tablet (5 mg) by mouth every 3 hours as needed for severe pain 30 tablet 0     polyethylene glycol (MIRALAX/GLYCOLAX) powder MIX 17GM OF POWDER IN 8OZ OF WATER UNTIL COMPLETELY DISSOLVED. DRINK SOLUTION DAILY AS NEEDED 527 g 98     Probiotic Product (PROBIOTIC DAILY) CAPS Take 1 capsule by mouth daily 28 capsule 98     senna-docusate (SENEXON-S) 8.6-50 MG tablet Take 2 tablets by mouth daily as needed for constipation 62 tablet 12     sterile water, bottle, (WATER FOR IRRIGATION, STERILE) irrigation 60ml  prn to irrigate ruby catheter when plugged 500 mL 11     vitamin C (ASCORBIC ACID) 500 MG tablet 1 TABLET ORALLY 2 TIMES DAILY (DX: CHRONIC CYSTITIS) 60 tablet 11     VITAMIN D3 50 MCG (2000 UT) tablet 2 TABLETS (4000U) DAILY 56 tablet 11     REVIEW OF SYSTEMS:  4 point ROS neg other than the symptoms noted above in the HPI.  No chest pain, no SOB noted.      PHYSICAL EXAM:  /79   Pulse 78   Temp 97.7  F (36.5  C)   Resp 16   Wt 108.9 kg (240 lb)   BMI 35.44 kg/m    He is alert and oriented x3.  She makes all her own decisions.  She does have a private aide that does help her with  transportation as well as communicating to this NP as needed.  The private aide also manages ordering her catheter supplies from 2 different sources as He needs to have her catheter changed every 2 to 3 weeks to help prevent urinary issues.    Heart rate is regular and strong with S1 and S2 heard.  Trace edema bilaterally in her lower legs which typically are in a dependent position.  Lungs are clear anteriorly.  Unable to listen to the back of her lungs due to her electric wheelchair.  Does not use any oxygen nor showing any signs of distress.  When she talks, voice can be weak and then stronger.      Catheter leg bag observed with yellow urine.  No sediment seen.  Skin is pink, warm and dry.  No open areas.    ASSESSMENT / PLAN:  (N31.9) Neurogenic bladder  (primary encounter diagnosis)  (Z97.8) Edwards catheter in place  (N39.0) Frequent UTI  Comment: Muna and her aide usually will collect a urine if there are any issues and staff send it in.  Just finished with 5 days of Cipro.  States she is feeling fine.  This NP helps with ordering the catheter supplies when needing to renew with a prescription.  At times, muna has a plugged catheter or a lot of sediment present so one reason why her catheter gets changed often besides having symptoms of UTI and changing catheter at those times.    (G35) MS (multiple sclerosis) (H)  (G82.50) Tetraparesis (H)  (Z99.3) Wheelchair dependence  Comment: Muna continues to go out to therapy twice a week with assist of her aide.  Paperwork is sent to the NP to sign.  No advancement with her MS.  Has limited use of her hands.  Able to manage wheelchair control and phone.  Not uncommon her aide will do the texting too if long message.  No changes to medications.     Orders:  No new orders today    Electronically signed by  CAROL Rodríguez CNP           Sincerely,        CAROL Rodríguez CNP

## 2023-06-27 NOTE — LETTER
6/27/2023        RE: Cristina Stevens  [address]  [address]  [place] [address]        No notes on file      Sincerely,        CAROL Rodríguez CNP

## 2023-06-27 NOTE — LETTER
6/27/2023        RE: Cristina Stevens  [id number]  [place] [address]        [facility] GERIATRICS  ACUTE/EPISODIC VISIT    [facility] Medical Record Number:  [id number]  Place of Service where encounter took place:  [facility] ([facility]) [[id number]]    Chief Complaint   Patient presents with    RECHECK       HPI:    Cristina Stevens is a 78 year old  (1945), who is being seen today for an episodic care visit.  HPI information obtained from: facility chart records and patient report.    Today's concern is:    Diagnoses         Codes Comments    Neurogenic bladder    -  Primary N31.9     Edwards catheter in place     Z97.8     Frequent UTI     N39.0     MS (multiple sclerosis) (H)     G35     Tetraparesis (H)     G82.50     Wheelchair dependence     Z99.3           Came to see Muna today just to follow-up on how she is feeling as of late has been having urinary issues.  On 6/13, UA /UC was ordered due to foul smell and discoloration.  He does have a Edwards catheter in place and so staff assist in emptying her leg bag during the daytime.  Did place her on Cipro for 5 days on 6/17/2023    Found Muna as usual up in her electric wheelchair.  She does stay in her apartment and only goes out for appointments.  Typically her meals are brought to her from the dining room if she orders something.  During the day, Muna does spend a lot of time on her computer either playing games or she is always reading something.    In a good mood today.  Typically she enjoys visiting and talking about other things that her health after the formalities are done.  Most recently had a vascular surgeon appointment.  He only needs to have the left lower leg wrapped if it is extremely swollen.  Is to stay on aspirin 81 mg daily.  [facility] pharmacy happened to receive a cancel aspirin order and so it was clarified though to continue on with the baby aspirin    ALLERGIES:    Allergies   Allergen  Reactions    Nitrofuran Derivatives Visual Disturbance     Hallucinations    Tetracyclines & Related GI Disturbance    Ampicillin GI Disturbance    Cefadroxil Muscle Pain (Myalgia)    Cephalexin Diarrhea and GI Disturbance    Levofloxacin Other (See Comments)     Phlebitis with IV infusion    Sulfa Antibiotics Nausea and Vomiting, GI Disturbance and Unknown     Reaction occurred as a child    Sulfasalazine Nausea and Vomiting        MEDICATIONS:  Post Discharge Medication Reconciliation Status: patient was not discharged from an inpatient facility or TCU.     Current Outpatient Medications   Medication Sig Dispense Refill    amLODIPine (NORVASC) 2.5 MG tablet 1 TABLET ORALLY EVERY EVENING (DX: HYPERTENSION) 28 tablet 11    amoxicillin (AMOXIL) 250 MG chewable tablet CHEW AND SWALLOW 8 TABLETS (2000 MG) BY MOUTH 1 HOUR PRIOR TO DENTAL 8 tablet 97    armodafinil (NUVIGIL) 150 MG TABS tablet 1 TABLET ORALLY EVERY MORNING (DX: NARCOLEPSY) ***CONTROLLED MEDICATION ? TO BE STORED IN DOUBLE LOCKED STORAGE*** 30 tablet 4    ASPIRIN LOW DOSE 81 MG chewable tablet 1 TABLET ORALLY DAILY 28 tablet 11    Baclofen (LIORESAL) 5 MG tablet Take 1 tablet (5 mg) by mouth 3 times daily as needed for muscle spasms 20 tablet 3    cetirizine (ZYRTEC) 10 MG tablet Take 1 tablet (10 mg) by mouth daily 28 tablet 97    CO2-Releasing (-TWO) SUPP 1 supp daily prn and if not effective or falls out, may repeat again until results achieved.  May keep at bedside      Cranberry 250 MG TABS Give 2 tabs (500mg) po twice a day 120 tablet 11    DESITIN 13 % CREA APPLY TOPICALLY TO AREAS OF REDNESS OR RASH WITH EACH BRIEF CHANGE 113 g 97    ELIQUIS ANTICOAGULANT 5 MG tablet 1 TABLET ORALLY 2 TIMES DAILY. DO NOT START BEFORE MAY 14TH,2022 (DX: VENOUS THROMBOEMBOLISM) 60 tablet 11    ibuprofen (ADVIL/MOTRIN) 400 MG tablet TAKE 1 TABLET BY MOUTH TWICE DAILY AS NEEDED FOR PAIN 30 tablet 97    medical cannabis (Patient's own supply) Take 1 Dose by mouth  See Admin Instructions (The purpose of this order is to document that the patient reports taking medical cannabis.  This is not a prescription, and is not used to certify that the patient has a qualifying medical condition.)      menthol-zinc oxide (CALMOSEPTINE) 0.44-20.6 % OINT ointment APPLY TO AFFECTED AREA(S) TOPICALLY TO BUTTOCKS AS NEEDED WITH BRIEF CHANGES Strength: 0.44-20.6 % 113 g 97    methenamine hippurate (HIPREX) 1 g tablet 1 TABLET ORALLY 2 TIMES DAILY  (DX: URINARY TRACT INFECTION) 56 tablet 11    Multiple Vitamin (TAB-A-DAWSON) TABS Take 1 tablet by mouth daily 28 tablet 97    NYAMYC 092950 UNIT/GM external powder APPLY TOPICALLY BETWEEN TOES EVERY EVENING UNTIL HEALED;THEN APPLY TOPICALLY BETWEEN TOES 2 TIMES DAILY AS NEEDED UNTIL HEALED 60 g 10    oxyCODONE (ROXICODONE) 5 MG tablet Take 1 tablet (5 mg) by mouth every 3 hours as needed for severe pain 30 tablet 0    polyethylene glycol (MIRALAX/GLYCOLAX) powder MIX 17GM OF POWDER IN 8OZ OF WATER UNTIL COMPLETELY DISSOLVED. DRINK SOLUTION DAILY AS NEEDED 527 g 98    Probiotic Product (PROBIOTIC DAILY) CAPS Take 1 capsule by mouth daily 28 capsule 98    senna-docusate (SENEXON-S) 8.6-50 MG tablet Take 2 tablets by mouth daily as needed for constipation 62 tablet 12    sterile water, bottle, (WATER FOR IRRIGATION, STERILE) irrigation 60ml  prn to irrigate ruby catheter when plugged 500 mL 11    vitamin C (ASCORBIC ACID) 500 MG tablet 1 TABLET ORALLY 2 TIMES DAILY (DX: CHRONIC CYSTITIS) 60 tablet 11    VITAMIN D3 50 MCG (2000 UT) tablet 2 TABLETS (4000U) DAILY 56 tablet 11     REVIEW OF SYSTEMS:  4 point ROS neg other than the symptoms noted above in the HPI.  No chest pain, no SOB noted.      PHYSICAL EXAM:  /79   Pulse 78   Temp 97.7  F (36.5  C)   Resp 16   Wt 108.9 kg (240 lb)   BMI 35.44 kg/m    He is alert and oriented x3.  She makes all her own decisions.  She does have a private aide that does help her with transportation as well as  communicating to this NP as needed.  The private aide also manages ordering her catheter supplies from 2 different sources as He needs to have her catheter changed every 2 to 3 weeks to help prevent urinary issues.    Heart rate is regular and strong with S1 and S2 heard.  Trace edema bilaterally in her lower legs which typically are in a dependent position.  Lungs are clear anteriorly.  Unable to listen to the back of her lungs due to her electric wheelchair.  Does not use any oxygen nor showing any signs of distress.  When she talks, voice can be weak and then stronger.      Catheter leg bag observed with yellow urine.  No sediment seen.  Skin is pink, warm and dry.  No open areas.    ASSESSMENT / PLAN:  (N31.9) Neurogenic bladder  (primary encounter diagnosis)  (Z97.8) Edwards catheter in place  (N39.0) Frequent UTI  Comment: Muna and her aide usually will collect a urine if there are any issues and staff send it in.  Just finished with 5 days of Cipro.  States she is feeling fine.  This NP helps with ordering the catheter supplies when needing to renew with a prescription.  At times, muna has a plugged catheter or a lot of sediment present so one reason why her catheter gets changed often besides having symptoms of UTI and changing catheter at those times.    (G35) MS (multiple sclerosis) (H)  (G82.50) Tetraparesis (H)  (Z99.3) Wheelchair dependence  Comment: Muna continues to go out to therapy twice a week with assist of her aide.  Paperwork is sent to the NP to sign.  No advancement with her MS.  Has limited use of her hands.  Able to manage wheelchair control and phone.  Not uncommon her aide will do the texting too if long message.  No changes to medications.     Orders:  No new orders today    Electronically signed by  CAROL Rodríguez CNP           Sincerely,        CAROL Rodríguez CNP

## 2023-06-27 NOTE — PROGRESS NOTES
[facility] GERIATRICS  ACUTE/EPISODIC VISIT    [facility] Medical Record Number:  [id number]  Place of Service where encounter took place:  [facility] ([facility]) [[id number]]    Chief Complaint   Patient presents with    RECHECK       HPI:    Cristina Stevens is a 78 year old  (1945), who is being seen today for an episodic care visit.  HPI information obtained from: facility chart records and patient report.    Today's concern is:    Diagnoses         Codes Comments    Neurogenic bladder    -  Primary N31.9     Edwards catheter in place     Z97.8     Frequent UTI     N39.0     MS (multiple sclerosis) (H)     G35     Tetraparesis (H)     G82.50     Wheelchair dependence     Z99.3           Came to see Muna today just to follow-up on how she is feeling as of late has been having urinary issues.  On 6/13, UA /UC was ordered due to foul smell and discoloration.  He does have a Edwards catheter in place and so staff assist in emptying her leg bag during the daytime.  Did place her on Cipro for 5 days on 6/17/2023    Found Muna as usual up in her electric wheelchair.  She does stay in her apartment and only goes out for appointments.  Typically her meals are brought to her from the dining room if she orders something.  During the day, Muna does spend a lot of time on her computer either playing games or she is always reading something.    In a good mood today.  Typically she enjoys visiting and talking about other things that her health after the formalities are done.  Most recently had a vascular surgeon appointment.  He only needs to have the left lower leg wrapped if it is extremely swollen.  Is to stay on aspirin 81 mg daily.  [facility] pharmacy happened to receive a cancel aspirin order and so it was clarified though to continue on with the baby aspirin    ALLERGIES:    Allergies   Allergen Reactions    Nitrofuran Derivatives Visual Disturbance     Hallucinations    Tetracyclines & Related GI  Disturbance    Ampicillin GI Disturbance    Cefadroxil Muscle Pain (Myalgia)    Cephalexin Diarrhea and GI Disturbance    Levofloxacin Other (See Comments)     Phlebitis with IV infusion    Sulfa Antibiotics Nausea and Vomiting, GI Disturbance and Unknown     Reaction occurred as a child    Sulfasalazine Nausea and Vomiting        MEDICATIONS:  Post Discharge Medication Reconciliation Status: patient was not discharged from an inpatient facility or TCU.     Current Outpatient Medications   Medication Sig Dispense Refill    amLODIPine (NORVASC) 2.5 MG tablet 1 TABLET ORALLY EVERY EVENING (DX: HYPERTENSION) 28 tablet 11    amoxicillin (AMOXIL) 250 MG chewable tablet CHEW AND SWALLOW 8 TABLETS (2000 MG) BY MOUTH 1 HOUR PRIOR TO DENTAL 8 tablet 97    armodafinil (NUVIGIL) 150 MG TABS tablet 1 TABLET ORALLY EVERY MORNING (DX: NARCOLEPSY) CONTROLLED MEDICATION ? TO BE STORED IN DOUBLE LOCKED STORAGE 30 tablet 4    ASPIRIN LOW DOSE 81 MG chewable tablet 1 TABLET ORALLY DAILY 28 tablet 11    Baclofen (LIORESAL) 5 MG tablet Take 1 tablet (5 mg) by mouth 3 times daily as needed for muscle spasms 20 tablet 3    cetirizine (ZYRTEC) 10 MG tablet Take 1 tablet (10 mg) by mouth daily 28 tablet 97    CO2-Releasing (-TWO) SUPP 1 supp daily prn and if not effective or falls out, may repeat again until results achieved.  May keep at bedside      Cranberry 250 MG TABS Give 2 tabs (500mg) po twice a day 120 tablet 11    DESITIN 13 % CREA APPLY TOPICALLY TO AREAS OF REDNESS OR RASH WITH EACH BRIEF CHANGE 113 g 97    ELIQUIS ANTICOAGULANT 5 MG tablet 1 TABLET ORALLY 2 TIMES DAILY. DO NOT START BEFORE MAY 14TH,2022 (DX: VENOUS THROMBOEMBOLISM) 60 tablet 11    ibuprofen (ADVIL/MOTRIN) 400 MG tablet TAKE 1 TABLET BY MOUTH TWICE DAILY AS NEEDED FOR PAIN 30 tablet 97    medical cannabis (Patient's own supply) Take 1 Dose by mouth See Admin Instructions (The purpose of this order is to document that the patient reports taking medical  cannabis.  This is not a prescription, and is not used to certify that the patient has a qualifying medical condition.)      menthol-zinc oxide (CALMOSEPTINE) 0.44-20.6 % OINT ointment APPLY TO AFFECTED AREA(S) TOPICALLY TO BUTTOCKS AS NEEDED WITH BRIEF CHANGES Strength: 0.44-20.6 % 113 g 97    methenamine hippurate (HIPREX) 1 g tablet 1 TABLET ORALLY 2 TIMES DAILY  (DX: URINARY TRACT INFECTION) 56 tablet 11    Multiple Vitamin (TAB-A-DAWSON) TABS Take 1 tablet by mouth daily 28 tablet 97    NYAMYC 445549 UNIT/GM external powder APPLY TOPICALLY BETWEEN TOES EVERY EVENING UNTIL HEALED;THEN APPLY TOPICALLY BETWEEN TOES 2 TIMES DAILY AS NEEDED UNTIL HEALED 60 g 10    oxyCODONE (ROXICODONE) 5 MG tablet Take 1 tablet (5 mg) by mouth every 3 hours as needed for severe pain 30 tablet 0    polyethylene glycol (MIRALAX/GLYCOLAX) powder MIX 17GM OF POWDER IN 8OZ OF WATER UNTIL COMPLETELY DISSOLVED. DRINK SOLUTION DAILY AS NEEDED 527 g 98    Probiotic Product (PROBIOTIC DAILY) CAPS Take 1 capsule by mouth daily 28 capsule 98    senna-docusate (SENEXON-S) 8.6-50 MG tablet Take 2 tablets by mouth daily as needed for constipation 62 tablet 12    sterile water, bottle, (WATER FOR IRRIGATION, STERILE) irrigation 60ml  prn to irrigate ruby catheter when plugged 500 mL 11    vitamin C (ASCORBIC ACID) 500 MG tablet 1 TABLET ORALLY 2 TIMES DAILY (DX: CHRONIC CYSTITIS) 60 tablet 11    VITAMIN D3 50 MCG (2000 UT) tablet 2 TABLETS (4000U) DAILY 56 tablet 11     REVIEW OF SYSTEMS:  4 point ROS neg other than the symptoms noted above in the HPI.  No chest pain, no SOB noted.      PHYSICAL EXAM:  /79   Pulse 78   Temp 97.7  F (36.5  C)   Resp 16   Wt 108.9 kg (240 lb)   BMI 35.44 kg/m    He is alert and oriented x3.  She makes all her own decisions.  She does have a private aide that does help her with transportation as well as communicating to this NP as needed.  The private aide also manages ordering her catheter supplies from 2  different sources as He needs to have her catheter changed every 2 to 3 weeks to help prevent urinary issues.    Heart rate is regular and strong with S1 and S2 heard.  Trace edema bilaterally in her lower legs which typically are in a dependent position.  Lungs are clear anteriorly.  Unable to listen to the back of her lungs due to her electric wheelchair.  Does not use any oxygen nor showing any signs of distress.  When she talks, voice can be weak and then stronger.      Catheter leg bag observed with yellow urine.  No sediment seen.  Skin is pink, warm and dry.  No open areas.    ASSESSMENT / PLAN:  (N31.9) Neurogenic bladder  (primary encounter diagnosis)  (Z97.8) Edwards catheter in place  (N39.0) Frequent UTI  Comment: Muna and her aide usually will collect a urine if there are any issues and staff send it in.  Just finished with 5 days of Cipro.  States she is feeling fine.  This NP helps with ordering the catheter supplies when needing to renew with a prescription.  At times, muna has a plugged catheter or a lot of sediment present so one reason why her catheter gets changed often besides having symptoms of UTI and changing catheter at those times.    (G35) MS (multiple sclerosis) (H)  (G82.50) Tetraparesis (H)  (Z99.3) Wheelchair dependence  Comment: Muna continues to go out to therapy twice a week with assist of her aide.  Paperwork is sent to the NP to sign.  No advancement with her MS.  Has limited use of her hands.  Able to manage wheelchair control and phone.  Not uncommon her aide will do the texting too if long message.  No changes to medications.     Orders:  No new orders today    Electronically signed by  CAROL Rodríguez CNP

## 2023-06-29 DIAGNOSIS — N30.01 ACUTE CYSTITIS WITH HEMATURIA: Primary | ICD-10-CM

## 2023-06-29 LAB — BACTERIA UR CULT: ABNORMAL

## 2023-06-29 RX ORDER — CIPROFLOXACIN 500 MG/1
500 TABLET, FILM COATED ORAL 2 TIMES DAILY
Qty: 20 TABLET | Refills: 0 | Status: SHIPPED | OUTPATIENT
Start: 2023-06-29 | End: 2023-07-09

## 2023-06-29 NOTE — PROGRESS NOTES
Sensitivities returned for Muna's UC.  Reviewed her allergies and sensitivities.  Has been on ciprofloxacin before and tolerated well.    We will place her on ciprofloxacin again today and sent a prescription over to [facility]    New orders:  Ciprofloxacin 500 mg by mouth twice a day x10 days for acute cystitis with hematuria and suprapubic catheter placement    CAROL Rodríguez CNP

## 2023-07-26 DIAGNOSIS — Z78.9 TAKES DIETARY SUPPLEMENTS: ICD-10-CM

## 2023-07-26 DIAGNOSIS — M81.0 SENILE OSTEOPOROSIS: ICD-10-CM

## 2023-07-26 RX ORDER — ASCORBIC ACID 500 MG
TABLET ORAL
Qty: 60 TABLET | Refills: 11 | Status: SHIPPED | OUTPATIENT
Start: 2023-07-26 | End: 2024-07-02

## 2023-07-26 RX ORDER — CHOLECALCIFEROL (VITAMIN D3) 50 MCG
TABLET ORAL
Qty: 60 TABLET | Refills: 11 | Status: SHIPPED | OUTPATIENT
Start: 2023-07-26 | End: 2023-10-26

## 2023-07-27 ENCOUNTER — TRANSFERRED RECORDS (OUTPATIENT)
Dept: HEALTH INFORMATION MANAGEMENT | Facility: CLINIC | Age: 78
End: 2023-07-27
Payer: COMMERCIAL

## 2023-07-28 NOTE — PROGRESS NOTES
New Physical therapy recertification has been received and placed in Dr. Macario's folder for review and signature.   Deni Lizarraga EMT 07/28/2023 8:45AM

## 2023-07-31 NOTE — PROGRESS NOTES
New physical therapy recertification has been signed and faxed back at [phone].  Deni Lizarraga EMT 07/31/2023 10:43AM

## 2023-08-07 ENCOUNTER — LAB REQUISITION (OUTPATIENT)
Dept: LAB | Facility: CLINIC | Age: 78
End: 2023-08-07
Payer: COMMERCIAL

## 2023-08-07 DIAGNOSIS — R31.9 HEMATURIA, UNSPECIFIED: ICD-10-CM

## 2023-08-18 ENCOUNTER — TELEPHONE (OUTPATIENT)
Dept: GERIATRICS | Facility: CLINIC | Age: 78
End: 2023-08-18
Payer: COMMERCIAL

## 2023-08-19 NOTE — TELEPHONE ENCOUNTER
Muna got in touch with this NP to states that she is having a stent put in her groin this coming Wednesday as she periodically is checked due to hx of poor circulation to the left leg.  She is to hold her Elquis for 2 days prior and asked that an order be given to nursing so it will be help Monday through Wednesday morning.  Able to take the rest of her medications.  Will also add not to take ibuprofen in that time frame which is a PRN order for muna.  No exam needed prior to stent placement.    ORDERS:     HOLD ELIQUIS MONDAY 8/21, TUESDAY 8/22 AND MORNING OF WEDNESDAY 8/23 PRIOR TO PROCEDURE THAT DAY.  NO IBUPROFEN EITHER SO HOLD THAT IN THAT TIME FRAME.    Love Lucas, APRN CNP

## 2023-08-24 DIAGNOSIS — I82.409 ACUTE THROMBOEMBOLISM OF DEEP VEINS OF LOWER EXTREMITY (H): ICD-10-CM

## 2023-08-24 RX ORDER — APIXABAN 5 MG/1
TABLET, FILM COATED ORAL
Qty: 60 TABLET | Refills: 11 | Status: SHIPPED | OUTPATIENT
Start: 2023-08-24 | End: 2024-07-29

## 2023-08-28 ENCOUNTER — TELEPHONE (OUTPATIENT)
Dept: FAMILY MEDICINE | Facility: CLINIC | Age: 78
End: 2023-08-28
Payer: COMMERCIAL

## 2023-08-28 ENCOUNTER — TELEPHONE (OUTPATIENT)
Dept: OBGYN | Facility: CLINIC | Age: 78
End: 2023-08-28

## 2023-08-28 DIAGNOSIS — M81.0 SENILE OSTEOPOROSIS: Primary | ICD-10-CM

## 2023-08-28 DIAGNOSIS — Z92.29 PERSONAL HISTORY OF DRUG THERAPY: ICD-10-CM

## 2023-08-28 NOTE — TELEPHONE ENCOUNTER
Cheryl called and wanted to set up Muna's Prolia injection for October.     Patient is scheduled for 10-10-23 @ 3:30 pm . Will also send a message to Dr. Abrams to put in lab orders for before and after as well.     All questions were answered.

## 2023-08-28 NOTE — TELEPHONE ENCOUNTER
[facility] Call Center    Phone Message    May a detailed message be left on voicemail: yes     Reason for Call: Other: Per pt friend Rubi calling to schedule pt a prolia inj. Please call Rubi      Action Taken: Message routed to:  Other: [facility]    Travel Screening: Not Applicable

## 2023-09-05 ENCOUNTER — TELEPHONE (OUTPATIENT)
Dept: GERIATRICS | Facility: CLINIC | Age: 78
End: 2023-09-05
Payer: COMMERCIAL

## 2023-09-05 DIAGNOSIS — I10 PRIMARY HYPERTENSION: Primary | ICD-10-CM

## 2023-09-05 RX ORDER — LOSARTAN POTASSIUM 25 MG/1
12.5 TABLET ORAL EVERY EVENING
Qty: 15 TABLET | Refills: 11 | Status: SHIPPED | OUTPATIENT
Start: 2023-09-05 | End: 2024-07-29

## 2023-09-05 NOTE — TELEPHONE ENCOUNTER
Received communication from muna and her personal helper this morning of Muna feeling bloated over last few days/weeks.  She asked for a medications review.    During the day she would inform this NP about Norvasc or the Zyrtec maybe causing the abnormal feeling.      In looking at all her medications, those two may cause abdominal pain but does not specifically say bloating.  Muna feels her seat belt in her electric w/c is too light but does not eat more than she has before.      Would think from the two medications Norvasc can cause edema/swelling.      Has been on lisinopril before but removed by another provider and she thought it was due to dry mouth.      In looking at all the B/P medications, leaning to do Cozaar 12.5mg po daily in PM and see how she responds.  She is willing to go off the Zyrtec as well.      ORDERS:   Discontinue Norvasc - s/e of edema  Start Losartan 25mg give 12.5mg po every PM - HTN  BMP and CBC in two weeks on a Monday.  Discontinue Zyrtec  GELACIO - Muna may buy an OTC allergy medication but asked her to let nursing or this NP know so an order can be written and to keep at bedside.    Electronically signed by Love Lucas RN, CNP

## 2023-09-12 ENCOUNTER — ASSISTED LIVING VISIT (OUTPATIENT)
Dept: GERIATRICS | Facility: CLINIC | Age: 78
End: 2023-09-12
Payer: COMMERCIAL

## 2023-09-12 VITALS
SYSTOLIC BLOOD PRESSURE: 129 MMHG | HEART RATE: 61 BPM | RESPIRATION RATE: 13 BRPM | DIASTOLIC BLOOD PRESSURE: 71 MMHG | WEIGHT: 240 LBS | TEMPERATURE: 97.5 F | BODY MASS INDEX: 35.44 KG/M2 | OXYGEN SATURATION: 97 %

## 2023-09-12 DIAGNOSIS — N31.9 NEUROGENIC BLADDER: ICD-10-CM

## 2023-09-12 DIAGNOSIS — I10 PRIMARY HYPERTENSION: ICD-10-CM

## 2023-09-12 DIAGNOSIS — Z97.8 FOLEY CATHETER IN PLACE: ICD-10-CM

## 2023-09-12 DIAGNOSIS — J31.0 CHRONIC RHINITIS: ICD-10-CM

## 2023-09-12 DIAGNOSIS — N39.0 FREQUENT UTI: ICD-10-CM

## 2023-09-12 DIAGNOSIS — T78.40XA SENSITIVITY TO MEDICATION, INITIAL ENCOUNTER: Primary | ICD-10-CM

## 2023-09-12 DIAGNOSIS — G35 MS (MULTIPLE SCLEROSIS) (H): ICD-10-CM

## 2023-09-12 PROCEDURE — 99349 HOME/RES VST EST MOD MDM 40: CPT | Performed by: NURSE PRACTITIONER

## 2023-09-12 NOTE — PROGRESS NOTES
[facility] GERIATRICS  ACUTE/EPISODIC VISIT    [facility] Medical Record Number:  [id number]  Place of Service where encounter took place:  [facility] [place] ([facility]) [[id number]]    Chief Complaint   Patient presents with    RECHECK       HPI:    Cristina Stevens is a 78 year old  (1945), who is being seen today for an episodic care visit.  HPI information obtained from: facility chart records, facility staff, patient report, and [facility] chart review.    Today's concern is:    Diagnoses         Codes Comments    Sensitivity to medication, initial encounter    -  Primary T78.40XA     Primary hypertension     I10     Neurogenic bladder     N31.9     Frequent UTI     N39.0     Edwards catheter in place     Z97.8     MS (multiple sclerosis) (H)     G35     Chronic rhinitis     J31.0           Came to see Muna today to follow up with recent change with her HTN medication.  Muna's aide text this nurse practitioner to update that Muna was feeling very bloated and having more swelling in her lower extremities.  Told her that day this nurse practitioner would review her medications and see what could be giving her the issues but Muna spent the day looking up side effects herself.  When she felt she found the correct medication she text to say either it was the Zyrtec or the amlodipine that most likely was causing her to have swelling.    Later that evening this nurse practitioner reviewed her medications as well and offered her replacements for the 2 medications.  Felt that the amlodipine had more of a risk of swelling then probably the Zyrtec did.  Muna's choice to come off the Zyrtec and then she was going to buy just an over-the-counter medication to have in her apartment.  As far as the blood pressure medicine, felt that she needed to have a replacement.  She was on lisinopril at 1 point but then came off of it due to either dry mouth or shaking she recalled.  This nurse practitioner was not  the one who took her off of the lisinopril.  It was another provider in the clinic.  She was put on Norvasc instead.  Spent some time thinking about what could she be replaced with.  Her pulses average are in the low 60s so do not feel that a beta-blocker would be ideal for her.  Overall her blood pressures are within normal limits and would like to keep that way.  Debated between hydralazine 10 mg twice a day or a very low dose of an ARB.  Discussed this with Muna, and she trusted what ever the decision was going to be made.  In the end did put Muna on losartan 12.5 mg every evening.  Blood pressures monitored in a timely fashion after the start of the medication.    Today Muna is in her apartment and up in her wheelchair that is electric and that she can control.  She states that taking away the Norvasc has greatly improved the swelling that she was experiencing.      ALLERGIES:    Allergies   Allergen Reactions    Nitrofuran Derivatives Visual Disturbance     Hallucinations    Tetracyclines & Related GI Disturbance    Ampicillin GI Disturbance    Cefadroxil Muscle Pain (Myalgia)    Cephalexin Diarrhea and GI Disturbance    Levofloxacin Other (See Comments)     Phlebitis with IV infusion    Sulfa Antibiotics Nausea and Vomiting, GI Disturbance and Unknown     Reaction occurred as a child    Sulfasalazine Nausea and Vomiting        MEDICATIONS:  Post Discharge Medication Reconciliation Status: patient was not discharged from an inpatient facility or TCU.     Current Outpatient Medications   Medication Sig Dispense Refill    amoxicillin (AMOXIL) 250 MG chewable tablet CHEW AND SWALLOW 8 TABLETS (2000 MG) BY MOUTH 1 HOUR PRIOR TO DENTAL 8 tablet 97    armodafinil (NUVIGIL) 150 MG TABS tablet 1 TABLET ORALLY EVERY MORNING (DX: NARCOLEPSY) CONTROLLED MEDICATION ? TO BE STORED IN DOUBLE LOCKED STORAGE 30 tablet 4    ASPIRIN LOW DOSE 81 MG chewable tablet 1 TABLET ORALLY DAILY 28 tablet 11    Baclofen (LIORESAL) 5 MG  tablet Take 1 tablet (5 mg) by mouth 3 times daily as needed for muscle spasms 20 tablet 3    CO2-Releasing (-TWO) SUPP 1 supp daily prn and if not effective or falls out, may repeat again until results achieved.  May keep at bedside      Cranberry 250 MG TABS Give 2 tabs (500mg) po twice a day 120 tablet 11    DESITIN 13 % CREA APPLY TOPICALLY TO AREAS OF REDNESS OR RASH WITH EACH BRIEF CHANGE 113 g 97    ELIQUIS ANTICOAGULANT 5 MG tablet 1 TABLET ORALLY 2 TIMES DAILY. DO NOT START BEFORE MAY 14TH,2022 (DX: VENOUS THROMBOEMBOLISM) 60 tablet 11    ibuprofen (ADVIL/MOTRIN) 400 MG tablet TAKE 1 TABLET BY MOUTH TWICE DAILY AS NEEDED FOR PAIN 30 tablet 97    losartan (COZAAR) 25 MG tablet Take 0.5 tablets (12.5 mg) by mouth every evening 15 tablet 11    medical cannabis (Patient's own supply) Take 1 Dose by mouth See Admin Instructions (The purpose of this order is to document that the patient reports taking medical cannabis.  This is not a prescription, and is not used to certify that the patient has a qualifying medical condition.)      menthol-zinc oxide (CALMOSEPTINE) 0.44-20.6 % OINT ointment APPLY TO AFFECTED AREA(S) TOPICALLY TO BUTTOCKS AS NEEDED WITH BRIEF CHANGES Strength: 0.44-20.6 % 113 g 97    methenamine hippurate (HIPREX) 1 g tablet 1 TABLET ORALLY 2 TIMES DAILY  (DX: URINARY TRACT INFECTION) 56 tablet 11    Multiple Vitamin (TAB-A-DAWSON) TABS Take 1 tablet by mouth daily 28 tablet 97    NYAMYC 511290 UNIT/GM external powder APPLY TOPICALLY BETWEEN TOES EVERY EVENING UNTIL HEALED;THEN APPLY TOPICALLY BETWEEN TOES 2 TIMES DAILY AS NEEDED UNTIL HEALED 60 g 10    polyethylene glycol (MIRALAX/GLYCOLAX) powder MIX 17GM OF POWDER IN 8OZ OF WATER UNTIL COMPLETELY DISSOLVED. DRINK SOLUTION DAILY AS NEEDED 527 g 98    Probiotic Product (PROBIOTIC DAILY) CAPS Take 1 capsule by mouth daily 28 capsule 98    senna-docusate (SENEXON-S) 8.6-50 MG tablet Take 2 tablets by mouth daily as needed for constipation 62  tablet 12    sterile water, bottle, (WATER FOR IRRIGATION, STERILE) irrigation 60ml  prn to irrigate ruby catheter when plugged 500 mL 11    vitamin C (ASCORBIC ACID) 500 MG tablet 1 TABLET ORALLY 2 TIMES DAILY (DX: CHRONIC CYSTITIS) 60 tablet 11    VITAMIN D3 50 MCG (2000 UT) tablet 2 TABLETS (4000U) DAILY 60 tablet 11         REVIEW OF SYSTEMS:  4 point ROS neg other than the symptoms noted above in the HPI.  No chest pain or SOB.  No urinary symptoms currently      PHYSICAL EXAM:  /71   Pulse 61   Temp 97.5  F (36.4  C)   Resp 13   Wt 108.9 kg (240 lb)   SpO2 97%   BMI 35.44 kg/m    Resident is up in her electric w/c on the computer.  She looked like she was in a different wheelchair.  Typically she will sit up high and she actually lowered it for the visit.  She did not get a new lap tray and this nurse practitioner thought she usually has a bigger one then presently seen.  She now has a telephone salazar on the lap tray so than her phone can always be in vision standing up.      Heart rate regular and strong.  Her voice wavers in strength and tone.    Abdomen is round, soft, and non-tender.  Non-ambulatory.  Lift is used for transfers.  Uses an electric w/c with shoulder straps so she can stay upright in chair.    Catheter present and has yellow urine in the leg bag.  No sediment noted.      ASSESSMENT / PLAN:  (T78.40XA) Sensitivity to medication, initial encounter  (primary encounter diagnosis)  Comment: will give an order to add amlodipine to chart for sensitivity with reaction of bloating/edema.    (I10) Primary hypertension  Comment: now is on Lorsartan 12.5mg at bedtime.  Will have labs done on 9/18 for monitoring kidneys and CBC.    Will have staff check her B/P's for a few days after one -two weeks of the new medication being given.      (N31.9) Neurogenic bladder  (N39.0) Frequent UTI  (Z97.8) Ruby catheter in place  Comment: Ruby is patent and without issues of today.  Catheter is  changed every 2 weeks to help avoid UTIs.  Muna privately pays for supplies that medication does not cover.  No hospitalizations due to her frequent UTIs.  Muna and her aide usually let this NP know if new scripts needed to be sent to vendors whom supply the extras.    (G35) MS (multiple sclerosis) (H)  Comment: no changes with her MS status.  Lives in extended care wing of the [facility] and so gets sufficient care from staff to meet her needs and then muna has a private aide as well.      (J31.0) Chronic rhinitis  Comment: Zyrtec was discontinued as possible side effects but will not add to allergy list.  Muna has already gotten OTC Allegra in her possession.  Will give a order to the nurse for them to put on her medication list so they know she has it in her apartment.      Orders:   Add amlodipine to allergy list to be a sensitivity of edema/bloating  OTC Allegra - use as directed on container.  May keep at bedside and give herself.  Do not order from the pharmacy.    Electronically signed by  CAROL Rodríguez CNP

## 2023-09-12 NOTE — LETTER
9/12/2023        RE: Cristina Stevens  [address]  [address]  [place] [address]        [facility] GERIATRICS  ACUTE/EPISODIC VISIT    [facility] Medical Record Number:  [id number]  Place of Service where encounter took place:  [facility] [place] ([facility]) [[id number]]    Chief Complaint   Patient presents with     RECHECK       HPI:    Cristina Stevens is a 78 year old  (1945), who is being seen today for an episodic care visit.  HPI information obtained from: facility chart records, facility staff, patient report, and [facility] chart review.    Today's concern is:    Diagnoses         Codes Comments    Sensitivity to medication, initial encounter    -  Primary T78.40XA     Primary hypertension     I10     Neurogenic bladder     N31.9     Frequent UTI     N39.0     Edwards catheter in place     Z97.8     MS (multiple sclerosis) (H)     G35     Chronic rhinitis     J31.0           Came to see Muna today to follow up with recent change with her HTN medication.  Muna's aide text this nurse practitioner to update that Muna was feeling very bloated and having more swelling in her lower extremities.  Told her that day this nurse practitioner would review her medications and see what could be giving her the issues but Muna spent the day looking up side effects herself.  When she felt she found the correct medication she text to say either it was the Zyrtec or the amlodipine that most likely was causing her to have swelling.    Later that evening this nurse practitioner reviewed her medications as well and offered her replacements for the 2 medications.  Felt that the amlodipine had more of a risk of swelling then probably the Zyrtec did.  Muna's choice to come off the Zyrtec and then she was going to buy just an over-the-counter medication to have in her apartment.  As far as the blood pressure medicine, felt that she needed to have a replacement.  She was on lisinopril at 1 point but  then came off of it due to either dry mouth or shaking she recalled.  This nurse practitioner was not the one who took her off of the lisinopril.  It was another provider in the clinic.  She was put on Norvasc instead.  Spent some time thinking about what could she be replaced with.  Her pulses average are in the low 60s so do not feel that a beta-blocker would be ideal for her.  Overall her blood pressures are within normal limits and would like to keep that way.  Debated between hydralazine 10 mg twice a day or a very low dose of an ARB.  Discussed this with Muna, and she trusted what ever the decision was going to be made.  In the end did put Muna on losartan 12.5 mg every evening.  Blood pressures monitored in a timely fashion after the start of the medication.    Today Muna is in her apartment and up in her wheelchair that is electric and that she can control.  She states that taking away the Norvasc has greatly improved the swelling that she was experiencing.      ALLERGIES:    Allergies   Allergen Reactions     Nitrofuran Derivatives Visual Disturbance     Hallucinations     Tetracyclines & Related GI Disturbance     Ampicillin GI Disturbance     Cefadroxil Muscle Pain (Myalgia)     Cephalexin Diarrhea and GI Disturbance     Levofloxacin Other (See Comments)     Phlebitis with IV infusion     Sulfa Antibiotics Nausea and Vomiting, GI Disturbance and Unknown     Reaction occurred as a child     Sulfasalazine Nausea and Vomiting        MEDICATIONS:  Post Discharge Medication Reconciliation Status: patient was not discharged from an inpatient facility or TCU.     Current Outpatient Medications   Medication Sig Dispense Refill     amoxicillin (AMOXIL) 250 MG chewable tablet CHEW AND SWALLOW 8 TABLETS (2000 MG) BY MOUTH 1 HOUR PRIOR TO DENTAL 8 tablet 97     armodafinil (NUVIGIL) 150 MG TABS tablet 1 TABLET ORALLY EVERY MORNING (DX: NARCOLEPSY) CONTROLLED MEDICATION ? TO BE STORED IN DOUBLE LOCKED STORAGE 30  tablet 4     ASPIRIN LOW DOSE 81 MG chewable tablet 1 TABLET ORALLY DAILY 28 tablet 11     Baclofen (LIORESAL) 5 MG tablet Take 1 tablet (5 mg) by mouth 3 times daily as needed for muscle spasms 20 tablet 3     CO2-Releasing (-TWO) SUPP 1 supp daily prn and if not effective or falls out, may repeat again until results achieved.  May keep at bedside       Cranberry 250 MG TABS Give 2 tabs (500mg) po twice a day 120 tablet 11     DESITIN 13 % CREA APPLY TOPICALLY TO AREAS OF REDNESS OR RASH WITH EACH BRIEF CHANGE 113 g 97     ELIQUIS ANTICOAGULANT 5 MG tablet 1 TABLET ORALLY 2 TIMES DAILY. DO NOT START BEFORE MAY 14TH,2022 (DX: VENOUS THROMBOEMBOLISM) 60 tablet 11     ibuprofen (ADVIL/MOTRIN) 400 MG tablet TAKE 1 TABLET BY MOUTH TWICE DAILY AS NEEDED FOR PAIN 30 tablet 97     losartan (COZAAR) 25 MG tablet Take 0.5 tablets (12.5 mg) by mouth every evening 15 tablet 11     medical cannabis (Patient's own supply) Take 1 Dose by mouth See Admin Instructions (The purpose of this order is to document that the patient reports taking medical cannabis.  This is not a prescription, and is not used to certify that the patient has a qualifying medical condition.)       menthol-zinc oxide (CALMOSEPTINE) 0.44-20.6 % OINT ointment APPLY TO AFFECTED AREA(S) TOPICALLY TO BUTTOCKS AS NEEDED WITH BRIEF CHANGES Strength: 0.44-20.6 % 113 g 97     methenamine hippurate (HIPREX) 1 g tablet 1 TABLET ORALLY 2 TIMES DAILY  (DX: URINARY TRACT INFECTION) 56 tablet 11     Multiple Vitamin (TAB-A-DAWSON) TABS Take 1 tablet by mouth daily 28 tablet 97     NYAMYC 227799 UNIT/GM external powder APPLY TOPICALLY BETWEEN TOES EVERY EVENING UNTIL HEALED;THEN APPLY TOPICALLY BETWEEN TOES 2 TIMES DAILY AS NEEDED UNTIL HEALED 60 g 10     polyethylene glycol (MIRALAX/GLYCOLAX) powder MIX 17GM OF POWDER IN 8OZ OF WATER UNTIL COMPLETELY DISSOLVED. DRINK SOLUTION DAILY AS NEEDED 527 g 98     Probiotic Product (PROBIOTIC DAILY) CAPS Take 1 capsule by mouth  daily 28 capsule 98     senna-docusate (SENEXON-S) 8.6-50 MG tablet Take 2 tablets by mouth daily as needed for constipation 62 tablet 12     sterile water, bottle, (WATER FOR IRRIGATION, STERILE) irrigation 60ml  prn to irrigate ruby catheter when plugged 500 mL 11     vitamin C (ASCORBIC ACID) 500 MG tablet 1 TABLET ORALLY 2 TIMES DAILY (DX: CHRONIC CYSTITIS) 60 tablet 11     VITAMIN D3 50 MCG (2000 UT) tablet 2 TABLETS (4000U) DAILY 60 tablet 11         REVIEW OF SYSTEMS:  4 point ROS neg other than the symptoms noted above in the HPI.  No chest pain or SOB.  No urinary symptoms currently      PHYSICAL EXAM:  /71   Pulse 61   Temp 97.5  F (36.4  C)   Resp 13   Wt 108.9 kg (240 lb)   SpO2 97%   BMI 35.44 kg/m    Resident is up in her electric w/c on the computer.  She looked like she was in a different wheelchair.  Typically she will sit up high and she actually lowered it for the visit.  She did not get a new lap tray and this nurse practitioner thought she usually has a bigger one then presently seen.  She now has a telephone salazar on the lap tray so than her phone can always be in vision standing up.      Heart rate regular and strong.  Her voice wavers in strength and tone.    Abdomen is round, soft, and non-tender.  Non-ambulatory.  Lift is used for transfers.  Uses an electric w/c with shoulder straps so she can stay upright in chair.    Catheter present and has yellow urine in the leg bag.  No sediment noted.      ASSESSMENT / PLAN:  (T78.40XA) Sensitivity to medication, initial encounter  (primary encounter diagnosis)  Comment: will give an order to add amlodipine to chart for sensitivity with reaction of bloating/edema.    (I10) Primary hypertension  Comment: now is on Lorsartan 12.5mg at bedtime.  Will have labs done on 9/18 for monitoring kidneys and CBC.    Will have staff check her B/P's for a few days after one -two weeks of the new medication being given.      (N31.9) Neurogenic  bladder  (N39.0) Frequent UTI  (Z97.8) Edwards catheter in place  Comment: Edwards is patent and without issues of today.  Catheter is changed every 2 weeks to help avoid UTIs.  Muna privately pays for supplies that medication does not cover.  No hospitalizations due to her frequent UTIs.  Muna and her aide usually let this NP know if new scripts needed to be sent to vendors whom supply the extras.    (G35) MS (multiple sclerosis) (H)  Comment: no changes with her MS status.  Lives in extended care [place] of the [facility] and so gets sufficient care from staff to meet her needs and then muna has a private aide as well.      (J31.0) Chronic rhinitis  Comment: Zyrtec was discontinued as possible side effects but will not add to allergy list.  Muna has already gotten OTC Allegra in her possession.  Will give a order to the nurse for them to put on her medication list so they know she has it in her apartment.      Orders:   Add amlodipine to allergy list to be a sensitivity of edema/bloating  OTC Allegra - use as directed on container.  May keep at bedside and give herself.  Do not order from the pharmacy.    Electronically signed by  CAROL Rodríguez CNP            Sincerely,        CAROL Rodríguez CNP

## 2023-09-16 ENCOUNTER — LAB REQUISITION (OUTPATIENT)
Dept: LAB | Facility: CLINIC | Age: 78
End: 2023-09-16
Payer: COMMERCIAL

## 2023-09-16 DIAGNOSIS — I10 ESSENTIAL (PRIMARY) HYPERTENSION: ICD-10-CM

## 2023-09-18 ENCOUNTER — ASSISTED LIVING VISIT (OUTPATIENT)
Dept: GERIATRICS | Facility: CLINIC | Age: 78
End: 2023-09-18
Payer: COMMERCIAL

## 2023-09-18 VITALS
BODY MASS INDEX: 35.44 KG/M2 | RESPIRATION RATE: 13 BRPM | DIASTOLIC BLOOD PRESSURE: 71 MMHG | OXYGEN SATURATION: 97 % | HEART RATE: 61 BPM | WEIGHT: 240 LBS | TEMPERATURE: 97.5 F | SYSTOLIC BLOOD PRESSURE: 129 MMHG

## 2023-09-18 DIAGNOSIS — G35 MS (MULTIPLE SCLEROSIS) (H): ICD-10-CM

## 2023-09-18 DIAGNOSIS — Z97.8 FOLEY CATHETER IN PLACE: ICD-10-CM

## 2023-09-18 DIAGNOSIS — Z91.89 POTENTIAL IMPAIRMENT OF SKIN INTEGRITY: ICD-10-CM

## 2023-09-18 DIAGNOSIS — G82.50 TETRAPARESIS (H): ICD-10-CM

## 2023-09-18 DIAGNOSIS — N31.9 NEUROGENIC BLADDER: Primary | ICD-10-CM

## 2023-09-18 LAB
ANION GAP SERPL CALCULATED.3IONS-SCNC: 15 MMOL/L (ref 7–15)
BASOPHILS # BLD AUTO: 0 10E3/UL (ref 0–0.2)
BASOPHILS NFR BLD AUTO: 1 %
BUN SERPL-MCNC: 19.6 MG/DL (ref 8–23)
CALCIUM SERPL-MCNC: 10.1 MG/DL (ref 8.8–10.2)
CHLORIDE SERPL-SCNC: 102 MMOL/L (ref 98–107)
CREAT SERPL-MCNC: 0.74 MG/DL (ref 0.51–0.95)
DEPRECATED HCO3 PLAS-SCNC: 23 MMOL/L (ref 22–29)
EGFRCR SERPLBLD CKD-EPI 2021: 83 ML/MIN/1.73M2
EOSINOPHIL # BLD AUTO: 0.3 10E3/UL (ref 0–0.7)
EOSINOPHIL NFR BLD AUTO: 5 %
ERYTHROCYTE [DISTWIDTH] IN BLOOD BY AUTOMATED COUNT: 16.3 % (ref 10–15)
GLUCOSE SERPL-MCNC: 72 MG/DL (ref 70–99)
HCT VFR BLD AUTO: 43.6 % (ref 35–47)
HGB BLD-MCNC: 13.5 G/DL (ref 11.7–15.7)
IMM GRANULOCYTES # BLD: 0 10E3/UL
IMM GRANULOCYTES NFR BLD: 0 %
LYMPHOCYTES # BLD AUTO: 2 10E3/UL (ref 0.8–5.3)
LYMPHOCYTES NFR BLD AUTO: 32 %
MCH RBC QN AUTO: 28.4 PG (ref 26.5–33)
MCHC RBC AUTO-ENTMCNC: 31 G/DL (ref 31.5–36.5)
MCV RBC AUTO: 92 FL (ref 78–100)
MONOCYTES # BLD AUTO: 0.8 10E3/UL (ref 0–1.3)
MONOCYTES NFR BLD AUTO: 12 %
NEUTROPHILS # BLD AUTO: 3.2 10E3/UL (ref 1.6–8.3)
NEUTROPHILS NFR BLD AUTO: 50 %
NRBC # BLD AUTO: 0 10E3/UL
NRBC BLD AUTO-RTO: 0 /100
PLATELET # BLD AUTO: 250 10E3/UL (ref 150–450)
POTASSIUM SERPL-SCNC: 4.2 MMOL/L (ref 3.4–5.3)
RBC # BLD AUTO: 4.75 10E6/UL (ref 3.8–5.2)
SODIUM SERPL-SCNC: 140 MMOL/L (ref 136–145)
WBC # BLD AUTO: 6.3 10E3/UL (ref 4–11)

## 2023-09-18 PROCEDURE — 85025 COMPLETE CBC W/AUTO DIFF WBC: CPT | Mod: ORL | Performed by: NURSE PRACTITIONER

## 2023-09-18 PROCEDURE — 99349 HOME/RES VST EST MOD MDM 40: CPT | Performed by: NURSE PRACTITIONER

## 2023-09-18 PROCEDURE — 36415 COLL VENOUS BLD VENIPUNCTURE: CPT | Mod: ORL | Performed by: NURSE PRACTITIONER

## 2023-09-18 PROCEDURE — 80048 BASIC METABOLIC PNL TOTAL CA: CPT | Mod: ORL | Performed by: NURSE PRACTITIONER

## 2023-09-18 NOTE — PROGRESS NOTES
[facility] GERIATRICS  ACUTE/EPISODIC VISIT    [facility] Medical Record Number:  [id number]  Place of Service where encounter took place:  [facility] ([facility]) [[id number]]    Chief Complaint   Patient presents with    RECHECK       HPI:    Cristina Stevens is a 78 year old  (1945), who is being seen today for an episodic care visit.  HPI information obtained from: facility chart records and patient report.  Periarea assessed during this visit.  No redness or open area around skin fold.  No blood around catheter.  Edwards intact.    Today's concern is:    Came to assess patient's concerns with bloody discharge during Edwards catheter change.  Patient was up in her wheelchair during the beginning of the visit.  She reports that she gets bowel prep daily as she is suppository to have a bowel movement.  Catheter changes completed every 2 weeks by her agency staff.  She reports that blood was noted around her periarea during the last catheter change.  She is not certain if the blood is around the skin fold or just from her groin area.  Had a discussion about a the possibility of a suprapubic catheter.  Patient states that that might be a possibility in the future but she presently does not feel ready to have a catheter surgically placed.  Provider returned for second visit when patient was transferred to her bed to enable assessment of the periarea.      ALLERGIES:    Allergies   Allergen Reactions    Nitrofuran Derivatives Visual Disturbance     Hallucinations    Tetracyclines & Related GI Disturbance    Amlodipine Other (See Comments)     Bloating/edema    Ampicillin GI Disturbance    Cefadroxil Muscle Pain (Myalgia)    Cephalexin Diarrhea and GI Disturbance    Levofloxacin Other (See Comments)     Phlebitis with IV infusion    Sulfa Antibiotics Nausea and Vomiting, GI Disturbance and Unknown     Reaction occurred as a child    Sulfasalazine Nausea and Vomiting        MEDICATIONS:  Post Discharge  Medication Reconciliation Status: patient was not discharged from an inpatient facility or TCU.     Current Outpatient Medications   Medication Sig Dispense Refill    amoxicillin (AMOXIL) 250 MG chewable tablet CHEW AND SWALLOW 8 TABLETS (2000 MG) BY MOUTH 1 HOUR PRIOR TO DENTAL 8 tablet 97    armodafinil (NUVIGIL) 150 MG TABS tablet 1 TABLET ORALLY EVERY MORNING (DX: NARCOLEPSY) CONTROLLED MEDICATION ? TO BE STORED IN DOUBLE LOCKED STORAGE 30 tablet 4    ASPIRIN LOW DOSE 81 MG chewable tablet 1 TABLET ORALLY DAILY 28 tablet 11    Baclofen (LIORESAL) 5 MG tablet Take 1 tablet (5 mg) by mouth 3 times daily as needed for muscle spasms 20 tablet 3    CO2-Releasing (-TWO) SUPP 1 supp daily prn and if not effective or falls out, may repeat again until results achieved.  May keep at bedside      Cranberry 250 MG TABS Give 2 tabs (500mg) po twice a day 120 tablet 11    DESITIN 13 % CREA APPLY TOPICALLY TO AREAS OF REDNESS OR RASH WITH EACH BRIEF CHANGE 113 g 97    ELIQUIS ANTICOAGULANT 5 MG tablet 1 TABLET ORALLY 2 TIMES DAILY. DO NOT START BEFORE MAY 14TH,2022 (DX: VENOUS THROMBOEMBOLISM) 60 tablet 11    ibuprofen (ADVIL/MOTRIN) 400 MG tablet TAKE 1 TABLET BY MOUTH TWICE DAILY AS NEEDED FOR PAIN 30 tablet 97    losartan (COZAAR) 25 MG tablet Take 0.5 tablets (12.5 mg) by mouth every evening 15 tablet 11    medical cannabis (Patient's own supply) Take 1 Dose by mouth See Admin Instructions (The purpose of this order is to document that the patient reports taking medical cannabis.  This is not a prescription, and is not used to certify that the patient has a qualifying medical condition.)      menthol-zinc oxide (CALMOSEPTINE) 0.44-20.6 % OINT ointment APPLY TO AFFECTED AREA(S) TOPICALLY TO BUTTOCKS AS NEEDED WITH BRIEF CHANGES Strength: 0.44-20.6 % 113 g 97    methenamine hippurate (HIPREX) 1 g tablet 1 TABLET ORALLY 2 TIMES DAILY  (DX: URINARY TRACT INFECTION) 56 tablet 11    Multiple Vitamin (TAB-A-DAWSON) TABS Take 1  tablet by mouth daily 28 tablet 97    NYAMYC 120245 UNIT/GM external powder APPLY TOPICALLY BETWEEN TOES EVERY EVENING UNTIL HEALED;THEN APPLY TOPICALLY BETWEEN TOES 2 TIMES DAILY AS NEEDED UNTIL HEALED 60 g 10    polyethylene glycol (MIRALAX/GLYCOLAX) powder MIX 17GM OF POWDER IN 8OZ OF WATER UNTIL COMPLETELY DISSOLVED. DRINK SOLUTION DAILY AS NEEDED 527 g 98    Probiotic Product (PROBIOTIC DAILY) CAPS Take 1 capsule by mouth daily 28 capsule 98    senna-docusate (SENEXON-S) 8.6-50 MG tablet Take 2 tablets by mouth daily as needed for constipation 62 tablet 12    sterile water, bottle, (WATER FOR IRRIGATION, STERILE) irrigation 60ml  prn to irrigate ruby catheter when plugged 500 mL 11    vitamin C (ASCORBIC ACID) 500 MG tablet 1 TABLET ORALLY 2 TIMES DAILY (DX: CHRONIC CYSTITIS) 60 tablet 11    VITAMIN D3 50 MCG (2000 UT) tablet 2 TABLETS (4000U) DAILY 60 tablet 11       REVIEW OF SYSTEMS:  4 point ROS neg other than the symptoms noted above in the HPI.      PHYSICAL EXAM:  /71   Pulse 61   Temp 97.5  F (36.4  C)   Resp 13   Wt 108.9 kg (240 lb)   SpO2 97%   BMI 35.44 kg/m    Asked the aides to help Muna to bed again in order to look at her wendi area.  A2 with 4 point lift, got her into her alternating air mattress bed that Muna can use the remove to move her head or feet up and down.    Muna has adaptable pants that snap on the side.  The two aides bent up her legs and had to hold them in position.  There was a lot of barrier cream present in wendi area and had to wipe some away.  Labia was red and irregular shaped like an extra flap but did not think anything of that.  The aide tried to show where she thought she saw something and could not see.  Palpated areas as well.   Turned her over to her left side to see if easier to look at her bottom and rectal/coccyx/sacral area clear.  Wendi area looks like a wendi area.  For now staff applied cream again and then got her back up to the wheelchair.       Told her that if the area was bleeding then think it was top layer of skin irritated or maybe minor trauma from catheter insertion.  If it was blisters/pustules than do think they would be visible but not bleeding.      Heart rate regular and strong.  Lungs are clear.  Alert and oriented x3.      Lab came to draw blood then.    ASSESSMENT / PLAN:  (N31.9) Neurogenic bladder  (primary encounter diagnosis)  (Z97.8) Edwards catheter in place  (Z91.89) Potential impairment of skin integrity  Comment: could not see any alteration in skin integrity as wiped away the barrier cream.  No bleeding.  Complimented her on her coccyx / sacral area looks normal and she stated that her cushion was seat mapped before she got her electric wheelchair.  May have missed a sore but do not think so.  Muna was thankful for looking.  Potential for trauma with catheter changes every 2 weeks.    Has seen a urologist recently to make sure things were ok.  Does not want suprapubic catheter as she is nervous with procedure that she would look what ability she has in her arms to do things.  As long as all is well, will leave alone.    (G35) MS (multiple sclerosis) (H)  (G82.50) Tetraparesis (H)  Comment: watched the aides get muna back together again when back in wheelchair.  Muna is unable to help at all and so they support her body to fix her clothing so it is not bunched up.  Feel Muna is pia to be in extended care at the [facility].  She gets good care from the aides and facility as well from her private aide(s).        Orders:  No new orders.     Electronically signed by  Avinash Foreman NP student with [employer]    I was present with the medical student/advanced practice registered nurse, FERMÍN Ling, who participated in the service and in the documentation of this note and/or observation along side of this provider. I have verified the history and personally performed the physical exam and medical decision making. I  agree with the assessment and plan of care as documented in the note.     CAROL Rodríguez CNP

## 2023-09-18 NOTE — LETTER
9/18/2023        RE: Cristina Stevens  [address]  [address]  [place] [address]        [facility] GERIATRICS  ACUTE/EPISODIC VISIT    [facility] Medical Record Number:  [id number]  Place of Service where encounter took place:  [facility] [place] ([facility]) [[id number]]    Chief Complaint   Patient presents with     RECHECK       HPI:    Cristina Stevens is a 78 year old  (1945), who is being seen today for an episodic care visit.  HPI information obtained from: facility chart records and patient report.  Periarea assessed during this visit.  No redness or open area around skin fold.  No blood around catheter.  Edwards intact.    Today's concern is:    Came to assess patient's concerns with bloody discharge during Edwards catheter change.  Patient was up in her wheelchair during the beginning of the visit.  She reports that she gets bowel prep daily as she is suppository to have a bowel movement.  Catheter changes completed every 2 weeks by her agency staff.  She reports that blood was noted around her periarea during the last catheter change.  She is not certain if the blood is around the skin fold or just from her groin area.  Had a discussion about a the possibility of a suprapubic catheter.  Patient states that that might be a possibility in the future but she presently does not feel ready to have a catheter surgically placed.  Provider returned for second visit when patient was transferred to her bed to enable assessment of the periarea.      ALLERGIES:    Allergies   Allergen Reactions     Nitrofuran Derivatives Visual Disturbance     Hallucinations     Tetracyclines & Related GI Disturbance     Amlodipine Other (See Comments)     Bloating/edema     Ampicillin GI Disturbance     Cefadroxil Muscle Pain (Myalgia)     Cephalexin Diarrhea and GI Disturbance     Levofloxacin Other (See Comments)     Phlebitis with IV infusion     Sulfa Antibiotics Nausea and Vomiting, GI Disturbance and  Unknown     Reaction occurred as a child     Sulfasalazine Nausea and Vomiting        MEDICATIONS:  Post Discharge Medication Reconciliation Status: patient was not discharged from an inpatient facility or TCU.     Current Outpatient Medications   Medication Sig Dispense Refill     amoxicillin (AMOXIL) 250 MG chewable tablet CHEW AND SWALLOW 8 TABLETS (2000 MG) BY MOUTH 1 HOUR PRIOR TO DENTAL 8 tablet 97     armodafinil (NUVIGIL) 150 MG TABS tablet 1 TABLET ORALLY EVERY MORNING (DX: NARCOLEPSY) CONTROLLED MEDICATION ? TO BE STORED IN DOUBLE LOCKED STORAGE 30 tablet 4     ASPIRIN LOW DOSE 81 MG chewable tablet 1 TABLET ORALLY DAILY 28 tablet 11     Baclofen (LIORESAL) 5 MG tablet Take 1 tablet (5 mg) by mouth 3 times daily as needed for muscle spasms 20 tablet 3     CO2-Releasing (-TWO) SUPP 1 supp daily prn and if not effective or falls out, may repeat again until results achieved.  May keep at bedside       Cranberry 250 MG TABS Give 2 tabs (500mg) po twice a day 120 tablet 11     DESITIN 13 % CREA APPLY TOPICALLY TO AREAS OF REDNESS OR RASH WITH EACH BRIEF CHANGE 113 g 97     ELIQUIS ANTICOAGULANT 5 MG tablet 1 TABLET ORALLY 2 TIMES DAILY. DO NOT START BEFORE MAY 14TH,2022 (DX: VENOUS THROMBOEMBOLISM) 60 tablet 11     ibuprofen (ADVIL/MOTRIN) 400 MG tablet TAKE 1 TABLET BY MOUTH TWICE DAILY AS NEEDED FOR PAIN 30 tablet 97     losartan (COZAAR) 25 MG tablet Take 0.5 tablets (12.5 mg) by mouth every evening 15 tablet 11     medical cannabis (Patient's own supply) Take 1 Dose by mouth See Admin Instructions (The purpose of this order is to document that the patient reports taking medical cannabis.  This is not a prescription, and is not used to certify that the patient has a qualifying medical condition.)       menthol-zinc oxide (CALMOSEPTINE) 0.44-20.6 % OINT ointment APPLY TO AFFECTED AREA(S) TOPICALLY TO BUTTOCKS AS NEEDED WITH BRIEF CHANGES Strength: 0.44-20.6 % 113 g 97     methenamine hippurate (HIPREX) 1 g  tablet 1 TABLET ORALLY 2 TIMES DAILY  (DX: URINARY TRACT INFECTION) 56 tablet 11     Multiple Vitamin (TAB-A-DAWSON) TABS Take 1 tablet by mouth daily 28 tablet 97     NYAMYC 634628 UNIT/GM external powder APPLY TOPICALLY BETWEEN TOES EVERY EVENING UNTIL HEALED;THEN APPLY TOPICALLY BETWEEN TOES 2 TIMES DAILY AS NEEDED UNTIL HEALED 60 g 10     polyethylene glycol (MIRALAX/GLYCOLAX) powder MIX 17GM OF POWDER IN 8OZ OF WATER UNTIL COMPLETELY DISSOLVED. DRINK SOLUTION DAILY AS NEEDED 527 g 98     Probiotic Product (PROBIOTIC DAILY) CAPS Take 1 capsule by mouth daily 28 capsule 98     senna-docusate (SENEXON-S) 8.6-50 MG tablet Take 2 tablets by mouth daily as needed for constipation 62 tablet 12     sterile water, bottle, (WATER FOR IRRIGATION, STERILE) irrigation 60ml  prn to irrigate ruby catheter when plugged 500 mL 11     vitamin C (ASCORBIC ACID) 500 MG tablet 1 TABLET ORALLY 2 TIMES DAILY (DX: CHRONIC CYSTITIS) 60 tablet 11     VITAMIN D3 50 MCG (2000 UT) tablet 2 TABLETS (4000U) DAILY 60 tablet 11       REVIEW OF SYSTEMS:  4 point ROS neg other than the symptoms noted above in the HPI.      PHYSICAL EXAM:  /71   Pulse 61   Temp 97.5  F (36.4  C)   Resp 13   Wt 108.9 kg (240 lb)   SpO2 97%   BMI 35.44 kg/m    Asked the aides to help Muna to bed again in order to look at her wendi area.  A2 with 4 point lift, got her into her alternating air mattress bed that Muna can use the remove to move her head or feet up and down.    Muna has adaptable pants that snap on the side.  The two aides bent up her legs and had to hold them in position.  There was a lot of barrier cream present in wendi area and had to wipe some away.  Labia was red and irregular shaped like an extra flap but did not think anything of that.  The aide tried to show where she thought she saw something and could not see.  Palpated areas as well.   Turned her over to her left side to see if easier to look at her bottom and  rectal/coccyx/sacral area clear.  Naomi area looks like a naomi area.  For now staff applied cream again and then got her back up to the wheelchair.      Told her that if the area was bleeding then think it was top layer of skin irritated or maybe minor trauma from catheter insertion.  If it was blisters/pustules than do think they would be visible but not bleeding.      Heart rate regular and strong.  Lungs are clear.  Alert and oriented x3.      Lab came to draw blood then.    ASSESSMENT / PLAN:  (N31.9) Neurogenic bladder  (primary encounter diagnosis)  (Z97.8) Edwards catheter in place  (Z91.89) Potential impairment of skin integrity  Comment: could not see any alteration in skin integrity as wiped away the barrier cream.  No bleeding.  Complimented her on her coccyx / sacral area looks normal and she stated that her cushion was seat mapped before she got her electric wheelchair.  May have missed a sore but do not think so.  Muna was thankful for looking.  Potential for trauma with catheter changes every 2 weeks.    Has seen a urologist recently to make sure things were ok.  Does not want suprapubic catheter as she is nervous with procedure that she would look what ability she has in her arms to do things.  As long as all is well, will leave alone.    (G35) MS (multiple sclerosis) (H)  (G82.50) Tetraparesis (H)  Comment: watched the aides get muna back together again when back in wheelchair.  Muna is unable to help at all and so they support her body to fix her clothing so it is not bunched up.  Feel Muna is pia to be in extended care at the [facility].  She gets good care from the aides and facility as well from her private aide(s).        Orders:  No new orders.     Electronically signed by  Avinash Foreman NP student with [employer]    I was present with the medical student/advanced practice registered nurse, FERMÍN Ling, who participated in the service and in the documentation of this note  and/or observation along side of this provider. I have verified the history and personally performed the physical exam and medical decision making. I agree with the assessment and plan of care as documented in the note.     CAROL Rodríguez CNP             Sincerely,        CAROL Rodríguez CNP

## 2023-10-02 PROCEDURE — 87088 URINE BACTERIA CULTURE: CPT | Mod: ORL | Performed by: NURSE PRACTITIONER

## 2023-10-02 PROCEDURE — 81001 URINALYSIS AUTO W/SCOPE: CPT | Mod: ORL | Performed by: NURSE PRACTITIONER

## 2023-10-03 ENCOUNTER — TELEPHONE (OUTPATIENT)
Dept: OBGYN | Facility: CLINIC | Age: 78
End: 2023-10-03
Payer: COMMERCIAL

## 2023-10-03 ENCOUNTER — LAB REQUISITION (OUTPATIENT)
Dept: LAB | Facility: CLINIC | Age: 78
End: 2023-10-03
Payer: COMMERCIAL

## 2023-10-03 ENCOUNTER — LAB (OUTPATIENT)
Dept: LAB | Facility: CLINIC | Age: 78
End: 2023-10-03
Payer: COMMERCIAL

## 2023-10-03 DIAGNOSIS — M81.0 SENILE OSTEOPOROSIS: ICD-10-CM

## 2023-10-03 DIAGNOSIS — R82.90 ABNORMAL FINDING ON URINALYSIS: Primary | ICD-10-CM

## 2023-10-03 DIAGNOSIS — R31.9 HEMATURIA, UNSPECIFIED: ICD-10-CM

## 2023-10-03 LAB
ALBUMIN UR-MCNC: 30 MG/DL
ANION GAP SERPL CALCULATED.3IONS-SCNC: 12 MMOL/L (ref 7–15)
APPEARANCE UR: ABNORMAL
BILIRUB UR QL STRIP: NEGATIVE
BUN SERPL-MCNC: 16.1 MG/DL (ref 8–23)
CALCIUM SERPL-MCNC: 9.6 MG/DL (ref 8.8–10.2)
CHLORIDE SERPL-SCNC: 105 MMOL/L (ref 98–107)
COLOR UR AUTO: ABNORMAL
CREAT SERPL-MCNC: 0.65 MG/DL (ref 0.51–0.95)
DEPRECATED HCO3 PLAS-SCNC: 23 MMOL/L (ref 22–29)
EGFRCR SERPLBLD CKD-EPI 2021: 90 ML/MIN/1.73M2
GLUCOSE SERPL-MCNC: 89 MG/DL (ref 70–99)
GLUCOSE UR STRIP-MCNC: NEGATIVE MG/DL
HGB UR QL STRIP: NEGATIVE
KETONES UR STRIP-MCNC: NEGATIVE MG/DL
LEUKOCYTE ESTERASE UR QL STRIP: ABNORMAL
MUCOUS THREADS #/AREA URNS LPF: PRESENT /LPF
NITRATE UR QL: NEGATIVE
PH UR STRIP: 5.5 [PH] (ref 5–7)
POTASSIUM SERPL-SCNC: 4.5 MMOL/L (ref 3.4–5.3)
RBC URINE: 1 /HPF
SODIUM SERPL-SCNC: 140 MMOL/L (ref 135–145)
SP GR UR STRIP: 1.02 (ref 1–1.03)
SQUAMOUS EPITHELIAL: 3 /HPF
TRANSITIONAL EPI: 1 /HPF
UROBILINOGEN UR STRIP-MCNC: NORMAL MG/DL
WBC CLUMPS #/AREA URNS HPF: PRESENT /HPF
WBC URINE: 74 /HPF

## 2023-10-03 PROCEDURE — 36415 COLL VENOUS BLD VENIPUNCTURE: CPT

## 2023-10-03 PROCEDURE — 80048 BASIC METABOLIC PNL TOTAL CA: CPT

## 2023-10-03 RX ORDER — CIPROFLOXACIN 500 MG/1
500 TABLET, FILM COATED ORAL 2 TIMES DAILY
Qty: 10 TABLET | Refills: 0 | Status: SHIPPED | OUTPATIENT
Start: 2023-10-03 | End: 2023-10-08

## 2023-10-03 NOTE — TELEPHONE ENCOUNTER
Rubi is calling today to ask about options to have Muna's BMP lab drawn. I explained to Rubi the best option is for them to have the lab drawn at a [place] lab location so we can review the results before Muna's upcoming appointment. Rubi verbalized understanding and will call back with any additional questions.

## 2023-10-04 DIAGNOSIS — R14.1 FLATULENCE, ERUCTATION AND GAS PAIN: Primary | ICD-10-CM

## 2023-10-04 DIAGNOSIS — R14.2 FLATULENCE, ERUCTATION AND GAS PAIN: Primary | ICD-10-CM

## 2023-10-04 DIAGNOSIS — R14.3 FLATULENCE, ERUCTATION AND GAS PAIN: Primary | ICD-10-CM

## 2023-10-04 RX ORDER — SIMETHICONE 125 MG
TABLET,CHEWABLE ORAL
Qty: 60 TABLET | Refills: 11 | Status: SHIPPED | OUTPATIENT
Start: 2023-10-04

## 2023-10-04 NOTE — PROGRESS NOTES
Muna contacted this NP about feeling bloated on and off.  She did some research and felt she would like to try Gas-X.  Gave her the option of ordering through [facility] or buying on own as she would need the order on her chart.    She opted to have it through [facility] and keep at bedside.    New orders sent to [facility]:     Gas X (Simethicone) 125mg chew po 4x a day PRN for gas/bloating.  May keep at bedside to self admin      CAROL Rodríguez CNP

## 2023-10-05 LAB
BACTERIA UR CULT: ABNORMAL
BACTERIA UR CULT: ABNORMAL

## 2023-10-06 DIAGNOSIS — N39.0 URINARY TRACT INFECTION ASSOCIATED WITH INDWELLING URETHRAL CATHETER, SUBSEQUENT ENCOUNTER: Primary | ICD-10-CM

## 2023-10-06 DIAGNOSIS — T83.511D URINARY TRACT INFECTION ASSOCIATED WITH INDWELLING URETHRAL CATHETER, SUBSEQUENT ENCOUNTER: Primary | ICD-10-CM

## 2023-10-06 RX ORDER — CIPROFLOXACIN 500 MG/1
500 TABLET, FILM COATED ORAL 2 TIMES DAILY
Qty: 4 TABLET | Refills: 0 | Status: SHIPPED | OUTPATIENT
Start: 2023-10-06 | End: 2023-10-08

## 2023-10-06 NOTE — PROGRESS NOTES
Culture 50,000-100,000 CFU/mL Escherichia coli Abnormal       10,000-50,000 CFU/mL Klebsiella pneumoniae Abnormal            Resulting Agency: [facility]       Sent script to [facility] to extend the Cipro due to catheter indwelling UTI.    Orders:  Cipro 500mg po BID x2 days beyond the first 5 days = 7 days.      CAROL Rodríguez CNP

## 2023-10-10 ENCOUNTER — ALLIED HEALTH/NURSE VISIT (OUTPATIENT)
Dept: OBGYN | Facility: CLINIC | Age: 78
End: 2023-10-10
Attending: FAMILY MEDICINE
Payer: COMMERCIAL

## 2023-10-10 DIAGNOSIS — M81.0 SENILE OSTEOPOROSIS: Primary | ICD-10-CM

## 2023-10-10 PROCEDURE — 250N000011 HC RX IP 250 OP 636: Mod: JZ | Performed by: FAMILY MEDICINE

## 2023-10-10 PROCEDURE — 96372 THER/PROPH/DIAG INJ SC/IM: CPT | Performed by: FAMILY MEDICINE

## 2023-10-10 RX ADMIN — DENOSUMAB 60 MG: 60 INJECTION SUBCUTANEOUS at 15:17

## 2023-10-10 NOTE — NURSING NOTE
Chief Complaint   Patient presents with    Allied Health Visit     Prolia inj   Clinic Administered Medication Documentation      Prolia Documentation    Indication: Prolia  (denosumab) is a prescription medicine used to treat osteoporosis in patients who:   Are at high risk for fracture, meaning patients who have had a fracture related to osteoporosis, or who have multiple risk factors for fracture.  Cannot use another osteoporosis medicine or other osteoporosis medicines did not work well.  The timeline for early/late injections would be 4 weeks early and any time after the 6 month peace. If a patient receives their injection late, then the subsequent injection would be 6 months from the date that they actually received the injection.    When was the last injection?  04/10/2023  Was the last injection at least 6 months ago? Yes  Has the prior authorization been completed?  Yes  Is there an active order (written within the past 365 days, with administrations remaining, not ) in the chart?  Yes  Patient denies any dental work involving the bone (e.g. tooth extraction or dental implants) in the past 4 weeks?  Yes  Patient denies plans for any dental work involving the bone (e.g. tooth extraction or dental implants) in the next 4 weeks? Yes    The following steps were completed to comply with the REMS program for Prolia:  Confirms that patient received education from RN or provider via the Medication Guide and Patient Counseling Chart, including the serious risks of Prolia  and the symptoms of each risk.  Told the patient to seek prompt medical attention if they have signs or symptoms of any of the serious risks, as described in the Medication Guide and Patient Counseling Chart that was reviewed between the patient and RN or LP.  Provided each patient a copy of the Medication Guide and Patient Brochure.    Prior to injection, verified patient identity using patient's name and date of birth. Medication was  administered. Please see MAR and medication order for additional information. Patient instructed to report any adverse reaction to staff immediately.    Vial/Syringe: Single dose vial. Was entire vial of medication used? Yes  Was this medication supplied by the patient? No  Verified that the patient has refills remaining in their prescription.

## 2023-10-10 NOTE — PATIENT INSTRUCTIONS
Thank you for trusting us with your care!     If you need to contact us for questions about:  Symptoms, Scheduling & Medical Questions; Non-urgent (2-3 day response) Lori message, Urgent (needing response today) [phone] (if after 3:30pm next day response)   Prescriptions: Please call your Pharmacy   Billing: Jenelle [phone] or [facility]:[phone]

## 2023-10-13 DIAGNOSIS — G47.429 NARCOLEPSY DUE TO UNDERLYING CONDITION WITHOUT CATAPLEXY: ICD-10-CM

## 2023-10-14 RX ORDER — ARMODAFINIL 150 MG/1
TABLET ORAL
Qty: 30 TABLET | Refills: 5 | Status: SHIPPED | OUTPATIENT
Start: 2023-10-14 | End: 2024-03-27

## 2023-10-20 DIAGNOSIS — N39.0 FREQUENT UTI: Primary | ICD-10-CM

## 2023-10-20 RX ORDER — CRANBERRY CONC/C/BACILL COAG 250-30-15
TABLET ORAL
Qty: 112 TABLET | Refills: 11 | Status: SHIPPED | OUTPATIENT
Start: 2023-10-20

## 2023-10-26 DIAGNOSIS — M81.0 SENILE OSTEOPOROSIS: ICD-10-CM

## 2023-10-26 RX ORDER — CHOLECALCIFEROL (VITAMIN D3) 50 MCG
1 TABLET ORAL DAILY
Qty: 30 TABLET | Refills: 11 | Status: SHIPPED | OUTPATIENT
Start: 2023-10-26 | End: 2024-08-22

## 2023-10-26 NOTE — PROGRESS NOTES
New orders for Calcium and Vitamin per bone specialist for Muna:     Discontinue previous Vitamin D orders  Start Vitamin D 2000 international unit(s) po daily for osteoporosis  Calcium citrate 333mg tab - take 2 tabs in AM and 1 tab in PM for osteoporosis      CAROL Rodríguez CNP

## 2023-11-13 DIAGNOSIS — R23.8 SKIN IRRITATION: ICD-10-CM

## 2023-11-14 RX ORDER — ANORECTAL OINTMENT 15.7; .44; 24; 20.6 G/100G; G/100G; G/100G; G/100G
OINTMENT TOPICAL
Qty: 113 G | Refills: 11 | Status: SHIPPED | OUTPATIENT
Start: 2023-11-14

## 2023-11-16 DIAGNOSIS — M81.0 AGE-RELATED OSTEOPOROSIS WITHOUT CURRENT PATHOLOGICAL FRACTURE: Primary | ICD-10-CM

## 2023-11-16 RX ORDER — PSEUDOEPHEDRINE HCL 30 MG
TABLET ORAL
Qty: 112 TABLET | Refills: 11 | Status: SHIPPED | OUTPATIENT
Start: 2023-11-16 | End: 2024-07-14

## 2023-11-21 ENCOUNTER — ASSISTED LIVING VISIT (OUTPATIENT)
Dept: GERIATRICS | Facility: CLINIC | Age: 78
End: 2023-11-21
Payer: COMMERCIAL

## 2023-11-21 VITALS
BODY MASS INDEX: 35.44 KG/M2 | OXYGEN SATURATION: 97 % | RESPIRATION RATE: 10 BRPM | SYSTOLIC BLOOD PRESSURE: 122 MMHG | HEART RATE: 81 BPM | DIASTOLIC BLOOD PRESSURE: 70 MMHG | WEIGHT: 240 LBS | TEMPERATURE: 96.8 F

## 2023-11-21 DIAGNOSIS — G82.50 TETRAPARESIS (H): ICD-10-CM

## 2023-11-21 DIAGNOSIS — Z97.8 FOLEY CATHETER IN PLACE: ICD-10-CM

## 2023-11-21 DIAGNOSIS — Z99.3 WHEELCHAIR DEPENDENCE: ICD-10-CM

## 2023-11-21 DIAGNOSIS — G35 MS (MULTIPLE SCLEROSIS) (H): ICD-10-CM

## 2023-11-21 DIAGNOSIS — N31.9 NEUROGENIC BLADDER: ICD-10-CM

## 2023-11-21 DIAGNOSIS — R14.0 ABDOMINAL BLOATING: Primary | ICD-10-CM

## 2023-11-21 PROCEDURE — 99349 HOME/RES VST EST MOD MDM 40: CPT | Performed by: NURSE PRACTITIONER

## 2023-11-21 NOTE — LETTER
11/21/2023        RE: Cristina Stevens  [address]  [address]  [place] [address]        [facility] GERIATRICS  ACUTE/EPISODIC VISIT    [facility] Medical Record Number:  [id number]  Place of Service where encounter took place:  [facility] ([facility]) [[id number]]    Chief Complaint   Patient presents with     RECHECK       HPI:    Cristina Stevens is a 78 year old  (1945), who is being seen today for an episodic care visit.  HPI information obtained from: facility chart records, facility staff, and patient report.    Today's concern is:    Diagnoses         Codes Comments    Abdominal bloating    -  Primary R14.0     Tetraparesis (H)     G82.50     Wheelchair dependence     Z99.3     MS (multiple sclerosis) (H)     G35     Neurogenic bladder     N31.9     Edwards catheter in place     Z97.8           MRI to see Muna today as she was communicating with this nurse practitioner in the before days that her abdomen felt very bloated.  She does have an order for simethicone as needed that she could easily take.  He also has a private aide that acts as her advocate and does a lot of the communication to this nurse practitioner as well because of Muna's limited use of her extremities because of her multiple sclerosis.    Found Muna in her apartment as she was working on her computer.  She is alert and oriented x 3.  She loves to talk beyond what her health problems are which feel is good for her because she can be isolating in her apartment.  She is more of a private person.    Muna held out both of her hands above her abdomen and made a comment that she has been feeling pretty bloated but is trying to figure out the cause of why this is going on.  She is testing different foods thinking it could be gluten that might be bothering her.  She is not asking for any help in this endeavor.  She knows she can ask if she needs a referral somewhere.      ALLERGIES:    Allergies   Allergen  Reactions     Nitrofuran Derivatives Visual Disturbance     Hallucinations     Tetracyclines & Related GI Disturbance     Amlodipine Other (See Comments)     Bloating/edema     Ampicillin GI Disturbance     Cefadroxil Muscle Pain (Myalgia)     Cephalexin Diarrhea and GI Disturbance     Levofloxacin Other (See Comments)     Phlebitis with IV infusion     Sulfa Antibiotics Nausea and Vomiting, GI Disturbance and Unknown     Reaction occurred as a child     Sulfasalazine Nausea and Vomiting        MEDICATIONS:  Post Discharge Medication Reconciliation Status: patient was not discharged from an inpatient facility or TCU.     Current Outpatient Medications   Medication Sig Dispense Refill     amoxicillin (AMOXIL) 250 MG chewable tablet CHEW AND SWALLOW 8 TABLETS (2000 MG) BY MOUTH 1 HOUR PRIOR TO DENTAL 8 tablet 97     armodafinil (NUVIGIL) 150 MG TABS tablet 1 TABLET ORALLY EVERY MORNING (DX: NARCOLEPSY) CONTROLLED MEDICATION ? TO BE STORED IN DOUBLE LOCKED STORAGE 30 tablet 5     ASPIRIN LOW DOSE 81 MG chewable tablet 1 TABLET ORALLY DAILY 28 tablet 11     Baclofen (LIORESAL) 5 MG tablet Take 1 tablet (5 mg) by mouth 3 times daily as needed for muscle spasms 20 tablet 3     calcium citrate 250 MG TABS 2 TABLETS (500MG) ORALLY 2 TIMES DAILY (DX: OSTEOPOROSIS) 112 tablet 11     Calcium Citrate 333 MG TABS Take 2 tablets by mouth every morning AND 1 tablet every evening. 90 tablet 11     CO2-Releasing (-TWO) SUPP 1 supp daily prn and if not effective or falls out, may repeat again until results achieved.  May keep at bedside       Cranberry 250 MG TABS Give 2 tabs (500mg) po twice a day 120 tablet 11     Cranberry-Vitamin C-Probiotic (AZO CRANBERRY) 250-30 MG TABS 2 TABLETS (500MG) ORALLY 2 TIMES DAILY  (DX: URINARY TRACT INFECTION) 112 tablet 11     DESITIN 13 % CREA APPLY TOPICALLY TO AREAS OF REDNESS OR RASH WITH EACH BRIEF CHANGE 113 g 97     ELIQUIS ANTICOAGULANT 5 MG tablet 1 TABLET ORALLY 2 TIMES DAILY. DO NOT  START BEFORE MAY 14TH,2022 (DX: VENOUS THROMBOEMBOLISM) 60 tablet 11     ibuprofen (ADVIL/MOTRIN) 400 MG tablet TAKE 1 TABLET BY MOUTH TWICE DAILY AS NEEDED FOR PAIN 30 tablet 97     losartan (COZAAR) 25 MG tablet Take 0.5 tablets (12.5 mg) by mouth every evening 15 tablet 11     medical cannabis (Patient's own supply) Take 1 Dose by mouth See Admin Instructions (The purpose of this order is to document that the patient reports taking medical cannabis.  This is not a prescription, and is not used to certify that the patient has a qualifying medical condition.)       menthol-zinc oxide (CALMOSEPTINE) 0.44-20.6 % OINT ointment APPLY TO AFFECTED AREA TOPICALLY TO SKIN CHAFFED AREAS  2 TIMES DAILY;APPLY TO AFFECTED AREA TOPICALLY 2 TIMES DAILY AS NEEDED 113 g 11     methenamine hippurate (HIPREX) 1 g tablet 1 TABLET ORALLY 2 TIMES DAILY  (DX: URINARY TRACT INFECTION) 56 tablet 11     Multiple Vitamin (TAB-A-DAWSON) TABS Take 1 tablet by mouth daily 28 tablet 97     NYAMYC 052722 UNIT/GM external powder APPLY TOPICALLY BETWEEN TOES EVERY EVENING UNTIL HEALED;THEN APPLY TOPICALLY BETWEEN TOES 2 TIMES DAILY AS NEEDED UNTIL HEALED 60 g 10     polyethylene glycol (MIRALAX/GLYCOLAX) powder MIX 17GM OF POWDER IN 8OZ OF WATER UNTIL COMPLETELY DISSOLVED. DRINK SOLUTION DAILY AS NEEDED 527 g 98     Probiotic Product (PROBIOTIC DAILY) CAPS Take 1 capsule by mouth daily 28 capsule 98     senna-docusate (SENEXON-S) 8.6-50 MG tablet Take 2 tablets by mouth daily as needed for constipation 62 tablet 12     simethicone (MYLICON) 125 MG chewable tablet 125mg po 4 times a day PRN for gas (may keep at bedside per request) 60 tablet 11     sterile water, bottle, (WATER FOR IRRIGATION, STERILE) irrigation 60ml  prn to irrigate ruby catheter when plugged 500 mL 11     vitamin C (ASCORBIC ACID) 500 MG tablet 1 TABLET ORALLY 2 TIMES DAILY (DX: CHRONIC CYSTITIS) 60 tablet 11     Vitamin D3 50 mcg (2000 units) tablet Take 1 tablet (50 mcg) by  mouth daily 30 tablet 11       REVIEW OF SYSTEMS:  4 point ROS neg other than the symptoms noted above in the HPI.  Denies any chest pain, shortness of breath, or headaches.      PHYSICAL EXAM:  /70   Pulse 81   Temp 96.8  F (36  C)   Resp 10   Wt 108.9 kg (240 lb)   SpO2 97%   BMI 35.44 kg/m    Consider Muna a Young elderly female who is wheelchair dependent and able to operate the electric wheelchair.  Lift with 2 assist is used and transferring Muna from surface to surface.  Basically she stays in her electric wheelchair most of the day.  Does have a catheter to straight drainage placed.  Urine today is yellow with no sediment seen  Heart rate/rhythm is regular and strong  Lungs are clear to auscultation with fair expansion.  Can see that sometimes when she talks it is like she has to catch her breath.  This is in relation to her MS disease.  Voice can be very quiet at times-almost to a whisper.  Abdomen is round and semifirm to touch but not tender.      Muna has little movement in her lower extremities.  She has some purposeful movement in her upper extremities.  Even with her hands can tell she does not have full range of motion.  She has a tray on her electric wheelchair in which the important things that she needs to manage from day-to-day is on the tray.    Last lab was done in October 2023    ASSESSMENT / PLAN:  (R14.0) Abdominal bloating  (primary encounter diagnosis)  Comment: As noted above, Muna is going to do some different testing with her food to see if that is what is causing her bloating feeling.  She does not need anything from this nurse practitioner right now except that she just is updating what she is currently doing.    (G82.50) Tetraparesis (H)  (Z99.3) Wheelchair dependence  (G35) MS (multiple sclerosis) (H)  Comment: Muna has not shown any changes in her functions with her MS.  She continues to be dependent on staff to assist with transfers, incontinence of bowel,  assistance with catheter draining, medication management, as well as meal arrangements.  Muna does not go down to the dining room to eat.  Either she has food that staff will cook for her in the microwave or she will eat some of what is brought down to her for a meal.  Typically do see simple things on her lap tray that includes yogurt and something to drink.  Muna does continue to go out twice a week for therapy to work with her muscles/MS    (N31.9) Neurogenic bladder  (Z97.8) Edwards catheter in place  Comment: At his assistant is the one who manages with the catheter changes.  Do assist in getting her the orders she needs for catheter supplies.  She is still in need of having this catheter due to neurogenic bladder.  It helps keep the urine away from her skin and prevents having skin breakdown.  Typically the catheter does get changed more than what Medicare does allow and so Muna gets extra supplies from another vendor.  When Muna has complications with her catheter, for example blockage, typically her private assistant will notify this nurse practitioner that they have collected a urine sample to be sent in to be tested for urinary tract infection.       Orders:  No new orders today    Electronically signed by  CAROL Rodríguez CNP            Sincerely,        CAROL Rodríguez CNP

## 2023-11-21 NOTE — PROGRESS NOTES
[facility] GERIATRICS  ACUTE/EPISODIC VISIT    [facility] Medical Record Number:  [id number]  Place of Service where encounter took place:  [facility] ([facility]) [[id number]]    Chief Complaint   Patient presents with    RECHECK       HPI:    Cristina Stevens is a 78 year old  (1945), who is being seen today for an episodic care visit.  HPI information obtained from: facility chart records, facility staff, and patient report.    Today's concern is:    Diagnoses         Codes Comments    Abdominal bloating    -  Primary R14.0     Tetraparesis (H)     G82.50     Wheelchair dependence     Z99.3     MS (multiple sclerosis) (H)     G35     Neurogenic bladder     N31.9     Edwards catheter in place     Z97.8           MRI to see Muna today as she was communicating with this nurse practitioner in the before days that her abdomen felt very bloated.  She does have an order for simethicone as needed that she could easily take.  He also has a private aide that acts as her advocate and does a lot of the communication to this nurse practitioner as well because of Muna's limited use of her extremities because of her multiple sclerosis.    Found Muna in her apartment as she was working on her computer.  She is alert and oriented x 3.  She loves to talk beyond what her health problems are which feel is good for her because she can be isolating in her apartment.  She is more of a private person.    Muna held out both of her hands above her abdomen and made a comment that she has been feeling pretty bloated but is trying to figure out the cause of why this is going on.  She is testing different foods thinking it could be gluten that might be bothering her.  She is not asking for any help in this endeavor.  She knows she can ask if she needs a referral somewhere.      ALLERGIES:    Allergies   Allergen Reactions    Nitrofuran Derivatives Visual Disturbance     Hallucinations    Tetracyclines & Related GI  Disturbance    Amlodipine Other (See Comments)     Bloating/edema    Ampicillin GI Disturbance    Cefadroxil Muscle Pain (Myalgia)    Cephalexin Diarrhea and GI Disturbance    Levofloxacin Other (See Comments)     Phlebitis with IV infusion    Sulfa Antibiotics Nausea and Vomiting, GI Disturbance and Unknown     Reaction occurred as a child    Sulfasalazine Nausea and Vomiting        MEDICATIONS:  Post Discharge Medication Reconciliation Status: patient was not discharged from an inpatient facility or TCU.     Current Outpatient Medications   Medication Sig Dispense Refill    amoxicillin (AMOXIL) 250 MG chewable tablet CHEW AND SWALLOW 8 TABLETS (2000 MG) BY MOUTH 1 HOUR PRIOR TO DENTAL 8 tablet 97    armodafinil (NUVIGIL) 150 MG TABS tablet 1 TABLET ORALLY EVERY MORNING (DX: NARCOLEPSY) CONTROLLED MEDICATION ? TO BE STORED IN DOUBLE LOCKED STORAGE 30 tablet 5    ASPIRIN LOW DOSE 81 MG chewable tablet 1 TABLET ORALLY DAILY 28 tablet 11    Baclofen (LIORESAL) 5 MG tablet Take 1 tablet (5 mg) by mouth 3 times daily as needed for muscle spasms 20 tablet 3    calcium citrate 250 MG TABS 2 TABLETS (500MG) ORALLY 2 TIMES DAILY (DX: OSTEOPOROSIS) 112 tablet 11    Calcium Citrate 333 MG TABS Take 2 tablets by mouth every morning AND 1 tablet every evening. 90 tablet 11    CO2-Releasing (-TWO) SUPP 1 supp daily prn and if not effective or falls out, may repeat again until results achieved.  May keep at bedside      Cranberry 250 MG TABS Give 2 tabs (500mg) po twice a day 120 tablet 11    Cranberry-Vitamin C-Probiotic (AZO CRANBERRY) 250-30 MG TABS 2 TABLETS (500MG) ORALLY 2 TIMES DAILY  (DX: URINARY TRACT INFECTION) 112 tablet 11    DESITIN 13 % CREA APPLY TOPICALLY TO AREAS OF REDNESS OR RASH WITH EACH BRIEF CHANGE 113 g 97    ELIQUIS ANTICOAGULANT 5 MG tablet 1 TABLET ORALLY 2 TIMES DAILY. DO NOT START BEFORE MAY 14TH,2022 (DX: VENOUS THROMBOEMBOLISM) 60 tablet 11    ibuprofen (ADVIL/MOTRIN) 400 MG tablet TAKE 1 TABLET  BY MOUTH TWICE DAILY AS NEEDED FOR PAIN 30 tablet 97    losartan (COZAAR) 25 MG tablet Take 0.5 tablets (12.5 mg) by mouth every evening 15 tablet 11    medical cannabis (Patient's own supply) Take 1 Dose by mouth See Admin Instructions (The purpose of this order is to document that the patient reports taking medical cannabis.  This is not a prescription, and is not used to certify that the patient has a qualifying medical condition.)      menthol-zinc oxide (CALMOSEPTINE) 0.44-20.6 % OINT ointment APPLY TO AFFECTED AREA TOPICALLY TO SKIN CHAFFED AREAS  2 TIMES DAILY;APPLY TO AFFECTED AREA TOPICALLY 2 TIMES DAILY AS NEEDED 113 g 11    methenamine hippurate (HIPREX) 1 g tablet 1 TABLET ORALLY 2 TIMES DAILY  (DX: URINARY TRACT INFECTION) 56 tablet 11    Multiple Vitamin (TAB-A-DAWSON) TABS Take 1 tablet by mouth daily 28 tablet 97    NYAMYC 748532 UNIT/GM external powder APPLY TOPICALLY BETWEEN TOES EVERY EVENING UNTIL HEALED;THEN APPLY TOPICALLY BETWEEN TOES 2 TIMES DAILY AS NEEDED UNTIL HEALED 60 g 10    polyethylene glycol (MIRALAX/GLYCOLAX) powder MIX 17GM OF POWDER IN 8OZ OF WATER UNTIL COMPLETELY DISSOLVED. DRINK SOLUTION DAILY AS NEEDED 527 g 98    Probiotic Product (PROBIOTIC DAILY) CAPS Take 1 capsule by mouth daily 28 capsule 98    senna-docusate (SENEXON-S) 8.6-50 MG tablet Take 2 tablets by mouth daily as needed for constipation 62 tablet 12    simethicone (MYLICON) 125 MG chewable tablet 125mg po 4 times a day PRN for gas (may keep at bedside per request) 60 tablet 11    sterile water, bottle, (WATER FOR IRRIGATION, STERILE) irrigation 60ml  prn to irrigate ruby catheter when plugged 500 mL 11    vitamin C (ASCORBIC ACID) 500 MG tablet 1 TABLET ORALLY 2 TIMES DAILY (DX: CHRONIC CYSTITIS) 60 tablet 11    Vitamin D3 50 mcg (2000 units) tablet Take 1 tablet (50 mcg) by mouth daily 30 tablet 11       REVIEW OF SYSTEMS:  4 point ROS neg other than the symptoms noted above in the HPI.  Denies any chest pain,  shortness of breath, or headaches.      PHYSICAL EXAM:  /70   Pulse 81   Temp 96.8  F (36  C)   Resp 10   Wt 108.9 kg (240 lb)   SpO2 97%   BMI 35.44 kg/m    Consider Muna a Young elderly female who is wheelchair dependent and able to operate the electric wheelchair.  Lift with 2 assist is used and transferring Muna from surface to surface.  Basically she stays in her electric wheelchair most of the day.  Does have a catheter to straight drainage placed.  Urine today is yellow with no sediment seen  Heart rate/rhythm is regular and strong  Lungs are clear to auscultation with fair expansion.  Can see that sometimes when she talks it is like she has to catch her breath.  This is in relation to her MS disease.  Voice can be very quiet at times-almost to a whisper.  Abdomen is round and semifirm to touch but not tender.      Muna has little movement in her lower extremities.  She has some purposeful movement in her upper extremities.  Even with her hands can tell she does not have full range of motion.  She has a tray on her electric wheelchair in which the important things that she needs to manage from day-to-day is on the tray.    Last lab was done in October 2023    ASSESSMENT / PLAN:  (R14.0) Abdominal bloating  (primary encounter diagnosis)  Comment: As noted above, Muna is going to do some different testing with her food to see if that is what is causing her bloating feeling.  She does not need anything from this nurse practitioner right now except that she just is updating what she is currently doing.    (G82.50) Tetraparesis (H)  (Z99.3) Wheelchair dependence  (G35) MS (multiple sclerosis) (H)  Comment: Muna has not shown any changes in her functions with her MS.  She continues to be dependent on staff to assist with transfers, incontinence of bowel, assistance with catheter draining, medication management, as well as meal arrangements.  Muna does not go down to the dining room to eat.  Either  she has food that staff will cook for her in the microwave or she will eat some of what is brought down to her for a meal.  Typically do see simple things on her lap tray that includes yogurt and something to drink.  Muna does continue to go out twice a week for therapy to work with her muscles/MS    (N31.9) Neurogenic bladder  (Z97.8) Edwards catheter in place  Comment: At his assistant is the one who manages with the catheter changes.  Do assist in getting her the orders she needs for catheter supplies.  She is still in need of having this catheter due to neurogenic bladder.  It helps keep the urine away from her skin and prevents having skin breakdown.  Typically the catheter does get changed more than what Medicare does allow and so Muna gets extra supplies from another vendor.  When Muna has complications with her catheter, for example blockage, typically her private assistant will notify this nurse practitioner that they have collected a urine sample to be sent in to be tested for urinary tract infection.       Orders:  No new orders today    Electronically signed by  CAROL Rodríguez CNP

## 2023-11-21 NOTE — LETTER
11/21/2023        RE: Cristina Stevens  [address]  [address]  [facility] [address]        No notes on file      Sincerely,        CAROL Rodríguez CNP

## 2023-11-25 ENCOUNTER — TELEPHONE (OUTPATIENT)
Dept: GERIATRICS | Facility: CLINIC | Age: 78
End: 2023-11-25
Payer: COMMERCIAL

## 2023-11-25 ENCOUNTER — LAB REQUISITION (OUTPATIENT)
Dept: LAB | Facility: CLINIC | Age: 78
End: 2023-11-25
Payer: COMMERCIAL

## 2023-11-25 DIAGNOSIS — R31.9 HEMATURIA, UNSPECIFIED: ICD-10-CM

## 2023-11-25 DIAGNOSIS — R82.90 ABNORMAL URINE: Primary | ICD-10-CM

## 2023-11-25 LAB
ALBUMIN UR-MCNC: 50 MG/DL
APPEARANCE UR: ABNORMAL
BILIRUB UR QL STRIP: NEGATIVE
CAOX CRY #/AREA URNS HPF: ABNORMAL /HPF
COLOR UR AUTO: YELLOW
GLUCOSE UR STRIP-MCNC: NEGATIVE MG/DL
HGB UR QL STRIP: ABNORMAL
KETONES UR STRIP-MCNC: NEGATIVE MG/DL
LEUKOCYTE ESTERASE UR QL STRIP: ABNORMAL
MUCOUS THREADS #/AREA URNS LPF: PRESENT /LPF
NITRATE UR QL: NEGATIVE
PH UR STRIP: 6 [PH] (ref 5–7)
RBC URINE: 55 /HPF
SP GR UR STRIP: 1.02 (ref 1–1.03)
SQUAMOUS EPITHELIAL: 6 /HPF
TRANSITIONAL EPI: 2 /HPF
UROBILINOGEN UR STRIP-MCNC: NORMAL MG/DL
WBC CLUMPS #/AREA URNS HPF: PRESENT /HPF
WBC URINE: >182 /HPF

## 2023-11-25 PROCEDURE — 81001 URINALYSIS AUTO W/SCOPE: CPT | Mod: ORL | Performed by: NURSE PRACTITIONER

## 2023-11-25 PROCEDURE — 87086 URINE CULTURE/COLONY COUNT: CPT | Mod: ORL | Performed by: NURSE PRACTITIONER

## 2023-11-25 RX ORDER — CIPROFLOXACIN 500 MG/1
500 TABLET, FILM COATED ORAL 2 TIMES DAILY
Qty: 10 TABLET | Refills: 0 | Status: SHIPPED | OUTPATIENT
Start: 2023-11-25 | End: 2023-11-30

## 2023-11-25 NOTE — TELEPHONE ENCOUNTER
Received a message from muna and her helper Rubi yesterday about Muna's urine being full of sediment and not flowing into the catheter.    Catheter was changed and urine brought to the nurses to send to lab.  Muna requesting to be started on a ABX due to the symptoms.    Will send a script to [facility] and orders to [facility]     Cipro 500mg po BID x5 days for abnormal urine findings    CAROL Rodríguez CNP

## 2023-11-26 LAB — BACTERIA UR CULT: NORMAL

## 2023-11-28 DIAGNOSIS — R10.9 STOMACH DISCOMFORT: Primary | ICD-10-CM

## 2023-11-28 RX ORDER — BACITRACIN ZINC AND POLYMYXIN B SULFATE 500; 10000 [USP'U]/G; [USP'U]/G
2 OINTMENT TOPICAL DAILY
Qty: 30 CAPSULE | Refills: 11 | Status: SHIPPED | OUTPATIENT
Start: 2023-11-28

## 2023-12-05 NOTE — PROGRESS NOTES
Received notice from Cristina Stevens (: 47) and her aide that she is in need of a new mattress for bed as she is past the 5 year peace having this current mattress.    Will send this as an order to [facility] at [phone]    ORDERS:  New Group 2 alternating pressure mattress due to 5 year bed replacement.    Electronically signed:  CAROL Rodríguez CNP    NPI # [id number]    [facility]  [address] [place] [address]  [address]  [place] [address]    Fax: [phone]

## 2023-12-08 NOTE — TELEPHONE ENCOUNTER
Health Call Center    Phone Message    May a detailed message be left on voicemail: yes     Reason for Call:     [facility] from [facility] called she will no longer be at [facility] and is recommending new OT orders for patient to be seen at [facility]. If any questions please call her at [phone] Monday- Friday 8-5pm    Action Taken: Message routed to:  [facility] neurology    Travel Screening: Not Applicable                                                                        
Called and left message for Gaviota explained patient has not been seen at Dr Duval new location. She needs to schedule an appointment in order to write new orders for OT.    Patient caregiver jarret was called. Left voicemail explaining patient needs to be seen at new location, appointment scheduling number given  Sol Lezama MA    
73-year-old male past medical history of atrial fibrillation on blood thinners presenting to the emergency department due to bleeding rectal mass that started this morning.  Patient denies any nausea, vomiting, diarrhea, melena, fevers, chills, body aches tenderness, syncope.  On exam the abdomen was nontender, and a approximately 2 cm mass was noted on the superior aspect of the rectum.  The mass was bleeding, but not gross blood.  Will order CBC, CMP to evaluate for any signs of anemia.  Will order CT abdomen pelvis with IV contrast evaluate for any masses that could be causing the bleeding.  Will order VBG comprehensive to evaluate for any signs of elevated lactate that could indicate sepsis.

## 2023-12-14 ENCOUNTER — DOCUMENTATION ONLY (OUTPATIENT)
Dept: NEUROLOGY | Facility: CLINIC | Age: 78
End: 2023-12-14
Payer: COMMERCIAL

## 2023-12-14 NOTE — PROGRESS NOTES
Physical therapy referral received from [facility], referral placed in Dr. Macario's folder for review and signature.   Deni Lizarraga EMT 12/14/2023 2:58PM

## 2023-12-15 ENCOUNTER — MEDICAL CORRESPONDENCE (OUTPATIENT)
Dept: HEALTH INFORMATION MANAGEMENT | Facility: CLINIC | Age: 78
End: 2023-12-15
Payer: COMMERCIAL

## 2023-12-15 NOTE — PROGRESS NOTES
Physical therapy referral has been signed and faxed back at [phone].  Deni Lizarraga EMT 12/15/2023 8:34AM

## 2023-12-27 ENCOUNTER — DOCUMENTATION ONLY (OUTPATIENT)
Dept: GERIATRICS | Facility: CLINIC | Age: 78
End: 2023-12-27
Payer: COMMERCIAL

## 2024-01-02 ENCOUNTER — DOCUMENTATION ONLY (OUTPATIENT)
Dept: NEUROLOGY | Facility: CLINIC | Age: 79
End: 2024-01-02
Payer: COMMERCIAL

## 2024-01-02 ENCOUNTER — TRANSFERRED RECORDS (OUTPATIENT)
Dept: HEALTH INFORMATION MANAGEMENT | Facility: CLINIC | Age: 79
End: 2024-01-02
Payer: COMMERCIAL

## 2024-01-02 NOTE — PROGRESS NOTES
Plan of care received from [facility], orders placed in Dr. Macario's folder for review and signature.   Deni Lizarraga EMT 01/02/2023 12:13PM

## 2024-01-16 ENCOUNTER — ASSISTED LIVING VISIT (OUTPATIENT)
Dept: GERIATRICS | Facility: CLINIC | Age: 79
End: 2024-01-16
Payer: COMMERCIAL

## 2024-01-16 VITALS
SYSTOLIC BLOOD PRESSURE: 112 MMHG | OXYGEN SATURATION: 97 % | WEIGHT: 240 LBS | HEART RATE: 73 BPM | BODY MASS INDEX: 35.44 KG/M2 | DIASTOLIC BLOOD PRESSURE: 71 MMHG | TEMPERATURE: 96 F | RESPIRATION RATE: 18 BRPM

## 2024-01-16 DIAGNOSIS — E66.01 MORBID OBESITY (H): ICD-10-CM

## 2024-01-16 DIAGNOSIS — G70.9 NEUROMUSCULAR RESPIRATORY WEAKNESS (H): ICD-10-CM

## 2024-01-16 DIAGNOSIS — I73.9 PVD (PERIPHERAL VASCULAR DISEASE) (H): ICD-10-CM

## 2024-01-16 DIAGNOSIS — G35 MS (MULTIPLE SCLEROSIS) (H): ICD-10-CM

## 2024-01-16 DIAGNOSIS — J99 NEUROMUSCULAR RESPIRATORY WEAKNESS (H): ICD-10-CM

## 2024-01-16 DIAGNOSIS — G82.50 TETRAPARESIS (H): ICD-10-CM

## 2024-01-16 PROCEDURE — 99349 HOME/RES VST EST MOD MDM 40: CPT | Performed by: NURSE PRACTITIONER

## 2024-01-16 NOTE — LETTER
1/16/2024        RE: Cristina Stevens  [address]  [address]  [place] [address]        [facility] GERIATRICS  ACUTE/EPISODIC VISIT    [facility] Medical Record Number:  [id number]  Place of Service where encounter took place:  [facility] [place] ([facility]) [[id number]]    Chief Complaint   Patient presents with     RECHECK       HPI:    Cristina Stevens is a 78 year old  (1945), who is being seen today for an episodic care visit.  HPI information obtained from: facility chart records, facility staff, and patient report.    Today's concern is:    Diagnoses         Codes Comments    MS (multiple sclerosis) (H)     G35     Tetraparesis (H)     G82.50     Neuromuscular respiratory weakness (H)     G70.9, J99     PVD (peripheral vascular disease) (H24)     I73.9     Morbid obesity (H)     E66.01           Came to see Muna this week and how she is doing as last week she had such a bad headache that she stayed in bed longer than normal to sleep it off.  Muna stated that it was a one day event but eventually got up.        Muna was up in her electric wheelchair watching her computer as looking up the surfing on [facility].  Talked about various things other than her health.  No changes with her MS.  Continues to go out in the early morning to have therapy for her MS/tetraparesis.    ALLERGIES:    Allergies   Allergen Reactions     Nitrofuran Derivatives Visual Disturbance     Hallucinations     Tetracyclines & Related GI Disturbance     Amlodipine Other (See Comments)     Bloating/edema     Ampicillin GI Disturbance     Cefadroxil Muscle Pain (Myalgia)     Cephalexin Diarrhea and GI Disturbance     Levofloxacin Other (See Comments)     Phlebitis with IV infusion     Sulfa Antibiotics Nausea and Vomiting, GI Disturbance and Unknown     Reaction occurred as a child     Sulfasalazine Nausea and Vomiting        MEDICATIONS:  Post Discharge Medication Reconciliation Status: patient was not  discharged from an inpatient facility or TCU.     Current Outpatient Medications   Medication Sig Dispense Refill     amoxicillin (AMOXIL) 250 MG chewable tablet CHEW AND SWALLOW 8 TABLETS (2000 MG) BY MOUTH 1 HOUR PRIOR TO DENTAL 8 tablet 97     armodafinil (NUVIGIL) 150 MG TABS tablet 1 TABLET ORALLY EVERY MORNING (DX: NARCOLEPSY) CONTROLLED MEDICATION ? TO BE STORED IN DOUBLE LOCKED STORAGE 30 tablet 5     ASPIRIN LOW DOSE 81 MG chewable tablet 1 TABLET ORALLY DAILY 28 tablet 11     Bacillus Coagulans-Inulin (PROBIOTIC FORMULA) 1-250 BILLION-MG CAPS 1 CAPSULE ORALLY DAILY 30 capsule 11     Baclofen (LIORESAL) 5 MG tablet Take 1 tablet (5 mg) by mouth 3 times daily as needed for muscle spasms 20 tablet 3     calcium citrate 250 MG TABS 2 TABLETS (500MG) ORALLY 2 TIMES DAILY (DX: OSTEOPOROSIS) 112 tablet 11     Calcium Citrate 333 MG TABS Take 2 tablets by mouth every morning AND 1 tablet every evening. 90 tablet 11     CO2-Releasing (-TWO) SUPP 1 supp daily prn and if not effective or falls out, may repeat again until results achieved.  May keep at bedside       Cranberry 250 MG TABS Give 2 tabs (500mg) po twice a day 120 tablet 11     Cranberry-Vitamin C-Probiotic (AZO CRANBERRY) 250-30 MG TABS 2 TABLETS (500MG) ORALLY 2 TIMES DAILY  (DX: URINARY TRACT INFECTION) 112 tablet 11     DESITIN 13 % CREA APPLY TOPICALLY TO AREAS OF REDNESS OR RASH WITH EACH BRIEF CHANGE 113 g 97     ELIQUIS ANTICOAGULANT 5 MG tablet 1 TABLET ORALLY 2 TIMES DAILY. DO NOT START BEFORE MAY 14TH,2022 (DX: VENOUS THROMBOEMBOLISM) 60 tablet 11     ibuprofen (ADVIL/MOTRIN) 400 MG tablet TAKE 1 TABLET BY MOUTH TWICE DAILY AS NEEDED FOR PAIN 30 tablet 97     losartan (COZAAR) 25 MG tablet Take 0.5 tablets (12.5 mg) by mouth every evening 15 tablet 11     medical cannabis (Patient's own supply) Take 1 Dose by mouth See Admin Instructions (The purpose of this order is to document that the patient reports taking medical cannabis.  This is not a  prescription, and is not used to certify that the patient has a qualifying medical condition.)       menthol-zinc oxide (CALMOSEPTINE) 0.44-20.6 % OINT ointment APPLY TO AFFECTED AREA TOPICALLY TO SKIN CHAFFED AREAS  2 TIMES DAILY;APPLY TO AFFECTED AREA TOPICALLY 2 TIMES DAILY AS NEEDED 113 g 11     methenamine hippurate (HIPREX) 1 g tablet 1 TABLET ORALLY 2 TIMES DAILY  (DX: URINARY TRACT INFECTION) 56 tablet 11     Multiple Vitamin (TAB-A-DAWSON) TABS Take 1 tablet by mouth daily 28 tablet 97     NYAMYC 155329 UNIT/GM external powder APPLY TOPICALLY BETWEEN TOES EVERY EVENING UNTIL HEALED;THEN APPLY TOPICALLY BETWEEN TOES 2 TIMES DAILY AS NEEDED UNTIL HEALED 60 g 10     polyethylene glycol (MIRALAX/GLYCOLAX) powder MIX 17GM OF POWDER IN 8OZ OF WATER UNTIL COMPLETELY DISSOLVED. DRINK SOLUTION DAILY AS NEEDED 527 g 98     Probiotic Product (PROBIOTIC DAILY) CAPS Take 1 capsule by mouth daily 28 capsule 98     senna-docusate (SENEXON-S) 8.6-50 MG tablet Take 2 tablets by mouth daily as needed for constipation 62 tablet 12     simethicone (MYLICON) 125 MG chewable tablet 125mg po 4 times a day PRN for gas (may keep at bedside per request) 60 tablet 11     sterile water, bottle, (WATER FOR IRRIGATION, STERILE) irrigation 60ml  prn to irrigate ruby catheter when plugged 500 mL 11     vitamin C (ASCORBIC ACID) 500 MG tablet 1 TABLET ORALLY 2 TIMES DAILY (DX: CHRONIC CYSTITIS) 60 tablet 11     Vitamin D3 50 mcg (2000 units) tablet Take 1 tablet (50 mcg) by mouth daily 30 tablet 11       REVIEW OF SYSTEMS:  4 point ROS neg other than the symptoms noted above in the HPI.  No chest pain.  No acute SOB.      PHYSICAL EXAM:  /71   Pulse 73   Temp (!) 96  F (35.6  C)   Resp 18   Wt 108.9 kg (240 lb)   SpO2 97%   BMI 35.44 kg/m    Alert and oriented x3.  Voice is stronger today but at times is quiet due to her neuromuscular respiratory weakness.    Heart rate regular and regular.  Lower extremities has trace to +1  edema.  Wears ADONIS stockings and so can not see her skin.    Catheter in place and leg bag has clear yellow urine present.    Skin is warm and dry.  No open areas.    Non-ambulatory.  Lift used for transfers.  Can use hands to manipulate her immediate environment but even her hands are disabled.    Eats meals in her apartment.        ASSESSMENT / PLAN:  (G35) MS (multiple sclerosis) (H)  (G82.50) Tetraparesis (H)  (G70.9,  J99) Neuromuscular respiratory weakness (H)  Comment: rocky is able to reside in the extended care unit in the [facility] where there is a aide on the floor all the time.  In the morning there are two aides so that they can help move people whom need 2 assist.  Continues to go out to have therapies twice a week to keep extremities from kristin.  No changes in her medications.      (I73.9) PVD (peripheral vascular disease) (H24)  Comment: has had issues with her lower extremities in the past and still goes for follow ups to make sure her circulation is stable and not depleted in the feet as she has had an open area.    (E66.01) Morbid obesity (H)  Comment: unfortunately with her main diagnosis, she is unable to exercise regularly.  She does not over eat and diet is per her liking.  She is very aware of what her weight can do to her health and breathing.     Orders:  No new orders    Electronically signed by  CAROL Rodríguez CNP            Sincerely,        CAROL Rodríguez CNP

## 2024-01-16 NOTE — PROGRESS NOTES
[facility] GERIATRICS  ACUTE/EPISODIC VISIT    [facility] Medical Record Number:  [id number]  Place of Service where encounter took place:  [facility] ([facility]) [[id number]]    Chief Complaint   Patient presents with    RECHECK       HPI:    Cristina Stevens is a 78 year old  (1945), who is being seen today for an episodic care visit.  HPI information obtained from: facility chart records, facility staff, and patient report.    Today's concern is:    Diagnoses         Codes Comments    MS (multiple sclerosis) (H)     G35     Tetraparesis (H)     G82.50     Neuromuscular respiratory weakness (H)     G70.9, J99     PVD (peripheral vascular disease) (H24)     I73.9     Morbid obesity (H)     E66.01           Came to see Muna this week and how she is doing as last week she had such a bad headache that she stayed in bed longer than normal to sleep it off.  Muna stated that it was a one day event but eventually got up.        Muna was up in her electric wheelchair watching her computer as looking up the surfing on [facility].  Talked about various things other than her health.  No changes with her MS.  Continues to go out in the early morning to have therapy for her MS/tetraparesis.    ALLERGIES:    Allergies   Allergen Reactions    Nitrofuran Derivatives Visual Disturbance     Hallucinations    Tetracyclines & Related GI Disturbance    Amlodipine Other (See Comments)     Bloating/edema    Ampicillin GI Disturbance    Cefadroxil Muscle Pain (Myalgia)    Cephalexin Diarrhea and GI Disturbance    Levofloxacin Other (See Comments)     Phlebitis with IV infusion    Sulfa Antibiotics Nausea and Vomiting, GI Disturbance and Unknown     Reaction occurred as a child    Sulfasalazine Nausea and Vomiting        MEDICATIONS:  Post Discharge Medication Reconciliation Status: patient was not discharged from an inpatient facility or TCU.     Current Outpatient Medications   Medication Sig Dispense Refill     amoxicillin (AMOXIL) 250 MG chewable tablet CHEW AND SWALLOW 8 TABLETS (2000 MG) BY MOUTH 1 HOUR PRIOR TO DENTAL 8 tablet 97    armodafinil (NUVIGIL) 150 MG TABS tablet 1 TABLET ORALLY EVERY MORNING (DX: NARCOLEPSY) CONTROLLED MEDICATION ? TO BE STORED IN DOUBLE LOCKED STORAGE 30 tablet 5    ASPIRIN LOW DOSE 81 MG chewable tablet 1 TABLET ORALLY DAILY 28 tablet 11    Bacillus Coagulans-Inulin (PROBIOTIC FORMULA) 1-250 BILLION-MG CAPS 1 CAPSULE ORALLY DAILY 30 capsule 11    Baclofen (LIORESAL) 5 MG tablet Take 1 tablet (5 mg) by mouth 3 times daily as needed for muscle spasms 20 tablet 3    calcium citrate 250 MG TABS 2 TABLETS (500MG) ORALLY 2 TIMES DAILY (DX: OSTEOPOROSIS) 112 tablet 11    Calcium Citrate 333 MG TABS Take 2 tablets by mouth every morning AND 1 tablet every evening. 90 tablet 11    CO2-Releasing (-TWO) SUPP 1 supp daily prn and if not effective or falls out, may repeat again until results achieved.  May keep at bedside      Cranberry 250 MG TABS Give 2 tabs (500mg) po twice a day 120 tablet 11    Cranberry-Vitamin C-Probiotic (AZO CRANBERRY) 250-30 MG TABS 2 TABLETS (500MG) ORALLY 2 TIMES DAILY  (DX: URINARY TRACT INFECTION) 112 tablet 11    DESITIN 13 % CREA APPLY TOPICALLY TO AREAS OF REDNESS OR RASH WITH EACH BRIEF CHANGE 113 g 97    ELIQUIS ANTICOAGULANT 5 MG tablet 1 TABLET ORALLY 2 TIMES DAILY. DO NOT START BEFORE MAY 14TH,2022 (DX: VENOUS THROMBOEMBOLISM) 60 tablet 11    ibuprofen (ADVIL/MOTRIN) 400 MG tablet TAKE 1 TABLET BY MOUTH TWICE DAILY AS NEEDED FOR PAIN 30 tablet 97    losartan (COZAAR) 25 MG tablet Take 0.5 tablets (12.5 mg) by mouth every evening 15 tablet 11    medical cannabis (Patient's own supply) Take 1 Dose by mouth See Admin Instructions (The purpose of this order is to document that the patient reports taking medical cannabis.  This is not a prescription, and is not used to certify that the patient has a qualifying medical condition.)      menthol-zinc oxide  (CALMOSEPTINE) 0.44-20.6 % OINT ointment APPLY TO AFFECTED AREA TOPICALLY TO SKIN CHAFFED AREAS  2 TIMES DAILY;APPLY TO AFFECTED AREA TOPICALLY 2 TIMES DAILY AS NEEDED 113 g 11    methenamine hippurate (HIPREX) 1 g tablet 1 TABLET ORALLY 2 TIMES DAILY  (DX: URINARY TRACT INFECTION) 56 tablet 11    Multiple Vitamin (TAB-A-DAWSON) TABS Take 1 tablet by mouth daily 28 tablet 97    NYAMYC 816414 UNIT/GM external powder APPLY TOPICALLY BETWEEN TOES EVERY EVENING UNTIL HEALED;THEN APPLY TOPICALLY BETWEEN TOES 2 TIMES DAILY AS NEEDED UNTIL HEALED 60 g 10    polyethylene glycol (MIRALAX/GLYCOLAX) powder MIX 17GM OF POWDER IN 8OZ OF WATER UNTIL COMPLETELY DISSOLVED. DRINK SOLUTION DAILY AS NEEDED 527 g 98    Probiotic Product (PROBIOTIC DAILY) CAPS Take 1 capsule by mouth daily 28 capsule 98    senna-docusate (SENEXON-S) 8.6-50 MG tablet Take 2 tablets by mouth daily as needed for constipation 62 tablet 12    simethicone (MYLICON) 125 MG chewable tablet 125mg po 4 times a day PRN for gas (may keep at bedside per request) 60 tablet 11    sterile water, bottle, (WATER FOR IRRIGATION, STERILE) irrigation 60ml  prn to irrigate ruby catheter when plugged 500 mL 11    vitamin C (ASCORBIC ACID) 500 MG tablet 1 TABLET ORALLY 2 TIMES DAILY (DX: CHRONIC CYSTITIS) 60 tablet 11    Vitamin D3 50 mcg (2000 units) tablet Take 1 tablet (50 mcg) by mouth daily 30 tablet 11       REVIEW OF SYSTEMS:  4 point ROS neg other than the symptoms noted above in the HPI.  No chest pain.  No acute SOB.      PHYSICAL EXAM:  /71   Pulse 73   Temp (!) 96  F (35.6  C)   Resp 18   Wt 108.9 kg (240 lb)   SpO2 97%   BMI 35.44 kg/m    Alert and oriented x3.  Voice is stronger today but at times is quiet due to her neuromuscular respiratory weakness.    Heart rate regular and regular.  Lower extremities has trace to +1 edema.  Wears ADONIS stockings and so can not see her skin.    Catheter in place and leg bag has clear yellow urine present.    Skin is  warm and dry.  No open areas.    Non-ambulatory.  Lift used for transfers.  Can use hands to manipulate her immediate environment but even her hands are disabled.    Eats meals in her apartment.        ASSESSMENT / PLAN:  (G35) MS (multiple sclerosis) (H)  (G82.50) Tetraparesis (H)  (G70.9,  J99) Neuromuscular respiratory weakness (H)  Comment: rocky is able to reside in the extended care unit in the [facility] where there is a aide on the floor all the time.  In the morning there are two aides so that they can help move people whom need 2 assist.  Continues to go out to have therapies twice a week to keep extremities from kristin.  No changes in her medications.      (I73.9) PVD (peripheral vascular disease) (H24)  Comment: has had issues with her lower extremities in the past and still goes for follow ups to make sure her circulation is stable and not depleted in the feet as she has had an open area.    (E66.01) Morbid obesity (H)  Comment: unfortunately with her main diagnosis, she is unable to exercise regularly.  She does not over eat and diet is per her liking.  She is very aware of what her weight can do to her health and breathing.     Orders:  No new orders    Electronically signed by  CAROL Rodríguez CNP

## 2024-02-09 DIAGNOSIS — N39.0 FREQUENT UTI: ICD-10-CM

## 2024-02-09 RX ORDER — METHENAMINE HIPPURATE 1000 MG/1
TABLET ORAL
Qty: 56 TABLET | Refills: 11 | Status: SHIPPED | OUTPATIENT
Start: 2024-02-09

## 2024-02-26 ENCOUNTER — ASSISTED LIVING VISIT (OUTPATIENT)
Dept: GERIATRICS | Facility: CLINIC | Age: 79
End: 2024-02-26
Payer: COMMERCIAL

## 2024-02-26 VITALS
BODY MASS INDEX: 35.44 KG/M2 | OXYGEN SATURATION: 97 % | TEMPERATURE: 96 F | WEIGHT: 240 LBS | DIASTOLIC BLOOD PRESSURE: 71 MMHG | HEART RATE: 73 BPM | RESPIRATION RATE: 18 BRPM | SYSTOLIC BLOOD PRESSURE: 112 MMHG

## 2024-02-26 DIAGNOSIS — G82.50 TETRAPARESIS (H): ICD-10-CM

## 2024-02-26 DIAGNOSIS — G35 MS (MULTIPLE SCLEROSIS) (H): Primary | ICD-10-CM

## 2024-02-26 DIAGNOSIS — N31.9 NEUROGENIC BLADDER: ICD-10-CM

## 2024-02-26 DIAGNOSIS — Z97.8 FOLEY CATHETER IN PLACE: ICD-10-CM

## 2024-02-26 DIAGNOSIS — R25.2 SPASTICITY: ICD-10-CM

## 2024-02-26 PROCEDURE — 99349 HOME/RES VST EST MOD MDM 40: CPT | Performed by: NURSE PRACTITIONER

## 2024-02-26 NOTE — LETTER
2/26/2024        RE: Cristina Stevens  [address]  [address]  [place] [address]        [facility] GERIATRICS  ACUTE/EPISODIC VISIT    [facility] Medical Record Number:  [id number]  Place of Service where encounter took place:  [facility] [place] ([facility]) [[id number]]    Chief Complaint   Patient presents with     RECHECK       HPI:    Cristina Stevens is a 78 year old  (1945), who is being seen today for an episodic care visit.  HPI information obtained from: patient report.    Today's concern is:    Diagnoses         Codes Comments    MS (multiple sclerosis) (H)    -  Primary G35     Tetraparesis (H)     G82.50     Spasticity     R25.2     Neurogenic bladder     N31.9     Edwards catheter in place     Z97.8           Came to see Muna today per her request.  She had a few questions to ask in regards to her Spasticity.  Muna was inquiring about mariajuana to help with her muscles.  Muna states that her legs will kick out uncontrollably.  This NP has not ever witnessed a significant spastic episode.    Muna states she has used CBD gummies and she finds she bloats and so not willing to use them again.    Informed her that this NP can not prescribe the mariajuana.  Will find some information for her about getting aligned with someone/place that can prescribe this for her.      Beyond her questions, muna seems to be doing fine.  Continues to go out twice a week for therapies.    Catheter has been running ok as well.      ALLERGIES:    Allergies   Allergen Reactions     Nitrofuran Derivatives Visual Disturbance     Hallucinations     Tetracyclines & Related GI Disturbance     Amlodipine Other (See Comments)     Bloating/edema     Ampicillin GI Disturbance     Cefadroxil Muscle Pain (Myalgia)     Cephalexin Diarrhea and GI Disturbance     Levofloxacin Other (See Comments)     Phlebitis with IV infusion     Sulfa Antibiotics Nausea and Vomiting, GI Disturbance and Unknown     Reaction  occurred as a child     Sulfasalazine Nausea and Vomiting        MEDICATIONS:  Post Discharge Medication Reconciliation Status: patient was not discharged from an inpatient facility or TCU.     Current Outpatient Medications   Medication Sig Dispense Refill     amoxicillin (AMOXIL) 250 MG chewable tablet CHEW AND SWALLOW 8 TABLETS (2000 MG) BY MOUTH 1 HOUR PRIOR TO DENTAL 8 tablet 97     armodafinil (NUVIGIL) 150 MG TABS tablet 1 TABLET ORALLY EVERY MORNING (DX: NARCOLEPSY) CONTROLLED MEDICATION ? TO BE STORED IN DOUBLE LOCKED STORAGE 30 tablet 5     ASPIRIN LOW DOSE 81 MG chewable tablet 1 TABLET ORALLY DAILY 28 tablet 11     Bacillus Coagulans-Inulin (PROBIOTIC FORMULA) 1-250 BILLION-MG CAPS 1 CAPSULE ORALLY DAILY 30 capsule 11     Baclofen (LIORESAL) 5 MG tablet Take 1 tablet (5 mg) by mouth 3 times daily as needed for muscle spasms 20 tablet 3     calcium citrate 250 MG TABS 2 TABLETS (500MG) ORALLY 2 TIMES DAILY (DX: OSTEOPOROSIS) 112 tablet 11     Calcium Citrate 333 MG TABS Take 2 tablets by mouth every morning AND 1 tablet every evening. 90 tablet 11     CO2-Releasing (-TWO) SUPP 1 supp daily prn and if not effective or falls out, may repeat again until results achieved.  May keep at bedside       Cranberry 250 MG TABS Give 2 tabs (500mg) po twice a day 120 tablet 11     Cranberry-Vitamin C-Probiotic (AZO CRANBERRY) 250-30 MG TABS 2 TABLETS (500MG) ORALLY 2 TIMES DAILY  (DX: URINARY TRACT INFECTION) 112 tablet 11     DESITIN 13 % CREA APPLY TOPICALLY TO AREAS OF REDNESS OR RASH WITH EACH BRIEF CHANGE 113 g 97     ELIQUIS ANTICOAGULANT 5 MG tablet 1 TABLET ORALLY 2 TIMES DAILY. DO NOT START BEFORE MAY 14TH,2022 (DX: VENOUS THROMBOEMBOLISM) 60 tablet 11     ibuprofen (ADVIL/MOTRIN) 400 MG tablet TAKE 1 TABLET BY MOUTH TWICE DAILY AS NEEDED FOR PAIN 30 tablet 97     losartan (COZAAR) 25 MG tablet Take 0.5 tablets (12.5 mg) by mouth every evening 15 tablet 11     medical cannabis (Patient's own supply) Take 1  Dose by mouth See Admin Instructions (The purpose of this order is to document that the patient reports taking medical cannabis.  This is not a prescription, and is not used to certify that the patient has a qualifying medical condition.)       menthol-zinc oxide (CALMOSEPTINE) 0.44-20.6 % OINT ointment APPLY TO AFFECTED AREA TOPICALLY TO SKIN CHAFFED AREAS  2 TIMES DAILY;APPLY TO AFFECTED AREA TOPICALLY 2 TIMES DAILY AS NEEDED 113 g 11     methenamine hippurate (HIPREX) 1 g tablet 1 TABLET ORALLY 2 TIMES DAILY  (DX: URINARY TRACT INFECTION) 56 tablet 11     Multiple Vitamin (TAB-A-DAWSON) TABS Take 1 tablet by mouth daily 28 tablet 97     NYAMYC 506026 UNIT/GM external powder APPLY TOPICALLY BETWEEN TOES EVERY EVENING UNTIL HEALED;THEN APPLY TOPICALLY BETWEEN TOES 2 TIMES DAILY AS NEEDED UNTIL HEALED 60 g 10     polyethylene glycol (MIRALAX/GLYCOLAX) powder MIX 17GM OF POWDER IN 8OZ OF WATER UNTIL COMPLETELY DISSOLVED. DRINK SOLUTION DAILY AS NEEDED 527 g 98     Probiotic Product (PROBIOTIC DAILY) CAPS Take 1 capsule by mouth daily 28 capsule 98     senna-docusate (SENEXON-S) 8.6-50 MG tablet Take 2 tablets by mouth daily as needed for constipation 62 tablet 12     simethicone (MYLICON) 125 MG chewable tablet 125mg po 4 times a day PRN for gas (may keep at bedside per request) 60 tablet 11     sterile water, bottle, (WATER FOR IRRIGATION, STERILE) irrigation 60ml  prn to irrigate ruby catheter when plugged 500 mL 11     vitamin C (ASCORBIC ACID) 500 MG tablet 1 TABLET ORALLY 2 TIMES DAILY (DX: CHRONIC CYSTITIS) 60 tablet 11     Vitamin D3 50 mcg (2000 units) tablet Take 1 tablet (50 mcg) by mouth daily 30 tablet 11       REVIEW OF SYSTEMS:  4 point ROS neg other than the symptoms noted above in the HPI.      PHYSICAL EXAM:  /71   Pulse 73   Temp (!) 96  F (35.6  C)   Resp 18   Wt 108.9 kg (240 lb)   SpO2 97%   BMI 35.44 kg/m    Muna is up in her electric wheelchair and reading articles on her  computer.  Alert and oriented x3.  Skin is pink, warm and dry.  No known open areas.    Catheter runs to a leg bag that has clear yellow urine in bag.  Abdomen is soft and round.  Can tell she does not look bloated anymore.    No respiratory distress.  Voice is soft.  Heart rate regular and strong.      No recent labs      ASSESSMENT / PLAN:  (G35) MS (multiple sclerosis) (H)  (primary encounter diagnosis)  (G82.50) Tetraparesis (H)  (R25.2) Spasticity  Comment: Muna has tried different medications to help with spasms.  Will find her some information about rules for [place] and align her with appropriate ways to get her request.  Has Baclofen PRN.  Did not tolerate the Baclofen scheduled.  Muna has a private aide that helps her with many of her issues and drives her to appointments.  Electric w/c bound.  Stand up lift used for proper transfers.      (N31.9) Neurogenic bladder  (Z97.8) Edwards catheter in place  Comment: catheter is functioning well.  Edwards catheter is changed less than 4 weeks at a time.  The private aide helps arrange getting her supplies.  No complaints of bypassing.  No changes.    Orders:  No new orders    Electronically signed by  CAROL Rodríguez CNP            Sincerely,        CAROL Rodríguez CNP

## 2024-02-26 NOTE — PROGRESS NOTES
[facility] GERIATRICS  ACUTE/EPISODIC VISIT    [facility] Medical Record Number:  [id number]  Place of Service where encounter took place:  [facility]) [[id number]]    Chief Complaint   Patient presents with    RECHECK       HPI:    Cristina Stevens is a 78 year old  (1945), who is being seen today for an episodic care visit.  HPI information obtained from: patient report.    Today's concern is:    Diagnoses         Codes Comments    MS (multiple sclerosis) (H)    -  Primary G35     Tetraparesis (H)     G82.50     Spasticity     R25.2     Neurogenic bladder     N31.9     Edwards catheter in place     Z97.8           Came to see Muna today per her request.  She had a few questions to ask in regards to her Spasticity.  Muna was inquiring about mariajuana to help with her muscles.  Muna states that her legs will kick out uncontrollably.  This NP has not ever witnessed a significant spastic episode.    Muna states she has used CBD gummies and she finds she bloats and so not willing to use them again.    Informed her that this NP can not prescribe the mariajuana.  Will find some information for her about getting aligned with someone/place that can prescribe this for her.      Beyond her questions, muna seems to be doing fine.  Continues to go out twice a week for therapies.    Catheter has been running ok as well.      ALLERGIES:    Allergies   Allergen Reactions    Nitrofuran Derivatives Visual Disturbance     Hallucinations    Tetracyclines & Related GI Disturbance    Amlodipine Other (See Comments)     Bloating/edema    Ampicillin GI Disturbance    Cefadroxil Muscle Pain (Myalgia)    Cephalexin Diarrhea and GI Disturbance    Levofloxacin Other (See Comments)     Phlebitis with IV infusion    Sulfa Antibiotics Nausea and Vomiting, GI Disturbance and Unknown     Reaction occurred as a child    Sulfasalazine Nausea and Vomiting        MEDICATIONS:  Post Discharge Medication  Reconciliation Status: patient was not discharged from an inpatient facility or TCU.     Current Outpatient Medications   Medication Sig Dispense Refill    amoxicillin (AMOXIL) 250 MG chewable tablet CHEW AND SWALLOW 8 TABLETS (2000 MG) BY MOUTH 1 HOUR PRIOR TO DENTAL 8 tablet 97    armodafinil (NUVIGIL) 150 MG TABS tablet 1 TABLET ORALLY EVERY MORNING (DX: NARCOLEPSY) CONTROLLED MEDICATION ? TO BE STORED IN DOUBLE LOCKED STORAGE 30 tablet 5    ASPIRIN LOW DOSE 81 MG chewable tablet 1 TABLET ORALLY DAILY 28 tablet 11    Bacillus Coagulans-Inulin (PROBIOTIC FORMULA) 1-250 BILLION-MG CAPS 1 CAPSULE ORALLY DAILY 30 capsule 11    Baclofen (LIORESAL) 5 MG tablet Take 1 tablet (5 mg) by mouth 3 times daily as needed for muscle spasms 20 tablet 3    calcium citrate 250 MG TABS 2 TABLETS (500MG) ORALLY 2 TIMES DAILY (DX: OSTEOPOROSIS) 112 tablet 11    Calcium Citrate 333 MG TABS Take 2 tablets by mouth every morning AND 1 tablet every evening. 90 tablet 11    CO2-Releasing (-TWO) SUPP 1 supp daily prn and if not effective or falls out, may repeat again until results achieved.  May keep at bedside      Cranberry 250 MG TABS Give 2 tabs (500mg) po twice a day 120 tablet 11    Cranberry-Vitamin C-Probiotic (AZO CRANBERRY) 250-30 MG TABS 2 TABLETS (500MG) ORALLY 2 TIMES DAILY  (DX: URINARY TRACT INFECTION) 112 tablet 11    DESITIN 13 % CREA APPLY TOPICALLY TO AREAS OF REDNESS OR RASH WITH EACH BRIEF CHANGE 113 g 97    ELIQUIS ANTICOAGULANT 5 MG tablet 1 TABLET ORALLY 2 TIMES DAILY. DO NOT START BEFORE MAY 14TH,2022 (DX: VENOUS THROMBOEMBOLISM) 60 tablet 11    ibuprofen (ADVIL/MOTRIN) 400 MG tablet TAKE 1 TABLET BY MOUTH TWICE DAILY AS NEEDED FOR PAIN 30 tablet 97    losartan (COZAAR) 25 MG tablet Take 0.5 tablets (12.5 mg) by mouth every evening 15 tablet 11    medical cannabis (Patient's own supply) Take 1 Dose by mouth See Admin Instructions (The purpose of this order is to document that the patient reports taking medical  cannabis.  This is not a prescription, and is not used to certify that the patient has a qualifying medical condition.)      menthol-zinc oxide (CALMOSEPTINE) 0.44-20.6 % OINT ointment APPLY TO AFFECTED AREA TOPICALLY TO SKIN CHAFFED AREAS  2 TIMES DAILY;APPLY TO AFFECTED AREA TOPICALLY 2 TIMES DAILY AS NEEDED 113 g 11    methenamine hippurate (HIPREX) 1 g tablet 1 TABLET ORALLY 2 TIMES DAILY  (DX: URINARY TRACT INFECTION) 56 tablet 11    Multiple Vitamin (TAB-A-DAWSON) TABS Take 1 tablet by mouth daily 28 tablet 97    NYAMYC 528519 UNIT/GM external powder APPLY TOPICALLY BETWEEN TOES EVERY EVENING UNTIL HEALED;THEN APPLY TOPICALLY BETWEEN TOES 2 TIMES DAILY AS NEEDED UNTIL HEALED 60 g 10    polyethylene glycol (MIRALAX/GLYCOLAX) powder MIX 17GM OF POWDER IN 8OZ OF WATER UNTIL COMPLETELY DISSOLVED. DRINK SOLUTION DAILY AS NEEDED 527 g 98    Probiotic Product (PROBIOTIC DAILY) CAPS Take 1 capsule by mouth daily 28 capsule 98    senna-docusate (SENEXON-S) 8.6-50 MG tablet Take 2 tablets by mouth daily as needed for constipation 62 tablet 12    simethicone (MYLICON) 125 MG chewable tablet 125mg po 4 times a day PRN for gas (may keep at bedside per request) 60 tablet 11    sterile water, bottle, (WATER FOR IRRIGATION, STERILE) irrigation 60ml  prn to irrigate ruby catheter when plugged 500 mL 11    vitamin C (ASCORBIC ACID) 500 MG tablet 1 TABLET ORALLY 2 TIMES DAILY (DX: CHRONIC CYSTITIS) 60 tablet 11    Vitamin D3 50 mcg (2000 units) tablet Take 1 tablet (50 mcg) by mouth daily 30 tablet 11       REVIEW OF SYSTEMS:  4 point ROS neg other than the symptoms noted above in the HPI.      PHYSICAL EXAM:  /71   Pulse 73   Temp (!) 96  F (35.6  C)   Resp 18   Wt 108.9 kg (240 lb)   SpO2 97%   BMI 35.44 kg/m    Muna is up in her electric wheelchair and reading articles on her computer.  Alert and oriented x3.  Skin is pink, warm and dry.  No known open areas.    Catheter runs to a leg bag that has clear yellow  urine in bag.  Abdomen is soft and round.  Can tell she does not look bloated anymore.    No respiratory distress.  Voice is soft.  Heart rate regular and strong.      No recent labs      ASSESSMENT / PLAN:  (G35) MS (multiple sclerosis) (H)  (primary encounter diagnosis)  (G82.50) Tetraparesis (H)  (R25.2) Spasticity  Comment: Muna has tried different medications to help with spasms.  Will find her some information about rules for [place] and align her with appropriate ways to get her request.  Has Baclofen PRN.  Did not tolerate the Baclofen scheduled.  Muna has a private aide that helps her with many of her issues and drives her to appointments.  Electric w/c bound.  Stand up lift used for proper transfers.      (N31.9) Neurogenic bladder  (Z97.8) Edwards catheter in place  Comment: catheter is functioning well.  Edwards catheter is changed less than 4 weeks at a time.  The private aide helps arrange getting her supplies.  No complaints of bypassing.  No changes.    Orders:  No new orders    Electronically signed by  CAROL Rodríguez CNP

## 2024-03-06 DIAGNOSIS — R21 RASH: ICD-10-CM

## 2024-03-06 RX ORDER — NYSTATIN 100000 [USP'U]/G
POWDER TOPICAL
Qty: 60 G | Refills: 11 | Status: SHIPPED | OUTPATIENT
Start: 2024-03-06

## 2024-03-14 ENCOUNTER — TRANSFERRED RECORDS (OUTPATIENT)
Dept: HEALTH INFORMATION MANAGEMENT | Facility: CLINIC | Age: 79
End: 2024-03-14
Payer: COMMERCIAL

## 2024-03-15 NOTE — PROGRESS NOTES
New physical therapy recertification has been received from [facility], orders placed in Dr. Macario's folder for review and signature.   Deni Lizarraga EMT 03/15/2024 8:24AM

## 2024-03-18 NOTE — PROGRESS NOTES
New orders for physical therapy have been signed and faxed back at [phone].  Deni Lizarraga EMT 03/18/2024 10:09AM

## 2024-03-26 DIAGNOSIS — G47.429 NARCOLEPSY DUE TO UNDERLYING CONDITION WITHOUT CATAPLEXY: ICD-10-CM

## 2024-03-27 RX ORDER — ARMODAFINIL 150 MG/1
TABLET ORAL
Qty: 30 TABLET | Refills: 4 | Status: SHIPPED | OUTPATIENT
Start: 2024-03-27 | End: 2024-09-04

## 2024-04-06 ENCOUNTER — LAB REQUISITION (OUTPATIENT)
Dept: LAB | Facility: CLINIC | Age: 79
End: 2024-04-06
Payer: COMMERCIAL

## 2024-04-06 DIAGNOSIS — R30.0 DYSURIA: ICD-10-CM

## 2024-04-06 LAB
ALBUMIN UR-MCNC: NEGATIVE MG/DL
APPEARANCE UR: ABNORMAL
BACTERIA #/AREA URNS HPF: ABNORMAL /HPF
BILIRUB UR QL STRIP: NEGATIVE
COLOR UR AUTO: ABNORMAL
GLUCOSE UR STRIP-MCNC: NEGATIVE MG/DL
HGB UR QL STRIP: ABNORMAL
KETONES UR STRIP-MCNC: NEGATIVE MG/DL
LEUKOCYTE ESTERASE UR QL STRIP: ABNORMAL
NITRATE UR QL: NEGATIVE
PH UR STRIP: 8 [PH] (ref 5–7)
RBC URINE: 4 /HPF
SP GR UR STRIP: 1 (ref 1–1.03)
SQUAMOUS EPITHELIAL: <1 /HPF
UROBILINOGEN UR STRIP-MCNC: NORMAL MG/DL
WBC CLUMPS #/AREA URNS HPF: PRESENT /HPF
WBC URINE: >182 /HPF

## 2024-04-06 PROCEDURE — 87086 URINE CULTURE/COLONY COUNT: CPT | Mod: ORL | Performed by: NURSE PRACTITIONER

## 2024-04-06 PROCEDURE — 81001 URINALYSIS AUTO W/SCOPE: CPT | Mod: ORL | Performed by: NURSE PRACTITIONER

## 2024-04-07 LAB — BACTERIA UR CULT: NORMAL

## 2024-04-08 ENCOUNTER — ASSISTED LIVING VISIT (OUTPATIENT)
Dept: GERIATRICS | Facility: CLINIC | Age: 79
End: 2024-04-08
Payer: COMMERCIAL

## 2024-04-08 VITALS
TEMPERATURE: 96 F | HEART RATE: 73 BPM | SYSTOLIC BLOOD PRESSURE: 112 MMHG | WEIGHT: 240 LBS | DIASTOLIC BLOOD PRESSURE: 71 MMHG | BODY MASS INDEX: 35.44 KG/M2 | OXYGEN SATURATION: 97 % | RESPIRATION RATE: 18 BRPM

## 2024-04-08 DIAGNOSIS — I73.9 PAD (PERIPHERAL ARTERY DISEASE) (H): ICD-10-CM

## 2024-04-08 DIAGNOSIS — R82.90 ABNORMAL FINDING ON URINALYSIS: Primary | ICD-10-CM

## 2024-04-08 DIAGNOSIS — G82.50 TETRAPARESIS (H): ICD-10-CM

## 2024-04-08 DIAGNOSIS — N31.9 NEUROGENIC BLADDER: ICD-10-CM

## 2024-04-08 DIAGNOSIS — G35 MS (MULTIPLE SCLEROSIS) (H): ICD-10-CM

## 2024-04-08 DIAGNOSIS — Z97.8 FOLEY CATHETER IN PLACE: ICD-10-CM

## 2024-04-08 PROCEDURE — 99349 HOME/RES VST EST MOD MDM 40: CPT | Performed by: NURSE PRACTITIONER

## 2024-04-08 RX ORDER — ASPIRIN 81 MG
TABLET,CHEWABLE ORAL
Qty: 28 TABLET | Refills: 11 | Status: SHIPPED | OUTPATIENT
Start: 2024-04-08

## 2024-04-08 RX ORDER — CIPROFLOXACIN 500 MG/1
500 TABLET, FILM COATED ORAL 2 TIMES DAILY
COMMUNITY
Start: 2024-04-06 | End: 2024-07-14

## 2024-04-08 NOTE — LETTER
4/8/2024        RE: Cristina Stevens  [id number]  [place] [address]        [facility] GERIATRICS  ACUTE/EPISODIC VISIT    [facility] Medical Record Number:  [id number]  Place of Service where encounter took place:  [facility] [place] ([facility]) [[id number]]    Chief Complaint   Patient presents with     RECHECK       HPI:    Cristina Stevens is a 78 year old  (1945), who is being seen today for an episodic care visit.  HPI information obtained from: facility chart records, facility staff, patient report, and [facility] chart review.    Today's concern is:    Diagnoses         Codes Comments    Abnormal finding on urinalysis    -  Primary R82.90     Neurogenic bladder     N31.9     Ruby catheter in place     Z97.8     MS (multiple sclerosis) (H)     G35     Tetraparesis (H)     G82.50           Over the weekend, received a couple of messages in regards to Muna and one of them from her that she has a potential UTI as having bypassing of urine with the ruby catheter, large amount of sediment, and burning.  One of Muna's workers came in to change the catheter and collected a urine sample.      Had started her on 3 days of Cipro due to acute symptoms.  As of this morning, still waiting on UC results.      Found Muna in her apartment, up in her electric wheelchair that she is able to manage once staff use a lift to get her into the chair.  Muna is non-ambulatory.  Able to use her hands, but hands are stiff and do not move freely.  She looked up on her computer her UA results with this NP and saw that her UC final returned.  Looked at results together.      Muna's urine came back probable contaminated - mixed cosmo but her UA appeared positive.  Reviewed with her the symptoms she was having and decision made to extend her Cipro to 5 days total.    ALLERGIES:    Allergies   Allergen Reactions     Nitrofuran Derivatives Visual Disturbance     Hallucinations      Tetracyclines & Related GI Disturbance     Amlodipine Other (See Comments)     Bloating/edema     Ampicillin GI Disturbance     Cefadroxil Muscle Pain (Myalgia)     Cephalexin Diarrhea and GI Disturbance     Levofloxacin Other (See Comments)     Phlebitis with IV infusion     Sulfa Antibiotics Nausea and Vomiting, GI Disturbance and Unknown     Reaction occurred as a child     Sulfasalazine Nausea and Vomiting        MEDICATIONS:  Post Discharge Medication Reconciliation Status: patient was not discharged from an inpatient facility or TCU.     Current Outpatient Medications   Medication Sig Dispense Refill     ciprofloxacin (CIPRO) 500 MG tablet Take 500 mg by mouth 2 times daily       amoxicillin (AMOXIL) 250 MG chewable tablet CHEW AND SWALLOW 8 TABLETS (2000 MG) BY MOUTH 1 HOUR PRIOR TO DENTAL 8 tablet 97     armodafinil (NUVIGIL) 150 MG TABS tablet 1 TABLET ORALLY EVERY MORNING (DX: NARCOLEPSY) CONTROLLED MEDICATION ? TO BE STORED IN DOUBLE LOCKED STORAGE 30 tablet 4     ASPIRIN LOW DOSE 81 MG chewable tablet 1 TABLET ORALLY DAILY 28 tablet 11     Bacillus Coagulans-Inulin (PROBIOTIC FORMULA) 1-250 BILLION-MG CAPS 1 CAPSULE ORALLY DAILY 30 capsule 11     Baclofen (LIORESAL) 5 MG tablet Take 1 tablet (5 mg) by mouth 3 times daily as needed for muscle spasms 20 tablet 3     calcium citrate 250 MG TABS 2 TABLETS (500MG) ORALLY 2 TIMES DAILY (DX: OSTEOPOROSIS) 112 tablet 11     Calcium Citrate 333 MG TABS Take 2 tablets by mouth every morning AND 1 tablet every evening. 90 tablet 11     CO2-Releasing (-TWO) SUPP 1 supp daily prn and if not effective or falls out, may repeat again until results achieved.  May keep at bedside       Cranberry 250 MG TABS Give 2 tabs (500mg) po twice a day 120 tablet 11     Cranberry-Vitamin C-Probiotic (AZO CRANBERRY) 250-30 MG TABS 2 TABLETS (500MG) ORALLY 2 TIMES DAILY  (DX: URINARY TRACT INFECTION) 112 tablet 11     DESITIN 13 % CREA APPLY TOPICALLY TO AREAS OF REDNESS OR RASH  WITH EACH BRIEF CHANGE 113 g 97     ELIQUIS ANTICOAGULANT 5 MG tablet 1 TABLET ORALLY 2 TIMES DAILY. DO NOT START BEFORE MAY 14TH,2022 (DX: VENOUS THROMBOEMBOLISM) 60 tablet 11     ibuprofen (ADVIL/MOTRIN) 400 MG tablet TAKE 1 TABLET BY MOUTH TWICE DAILY AS NEEDED FOR PAIN 30 tablet 97     losartan (COZAAR) 25 MG tablet Take 0.5 tablets (12.5 mg) by mouth every evening 15 tablet 11     medical cannabis (Patient's own supply) Take 1 Dose by mouth See Admin Instructions (The purpose of this order is to document that the patient reports taking medical cannabis.  This is not a prescription, and is not used to certify that the patient has a qualifying medical condition.)       menthol-zinc oxide (CALMOSEPTINE) 0.44-20.6 % OINT ointment APPLY TO AFFECTED AREA TOPICALLY TO SKIN CHAFFED AREAS  2 TIMES DAILY;APPLY TO AFFECTED AREA TOPICALLY 2 TIMES DAILY AS NEEDED 113 g 11     methenamine hippurate (HIPREX) 1 g tablet 1 TABLET ORALLY 2 TIMES DAILY  (DX: URINARY TRACT INFECTION) 56 tablet 11     Multiple Vitamin (TAB-A-DAWSON) TABS Take 1 tablet by mouth daily 28 tablet 97     nystatin (MYCOSTATIN) 265109 UNIT/GM external powder APPLY TOPICALLY BETWEEN TOES EVERY EVENING UNTIL HEALED;THEN APPLY TOPICALLY BETWEEN TOES 2 TIMES DAILY AS NEEDED UNTIL HEALED 60 g 11     polyethylene glycol (MIRALAX/GLYCOLAX) powder MIX 17GM OF POWDER IN 8OZ OF WATER UNTIL COMPLETELY DISSOLVED. DRINK SOLUTION DAILY AS NEEDED 527 g 98     Probiotic Product (PROBIOTIC DAILY) CAPS Take 1 capsule by mouth daily 28 capsule 98     senna-docusate (SENEXON-S) 8.6-50 MG tablet Take 2 tablets by mouth daily as needed for constipation 62 tablet 12     simethicone (MYLICON) 125 MG chewable tablet 125mg po 4 times a day PRN for gas (may keep at bedside per request) 60 tablet 11     sterile water, bottle, (WATER FOR IRRIGATION, STERILE) irrigation 60ml  prn to irrigate ruby catheter when plugged 500 mL 11     vitamin C (ASCORBIC ACID) 500 MG tablet 1 TABLET  ORALLY 2 TIMES DAILY (DX: CHRONIC CYSTITIS) 60 tablet 11     Vitamin D3 50 mcg (2000 units) tablet Take 1 tablet (50 mcg) by mouth daily 30 tablet 11     Medications reviewed:  Medications reconciled to facility chart and changes were made to reflect current medications as identified as above med list. Below are the changes that were made:   Medications stopped since last EPIC medication reconciliation:   There are no discontinued medications.    Medications started since last [facility] medication reconciliation:  Orders Placed This Encounter   Medications     ciprofloxacin (CIPRO) 500 MG tablet     Sig: Take 500 mg by mouth 2 times daily         REVIEW OF SYSTEMS:  4 point ROS neg other than the symptoms noted above in the HPI.  Denied chest pain, SOB, or stomach discomforts today.      PHYSICAL EXAM:  /71   Pulse 73   Temp (!) 96  F (35.6  C)   Resp 18   Wt 108.9 kg (240 lb)   SpO2 97%   BMI 35.44 kg/m    Muna is alert and oriented x3.  Able to make needs known with her voice in a quiet tone as she does have neuromuscular respiratory weakness.    Does not use O2.  Respiratory status is even and not labored.  Heart rate regular and strong with S1 and S2 heard.  No edema.  Catheter leg bag has pale yellow urine present.  No sediment noted.  Catheter is patent and no bypassing.  Abdomen is round soft and non-tender.      Component      Latest Ref Rng 4/6/2024  10:50 AM   Color Urine      Colorless, Straw, Light Yellow, Yellow  Straw    Appearance Urine      Clear  Slightly Cloudy !    Glucose Urine      Negative mg/dL Negative    Bilirubin Urine      Negative  Negative    Ketones Urine      Negative mg/dL Negative    Specific Gravity Urine      1.003 - 1.035  1.004    Blood Urine      Negative  Trace !    pH Urine      5.0 - 7.0  8.0 (H)    Protein Albumin Urine      Negative mg/dL Negative    Urobilinogen mg/dL      Normal, 2.0 mg/dL Normal    Nitrite Urine      Negative  Negative    Leukocyte Esterase  Urine      Negative  Large !    Bacteria Urine      None Seen /HPF Few !    WBC Clumps      None Seen /HPF Present !    RBC Urine      <=2 /HPF 4 (H)    WBC Urine      <=5 /HPF >182 (H)    Squamous Epithelial /HPF Urine      <=1 /HPF <1         ASSESSMENT / PLAN:  (R82.90) Abnormal finding on urinalysis  (primary encounter diagnosis)  Comment: final UC is negative but given her hx with UTIs and symptoms, will write an order today to extend her Cipro 500mg BID for another 2 days = 5 days total.  Urine looks good today.  First time checking a urine in 3-4 months.    (N31.9) Neurogenic bladder  (Z97.8) Edwards catheter in place  Comment: typically her catheter can be changed up to twice a month.  Catheter is patent now.  Long term catheter use.  Has not ever done a trial without the catheter and do not anticipate this to occur.    Continue to support Muna with any issues pertaining to the catheter and obtaining supplies.    (G35) MS (multiple sclerosis) (H)  (G82.50) Tetraparesis (H)  Comment: continues to reside in an extended care unit.  Stays in electric w/c most of the time.  The wheelchair cushion has been mapped out for maximum comfort and pressure reduction.  Do not see any advancement in her condition.  Continue to go to outpatient therapy to work on maintaining muscle control.    Orders:  Extend Cipro 500mg BID x2 more days = 5 days all together - abnormal UA    Electronically signed by  CAROL Rodríguez CNP            Sincerely,        CAROL Rodríguez CNP

## 2024-04-08 NOTE — PROGRESS NOTES
[facility] GERIATRICS  ACUTE/EPISODIC VISIT    [facility] Medical Record Number:  [id number]  Place of Service where encounter took place:  [facility] ([facility]) [[id number]]    Chief Complaint   Patient presents with    RECHECK       HPI:    Cristina Stevens is a 78 year old  (1945), who is being seen today for an episodic care visit.  HPI information obtained from: facility chart records, facility staff, patient report, and [facility] chart review.    Today's concern is:    Diagnoses         Codes Comments    Abnormal finding on urinalysis    -  Primary R82.90     Neurogenic bladder     N31.9     Ruby catheter in place     Z97.8     MS (multiple sclerosis) (H)     G35     Tetraparesis (H)     G82.50           Over the weekend, received a couple of messages in regards to Muna and one of them from her that she has a potential UTI as having bypassing of urine with the ruby catheter, large amount of sediment, and burning.  One of Muna's workers came in to change the catheter and collected a urine sample.      Had started her on 3 days of Cipro due to acute symptoms.  As of this morning, still waiting on UC results.      Found Muna in her apartment, up in her electric wheelchair that she is able to manage once staff use a lift to get her into the chair.  Muna is non-ambulatory.  Able to use her hands, but hands are stiff and do not move freely.  She looked up on her computer her UA results with this NP and saw that her UC final returned.  Looked at results together.      Muna's urine came back probable contaminated - mixed cosmo but her UA appeared positive.  Reviewed with her the symptoms she was having and decision made to extend her Cipro to 5 days total.    ALLERGIES:    Allergies   Allergen Reactions    Nitrofuran Derivatives Visual Disturbance     Hallucinations    Tetracyclines & Related GI Disturbance    Amlodipine Other (See Comments)     Bloating/edema    Ampicillin GI  Disturbance    Cefadroxil Muscle Pain (Myalgia)    Cephalexin Diarrhea and GI Disturbance    Levofloxacin Other (See Comments)     Phlebitis with IV infusion    Sulfa Antibiotics Nausea and Vomiting, GI Disturbance and Unknown     Reaction occurred as a child    Sulfasalazine Nausea and Vomiting        MEDICATIONS:  Post Discharge Medication Reconciliation Status: patient was not discharged from an inpatient facility or TCU.     Current Outpatient Medications   Medication Sig Dispense Refill    ciprofloxacin (CIPRO) 500 MG tablet Take 500 mg by mouth 2 times daily      amoxicillin (AMOXIL) 250 MG chewable tablet CHEW AND SWALLOW 8 TABLETS (2000 MG) BY MOUTH 1 HOUR PRIOR TO DENTAL 8 tablet 97    armodafinil (NUVIGIL) 150 MG TABS tablet 1 TABLET ORALLY EVERY MORNING (DX: NARCOLEPSY) CONTROLLED MEDICATION ? TO BE STORED IN DOUBLE LOCKED STORAGE 30 tablet 4    ASPIRIN LOW DOSE 81 MG chewable tablet 1 TABLET ORALLY DAILY 28 tablet 11    Bacillus Coagulans-Inulin (PROBIOTIC FORMULA) 1-250 BILLION-MG CAPS 1 CAPSULE ORALLY DAILY 30 capsule 11    Baclofen (LIORESAL) 5 MG tablet Take 1 tablet (5 mg) by mouth 3 times daily as needed for muscle spasms 20 tablet 3    calcium citrate 250 MG TABS 2 TABLETS (500MG) ORALLY 2 TIMES DAILY (DX: OSTEOPOROSIS) 112 tablet 11    Calcium Citrate 333 MG TABS Take 2 tablets by mouth every morning AND 1 tablet every evening. 90 tablet 11    CO2-Releasing (-TWO) SUPP 1 supp daily prn and if not effective or falls out, may repeat again until results achieved.  May keep at bedside      Cranberry 250 MG TABS Give 2 tabs (500mg) po twice a day 120 tablet 11    Cranberry-Vitamin C-Probiotic (AZO CRANBERRY) 250-30 MG TABS 2 TABLETS (500MG) ORALLY 2 TIMES DAILY  (DX: URINARY TRACT INFECTION) 112 tablet 11    DESITIN 13 % CREA APPLY TOPICALLY TO AREAS OF REDNESS OR RASH WITH EACH BRIEF CHANGE 113 g 97    ELIQUIS ANTICOAGULANT 5 MG tablet 1 TABLET ORALLY 2 TIMES DAILY. DO NOT START BEFORE MAY  14TH,2022 (DX: VENOUS THROMBOEMBOLISM) 60 tablet 11    ibuprofen (ADVIL/MOTRIN) 400 MG tablet TAKE 1 TABLET BY MOUTH TWICE DAILY AS NEEDED FOR PAIN 30 tablet 97    losartan (COZAAR) 25 MG tablet Take 0.5 tablets (12.5 mg) by mouth every evening 15 tablet 11    medical cannabis (Patient's own supply) Take 1 Dose by mouth See Admin Instructions (The purpose of this order is to document that the patient reports taking medical cannabis.  This is not a prescription, and is not used to certify that the patient has a qualifying medical condition.)      menthol-zinc oxide (CALMOSEPTINE) 0.44-20.6 % OINT ointment APPLY TO AFFECTED AREA TOPICALLY TO SKIN CHAFFED AREAS  2 TIMES DAILY;APPLY TO AFFECTED AREA TOPICALLY 2 TIMES DAILY AS NEEDED 113 g 11    methenamine hippurate (HIPREX) 1 g tablet 1 TABLET ORALLY 2 TIMES DAILY  (DX: URINARY TRACT INFECTION) 56 tablet 11    Multiple Vitamin (TAB-A-DAWOSN) TABS Take 1 tablet by mouth daily 28 tablet 97    nystatin (MYCOSTATIN) 937177 UNIT/GM external powder APPLY TOPICALLY BETWEEN TOES EVERY EVENING UNTIL HEALED;THEN APPLY TOPICALLY BETWEEN TOES 2 TIMES DAILY AS NEEDED UNTIL HEALED 60 g 11    polyethylene glycol (MIRALAX/GLYCOLAX) powder MIX 17GM OF POWDER IN 8OZ OF WATER UNTIL COMPLETELY DISSOLVED. DRINK SOLUTION DAILY AS NEEDED 527 g 98    Probiotic Product (PROBIOTIC DAILY) CAPS Take 1 capsule by mouth daily 28 capsule 98    senna-docusate (SENEXON-S) 8.6-50 MG tablet Take 2 tablets by mouth daily as needed for constipation 62 tablet 12    simethicone (MYLICON) 125 MG chewable tablet 125mg po 4 times a day PRN for gas (may keep at bedside per request) 60 tablet 11    sterile water, bottle, (WATER FOR IRRIGATION, STERILE) irrigation 60ml  prn to irrigate ruby catheter when plugged 500 mL 11    vitamin C (ASCORBIC ACID) 500 MG tablet 1 TABLET ORALLY 2 TIMES DAILY (DX: CHRONIC CYSTITIS) 60 tablet 11    Vitamin D3 50 mcg (2000 units) tablet Take 1 tablet (50 mcg) by mouth daily 30  tablet 11     Medications reviewed:  Medications reconciled to facility chart and changes were made to reflect current medications as identified as above med list. Below are the changes that were made:   Medications stopped since last EPIC medication reconciliation:   There are no discontinued medications.    Medications started since last [facility] medication reconciliation:  Orders Placed This Encounter   Medications    ciprofloxacin (CIPRO) 500 MG tablet     Sig: Take 500 mg by mouth 2 times daily         REVIEW OF SYSTEMS:  4 point ROS neg other than the symptoms noted above in the HPI.  Denied chest pain, SOB, or stomach discomforts today.      PHYSICAL EXAM:  /71   Pulse 73   Temp (!) 96  F (35.6  C)   Resp 18   Wt 108.9 kg (240 lb)   SpO2 97%   BMI 35.44 kg/m    Muna is alert and oriented x3.  Able to make needs known with her voice in a quiet tone as she does have neuromuscular respiratory weakness.    Does not use O2.  Respiratory status is even and not labored.  Heart rate regular and strong with S1 and S2 heard.  No edema.  Catheter leg bag has pale yellow urine present.  No sediment noted.  Catheter is patent and no bypassing.  Abdomen is round soft and non-tender.      Component      Latest Ref Rng 4/6/2024  10:50 AM   Color Urine      Colorless, Straw, Light Yellow, Yellow  Straw    Appearance Urine      Clear  Slightly Cloudy !    Glucose Urine      Negative mg/dL Negative    Bilirubin Urine      Negative  Negative    Ketones Urine      Negative mg/dL Negative    Specific Gravity Urine      1.003 - 1.035  1.004    Blood Urine      Negative  Trace !    pH Urine      5.0 - 7.0  8.0 (H)    Protein Albumin Urine      Negative mg/dL Negative    Urobilinogen mg/dL      Normal, 2.0 mg/dL Normal    Nitrite Urine      Negative  Negative    Leukocyte Esterase Urine      Negative  Large !    Bacteria Urine      None Seen /HPF Few !    WBC Clumps      None Seen /HPF Present !    RBC Urine      <=2 /HPF 4  (H)    WBC Urine      <=5 /HPF >182 (H)    Squamous Epithelial /HPF Urine      <=1 /HPF <1         ASSESSMENT / PLAN:  (R82.90) Abnormal finding on urinalysis  (primary encounter diagnosis)  Comment: final UC is negative but given her hx with UTIs and symptoms, will write an order today to extend her Cipro 500mg BID for another 2 days = 5 days total.  Urine looks good today.  First time checking a urine in 3-4 months.    (N31.9) Neurogenic bladder  (Z97.8) Edwards catheter in place  Comment: typically her catheter can be changed up to twice a month.  Catheter is patent now.  Long term catheter use.  Has not ever done a trial without the catheter and do not anticipate this to occur.    Continue to support Muna with any issues pertaining to the catheter and obtaining supplies.    (G35) MS (multiple sclerosis) (H)  (G82.50) Tetraparesis (H)  Comment: continues to reside in an extended care unit.  Stays in electric w/c most of the time.  The wheelchair cushion has been mapped out for maximum comfort and pressure reduction.  Do not see any advancement in her condition.  Continue to go to outpatient therapy to work on maintaining muscle control.    Orders:  Extend Cipro 500mg BID x2 more days = 5 days all together - abnormal UA    Electronically signed by  CAROL Rodríguez CNP

## 2024-04-10 ENCOUNTER — ALLIED HEALTH/NURSE VISIT (OUTPATIENT)
Dept: OBGYN | Facility: CLINIC | Age: 79
End: 2024-04-10
Payer: COMMERCIAL

## 2024-04-10 DIAGNOSIS — M81.0 SENILE OSTEOPOROSIS: Primary | ICD-10-CM

## 2024-04-10 DIAGNOSIS — Z92.29 PERSONAL HISTORY OF DRUG THERAPY: ICD-10-CM

## 2024-04-10 PROCEDURE — 96372 THER/PROPH/DIAG INJ SC/IM: CPT | Performed by: FAMILY MEDICINE

## 2024-04-10 PROCEDURE — 250N000011 HC RX IP 250 OP 636: Mod: JZ | Performed by: FAMILY MEDICINE

## 2024-04-10 RX ADMIN — DENOSUMAB 60 MG: 60 INJECTION SUBCUTANEOUS at 15:35

## 2024-04-10 NOTE — PATIENT INSTRUCTIONS
Thank you for trusting us with your care!     If you need to contact us for questions about:  Symptoms, Scheduling & Medical Questions; Non-urgent (2-3 day response) Lori message, Urgent (needing response today) [phone] (if after 3:30pm next day response)   Prescriptions: Please call your Pharmacy   Billing: Jenelle [phone] or [facility]:[phone]

## 2024-04-10 NOTE — NURSING NOTE
Chief Complaint   Patient presents with    Allied Health Visit     Prolia                     Prolia      Prolia is used to protect further re-absorption of bone mass in men and women with osteoporosis who are at high risk for fracture.         1 ml of a 60 mg/ml solution in a single -use prefilled syringe subcutaneously into the upper arm, upper thigh, or abdomen.      Patient should take calcium 1200 mg or dose instructed by provider      Patient should take at least 1000 units of Vitamin D daily        Before each injection      Calcium level within 30 days of each injection      Verify no serious infections currently        After each infection      Need to go to any [facility] two weeks after Prolia injections        Calcium      Magnesium      Phosphorus       Follow up in six months after first Prolia injection with your provider.     You will need to have a calcium level drawn before this visit.           Follow up annually with your provider        Adverse Reactions            Hypocalcemia      Serious infections of the skin, lower abdomin, bladder or ear          Endocarditis      Dermatitis      Severe jaw bone problems (osteonecrosis)      Muscle aches      Diarrhea      Headache      Possible atypical femur ( leg) fractures      Possible spinal fracture after discontinuation                   Clinic Administered Medication Documentation      Prolia Documentation    Indication: Prolia  (denosumab) is a prescription medicine used to treat osteoporosis in patients who:   Are at high risk for fracture, meaning patients who have had a fracture related to osteoporosis, or who have multiple risk factors for fracture.  Cannot use another osteoporosis medicine or other osteoporosis medicines did not work well.  The timeline for early/late injections would be 4 weeks early and any time after the 6 month peace. If a patient receives their injection late, then the subsequent injection would be 6 months from the  date that they actually received the injection.    When was the last injection?  10/10/2023  Was the last injection at least 6 months ago? Yes  Has the prior authorization been completed?  Yes  Is there an active order (written within the past 365 days, with administrations remaining, not ) in the chart?  Yes  Patient denies any dental work involving the bone (e.g. tooth extraction or dental implants) in the past 4 weeks?  Yes  Patient denies plans for any dental work involving the bone (e.g. tooth extraction or dental implants) in the next 4 weeks? Yes    The following steps were completed to comply with the REMS program for Prolia:  Confirms that patient received education from RN or provider via the Medication Guide and Patient Counseling Chart, including the serious risks of Prolia  and the symptoms of each risk.  Told the patient to seek prompt medical attention if they have signs or symptoms of any of the serious risks, as described in the Medication Guide and Patient Counseling Chart that was reviewed between the patient and RN or LP.  Provided each patient a copy of the Medication Guide and Patient Brochure.    Prior to injection, verified patient identity using patient's name and date of birth. Medication was administered. Please see MAR and medication order for additional information. Patient instructed to report any adverse reaction to staff immediately.    Vial/Syringe: Single dose vial. Was entire vial of medication used? Yes  Was this medication supplied by the patient? No  Verified that the patient has refills remaining in their prescription.

## 2024-04-16 DIAGNOSIS — N31.9 NEUROGENIC BLADDER: ICD-10-CM

## 2024-04-16 DIAGNOSIS — Z97.8 FOLEY CATHETER IN PLACE: ICD-10-CM

## 2024-04-16 DIAGNOSIS — N39.0 FREQUENT UTI: ICD-10-CM

## 2024-04-16 NOTE — PROGRESS NOTES
Report for plan of care has been received from [facility], report placed in Dr. Macario's folder for review  . Deni Lizarraga EMT April 16, 2024

## 2024-05-02 DIAGNOSIS — R21 RASH: ICD-10-CM

## 2024-05-02 RX ORDER — ZINC OXIDE 13 %
CREAM (GRAM) TOPICAL 3 TIMES DAILY PRN
Qty: 113 G | Refills: 11 | Status: SHIPPED | OUTPATIENT
Start: 2024-05-02

## 2024-05-03 DIAGNOSIS — R12 HEARTBURN: Primary | ICD-10-CM

## 2024-05-04 RX ORDER — CALCIUM CARBONATE 500 MG/1
TABLET, CHEWABLE ORAL
Qty: 30 TABLET | Refills: 11 | Status: SHIPPED | OUTPATIENT
Start: 2024-05-04

## 2024-05-07 DIAGNOSIS — G89.4 CHRONIC PAIN SYNDROME: Primary | ICD-10-CM

## 2024-05-08 RX ORDER — PSEUDOEPHED/ACETAMINOPH/DIPHEN 30MG-500MG
TABLET ORAL
Qty: 60 TABLET | Refills: 11 | Status: SHIPPED | OUTPATIENT
Start: 2024-05-08

## 2024-05-23 ENCOUNTER — TRANSFERRED RECORDS (OUTPATIENT)
Dept: HEALTH INFORMATION MANAGEMENT | Facility: CLINIC | Age: 79
End: 2024-05-23
Payer: COMMERCIAL

## 2024-05-24 ENCOUNTER — DOCUMENTATION ONLY (OUTPATIENT)
Dept: NEUROLOGY | Facility: CLINIC | Age: 79
End: 2024-05-24
Payer: COMMERCIAL

## 2024-05-24 NOTE — PROGRESS NOTES
Physical therapy recertification has been received from [facility], orders have been placed in Dr. Macario's folder for review and signature.   Deni Lizarraga EMT May 24, 2024

## 2024-05-30 NOTE — PROGRESS NOTES
Physical therapy recertification have been signed and faxed back at [phone].  Deni Lizarraga EMT May 30, 2024

## 2024-06-04 NOTE — PROGRESS NOTES
[facility]ERIATRIC [facility]  [place] Medical Record Number: [id number]  Place of Service where encounter took place: [facility] [[id number]]  Chief Complaint   Patient presents with     RECHECK       HPI:    Cristina Stevens is a 75 year old (1945), who is being seen today for an episodic care visit. HPI information obtained from: facility chart records, facility staff, patient report and [facility] chart review.     Ms. Stevens was visited today while in her room, sitting in the wheelchair. States that she had a virtual appointment with her neurologist yesterday and baclofen was discontinued. She also reports that she has had constipation. Denies pain or discomfort. No shortness of breath. Catheter has been functioning well and has not had UTIs recently. She has been sleeping well.      Past Medical and Surgical History reviewed in Epic today.    MEDICATIONS:  Current Outpatient Medications   Medication Sig Dispense Refill     senna-docusate (SENEXON-S) 8.6-50 MG tablet Take 2 tablets by mouth 2 times daily 62 tablet 12     amoxicillin (AMOXIL) 250 MG chewable tablet CHEW AND SWALLOW 8 TABLETS (2000 MG) BY MOUTH 1 HOUR PRIOR TO DENTAL 8 tablet 97     armodafinil (NUVIGIL) 150 MG TABS tablet TAKE 1 TABLET BY MOUTH EVERY MORNING 30 tablet 4     Bacillus Coagulans-Inulin (PROBIOTIC FORMULA) 1-250 BILLION-MG CAPS TAKE 1 CAPSULE BY MOUTH ONCE DAILY 28 capsule 97     CALMOSEPTINE 0.44-20.6 % OINT ointment APPLY TO AFFECTED AREA(S) TOPICALLY TO BUTTOCKS AS NEEDED WITH BRIEF CHANGES 113 g 97     cephALEXin (KEFLEX) 250 MG capsule TAKE 1 CAPSULE BY MOUTH ONCE DAILY FOR PREVENTIVE 31 capsule 97     cetirizine (ZYRTEC) 10 MG tablet TAKE 1 TABLET BY MOUTH ONCE DAILY 31 tablet PRN     Cholecalciferol (VITAMIN D3) 50 MCG (2000 UT) CAPS TAKE TWO CAPSULES (4000 UNITS) BY MOUTH ONCE DAILY 56 capsule 97     CO2-Releasing (-TWO) SUPP Place rectally daily as needed       CRANBERRY  CONCENTRATE 500 MG CAPS TAKE 1 CAPSULE BY MOUTH ONCE DAILY 28 capsule 97     DESITIN 13 % CREA APPLY TOPICALLY TO AREAS OF REDNESS OR RASH WITH EACH BRIEF CHANGE 113 g 97     Diaper Rash Products (DESITIN) OINT Externally apply topically as needed       ibuprofen (ADVIL/MOTRIN) 400 MG tablet TAKE 1 TABLET BY MOUTH TWICE DAILY AS NEEDED FOR PAIN 30 tablet 11     insulin pen needle (B-D U/F) 31G X 5 MM miscellaneous USE DAILY WITH TYMLOS AS DIRECTED 100 each 4     Multiple Vitamins-Minerals (TAB-A-DAWSON) TABS TAKE 1 TABLET BY MOUTH ONCE DAILY 28 tablet 97     MYRBETRIQ 25 MG 24 hr tablet TAKE 1 TABLET BY MOUTH ONCE DAILY 28 tablet 97     nystatin (MYCOSTATIN) 001463 UNIT/GM external powder APPLY IN BETWEEN THE TOES TWICE DAILY UNTIL HEALED;THEN TWICE DAILY AS NEEDED 30 g 97     polyethylene glycol (MIRALAX/GLYCOLAX) powder MIX 17GM OF POWDER IN 8OZ OF WATER UNTIL COMPLETELY DISSOLVED. DRINK SOLUTION DAILY AS NEEDED 527 g 98     Probiotic Product (PROBIOTIC DAILY) CAPS Take 1 capsule by mouth daily 28 capsule 98     solifenacin (VESICARE) 10 MG tablet TAKE 1 TABLET BY MOUTH ONCE DAILY 28 tablet 97     TYMLOS 3120 MCG/1.56ML SOPN injection INJECT 0.04 ML (80MCG) SUBCUTANEOUSLY ONCE DAILY INTO ABDOMEN. ROTATE INJECTION SITES DAILY. 1.56 mL 5     vitamin C (ASCORBIC ACID) 500 MG tablet Take 1 tablet (500 mg) by mouth daily 31 tablet 98     REVIEW OF SYSTEMS:  4 point ROS including Respiratory, CV, GI and , other than that noted in the HPI,  is negative    Objective:  /74   Pulse 62   Temp 97.6  F (36.4  C)   SpO2 98%   Exam:  GENERAL APPEARANCE:  Alert, in no distress, pleasant, cooperative  RESP: no respiratory distress, patient is on room air  CV: . Edema none in bilateral lower extremities.  M/S: Gait and station, wheelchair bound, no tenderness or swelling of the joints; moves upper extremities, minimal movement to LEs  SKIN:  Inspection and palpation of skin and subcutaneous tissue: skin warm, dry and  intact without rashes but exam is limited  NEURO: no facial asymmetry, no speech deficits and able to follow directions  PSYCH:  insight and judgement intact, memory intact, affect and mood normal    Labs:   Reviewed in care everywhere.     ASSESSMENT/PLAN:  Multiple Sclerosis  Spastic quad  Wheelchair bound and uses a mechanical lift for transfers. Baclofen was discontinued by neurologist yesterday.   -- continues on armodafinil 150 mg daily   -- follow up with neurology as PM&R as per them   --follow up with Dr. Macario of neurology      Neurogenic bladder  Ruby catheter chronic  Requires ruby change outs every three weeks. Her PCA whom is also a nurse will now be changing out the catheters.  --follow up with urologist as she is likley due for botox bladder injection but will likely not be able to do so due to COVID pandemic.     --continue with vesicare 10 mg daily      Osteoporosis  Follows with Dr. Erica Abrams for osteo and was started on Tymos in 2018.  --continues with abaloparatide 3120 mcg injection daily.  -- continues on cholecalciferol 4000 units daily     Electronically signed by:  CAROL Dupont CNP      impairments found

## 2024-06-10 ENCOUNTER — ASSISTED LIVING VISIT (OUTPATIENT)
Dept: GERIATRICS | Facility: CLINIC | Age: 79
End: 2024-06-10
Payer: COMMERCIAL

## 2024-06-10 VITALS
OXYGEN SATURATION: 97 % | SYSTOLIC BLOOD PRESSURE: 112 MMHG | DIASTOLIC BLOOD PRESSURE: 71 MMHG | WEIGHT: 240 LBS | BODY MASS INDEX: 35.44 KG/M2 | RESPIRATION RATE: 18 BRPM | HEART RATE: 73 BPM | TEMPERATURE: 96 F

## 2024-06-10 DIAGNOSIS — G82.50 TETRAPARESIS (H): ICD-10-CM

## 2024-06-10 DIAGNOSIS — N31.9 NEUROGENIC BLADDER: ICD-10-CM

## 2024-06-10 DIAGNOSIS — G70.9 NEUROMUSCULAR RESPIRATORY WEAKNESS (H): ICD-10-CM

## 2024-06-10 DIAGNOSIS — Z97.8 FOLEY CATHETER IN PLACE: ICD-10-CM

## 2024-06-10 DIAGNOSIS — J99 NEUROMUSCULAR RESPIRATORY WEAKNESS (H): ICD-10-CM

## 2024-06-10 DIAGNOSIS — N39.0 FREQUENT UTI: ICD-10-CM

## 2024-06-10 DIAGNOSIS — G35 MS (MULTIPLE SCLEROSIS) (H): Primary | ICD-10-CM

## 2024-06-10 PROCEDURE — 99349 HOME/RES VST EST MOD MDM 40: CPT | Performed by: NURSE PRACTITIONER

## 2024-06-10 NOTE — LETTER
6/10/2024      Cristina Stevens  [address]  [facility] [address]        [facility] GERIATRICS  Chief Complaint   Patient presents with    Wellness Visit     [place] Medical Record Number:  [id number]  Place of Service where encounter took place:  [facility] [place] ([facility]) [[id number]]    SUBJECTIVE:   Muna is a 78 year old who presents for Preventive Visit.    Are you in the first 12 months of your Medicare coverage?  { :733436}    HPI      Have you ever done Advance Care Planning? (For example, a Health Directive, POLST, or a discussion with a medical provider or your loved ones about your wishes): { :526572}    {Hearing Test Done (Optional):077138}   Fall risk  { :191505}  {If any of the above assessments are answered yes, consider ordering appropriate referrals (Optional):759773}  Cognitive Screening { :199350}    {Do you have sleep apnea, excessive snoring or daytime drowsiness? (Optional):774368}    Reviewed and updated as needed this visit by clinical staff    Allergies  Meds              Reviewed and updated as needed this visit by Provider                  Social History     Tobacco Use    Smoking status: Former     Current packs/day: 0.00     Types: Cigarettes     Start date: 1962     Quit date: 1985     Years since quittin.4    Smokeless tobacco: Never   Substance Use Topics    Alcohol use: Yes     Alcohol/week: 1.0 standard drink of alcohol     Types: 1 Standard drinks or equivalent per week     {Rooming staff  Click this link to complete the Prescreen if response below is not for today's visit  Alcohol Use Prescreen >3 drinks/day or > 7 drinks/week.  If the prescreen question answer is YES, complete the full AUDIT  :854981}         No data to display            {add AUDIT responses (Optional) (A score of 7 for adult men is an indication of hazardous drinking; a score of 8 or more is an indication of an alcohol use disorder.  A score of 7 or more for adult women is  "an indication of hazardous drinking or an alchohol use disorder):395641}  Do you have a current opioid prescription? { :383448}  Do you use any other controlled substances or medications that are not prescribed by a provider? {Substance Use :244178::\"None\"}  {Provider  If there are gaps in the social history shown above, please follow the link and refresh the note Link to Social and Substance History :683994}    {Outside tests to abstract? :617023}    {additional problems to add (Optional):686259}    Current providers sharing in care for this patient include: {Rooming staff:  Please update Care Team from storyboard, refresh this note and then delete this statement}  Patient Care Team:  Love Lucas APRN CNP as PCP - General (Geriatric Medicine)  Kiley Paul MD as MD (Internal Medicine)  Erica Abrams MD PhD as MD (Family Practice)  [facility], [facility]  Love Lucas APRN CNP as Assigned PCP    The following health maintenance items are reviewed in Epic and correct as of today:  Health Maintenance   Topic Date Due    URINE DRUG SCREEN  Never done    LIPID  Never done    RSV VACCINE (Pregnancy & 60+) (1 - 1-dose 60+ series) Never done    ZOSTER IMMUNIZATION (2 of 3) 02/25/2009    DTAP/TDAP/TD IMMUNIZATION (3 - Td or Tdap) 04/19/2020    MEDICARE ANNUAL WELLNESS VISIT  11/23/2021    FALL RISK ASSESSMENT  12/10/2022    COVID-19 Vaccine (6 - 2023-24 season) 09/01/2023    PHQ-2 (once per calendar year)  01/01/2024    GLUCOSE  10/03/2026    ADVANCE CARE PLANNING  12/16/2026    DEXA  03/14/2038    HEPATITIS C SCREENING  Completed    INFLUENZA VACCINE  Completed    Pneumococcal Vaccine: 65+ Years  Completed    IPV IMMUNIZATION  Aged Out    HPV IMMUNIZATION  Aged Out    MENINGITIS IMMUNIZATION  Aged Out    RSV MONOCLONAL ANTIBODY  Aged Out    MAMMO SCREENING  Discontinued     {Chronicprobdata (optional):768548}  {Decision Support (Optional):778048}    {Mammo DS 75+ " ":342662}  Pertinent mammograms are reviewed under the imaging tab.    Review of Systems  {ROS COMP (Optional):301460}    OBJECTIVE:   /71   Pulse 73   Temp (!) 96  F (35.6  C)   Resp 18   Wt 108.9 kg (240 lb)   SpO2 97%   BMI 35.44 kg/m   Estimated body mass index is 35.44 kg/m  as calculated from the following:    Height as of 22: 1.753 m (5' 9\").    Weight as of this encounter: 108.9 kg (240 lb).  Physical Exam  {Exam (Optional) :846428}    {Diagnostic Test Results (Optional):193147}    ASSESSMENT / PLAN:   {Diag Picklist:977203}    {Patient advised of split billing (Optional):341139}      COUNSELING:  {Medicare Counselin}        She reports that she quit smoking about 39 years ago. She started smoking about 62 years ago. She has never used smokeless tobacco.      Appropriate preventive services were discussed with this patient, including applicable screening as appropriate for cardiovascular disease, diabetes, osteopenia/osteoporosis, and glaucoma.  As appropriate for age/gender, discussed screening for colorectal cancer, prostate cancer, breast cancer, and cervical cancer. Checklist reviewing preventive services available has been given to the patient.    Reviewed patients plan of care and provided an AVS. The {CarePlan:654259} for Cristina meets the Care Plan requirement. This Care Plan has been established and reviewed with the {PATIENT, FAMILY MEMBER, CAREGIVER:458858}.    {Counseling Resources  [place] Preventive Services Task Force  Cholesterol Screening  Health diet/nutrition  Pooled Cohorts Equation Calculator  USDA's MyPlate  ASA Prophylaxis  Lung CA Screening  Osteoporosis prevention/bone health :468899}  {Breast Cancer Risk Calculator  BRCA-Related Cancer Risk Assessment FHS-7 Tool :682701}    CAROL Rodríguez [facility]    Identified Health Risks:  {Medicare required documentation of substance and opioid use disorders screening " :117848}    Electronically signed by:  Sandrita Kimball ***     [facility] GERIATRICS  ACUTE/EPISODIC VISIT    [facility] Medical Record Number:  [id number]  Place of Service where encounter took place:  [facility] [place] ([facility]) [[id number]]    Chief Complaint   Patient presents with    RECHECK       HPI:    Cristina Stevens is a 79 year old  (1945), who is being seen today for an episodic care visit.  HPI information obtained from: facility chart records, facility staff, and patient report.    Today's concern is:    Diagnoses         Codes Comments    MS (multiple sclerosis) (H)    -  Primary G35     Tetraparesis (H)     G82.50     Neurogenic bladder     N31.9     Edwards catheter in place     Z97.8     Frequent UTI     N39.0     Neuromuscular respiratory weakness (H)     G70.9, J99           Came to see Muna today to do a medicare wellness visit but she was expecting company soon, decided to do that type of visit at another date.    Muna was up in her wheelchair.  Alert and oriented x3.  No major health issues of late.  Last UTI that had an organism in the UC was October of last year.  Last check was April of this year with the UC negative.  Muna has an indwelling catheter that is changed by one of her helpers that is not employed by [facility].  She will collect a sample and bring to nursing as there are standing orders to be checked if symptoms occurring.    Muna continues to go out for therapies for exercises.  Usually early Tues and Thurs mornings.  Mostly to keep her extremities from having contractures.      ALLERGIES:    Allergies   Allergen Reactions    Nitrofuran Derivatives Visual Disturbance     Hallucinations    Tetracyclines & Related GI Disturbance    Amlodipine Other (See Comments)     Bloating/edema    Ampicillin GI Disturbance    Cefadroxil Muscle Pain (Myalgia)    Cephalexin Diarrhea and GI Disturbance    Levofloxacin Other (See Comments)     Phlebitis with IV infusion    Sulfa  Antibiotics Nausea and Vomiting, GI Disturbance and Unknown     Reaction occurred as a child    Sulfasalazine Nausea and Vomiting        MEDICATIONS:  Post Discharge Medication Reconciliation Status: patient was not discharged from an inpatient facility or TCU.     Current Outpatient Medications   Medication Sig Dispense Refill    acetaminophen (TYLENOL) 500 MG tablet 2 TABLETS (1000MG) ORALLY DAILY AS NEEDED (MAX 4GM TYLENOL OR ACETAMINOPHEN/24 HRS) 60 tablet 11    amoxicillin (AMOXIL) 250 MG chewable tablet CHEW AND SWALLOW 8 TABLETS (2000 MG) BY MOUTH 1 HOUR PRIOR TO DENTAL 8 tablet 97    ANTACID CALCIUM 500 MG chewable tablet 1 TABLET ORALLY DAILY  (DX: NUTRITIONAL SUPPLEMENT ) 30 tablet 11    armodafinil (NUVIGIL) 150 MG TABS tablet 1 TABLET ORALLY EVERY MORNING (DX: NARCOLEPSY) CONTROLLED MEDICATION ? TO BE STORED IN DOUBLE LOCKED STORAGE 30 tablet 4    ASPIRIN LOW DOSE 81 MG chewable tablet 1 TABLET ORALLY DAILY 28 tablet 11    Bacillus Coagulans-Inulin (PROBIOTIC FORMULA) 1-250 BILLION-MG CAPS 1 CAPSULE ORALLY DAILY 30 capsule 11    Baclofen (LIORESAL) 5 MG tablet Take 1 tablet (5 mg) by mouth 3 times daily as needed for muscle spasms 20 tablet 3    CO2-Releasing (-TWO) SUPP 1 supp daily prn and if not effective or falls out, may repeat again until results achieved.  May keep at bedside      Cranberry 250 MG TABS Give 2 tabs (500mg) po twice a day 120 tablet 11    Cranberry-Vitamin C-Probiotic (AZO CRANBERRY) 250-30 MG TABS 2 TABLETS (500MG) ORALLY 2 TIMES DAILY  (DX: URINARY TRACT INFECTION) 112 tablet 11    ELIQUIS ANTICOAGULANT 5 MG tablet 1 TABLET ORALLY 2 TIMES DAILY. DO NOT START BEFORE MAY 14TH,2022 (DX: VENOUS THROMBOEMBOLISM) 60 tablet 11    ibuprofen (ADVIL/MOTRIN) 400 MG tablet TAKE 1 TABLET BY MOUTH TWICE DAILY AS NEEDED FOR PAIN 30 tablet 97    losartan (COZAAR) 25 MG tablet Take 0.5 tablets (12.5 mg) by mouth every evening 15 tablet 11    medical cannabis (Patient's own supply) Take 1 Dose  by mouth See Admin Instructions (The purpose of this order is to document that the patient reports taking medical cannabis.  This is not a prescription, and is not used to certify that the patient has a qualifying medical condition.)      menthol-zinc oxide (CALMOSEPTINE) 0.44-20.6 % OINT ointment APPLY TO AFFECTED AREA TOPICALLY TO SKIN CHAFFED AREAS  2 TIMES DAILY;APPLY TO AFFECTED AREA TOPICALLY 2 TIMES DAILY AS NEEDED 113 g 11    methenamine hippurate (HIPREX) 1 g tablet 1 TABLET ORALLY 2 TIMES DAILY  (DX: URINARY TRACT INFECTION) 56 tablet 11    Multiple Vitamin (TAB-A-DAWSON) TABS Take 1 tablet by mouth daily 28 tablet 97    nystatin (MYCOSTATIN) 960949 UNIT/GM external powder APPLY TOPICALLY BETWEEN TOES EVERY EVENING UNTIL HEALED;THEN APPLY TOPICALLY BETWEEN TOES 2 TIMES DAILY AS NEEDED UNTIL HEALED 60 g 11    polyethylene glycol (MIRALAX/GLYCOLAX) powder MIX 17GM OF POWDER IN 8OZ OF WATER UNTIL COMPLETELY DISSOLVED. DRINK SOLUTION DAILY AS NEEDED 527 g 98    Probiotic Product (PROBIOTIC DAILY) CAPS Take 1 capsule by mouth daily 28 capsule 98    senna-docusate (SENEXON-S) 8.6-50 MG tablet Take 2 tablets by mouth daily as needed for constipation 62 tablet 12    simethicone (MYLICON) 125 MG chewable tablet 125mg po 4 times a day PRN for gas (may keep at bedside per request) 60 tablet 11    sterile water, bottle, (WATER FOR IRRIGATION, STERILE) irrigation 60ml  prn to irrigate ruby catheter when plugged 500 mL 11    vitamin C (ASCORBIC ACID) 500 MG tablet 1 TABLET ORALLY 2 TIMES DAILY (DX: CHRONIC CYSTITIS) 56 tablet 11    Vitamin D3 50 mcg (2000 units) tablet Take 1 tablet (50 mcg) by mouth daily 30 tablet 11    Zinc Oxide (DESITIN) 13 % CREA Apply topically 3 times daily as needed (chaffing) 113 g 11     Medications reviewed:  Medications reconciled to facility chart and changes were made to reflect current medications as identified as above med list. Below are the changes that were made:   Medications stopped  since last [facility] medication reconciliation:   Medications Discontinued During This Encounter   Medication Reason    Calcium Citrate 333 MG TABS Therapy completed (No AVS)    calcium citrate 250 MG TABS Therapy completed (No AVS)    Calcium Citrate 333 MG TABS Therapy completed (No AVS)    calcium citrate 250 MG TABS Therapy completed (No AVS)    ciprofloxacin (CIPRO) 500 MG tablet Therapy completed (No AVS)       Medications started since last [facility] medication reconciliation:  No orders of the defined types were placed in this encounter.      REVIEW OF SYSTEMS:  4 point ROS neg other than the symptoms noted above in the HPI.  No chest pain.  No acute shortness of breath      PHYSICAL EXAM  /71   Pulse 73   Temp (!) 96  F (35.6  C)   Resp 18   Wt 108.9 kg (240 lb)   SpO2 97%   BMI 35.44 kg/m    Muna is sitting up in her electric wheelchair in which she stays in most of the day. Non-ambulatory. It can be positioned to off-load her bottom, not to mention her cushion has been mapped as well.    Voice is soft and catches breath at times.    Skin is pink, dry and warm.  Heart rate regular and strong.    No respiratory distress.  She is strapped into her wheelchair with shoulder harness and unable to have her lean forward.  Has a laptray that is used for day to day activity.  She does not have fine motor skills in her hands.  Can manage the mouse to her computer.  Sometime, her helpers will compose the text on her phone to this NP.    Lower legs have trace to +1 edema.    Leg catheter bag present and no sediment present.      ASSESSMENT / PLAN:  (G35) MS (multiple sclerosis) (H)  (primary encounter diagnosis)  (G82.50) Tetraparesis (H)  Comment: no advancement seen in her disease process.  Unable to do her own cares and dependent on others.  Can voice her needs though.    Remains on Baclofen 5mg TID prn.  No scheduled medications.  Muna has many friends that are a good support for her.  Does not seem sad about  her life being this way.      (N31.9) Neurogenic bladder  (Z97.8) Edwards catheter in place  (N39.0) Frequent UTI  Comment: long standing diagnosis.  Minimally changed every 3 weeks to help prevent UTIs.  Remains on Hiprex 1GM twice a day.  Her main helper determines when she feels the urine is to be checked for infection.  Pleased at how her urine appears today.      (G70.9,  J99) Neuromuscular respiratory weakness (H)  Comment: as noted above, can tell when talking with Muna, her voice gets softer and can tell it is tied with her speech.  She enjoys visiting with others and she knows her limits.     Orders:  No new orders    Electronically signed by  CAROL Rodríguez CNP              Sincerely,        CAROL Rodríguez CNP

## 2024-06-10 NOTE — PROGRESS NOTES
[facility] GERIATRICS  ACUTE/EPISODIC VISIT    [facility] Medical Record Number:  [id number]  Place of Service where encounter took place:  [facility] [place] ([facility]) [[id number]]    Chief Complaint   Patient presents with    RECHECK       HPI:    Cristina Stevens is a 79 year old  (1945), who is being seen today for an episodic care visit.  HPI information obtained from: facility chart records, facility staff, and patient report.    Today's concern is:    Diagnoses         Codes Comments    MS (multiple sclerosis) (H)    -  Primary G35     Tetraparesis (H)     G82.50     Neurogenic bladder     N31.9     Edwards catheter in place     Z97.8     Frequent UTI     N39.0     Neuromuscular respiratory weakness (H)     G70.9, J99           Came to see Muna today to do a medicare wellness visit but she was expecting company soon, decided to do that type of visit at another date.    Muna was up in her wheelchair.  Alert and oriented x3.  No major health issues of late.  Last UTI that had an organism in the UC was October of last year.  Last check was April of this year with the UC negative.  Muna has an indwelling catheter that is changed by one of her helpers that is not employed by [facility].  She will collect a sample and bring to nursing as there are standing orders to be checked if symptoms occurring.    Muna continues to go out for therapies for exercises.  Usually early Tues and Thurs mornings.  Mostly to keep her extremities from having contractures.      ALLERGIES:    Allergies   Allergen Reactions    Nitrofuran Derivatives Visual Disturbance     Hallucinations    Tetracyclines & Related GI Disturbance    Amlodipine Other (See Comments)     Bloating/edema    Ampicillin GI Disturbance    Cefadroxil Muscle Pain (Myalgia)    Cephalexin Diarrhea and GI Disturbance    Levofloxacin Other (See Comments)     Phlebitis with IV infusion    Sulfa Antibiotics Nausea and Vomiting, GI Disturbance and Unknown      Reaction occurred as a child    Sulfasalazine Nausea and Vomiting        MEDICATIONS:  Post Discharge Medication Reconciliation Status: patient was not discharged from an inpatient facility or TCU.     Current Outpatient Medications   Medication Sig Dispense Refill    acetaminophen (TYLENOL) 500 MG tablet 2 TABLETS (1000MG) ORALLY DAILY AS NEEDED (MAX 4GM TYLENOL OR ACETAMINOPHEN/24 HRS) 60 tablet 11    amoxicillin (AMOXIL) 250 MG chewable tablet CHEW AND SWALLOW 8 TABLETS (2000 MG) BY MOUTH 1 HOUR PRIOR TO DENTAL 8 tablet 97    ANTACID CALCIUM 500 MG chewable tablet 1 TABLET ORALLY DAILY  (DX: NUTRITIONAL SUPPLEMENT ) 30 tablet 11    armodafinil (NUVIGIL) 150 MG TABS tablet 1 TABLET ORALLY EVERY MORNING (DX: NARCOLEPSY) CONTROLLED MEDICATION ? TO BE STORED IN DOUBLE LOCKED STORAGE 30 tablet 4    ASPIRIN LOW DOSE 81 MG chewable tablet 1 TABLET ORALLY DAILY 28 tablet 11    Bacillus Coagulans-Inulin (PROBIOTIC FORMULA) 1-250 BILLION-MG CAPS 1 CAPSULE ORALLY DAILY 30 capsule 11    Baclofen (LIORESAL) 5 MG tablet Take 1 tablet (5 mg) by mouth 3 times daily as needed for muscle spasms 20 tablet 3    CO2-Releasing (-TWO) SUPP 1 supp daily prn and if not effective or falls out, may repeat again until results achieved.  May keep at bedside      Cranberry 250 MG TABS Give 2 tabs (500mg) po twice a day 120 tablet 11    Cranberry-Vitamin C-Probiotic (AZO CRANBERRY) 250-30 MG TABS 2 TABLETS (500MG) ORALLY 2 TIMES DAILY  (DX: URINARY TRACT INFECTION) 112 tablet 11    ELIQUIS ANTICOAGULANT 5 MG tablet 1 TABLET ORALLY 2 TIMES DAILY. DO NOT START BEFORE MAY 14TH,2022 (DX: VENOUS THROMBOEMBOLISM) 60 tablet 11    ibuprofen (ADVIL/MOTRIN) 400 MG tablet TAKE 1 TABLET BY MOUTH TWICE DAILY AS NEEDED FOR PAIN 30 tablet 97    losartan (COZAAR) 25 MG tablet Take 0.5 tablets (12.5 mg) by mouth every evening 15 tablet 11    medical cannabis (Patient's own supply) Take 1 Dose by mouth See Admin Instructions (The purpose of this order is to  document that the patient reports taking medical cannabis.  This is not a prescription, and is not used to certify that the patient has a qualifying medical condition.)      menthol-zinc oxide (CALMOSEPTINE) 0.44-20.6 % OINT ointment APPLY TO AFFECTED AREA TOPICALLY TO SKIN CHAFFED AREAS  2 TIMES DAILY;APPLY TO AFFECTED AREA TOPICALLY 2 TIMES DAILY AS NEEDED 113 g 11    methenamine hippurate (HIPREX) 1 g tablet 1 TABLET ORALLY 2 TIMES DAILY  (DX: URINARY TRACT INFECTION) 56 tablet 11    Multiple Vitamin (TAB-A-DAWSON) TABS Take 1 tablet by mouth daily 28 tablet 97    nystatin (MYCOSTATIN) 238563 UNIT/GM external powder APPLY TOPICALLY BETWEEN TOES EVERY EVENING UNTIL HEALED;THEN APPLY TOPICALLY BETWEEN TOES 2 TIMES DAILY AS NEEDED UNTIL HEALED 60 g 11    polyethylene glycol (MIRALAX/GLYCOLAX) powder MIX 17GM OF POWDER IN 8OZ OF WATER UNTIL COMPLETELY DISSOLVED. DRINK SOLUTION DAILY AS NEEDED 527 g 98    Probiotic Product (PROBIOTIC DAILY) CAPS Take 1 capsule by mouth daily 28 capsule 98    senna-docusate (SENEXON-S) 8.6-50 MG tablet Take 2 tablets by mouth daily as needed for constipation 62 tablet 12    simethicone (MYLICON) 125 MG chewable tablet 125mg po 4 times a day PRN for gas (may keep at bedside per request) 60 tablet 11    sterile water, bottle, (WATER FOR IRRIGATION, STERILE) irrigation 60ml  prn to irrigate ruby catheter when plugged 500 mL 11    vitamin C (ASCORBIC ACID) 500 MG tablet 1 TABLET ORALLY 2 TIMES DAILY (DX: CHRONIC CYSTITIS) 56 tablet 11    Vitamin D3 50 mcg (2000 units) tablet Take 1 tablet (50 mcg) by mouth daily 30 tablet 11    Zinc Oxide (DESITIN) 13 % CREA Apply topically 3 times daily as needed (chaffing) 113 g 11     Medications reviewed:  Medications reconciled to facility chart and changes were made to reflect current medications as identified as above med list. Below are the changes that were made:   Medications stopped since last [facility] medication reconciliation:   Medications  Discontinued During This Encounter   Medication Reason    Calcium Citrate 333 MG TABS Therapy completed (No AVS)    calcium citrate 250 MG TABS Therapy completed (No AVS)    Calcium Citrate 333 MG TABS Therapy completed (No AVS)    calcium citrate 250 MG TABS Therapy completed (No AVS)    ciprofloxacin (CIPRO) 500 MG tablet Therapy completed (No AVS)       Medications started since last [facility] medication reconciliation:  No orders of the defined types were placed in this encounter.      REVIEW OF SYSTEMS:  4 point ROS neg other than the symptoms noted above in the HPI.  No chest pain.  No acute shortness of breath      PHYSICAL EXAM  /71   Pulse 73   Temp (!) 96  F (35.6  C)   Resp 18   Wt 108.9 kg (240 lb)   SpO2 97%   BMI 35.44 kg/m    Muna is sitting up in her electric wheelchair in which she stays in most of the day. Non-ambulatory. It can be positioned to off-load her bottom, not to mention her cushion has been mapped as well.    Voice is soft and catches breath at times.    Skin is pink, dry and warm.  Heart rate regular and strong.    No respiratory distress.  She is strapped into her wheelchair with shoulder harness and unable to have her lean forward.  Has a laptray that is used for day to day activity.  She does not have fine motor skills in her hands.  Can manage the mouse to her computer.  Sometime, her helpers will compose the text on her phone to this NP.    Lower legs have trace to +1 edema.    Leg catheter bag present and no sediment present.      ASSESSMENT / PLAN:  (G35) MS (multiple sclerosis) (H)  (primary encounter diagnosis)  (G82.50) Tetraparesis (H)  Comment: no advancement seen in her disease process.  Unable to do her own cares and dependent on others.  Can voice her needs though.    Remains on Baclofen 5mg TID prn.  No scheduled medications.  Muna has many friends that are a good support for her.  Does not seem sad about her life being this way.      (N31.9) Neurogenic  bladder  (Z97.8) Edwards catheter in place  (N39.0) Frequent UTI  Comment: long standing diagnosis.  Minimally changed every 3 weeks to help prevent UTIs.  Remains on Hiprex 1GM twice a day.  Her main helper determines when she feels the urine is to be checked for infection.  Pleased at how her urine appears today.      (G70.9,  J99) Neuromuscular respiratory weakness (H)  Comment: as noted above, can tell when talking with Muna, her voice gets softer and can tell it is tied with her speech.  She enjoys visiting with others and she knows her limits.     Orders:  No new orders    Electronically signed by  CAROL Rodríguez CNP

## 2024-06-10 NOTE — LETTER
6/10/2024      Cristina Stevens  [address]  [facility] [address]        No notes on file      Sincerely,        CAROL Rodríguez CNP

## 2024-06-10 NOTE — LETTER
6/10/2024      Cristina Stevens  [address]  [facility] [address]        [facility] GERIATRICS  ACUTE/EPISODIC VISIT    [facility] Medical Record Number:  [id number]  [facility] [[id number]    Chief Complaint   Patient presents with     RECHECK       HPI:    Cristina Stevens is a 79 year old  (1945), who is being seen today for an episodic care visit.  HPI information obtained from: facility chart records, facility staff, and patient report.    Today's concern is:    Diagnoses         Codes Comments    MS (multiple sclerosis) (H)    -  Primary G35     Tetraparesis (H)     G82.50     Neurogenic bladder     N31.9     Edwards catheter in place     Z97.8     Frequent UTI     N39.0     Neuromuscular respiratory weakness (H)     G70.9, J99           Came to see Muna today to do a medicare wellness visit but she was expecting company soon, decided to do that type of visit at another date.    Muna was up in her wheelchair.  Alert and oriented x3.  No major health issues of late.  Last UTI that had an organism in the UC was October of last year.  Last check was April of this year with the UC negative.  Muna has an indwelling catheter that is changed by one of her helpers that is not employed by [employer].  She will collect a sample and bring to nursing as there are standing orders to be checked if symptoms occurring.    Muna continues to go out for therapies for exercises.  Usually early Tues and Thurs mornings.  Mostly to keep her extremities from having contractures.      ALLERGIES:    Allergies   Allergen Reactions     Nitrofuran Derivatives Visual Disturbance     Hallucinations     Tetracyclines & Related GI Disturbance     Amlodipine Other (See Comments)     Bloating/edema     Ampicillin GI Disturbance     Cefadroxil Muscle Pain (Myalgia)     Cephalexin Diarrhea and GI Disturbance     Levofloxacin Other (See Comments)      Phlebitis with IV infusion     Sulfa Antibiotics Nausea and Vomiting, GI Disturbance and Unknown     Reaction occurred as a child     Sulfasalazine Nausea and Vomiting        MEDICATIONS:  Post Discharge Medication Reconciliation Status: patient was not discharged from an inpatient facility or TCU.     Current Outpatient Medications   Medication Sig Dispense Refill     acetaminophen (TYLENOL) 500 MG tablet 2 TABLETS (1000MG) ORALLY DAILY AS NEEDED (MAX 4GM TYLENOL OR ACETAMINOPHEN/24 HRS) 60 tablet 11     amoxicillin (AMOXIL) 250 MG chewable tablet CHEW AND SWALLOW 8 TABLETS (2000 MG) BY MOUTH 1 HOUR PRIOR TO DENTAL 8 tablet 97     ANTACID CALCIUM 500 MG chewable tablet 1 TABLET ORALLY DAILY  (DX: NUTRITIONAL SUPPLEMENT ) 30 tablet 11     armodafinil (NUVIGIL) 150 MG TABS tablet 1 TABLET ORALLY EVERY MORNING (DX: NARCOLEPSY) CONTROLLED MEDICATION ? TO BE STORED IN DOUBLE LOCKED STORAGE 30 tablet 4     ASPIRIN LOW DOSE 81 MG chewable tablet 1 TABLET ORALLY DAILY 28 tablet 11     Bacillus Coagulans-Inulin (PROBIOTIC FORMULA) 1-250 BILLION-MG CAPS 1 CAPSULE ORALLY DAILY 30 capsule 11     Baclofen (LIORESAL) 5 MG tablet Take 1 tablet (5 mg) by mouth 3 times daily as needed for muscle spasms 20 tablet 3     CO2-Releasing (-TWO) SUPP 1 supp daily prn and if not effective or falls out, may repeat again until results achieved.  May keep at bedside       Cranberry 250 MG TABS Give 2 tabs (500mg) po twice a day 120 tablet 11     Cranberry-Vitamin C-Probiotic (AZO CRANBERRY) 250-30 MG TABS 2 TABLETS (500MG) ORALLY 2 TIMES DAILY  (DX: URINARY TRACT INFECTION) 112 tablet 11     ELIQUIS ANTICOAGULANT 5 MG tablet 1 TABLET ORALLY 2 TIMES DAILY. DO NOT START BEFORE MAY 14TH,2022 (DX: VENOUS THROMBOEMBOLISM) 60 tablet 11     ibuprofen (ADVIL/MOTRIN) 400 MG tablet TAKE 1 TABLET BY MOUTH TWICE DAILY AS NEEDED FOR PAIN 30 tablet 97     losartan (COZAAR) 25 MG tablet Take 0.5 tablets (12.5 mg) by mouth every evening 15 tablet 11      medical cannabis (Patient's own supply) Take 1 Dose by mouth See Admin Instructions (The purpose of this order is to document that the patient reports taking medical cannabis.  This is not a prescription, and is not used to certify that the patient has a qualifying medical condition.)       menthol-zinc oxide (CALMOSEPTINE) 0.44-20.6 % OINT ointment APPLY TO AFFECTED AREA TOPICALLY TO SKIN CHAFFED AREAS  2 TIMES DAILY;APPLY TO AFFECTED AREA TOPICALLY 2 TIMES DAILY AS NEEDED 113 g 11     methenamine hippurate (HIPREX) 1 g tablet 1 TABLET ORALLY 2 TIMES DAILY  (DX: URINARY TRACT INFECTION) 56 tablet 11     Multiple Vitamin (TAB-A-DAWSON) TABS Take 1 tablet by mouth daily 28 tablet 97     nystatin (MYCOSTATIN) 981968 UNIT/GM external powder APPLY TOPICALLY BETWEEN TOES EVERY EVENING UNTIL HEALED;THEN APPLY TOPICALLY BETWEEN TOES 2 TIMES DAILY AS NEEDED UNTIL HEALED 60 g 11     polyethylene glycol (MIRALAX/GLYCOLAX) powder MIX 17GM OF POWDER IN 8OZ OF WATER UNTIL COMPLETELY DISSOLVED. DRINK SOLUTION DAILY AS NEEDED 527 g 98     Probiotic Product (PROBIOTIC DAILY) CAPS Take 1 capsule by mouth daily 28 capsule 98     senna-docusate (SENEXON-S) 8.6-50 MG tablet Take 2 tablets by mouth daily as needed for constipation 62 tablet 12     simethicone (MYLICON) 125 MG chewable tablet 125mg po 4 times a day PRN for gas (may keep at bedside per request) 60 tablet 11     sterile water, bottle, (WATER FOR IRRIGATION, STERILE) irrigation 60ml  prn to irrigate ruby catheter when plugged 500 mL 11     vitamin C (ASCORBIC ACID) 500 MG tablet 1 TABLET ORALLY 2 TIMES DAILY (DX: CHRONIC CYSTITIS) 56 tablet 11     Vitamin D3 50 mcg (2000 units) tablet Take 1 tablet (50 mcg) by mouth daily 30 tablet 11     Zinc Oxide (DESITIN) 13 % CREA Apply topically 3 times daily as needed (chaffing) 113 g 11     Medications reviewed:  Medications reconciled to facility chart and changes were made to reflect current medications as identified as above med  list. Below are the changes that were made:   Medications stopped since last EPIC medication reconciliation:   Medications Discontinued During This Encounter   Medication Reason     Calcium Citrate 333 MG TABS Therapy completed (No AVS)     calcium citrate 250 MG TABS Therapy completed (No AVS)     Calcium Citrate 333 MG TABS Therapy completed (No AVS)     calcium citrate 250 MG TABS Therapy completed (No AVS)     ciprofloxacin (CIPRO) 500 MG tablet Therapy completed (No AVS)       Medications started since last [facility] medication reconciliation:  No orders of the defined types were placed in this encounter.      REVIEW OF SYSTEMS:  4 point ROS neg other than the symptoms noted above in the HPI.  No chest pain.  No acute shortness of breath      PHYSICAL EXAM  /71   Pulse 73   Temp (!) 96  F (35.6  C)   Resp 18   Wt 108.9 kg (240 lb)   SpO2 97%   BMI 35.44 kg/m    Muna is sitting up in her electric wheelchair in which she stays in most of the day. Non-ambulatory. It can be positioned to off-load her bottom, not to mention her cushion has been mapped as well.    Voice is soft and catches breath at times.    Skin is pink, dry and warm.  Heart rate regular and strong.    No respiratory distress.  She is strapped into her wheelchair with shoulder harness and unable to have her lean forward.  Has a laptray that is used for day to day activity.  She does not have fine motor skills in her hands.  Can manage the mouse to her computer.  Sometime, her helpers will compose the text on her phone to this NP.    Lower legs have trace to +1 edema.    Leg catheter bag present and no sediment present.      ASSESSMENT / PLAN:  (G35) MS (multiple sclerosis) (H)  (primary encounter diagnosis)  (G82.50) Tetraparesis (H)  Comment: no advancement seen in her disease process.  Unable to do her own cares and dependent on others.  Can voice her needs though.    Remains on Baclofen 5mg TID prn.  No scheduled medications.  Muna has  many friends that are a good support for her.  Does not seem sad about her life being this way.      (N31.9) Neurogenic bladder  (Z97.8) Edwards catheter in place  (N39.0) Frequent UTI  Comment: long standing diagnosis.  Minimally changed every 3 weeks to help prevent UTIs.  Remains on Hiprex 1GM twice a day.  Her main helper determines when she feels the urine is to be checked for infection.  Pleased at how her urine appears today.      (G70.9,  J99) Neuromuscular respiratory weakness (H)  Comment: as noted above, can tell when talking with Muna, her voice gets softer and can tell it is tied with her speech.  She enjoys visiting with others and she knows her limits.     Orders:  No new orders    Electronically signed by  CAROL Rodríguez CNP             Sincerely,        CAROL Rodríguez CNP

## 2024-06-29 ENCOUNTER — HEALTH MAINTENANCE LETTER (OUTPATIENT)
Age: 79
End: 2024-06-29

## 2024-07-02 DIAGNOSIS — Z78.9 TAKES DIETARY SUPPLEMENTS: ICD-10-CM

## 2024-07-02 RX ORDER — ASCORBIC ACID 500 MG
TABLET ORAL
Qty: 56 TABLET | Refills: 11 | Status: SHIPPED | OUTPATIENT
Start: 2024-07-02

## 2024-07-29 DIAGNOSIS — I82.409 ACUTE THROMBOEMBOLISM OF DEEP VEINS OF LOWER EXTREMITY (H): ICD-10-CM

## 2024-07-29 DIAGNOSIS — I10 PRIMARY HYPERTENSION: ICD-10-CM

## 2024-07-29 RX ORDER — LOSARTAN POTASSIUM 25 MG/1
TABLET ORAL
Qty: 16 TABLET | Refills: 11 | Status: SHIPPED | OUTPATIENT
Start: 2024-07-29

## 2024-07-29 RX ORDER — APIXABAN 5 MG/1
TABLET, FILM COATED ORAL
Qty: 62 TABLET | Refills: 11 | Status: SHIPPED | OUTPATIENT
Start: 2024-07-29

## 2024-08-08 ENCOUNTER — TRANSFERRED RECORDS (OUTPATIENT)
Dept: HEALTH INFORMATION MANAGEMENT | Facility: CLINIC | Age: 79
End: 2024-08-08
Payer: COMMERCIAL

## 2024-08-12 NOTE — PROGRESS NOTES
New plan of care has been received from [facility] [employer], form placed in Dr. Macario's folder for review and siganture.   Deni Lizarraga EMT August 12, 2024

## 2024-08-15 NOTE — PROGRESS NOTES
New plan of care has been signed and faxed back at [phone].  Deni Lizarraga EMT August 15, 2024      independent

## 2024-08-19 ENCOUNTER — ASSISTED LIVING VISIT (OUTPATIENT)
Dept: GERIATRICS | Facility: CLINIC | Age: 79
End: 2024-08-19
Payer: COMMERCIAL

## 2024-08-19 VITALS
HEART RATE: 73 BPM | TEMPERATURE: 96 F | SYSTOLIC BLOOD PRESSURE: 112 MMHG | OXYGEN SATURATION: 97 % | BODY MASS INDEX: 35.44 KG/M2 | RESPIRATION RATE: 18 BRPM | WEIGHT: 240 LBS | DIASTOLIC BLOOD PRESSURE: 71 MMHG

## 2024-08-19 DIAGNOSIS — G35 MS (MULTIPLE SCLEROSIS) (H): Primary | ICD-10-CM

## 2024-08-19 DIAGNOSIS — N31.9 NEUROGENIC BLADDER: ICD-10-CM

## 2024-08-19 DIAGNOSIS — Z97.8 FOLEY CATHETER IN PLACE: ICD-10-CM

## 2024-08-19 DIAGNOSIS — N39.0 FREQUENT UTI: ICD-10-CM

## 2024-08-19 DIAGNOSIS — Z99.3 WHEELCHAIR DEPENDENCE: ICD-10-CM

## 2024-08-19 DIAGNOSIS — R25.2 SPASTICITY: ICD-10-CM

## 2024-08-19 DIAGNOSIS — G82.50 TETRAPARESIS (H): ICD-10-CM

## 2024-08-19 DIAGNOSIS — G70.9 NEUROMUSCULAR RESPIRATORY WEAKNESS (H): ICD-10-CM

## 2024-08-19 DIAGNOSIS — J99 NEUROMUSCULAR RESPIRATORY WEAKNESS (H): ICD-10-CM

## 2024-08-19 PROCEDURE — 99349 HOME/RES VST EST MOD MDM 40: CPT | Performed by: NURSE PRACTITIONER

## 2024-08-19 RX ORDER — CLOPIDOGREL BISULFATE 75 MG/1
1 TABLET ORAL DAILY
COMMUNITY
Start: 2024-06-28 | End: 2025-06-28

## 2024-08-19 NOTE — PROGRESS NOTES
[facility] GERIATRICS  ACUTE/EPISODIC VISIT    [facility] Medical Record Number:  [id number]  Place of Service where encounter took place:  [facility]) [[id number]]    Chief Complaint   Patient presents with    RECHECK       HPI:    Cristina Stevens is a 79 year old  (1945), who is being seen today for an episodic care visit.  HPI information obtained from: facility chart records, facility staff, patient report, [facility] Epic chart review, and Care Everywhere Epic chart review.    Today's concern is:    Diagnoses         Codes Comments    MS (multiple sclerosis) (H)    -  Primary G35     Tetraparesis (H)     G82.50     Spasticity     R25.2     Wheelchair dependence     Z99.3     Neuromuscular respiratory weakness (H)     G70.9, J99     Neurogenic bladder     N31.9     Edwards catheter in place     Z97.8     Frequent UTI     N39.0           Came to see Muna today per her request after receiving a letter denial of medical coverage for repair of her electric wheelchair.  Muna can appeal but needs supporting evidence of her MS diagnosis to meet medicare criteria for medical equipment repair.      Muna was first diagnosed with MS in 2007.  She never had a manual wheelchair.  Used a scooter at first in 2007 but then got an electric wheelchair shortly thereafter.    M[place] extended care [place] [facility]  She has her own apartment that she is able to function in with the help of staff for personal cares, transfers, bed mobility, catheter care, meal prep and medication management.    Muna has limited use of her extremities that she is not able to maneuver a standard wheelchair.  The electric wheelchair she has is a [employer] wheelchair.  [employer] paid for most of the wheelchair where Muna paid for the function of it to go up and down.  Overall, the electric wheelchair is ideal for her as she is able to raise it up, tilt it, extend the foot pedals out in  order to reposition self and off load her bottom.  Her seat cushion was also mapped out in order to take pressure off certain points.  Muna continues to go twice a week most weeks for therapies out of the building in which she has a escort that will drive her to the appointments.  Primary care comes to her in the extended care (myself) and then all other specialities she goes to their clinics.    Muna can make her own decisions and direct her care but she is dependent on staff or personal assistants to help manage day to day cares.      WHAT IS WRONG WITH THE ELECTRIC WHEELCHAIR:  Muna shows this NP today why she is asking for repairs to her electric wheelchair.  The control panel that is on her right side arm rest has buttons and a arthur stick.  The buttons control the movement of tilting, lifting, elevating legs for repositioning.  Unfortunately the lights will come on by self and turn off by self in operation.  This is abnormal and feel it is the wiring that is giving the trouble.  If this further malfunctions, could leave her in a undesirable position in the wheelchair. This is what is being asked to be looked at and fixed as Muna is dependent on the controls to function her electric wheelchair which in turns allows her to be independent during the day to be up in wheelchair.        ALLERGIES:    Allergies   Allergen Reactions    Nitrofuran Derivatives Visual Disturbance     Hallucinations    Tetracyclines & Related GI Disturbance    Amlodipine Other (See Comments)     Bloating/edema    Ampicillin GI Disturbance    Cefadroxil Muscle Pain (Myalgia)    Cephalexin Diarrhea and GI Disturbance    Levofloxacin Other (See Comments)     Phlebitis with IV infusion    Sulfa Antibiotics Nausea and Vomiting, GI Disturbance and Unknown     Reaction occurred as a child    Sulfasalazine Nausea and Vomiting        MEDICATIONS:  Post Discharge Medication Reconciliation Status: patient was not discharged from an inpatient  facility or TCU.   Current Outpatient Medications   Medication Sig Dispense Refill    clopidogrel (PLAVIX) 75 MG tablet Take 1 tablet by mouth daily      acetaminophen (TYLENOL) 500 MG tablet 2 TABLETS (1000MG) ORALLY DAILY AS NEEDED (MAX 4GM TYLENOL OR ACETAMINOPHEN/24 HRS) 60 tablet 11    amoxicillin (AMOXIL) 250 MG chewable tablet CHEW AND SWALLOW 8 TABLETS (2000 MG) BY MOUTH 1 HOUR PRIOR TO DENTAL 8 tablet 97    ANTACID CALCIUM 500 MG chewable tablet 1 TABLET ORALLY DAILY  (DX: NUTRITIONAL SUPPLEMENT ) 30 tablet 11    armodafinil (NUVIGIL) 150 MG TABS tablet 1 TABLET ORALLY EVERY MORNING (DX: NARCOLEPSY) ONTROLLED MEDICATION ? TO BE STORED IN DOUBLE LOCKED STORAGE 30 tablet 4    ASPIRIN LOW DOSE 81 MG chewable tablet 1 TABLET ORALLY DAILY 28 tablet 11    Bacillus Coagulans-Inulin (PROBIOTIC FORMULA) 1-250 BILLION-MG CAPS 1 CAPSULE ORALLY DAILY 30 capsule 11    Baclofen (LIORESAL) 5 MG tablet Take 1 tablet (5 mg) by mouth 3 times daily as needed for muscle spasms 20 tablet 3    CO2-Releasing (-TWO) SUPP 1 supp daily prn and if not effective or falls out, may repeat again until results achieved.  May keep at bedside      Cranberry 250 MG TABS Give 2 tabs (500mg) po twice a day 120 tablet 11    Cranberry-Vitamin C-Probiotic (AZO CRANBERRY) 250-30 MG TABS 2 TABLETS (500MG) ORALLY 2 TIMES DAILY  (DX: URINARY TRACT INFECTION) 112 tablet 11    ELIQUIS ANTICOAGULANT 5 MG tablet 1 TABLET ORALLY 2 TIMES DAILY. (DX: VENOUS THROMBOEMBOLISM) 62 tablet 11    ibuprofen (ADVIL/MOTRIN) 400 MG tablet TAKE 1 TABLET BY MOUTH TWICE DAILY AS NEEDED FOR PAIN 30 tablet 97    losartan (COZAAR) 25 MG tablet 1/2 TABLET (12.5MG) ORALLY EVERY EVENING 16 tablet 11    medical cannabis (Patient's own supply) Take 1 Dose by mouth See Admin Instructions (The purpose of this order is to document that the patient reports taking medical cannabis.  This is not a prescription, and is not used to certify that the patient has a qualifying medical  condition.)      menthol-zinc oxide (CALMOSEPTINE) 0.44-20.6 % OINT ointment APPLY TO AFFECTED AREA TOPICALLY TO SKIN CHAFFED AREAS  2 TIMES DAILY;APPLY TO AFFECTED AREA TOPICALLY 2 TIMES DAILY AS NEEDED 113 g 11    methenamine hippurate (HIPREX) 1 g tablet 1 TABLET ORALLY 2 TIMES DAILY  (DX: URINARY TRACT INFECTION) 56 tablet 11    Multiple Vitamin (TAB-A-DAWSON) TABS Take 1 tablet by mouth daily 28 tablet 97    nystatin (MYCOSTATIN) 149097 UNIT/GM external powder APPLY TOPICALLY BETWEEN TOES EVERY EVENING UNTIL HEALED;THEN APPLY TOPICALLY BETWEEN TOES 2 TIMES DAILY AS NEEDED UNTIL HEALED 60 g 11    polyethylene glycol (MIRALAX/GLYCOLAX) powder MIX 17GM OF POWDER IN 8OZ OF WATER UNTIL COMPLETELY DISSOLVED. DRINK SOLUTION DAILY AS NEEDED 527 g 98    Probiotic Product (PROBIOTIC DAILY) CAPS Take 1 capsule by mouth daily 28 capsule 98    senna-docusate (SENEXON-S) 8.6-50 MG tablet Take 2 tablets by mouth daily as needed for constipation 62 tablet 12    simethicone (MYLICON) 125 MG chewable tablet 125mg po 4 times a day PRN for gas (may keep at bedside per request) 60 tablet 11    sterile water, bottle, (WATER FOR IRRIGATION, STERILE) irrigation 60ml  prn to irrigate ruby catheter when plugged 500 mL 11    vitamin C (ASCORBIC ACID) 500 MG tablet 1 TABLET ORALLY 2 TIMES DAILY (DX: CHRONIC CYSTITIS) 56 tablet 11    Vitamin D3 50 mcg (2000 units) tablet Take 1 tablet (50 mcg) by mouth daily 30 tablet 11    Zinc Oxide (DESITIN) 13 % CREA Apply topically 3 times daily as needed (chaffing) 113 g 11     Medications reviewed:  Medications reconciled to facility chart and changes were made to reflect current medications as identified as above med list. Below are the changes that were made:   Medications stopped since last EPIC medication reconciliation:   There are no discontinued medications.    Medications started since last [facility] medication reconciliation:  Orders Placed This Encounter   Medications    clopidogrel (PLAVIX) 75  "MG tablet     Sig: Take 1 tablet by mouth daily         REVIEW OF SYSTEMS:  4 point ROS neg other than the symptoms noted above in the HPI.  Denied chest pain, or troubles with her catheter      PHYSICAL EXAM:  /71   Pulse 73   Temp (!) 96  F (35.6  C)   Resp 18   Wt 108.9 kg (240 lb)   SpO2 97%   BMI 35.44 kg/m    Physical Exam  Constitutional:       Comments: Larger body frame.  Sedentary lifestyle.   HENT:      Head: Normocephalic.      Right Ear: External ear normal.      Left Ear: External ear normal.      Ears:      Comments: No hearing aides.  Can hear in a quiet environment     Nose: Nose normal.      Mouth/Throat:      Mouth: Mucous membranes are moist.      Pharynx: Oropharynx is clear.      Comments: Voice varies in degree of strength due to her MS.  Her breathing will catch her conversation.  Soft spoken more than anything  Eyes:      Extraocular Movements: Extraocular movements intact.      Conjunctiva/sclera: Conjunctivae normal.      Pupils: Pupils are equal, round, and reactive to light.   Cardiovascular:      Rate and Rhythm: Normal rate and regular rhythm.      Heart sounds: Normal heart sounds.   Pulmonary:      Effort: Pulmonary effort is normal.      Comments: No acute SOB  Abdominal:      Comments: Round, soft, bowel sounds present.   Genitourinary:     Comments: Incontinent of stool.    Edwards catheter present draining clear yellow urine into bag.  Change of catheter is every 2 weeks to prevent infections  Musculoskeletal:      Cervical back: Normal range of motion.      Comments: Unable to move lower legs.  Trace to +1 edema in lower legs.  Arm movement is limited.  Finger dexterity is minimal.  Uses a \"mouse\" to move her computer curser around.  Has a cell phone but that is hard for her to text.  More for calling people.      Pb lift is used for transfers from surface to surface.  Non-ambulatory, unable to twist self in bed.     Skin:     General: Skin is warm and dry. "   Neurological:      Mental Status: She is alert and oriented to person, place, and time.      Coordination: Coordination abnormal.      Comments: Does have feeling in her lower extremities   Psychiatric:         Mood and Affect: Mood normal.         Behavior: Behavior normal.         Thought Content: Thought content normal.         Judgment: Judgment normal.     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  She has always been electric wheelchair dependent.  Once in her wheelchair, she is able to manage day to day activities cognitively that includes managing her electric wheelchair.  She is able to drive her wheelchair without hitting walls, door frames etc.  Physically she is dependent on staff to assist with cares.  No changes with medications in some time.    (G70.9,  J99) Neuromuscular respiratory weakness (H)  Comment: voice fades in and out during conversation.  Can start out strong/weak and then fades to weak in a sentence.  No acute respiratory distress that makes her dependent on oxygen.  Continue to monitor.    (N31.9) Neurogenic bladder  (Z97.8) Ruby catheter in place  (N39.0) Frequent UTI  Comment: - spoke today about her ruby and she stated it just was changed last night.  Goal is every two weeks.  She pays for extra supplies as long as changing the catheter more frequently than medicare allows helps her stay away from UTIs.  It has been since April when she had the last urine checked.    Remains on Hiprex and Vitamin C combination for prevention of UTIs and Cranberry caps twice a day.       Orders:  No new orders.  Will send this note and another note for [facility] to pay for her electric wheelchair     Electronically signed by  Love  CAROL Olson CNP

## 2024-08-19 NOTE — LETTER
8/19/2024      Cristina Stevens  [address]  [place] [address]        [facility]  ACUTE/EPISODIC VISIT    [facility] Medical Record Number:  [id number]  Place of Service where encounter took place:  [facility] ([facility]) [[id number]]    Chief Complaint   Patient presents with     RECHECK       HPI:    Cristina Stevens is a 79 year old  (1945), who is being seen today for an episodic care visit.  HPI information obtained from: facility chart records, facility staff, patient report, [facility] chart review, and Care Everywhere [facility] chart review.    Today's concern is:    Diagnoses         Codes Comments    MS (multiple sclerosis) (H)    -  Primary G35     Tetraparesis (H)     G82.50     Spasticity     R25.2     Wheelchair dependence     Z99.3     Neuromuscular respiratory weakness (H)     G70.9, J99     Neurogenic bladder     N31.9     Edwards catheter in place     Z97.8     Frequent UTI     N39.0           Came to see Muna today per her request after receiving a letter denial of medical coverage for repair of her electric wheelchair.  Muna can appeal but needs supporting evidence of her MS diagnosis to meet medicare criteria for medical equipment repair.      Muna was first diagnosed with MS in 2007.  She never had a manual wheelchair.  Used a scooter at first in 2007 but then got an electric wheelchair shortly thereafter.    Muna resides in an extended care unit in [facility].  She has her own apartment that she is able to function in with the help of staff for personal cares, transfers, bed mobility, catheter care, meal prep and medication management.    Muna has limited use of her extremities that she is not able to maneuver a standard wheelchair.  The electric wheelchair she has is a Permobil wheelchair.  [employer] paid for most of the wheelchair where Muna paid for the function of it to go up and down.  Overall, the electric  wheelchair is ideal for her as she is able to raise it up, tilt it, extend the foot pedals out in order to reposition self and off load her bottom.  Her seat cushion was also mapped out in order to take pressure off certain points.  Muna continues to go twice a week most weeks for therapies out of the building in which she has a escort that will drive her to the appointments.  Primary care comes to her in the extended care (myself) and then all other specialities she goes to their clinics.    Muna can make her own decisions and direct her care but she is dependent on staff or personal assistants to help manage day to day cares.      WHAT IS WRONG WITH THE ELECTRIC WHEELCHAIR:  Muna shows this NP today why she is asking for repairs to her electric wheelchair.  The control panel that is on her right side arm rest has buttons and a arthur stick.  The buttons control the movement of tilting, lifting, elevating legs for repositioning.  Unfortunately the lights will come on by self and turn off by self in operation.  This is abnormal and feel it is the wiring that is giving the trouble.  If this further malfunctions, could leave her in a undesirable position in the wheelchair. This is what is being asked to be looked at and fixed as Muna is dependent on the controls to function her electric wheelchair which in turns allows her to be independent during the day to be up in wheelchair.        ALLERGIES:    Allergies   Allergen Reactions     Nitrofuran Derivatives Visual Disturbance     Hallucinations     Tetracyclines & Related GI Disturbance     Amlodipine Other (See Comments)     Bloating/edema     Ampicillin GI Disturbance     Cefadroxil Muscle Pain (Myalgia)     Cephalexin Diarrhea and GI Disturbance     Levofloxacin Other (See Comments)     Phlebitis with IV infusion     Sulfa Antibiotics Nausea and Vomiting, GI Disturbance and Unknown     Reaction occurred as a child     Sulfasalazine Nausea and Vomiting         MEDICATIONS:  Post Discharge Medication Reconciliation Status: patient was not discharged from an inpatient facility or TCU.   Current Outpatient Medications   Medication Sig Dispense Refill     clopidogrel (PLAVIX) 75 MG tablet Take 1 tablet by mouth daily       acetaminophen (TYLENOL) 500 MG tablet 2 TABLETS (1000MG) ORALLY DAILY AS NEEDED (MAX 4GM TYLENOL OR ACETAMINOPHEN/24 HRS) 60 tablet 11     amoxicillin (AMOXIL) 250 MG chewable tablet CHEW AND SWALLOW 8 TABLETS (2000 MG) BY MOUTH 1 HOUR PRIOR TO DENTAL 8 tablet 97     ANTACID CALCIUM 500 MG chewable tablet 1 TABLET ORALLY DAILY  (DX: NUTRITIONAL SUPPLEMENT ) 30 tablet 11     armodafinil (NUVIGIL) 150 MG TABS tablet 1 TABLET ORALLY EVERY MORNING (DX: NARCOLEPSY) ONTROLLED MEDICATION ? TO BE STORED IN DOUBLE LOCKED STORAGE 30 tablet 4     ASPIRIN LOW DOSE 81 MG chewable tablet 1 TABLET ORALLY DAILY 28 tablet 11     Bacillus Coagulans-Inulin (PROBIOTIC FORMULA) 1-250 BILLION-MG CAPS 1 CAPSULE ORALLY DAILY 30 capsule 11     Baclofen (LIORESAL) 5 MG tablet Take 1 tablet (5 mg) by mouth 3 times daily as needed for muscle spasms 20 tablet 3     CO2-Releasing (-TWO) SUPP 1 supp daily prn and if not effective or falls out, may repeat again until results achieved.  May keep at bedside       Cranberry 250 MG TABS Give 2 tabs (500mg) po twice a day 120 tablet 11     Cranberry-Vitamin C-Probiotic (AZO CRANBERRY) 250-30 MG TABS 2 TABLETS (500MG) ORALLY 2 TIMES DAILY  (DX: URINARY TRACT INFECTION) 112 tablet 11     ELIQUIS ANTICOAGULANT 5 MG tablet 1 TABLET ORALLY 2 TIMES DAILY. (DX: VENOUS THROMBOEMBOLISM) 62 tablet 11     ibuprofen (ADVIL/MOTRIN) 400 MG tablet TAKE 1 TABLET BY MOUTH TWICE DAILY AS NEEDED FOR PAIN 30 tablet 97     losartan (COZAAR) 25 MG tablet 1/2 TABLET (12.5MG) ORALLY EVERY EVENING 16 tablet 11     medical cannabis (Patient's own supply) Take 1 Dose by mouth See Admin Instructions (The purpose of this order is to document that the patient  reports taking medical cannabis.  This is not a prescription, and is not used to certify that the patient has a qualifying medical condition.)       menthol-zinc oxide (CALMOSEPTINE) 0.44-20.6 % OINT ointment APPLY TO AFFECTED AREA TOPICALLY TO SKIN CHAFFED AREAS  2 TIMES DAILY;APPLY TO AFFECTED AREA TOPICALLY 2 TIMES DAILY AS NEEDED 113 g 11     methenamine hippurate (HIPREX) 1 g tablet 1 TABLET ORALLY 2 TIMES DAILY  (DX: URINARY TRACT INFECTION) 56 tablet 11     Multiple Vitamin (TAB-A-DAWSON) TABS Take 1 tablet by mouth daily 28 tablet 97     nystatin (MYCOSTATIN) 016092 UNIT/GM external powder APPLY TOPICALLY BETWEEN TOES EVERY EVENING UNTIL HEALED;THEN APPLY TOPICALLY BETWEEN TOES 2 TIMES DAILY AS NEEDED UNTIL HEALED 60 g 11     polyethylene glycol (MIRALAX/GLYCOLAX) powder MIX 17GM OF POWDER IN 8OZ OF WATER UNTIL COMPLETELY DISSOLVED. DRINK SOLUTION DAILY AS NEEDED 527 g 98     Probiotic Product (PROBIOTIC DAILY) CAPS Take 1 capsule by mouth daily 28 capsule 98     senna-docusate (SENEXON-S) 8.6-50 MG tablet Take 2 tablets by mouth daily as needed for constipation 62 tablet 12     simethicone (MYLICON) 125 MG chewable tablet 125mg po 4 times a day PRN for gas (may keep at bedside per request) 60 tablet 11     sterile water, bottle, (WATER FOR IRRIGATION, STERILE) irrigation 60ml  prn to irrigate ruby catheter when plugged 500 mL 11     vitamin C (ASCORBIC ACID) 500 MG tablet 1 TABLET ORALLY 2 TIMES DAILY (DX: CHRONIC CYSTITIS) 56 tablet 11     Vitamin D3 50 mcg (2000 units) tablet Take 1 tablet (50 mcg) by mouth daily 30 tablet 11     Zinc Oxide (DESITIN) 13 % CREA Apply topically 3 times daily as needed (chaffing) 113 g 11     Medications reviewed:  Medications reconciled to facility chart and changes were made to reflect current medications as identified as above med list. Below are the changes that were made:   Medications stopped since last EPIC medication reconciliation:   There are no discontinued  "medications.    Medications started since last [facility] medication reconciliation:  Orders Placed This Encounter   Medications     clopidogrel (PLAVIX) 75 MG tablet     Sig: Take 1 tablet by mouth daily         REVIEW OF SYSTEMS:  4 point ROS neg other than the symptoms noted above in the HPI.  Denied chest pain, or troubles with her catheter      PHYSICAL EXAM:  /71   Pulse 73   Temp (!) 96  F (35.6  C)   Resp 18   Wt 108.9 kg (240 lb)   SpO2 97%   BMI 35.44 kg/m    Physical Exam  Constitutional:       Comments: Larger body frame.  Sedentary lifestyle.   HENT:      Head: Normocephalic.      Right Ear: External ear normal.      Left Ear: External ear normal.      Ears:      Comments: No hearing aides.  Can hear in a quiet environment     Nose: Nose normal.      Mouth/Throat:      Mouth: Mucous membranes are moist.      Pharynx: Oropharynx is clear.      Comments: Voice varies in degree of strength due to her MS.  Her breathing will catch her conversation.  Soft spoken more than anything  Eyes:      Extraocular Movements: Extraocular movements intact.      Conjunctiva/sclera: Conjunctivae normal.      Pupils: Pupils are equal, round, and reactive to light.   Cardiovascular:      Rate and Rhythm: Normal rate and regular rhythm.      Heart sounds: Normal heart sounds.   Pulmonary:      Effort: Pulmonary effort is normal.      Comments: No acute SOB  Abdominal:      Comments: Round, soft, bowel sounds present.   Genitourinary:     Comments: Incontinent of stool.    Edwards catheter present draining clear yellow urine into bag.  Change of catheter is every 2 weeks to prevent infections  Musculoskeletal:      Cervical back: Normal range of motion.      Comments: Unable to move lower legs.  Trace to +1 edema in lower legs.  Arm movement is limited.  Finger dexterity is minimal.  Uses a \"mouse\" to move her computer curser around.  Has a cell phone but that is hard for her to text.  More for calling people.      Pb " lift is used for transfers from surface to surface.  Non-ambulatory, unable to twist self in bed.     Skin:     General: Skin is warm and dry.   Neurological:      Mental Status: She is alert and oriented to person, place, and time.      Coordination: Coordination abnormal.      Comments: Does have feeling in her lower extremities   Psychiatric:         Mood and Affect: Mood normal.         Behavior: Behavior normal.         Thought Content: Thought content normal.         Judgment: Judgment normal.     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  She has always been electric wheelchair dependent.  Once in her wheelchair, she is able to manage day to day activities cognitively that includes managing her electric wheelchair.  She is able to drive her wheelchair without hitting walls, door frames etc.  Physically she is dependent on staff to assist with cares.  No changes with medications in some time.    (G70.9,  J99) Neuromuscular respiratory weakness (H)  Comment: voice fades in and out during conversation.  Can start out strong/weak and then fades to weak in a sentence.  No acute respiratory distress that makes her dependent on oxygen.  Continue to monitor.    (N31.9) Neurogenic bladder  (Z97.8) Ruby catheter in place  (N39.0) Frequent UTI  Comment: - spoke today about her ruby and she stated it just was changed last night.  Goal is every two weeks.  She pays for extra supplies as long as changing the catheter more frequently than medicare allows helps her stay away from UTIs.  It has been since April when she had the last urine checked.    Remains on Hiprex and Vitamin C combination for prevention of UTIs and Cranberry caps twice a day.       Orders:  No new orders.   Will send this note and another note for appealing [facility] to pay for her electric wheelchair     Electronically signed by  CAROL Rodríguez CNP            Sincerely,        CAROL Rodríguez CNP

## 2024-08-19 NOTE — LETTER
2024      To Whom It May Concern:      I am the medical provider for Cristina Stevens (: 1945), (Member ID#  [id number]) and writing on her behalf of a medical denial of coverage she received for repair of her electric wheelchair.    According to the notice that Muna received, [facility] is asking for either a standard or fast appeal.  We are asking for a standard as this is not immediately jeopardizing her health.      Enclosed is most recent office note with best explanation of her health care disease history and present presentation that would explain why she has an electric wheelchair and what is wrong with the chair that needs repair.        Best Regards,      Love Lucas RN, APRN      NPI#  [id number]    [facility]  [address] [place] [address] [address]  [place] [address]    Fax:  [phone]

## 2024-08-22 DIAGNOSIS — M81.0 SENILE OSTEOPOROSIS: ICD-10-CM

## 2024-08-22 RX ORDER — CHOLECALCIFEROL (VITAMIN D3) 50 MCG
1 TABLET ORAL DAILY
Qty: 30 TABLET | Refills: 11 | Status: SHIPPED | OUTPATIENT
Start: 2024-08-22

## 2024-09-04 DIAGNOSIS — G47.429 NARCOLEPSY DUE TO UNDERLYING CONDITION WITHOUT CATAPLEXY: ICD-10-CM

## 2024-09-04 RX ORDER — ARMODAFINIL 150 MG/1
TABLET ORAL
Qty: 30 TABLET | Refills: 5 | Status: SHIPPED | OUTPATIENT
Start: 2024-09-04

## 2024-09-24 ENCOUNTER — TELEPHONE (OUTPATIENT)
Dept: GERIATRICS | Facility: CLINIC | Age: 79
End: 2024-09-24
Payer: COMMERCIAL

## 2024-09-24 NOTE — TELEPHONE ENCOUNTER
Cristina THURSTON Hilda  :  1945    Received a call from Muna requesting help to obtain a new cushion for her electric wheelchair.      Her current cushion is mapped from [facility].    Called the [facility] and the  transferred this NP to the proper person.  Spoke with him about what was needed and will fax a this telephone encounter over with the proper verbiage.  Will let patient know to call the number provided to set up an appointment.        ORDER:  Please map cushion for custom seating to fit her electric wheelchair    1 needed  No refills      Electronically signed by Love Lucas RN, CNP    NPI# [id number]    [facility]  [address] [place] [address]  [address]  [place] [address]    Fax:  [phone]

## 2024-10-17 ENCOUNTER — TRANSFERRED RECORDS (OUTPATIENT)
Dept: HEALTH INFORMATION MANAGEMENT | Facility: CLINIC | Age: 79
End: 2024-10-17
Payer: COMMERCIAL

## 2024-10-19 NOTE — PROGRESS NOTES
ASSESSMENT:  This is a 79-year-old postmenopausal female who has osteoporosis by T-scores.  She has MS and is wheelchair-bound.  She completed a course of Tymlos in April 2020.  At that time she started Prolia and her last shot was April 2021.  She then missed several shots because of COVID and surgery.  When I last saw her in April 2023 we again started Prolia.  She has not had any side effects.  She is due today.  We reviewed the black box, we also reviewed REMS with her.  She did receive Prolia today.  She will get blood work in 2 weeks.  Her next DEXA will be due in early April 2025 and I will see her then to review the DXA and decide on prolia.    She is aware of the rebound effect and that the Prolia should be timely.    PLAN:  Prolia today  Get blood work in 2 wks    Get DXA next April 2025 - order placed - do wrist also   Then see me and get the April prolia same time     Patient should take 1200mg of calcium/day in divided doses and vitamin D3 4000IU/day.                    Prolia      Prolia is used to protect further re-absorption of bone mass in men and women with osteoporosis who are at high risk for fracture.         1 ml of a 60 mg/ml solution in a single -use prefilled syringe subcutaneously into the upper arm, upper thigh, or abdomen.      Patient should take calcium 1200 mg or dose instructed by provider      Patient should take at least 1000 units of Vitamin D daily        Before each injection      Calcium level within 30 days of each injection      Verify no serious infections currently        After each infection      Need to go to any [facility] two weeks after Prolia injections        Calcium      Magnesium      Phosphorus       Follow up in six months after first Prolia injection with your provider.     You will need to have a calcium level drawn before this visit.           Follow up annually with your provider    Thank you for allowing me to participate in the care of your patient.   Please do not hesitate to call with questions or concerns.    Sincerely,    Erica Abrams MD, PhD  CC Princess Lucas NP       Time note (e4, 30'): The total of my time (on the date of service) for this service was 34 minutes, including discussion/face-to-face, chart review, interpretation not otherwise reported, documentation, and updating of the computerized record.          Cristina is a  79 year old female post menopausal] [GR0, P0] that presents today for osteoporosis follow up patient was last seen 4/2023.  She has MS and is wheel chair bound.   She completed a course of Tymlos for 18 months and stopped around the end of April 2020.  She then was started on Prolia. She had surgery in 2022 and with COVID missed prolia.  She restarted prolia in April 2023  last shot was April 2024.  She is due today.     Referring Physician: Princess Lucas NP     HPI     Have you ever had a bone density test? Yes  Where = [facility]  When = 3/2023  Spine Tscore = -2.8  LOSS 5.7%  Left neck Tscore = NA  Total left hip Tscore = NA  Right neck Tscore = NA  Total Right hip Tscore = NA  Radius  -4.5  Have you received any x-ray dye or contrast in the last ten days? No  How many servings of dairy products do you consume per day? Minimal Type: lactose intolerant   Do you take a multi-vitamin daily? Yes  Do you take a vitamin D supplement? Yes 2000iU - 2/day   Do you take a calcium supplement daily?  Calcium carbonate 500mg with 200mg vit D  1 bid   Do you take a supplement containing strontium? No  Are you exposed to natural sunlight at least 20 minutes three times a week? No    Social History   reports that she quit smoking about 39 years ago. She started smoking about 62 years ago. She has never used smokeless tobacco. She reports current alcohol use of about 1.0 standard drink of alcohol per week. She reports that she does not use drugs.  Do you smoke cigarettes? Reformed smoker   Do you exercise? Yes. Details: wheel chair  bound  Do you drink alcohol? No    Medication History  Have you used any of the following medications?   Actonel (Risedronate): No              Aredia (Pamidronate): No              Boniva (Ibindronate): No              Didronil (Etidronate): No              Evista (Raloxifene): No              Fosamax (Alendronate): No              Forteo (Parathyroid hormone) injections: No              tymlos  - 10/2018-4-2020              HCTZ (Thiazide): No              Calcitonin nasal spray: No              Reclast or Zometa (Zolendronate): No              Prolia (Denosumab): yes started 10/2020  - had 2 shots  - then a gap because of COVID - restart in 4/2023 and last shot 4/2024          Allergies   Allergen Reactions    Nitrofuran Derivatives Visual Disturbance     Hallucinations    Tetracyclines & Related GI Disturbance    Amlodipine Other (See Comments)     Bloating/edema    Ampicillin GI Disturbance    Cefadroxil Muscle Pain (Myalgia)    Cephalexin Diarrhea and GI Disturbance    Levofloxacin Other (See Comments)     Phlebitis with IV infusion    Sulfa Antibiotics Nausea and Vomiting, GI Disturbance and Unknown     Reaction occurred as a child    Sulfasalazine Nausea and Vomiting       Past Medical History    Family History   Problem Relation Age of Onset    Hypertension Mother     Cerebrovascular Disease Mother     Coronary Artery Disease Father        ROS:  General: none  Head/Eyes: none  Ears/Nose/Throat: none  Cardiovascular: none  Respiratory: none  Gastrointestinal: none  Breast: none  Genitourinary: none  Sexual Function: none  Musculoskeletal: muscle weakness/cramps  Skin: none  Neurological: none  Mental Health: none  Endocrine: none    Clinic Measurements  Vitals: BP (!) 164/86   Pulse 64   BMI= There is no height or weight on file to calculate BMI.  Unable     Physical exam  Constitutional: Well appearing woman in no acute distress.   Psychological: appropriate mood.  Neck: No thyroidmegaly.  no carotid  bruits.  Cardiovascular: regular rate and rhythm, normal S1 and S2, no murmurs, rubs or gallops, 1+edema  Respiratory: decreased breath sounds - clear to auscultation, no wheezes or crackles, normal breath sounds.  Musculoskeletal: motor strength 2-3/5  and equal in the upper and lower extremities   1+ edema  -   Spine: limited exam - in a wheel chair and unable to move    Skin: no concerning lesions, no jaundice.  Neurological: cranial nerves intact, poor strength, reflexes at patella and biceps normal, unable gait, no tremor.     LAB  Vertebra; Fracture Assessment: NA  Dexa Scan: 3/2023     FRAX Assessment Tool: [N/A,  Risk Factors:    T scores, MS, inactivity, hx of fracture, reformed smoker,         Erica Abrams MD, PhD  CC Princess Lucas   NP

## 2024-10-21 ENCOUNTER — OFFICE VISIT (OUTPATIENT)
Dept: FAMILY MEDICINE | Facility: CLINIC | Age: 79
End: 2024-10-21
Attending: FAMILY MEDICINE
Payer: COMMERCIAL

## 2024-10-21 ENCOUNTER — LAB (OUTPATIENT)
Dept: LAB | Facility: CLINIC | Age: 79
End: 2024-10-21
Attending: FAMILY MEDICINE
Payer: COMMERCIAL

## 2024-10-21 VITALS — DIASTOLIC BLOOD PRESSURE: 86 MMHG | SYSTOLIC BLOOD PRESSURE: 164 MMHG | HEART RATE: 64 BPM

## 2024-10-21 DIAGNOSIS — M81.0 SENILE OSTEOPOROSIS: ICD-10-CM

## 2024-10-21 DIAGNOSIS — M81.0 SENILE OSTEOPOROSIS: Primary | ICD-10-CM

## 2024-10-21 LAB — CALCIUM SERPL-MCNC: 10.3 MG/DL (ref 8.8–10.4)

## 2024-10-21 PROCEDURE — 36415 COLL VENOUS BLD VENIPUNCTURE: CPT

## 2024-10-21 PROCEDURE — 82310 ASSAY OF CALCIUM: CPT

## 2024-10-21 PROCEDURE — 99214 OFFICE O/P EST MOD 30 MIN: CPT | Performed by: FAMILY MEDICINE

## 2024-10-21 PROCEDURE — G0463 HOSPITAL OUTPT CLINIC VISIT: HCPCS | Mod: 25 | Performed by: FAMILY MEDICINE

## 2024-10-21 PROCEDURE — 250N000011 HC RX IP 250 OP 636: Mod: JZ | Performed by: FAMILY MEDICINE

## 2024-10-21 PROCEDURE — 96372 THER/PROPH/DIAG INJ SC/IM: CPT | Performed by: FAMILY MEDICINE

## 2024-10-21 RX ADMIN — DENOSUMAB 60 MG: 60 INJECTION SUBCUTANEOUS at 09:46

## 2024-10-21 NOTE — PATIENT INSTRUCTIONS
Prolia today  Get blood work in 2 wks    Get DXA next April 2025 - order placed - do wrist also   Then see me and get the April prolia same time     Patient should take 1200mg of calcium/day in divided doses and vitamin D3 4000IU/day.                    Prolia      Prolia is used to protect further re-absorption of bone mass in men and women with osteoporosis who are at high risk for fracture.         1 ml of a 60 mg/ml solution in a single -use prefilled syringe subcutaneously into the upper arm, upper thigh, or abdomen.      Patient should take calcium 1200 mg or dose instructed by provider      Patient should take at least 1000 units of Vitamin D daily        Before each injection      Calcium level within 30 days of each injection      Verify no serious infections currently        After each infection      Need to go to any [facility] two weeks after Prolia injections        Calcium      Magnesium      Phosphorus       Follow up in six months after first Prolia injection with your provider.     You will need to have a calcium level drawn before this visit.           Follow up annually with your provider        Adverse Reactions            Hypocalcemia      Serious infections of the skin, lower abdomin, bladder or ear          Endocarditis      Dermatitis      Severe jaw bone problems (osteonecrosis)      Muscle aches      Diarrhea      Headache      Possible atypical femur ( leg) fractures      Possible spinal fracture after discontinuation                                   Thank you for trusting us with your care!   Please be aware, if you are on Mychart, you may see your results prior to your providers review. If labs are abnormal, we will call or message you on [employer] with a follow up plan.    If you need to contact us for questions about:  Symptoms, Scheduling & Medical Questions; Non-urgent (2-3 day response) Include Fitness message, Urgent (needing response today) [phone] (if after 3:30pm next day  response)   Prescriptions: Please call your Pharmacy   Billing: [place] [phone] or [facility]:[phone]

## 2024-10-21 NOTE — NURSING NOTE
Clinic Administered Medication Documentation      Prolia Documentation    Indication: Prolia  (denosumab) is a prescription medicine used to treat osteoporosis in patients who:   Are at high risk for fracture, meaning patients who have had a fracture related to osteoporosis, or who have multiple risk factors for fracture.  Cannot use another osteoporosis medicine or other osteoporosis medicines did not work well.  The timeline for early/late injections would be 4 weeks early and any time after the 6 month peace. If a patient receives their injection late, then the subsequent injection would be 6 months from the date that they actually received the injection.    When was the last injection?  6 months ago  Was the last injection at least 6 months ago? Yes  Has the prior authorization been completed?  Yes  Is there an active order (written within the past 365 days, with administrations remaining, not ) in the chart?  Yes   GFR Estimate   Date Value Ref Range Status   10/03/2023 90 >60 mL/min/1.73m2 Final   2018 78 >60 mL/min/1.7m2 Final     Comment:     Non  GFR Calc     Has patient had a GFR within the last 12 months? Yes   Is GFR under 30, or patient has a diagnosis of CKD4 or CKD5? Yes   Calcium   Date Value Ref Range Status   10/21/2024 10.3 8.8 - 10.4 mg/dL Final     Comment:     Reference intervals for this test were updated on 2024 to reflect our healthy population more accurately. There may be differences in the flagging of prior results with similar values performed with this method. Those prior results can be interpreted in the context of the updated reference intervals.   2020 9.4 8.5 - 10.1 mg/dL Final     Is calcium result 8.5 or above? Yes   Was the calcium done after last Prolia injection? Yes   Is there a calcium order for 1 month post Prolia injection? Yes. Schedule patient for Calcium lab in next month.   Patient denies gastric bypass or parathyroid surgery in  past 6 months? Yes - patient denies.   Patient denies dental work in the past two months involving drilling into the bone, such as implants/extractions, oral surgery or a tooth extraction that has not healed yet?  Yes  Patient denies plans for an emergency tooth extraction within the next week? Yes    The following steps were completed to comply with the REMS program for Prolia:  Confirms that patient received education from RN or provider via the Medication Guide, including the serious risks of Prolia  and the symptoms of each risk.  Told the patient to seek prompt medical attention if they have signs or symptoms of any of the serious risks, as described in the Medication Guide that was reviewed between the patient and RN or LP.  Provided each patient a copy of the Medication Guide and Patient Guide.    Prior to injection, verified patient identity using patient's name and date of birth. Medication was administered. Please see MAR and medication order for additional information. Patient instructed to remain in clinic for 15 minutes and report any adverse reaction to staff immediately.    Vial/Syringe: Single dose vial. Was entire vial of medication used? Yes  Was this medication supplied by the patient? No  Patient has no refills remaining. Refill encounter opened, order pended and Routed to the provider

## 2024-11-06 ENCOUNTER — LAB (OUTPATIENT)
Dept: LAB | Facility: CLINIC | Age: 79
End: 2024-11-06
Payer: COMMERCIAL

## 2024-11-06 DIAGNOSIS — M81.0 SENILE OSTEOPOROSIS: ICD-10-CM

## 2024-11-06 LAB
CALCIUM SERPL-MCNC: 10.3 MG/DL (ref 8.8–10.4)
MAGNESIUM SERPL-MCNC: 2.4 MG/DL (ref 1.7–2.3)
PHOSPHATE SERPL-MCNC: 3.7 MG/DL (ref 2.5–4.5)

## 2024-11-06 PROCEDURE — 82310 ASSAY OF CALCIUM: CPT

## 2024-11-06 PROCEDURE — 84100 ASSAY OF PHOSPHORUS: CPT

## 2024-11-06 PROCEDURE — 36415 COLL VENOUS BLD VENIPUNCTURE: CPT

## 2024-11-06 PROCEDURE — 83735 ASSAY OF MAGNESIUM: CPT

## 2024-11-25 ENCOUNTER — ASSISTED LIVING VISIT (OUTPATIENT)
Dept: GERIATRICS | Facility: CLINIC | Age: 79
End: 2024-11-25
Payer: COMMERCIAL

## 2024-11-25 VITALS
HEART RATE: 77 BPM | OXYGEN SATURATION: 97 % | BODY MASS INDEX: 39.58 KG/M2 | SYSTOLIC BLOOD PRESSURE: 120 MMHG | DIASTOLIC BLOOD PRESSURE: 85 MMHG | TEMPERATURE: 97 F | WEIGHT: 268 LBS | RESPIRATION RATE: 18 BRPM

## 2024-11-25 DIAGNOSIS — M81.0 AGE-RELATED OSTEOPOROSIS WITHOUT CURRENT PATHOLOGICAL FRACTURE: ICD-10-CM

## 2024-11-25 DIAGNOSIS — G35 MS (MULTIPLE SCLEROSIS) (H): Primary | ICD-10-CM

## 2024-11-25 DIAGNOSIS — R25.2 SPASTICITY: ICD-10-CM

## 2024-11-25 DIAGNOSIS — J99 NEUROMUSCULAR RESPIRATORY WEAKNESS (H): ICD-10-CM

## 2024-11-25 DIAGNOSIS — G70.9 NEUROMUSCULAR RESPIRATORY WEAKNESS (H): ICD-10-CM

## 2024-11-25 DIAGNOSIS — G82.50 TETRAPARESIS (H): ICD-10-CM

## 2024-11-25 DIAGNOSIS — Z97.8 FOLEY CATHETER IN PLACE: ICD-10-CM

## 2024-11-25 DIAGNOSIS — N31.9 NEUROGENIC BLADDER: ICD-10-CM

## 2024-11-25 DIAGNOSIS — Z99.3 WHEELCHAIR DEPENDENCE: ICD-10-CM

## 2024-11-25 PROCEDURE — 99349 HOME/RES VST EST MOD MDM 40: CPT | Performed by: NURSE PRACTITIONER

## 2024-11-25 NOTE — LETTER
2024      Cristina Stevens  [address]  [place] [address]        [facility] GERIATRICS  Chief Complaint   Patient presents with     Wellness Visit     [place] Medical Record Number:  [id number]  [facility] [[id number]    SUBJECTIVE:   Muna is a 79 year old who presents for Preventive Visit.    Are you in the first 12 months of your Medicare coverage?  No    HPI      Have you ever done Advance Care Planning? (For example, a Health Directive, POLST, or a discussion with a medical provider or your loved ones about your wishes): Yes, advance care planning is on file.       Fall risk  Fallen 2 or more times in the past year?: No    Cognitive Screening   1) Repeat 3 items (Leader, Season, Table)    2) Clock draw: NORMAL  3) 3 item recall: Recalls 3 objects  Results: 3 items recalled: COGNITIVE IMPAIRMENT LESS LIKELY    Mini-CogTM Copyright S Esme. Licensed by the author for use in [facility]; reprinted with permission (soob@[facility]). All rights reserved.      Do you have sleep apnea, excessive snoring or daytime drowsiness? : no    Reviewed and updated as needed this visit by clinical staff    Allergies  Meds              Reviewed and updated as needed this visit by Provider                  Social History     Tobacco Use     Smoking status: Former     Current packs/day: 0.00     Types: Cigarettes     Start date: 1962     Quit date: 1985     Years since quittin.9     Smokeless tobacco: Never   Substance Use Topics     Alcohol use: Yes     Alcohol/week: 1.0 standard drink of alcohol     Types: 1 Standard drinks or equivalent per week              No data to display              Do you have a current opioid prescription? (!) YES  in on Nuvigil for Narcolepsy/MS  How severe is your pain on a scale from 1-10? 0/10             2024     1:11 AM   RIOSORD Total Score   RIOSORD Total Score 5     Average  probability of overdose or serious opioid-induced respiratory depression in the next six months:   Points    Risk   0-4    2%   5-7    5%   8-9    7%   10-17    15%   18-25    30%   26-41    55%   42    83%  Do you use any other controlled substances or medications that are not prescribed by a provider? (!) CANNABIS PRODUCTS      Current providers sharing in care for this patient include:   Patient Care Team:  Love Lucas APRN CNP as PCP - General (Geriatric Medicine)  Kiley Paul MD as MD (Internal Medicine)  Erica Abrams MD PhD as MD (Family Practice)  Assisted Living, [facility]  Love Lucas APRN CNP as Assigned PCP    The following health maintenance items are reviewed in Epic and correct as of today:  Health Maintenance   Topic Date Due     URINE DRUG SCREEN  Never done     LIPID  Never done     ZOSTER IMMUNIZATION (2 of 3) 02/25/2009     DTAP/TDAP/TD IMMUNIZATION (3 - Td or Tdap) 04/19/2020     RSV VACCINE (1 - 1-dose 75+ series) Never done     MEDICARE ANNUAL WELLNESS VISIT  11/23/2021     INFLUENZA VACCINE (1) 09/01/2024     COVID-19 Vaccine (7 - 2024-25 season) 09/01/2024     BMP  10/03/2024     FALL RISK ASSESSMENT  11/25/2025     GLUCOSE  10/03/2026     ADVANCE CARE PLANNING  12/16/2026     DEXA  03/14/2038     HEPATITIS C SCREENING  Completed     PHQ-2 (once per calendar year)  Completed     Pneumococcal Vaccine: 65+ Years  Completed     HPV IMMUNIZATION  Aged Out     MENINGITIS IMMUNIZATION  Aged Out     RSV MONOCLONAL ANTIBODY  Aged Out     Labs reviewed in [facility]  Vaccines are done by her choice and offered in the fall by pharmacy      Mammogram Screening - Patient over age 75, has elected to discontinue screenings.  Pertinent mammograms are reviewed under the imaging tab.    Review of Systems  Constitutional, HEENT, cardiovascular, pulmonary, GI, , musculoskeletal, neuro, skin, endocrine and psych systems are negative, except as otherwise  "noted.    OBJECTIVE:   /85   Pulse 77   Temp 97  F (36.1  C)   Resp 18   Wt 121.6 kg (268 lb)   SpO2 97%   BMI 39.58 kg/m   Estimated body mass index is 39.58 kg/m  as calculated from the following:    Height as of 4/28/22: 1.753 m (5' 9\").    Weight as of this encounter: 121.6 kg (268 lb).  Physical Exam  GENERAL: alert and no distress  EYES: Eyes grossly normal to inspection, PERRL and conjunctivae and sclerae normal  HENT: ear canals and TM's normal, nose and mouth without ulcers or lesions  NECK: no adenopathy, no asymmetry, masses, or scars  RESP: lungs clear to auscultation - no rales, rhonchi or wheezes  CV: regular rate and rhythm, normal S1 S2, no S3 or S4, no murmur, click or rub, no peripheral edema  ABDOMEN: soft, nontender, no hepatosplenomegaly, no masses and bowel sounds normal   (female): normal female external genitalia, normal urethral meatus , normal vaginal mucosa, and indwelling catheter to straight drainage  MS: wheelchair bound, non-ambulatory, hands mildly contracted.  Pb lift for transfers.    SKIN:  skin is pink, warm and dry.  Bottom is red.    NEURO: weakness of all extremities.  Has use of arms to a certain extent.  Using a phone is hard for her. and mentation intact  PSYCH: mentation appears normal, affect normal/bright    Diagnostic Test Results:  Labs reviewed in Epic    ASSESSMENT / PLAN:   (G35) MS (multiple sclerosis) (H)  (primary encounter diagnosis)  (G82.50) Tetraparesis (H)  (Z99.3) Wheelchair dependence  (G70.9,  J99) Neuromuscular respiratory weakness (H)  (R25.2) Spasticity  Comment:  continues to go to therapy twice a week to help keep limbs limber.  Continues with Baclofen 5mg twice a day, Nuvigil 150mg BID, and medical cannibus.  Has not seen her neurologist for 2-3 years.  Resides in an extended  care unit at a [facility].  Has a personal aide that does many things for her and a good advocate.  Reads on her computer to stay in touch with current events and " books.  Plan: CBC and BMP next lab day    (N31.9) Neurogenic bladder  (Z97.8) Edwards catheter in place  Comment: long time catheter.  Have found that if changed every 2 weeks, her incidents of UTI's are much less.  Standing order that if Muna's helper has to change the catheter due to symptoms, then urine is collected and nursing sends it in for testing.    Plan: remains on Cranberry Tab, Hiprex and Vitamin C to help keep the UTIs at bay.    (M81.0) Age-related osteoporosis without current pathological fracture  Comment: currently on Vitamin D, Calcium 500mg daily and MVI.  Plan: will have a Vitamin D level done next lab day.    Patient has been advised of split billing requirements and indicates understanding: No      Orders:  Next lab day:  CBC, BMP and Vitamin D def for MS and Osteoporosis    COUNSELING:  Reviewed preventive health counseling, as reflected in patient instructions        She reports that she quit smoking about 39 years ago. Her smoking use included cigarettes. She started smoking about 62 years ago. She has never used smokeless tobacco.      Appropriate preventive services were discussed with this patient, including applicable screening as appropriate for cardiovascular disease, diabetes, osteopenia/osteoporosis, and glaucoma.  As appropriate for age/gender, discussed screening for colorectal cancer, prostate cancer, breast cancer, and cervical cancer. Checklist reviewing preventive services available has been given to the patient.    Reviewed patients plan of care and provided an AVS. The Intermediate Care Plan ( asthma action plan, low back pain action plan, and migraine action plan) for Cristina meets the Care Plan requirement. This Care Plan has been established and reviewed with the Patient and caregiver.          CAROL Rodríguez [facility] GERIATRICS    Identified Health Risks:  I have reviewed Opioid Use Disorder and Substance Use Disorder risk factors and made  any needed referrals.     Electronically signed by:  CAROL Rodríguez CNP        Sincerely,        CAROL Rodríguez CNP

## 2024-11-25 NOTE — PROGRESS NOTES
[facility] GERIATRICS  Chief Complaint   Patient presents with    Wellness Visit     [place] Medical Record Number:  [id number]  Place of Service where encounter took place:  [facility] [[id number]]    SUBJECTIVE:   Muna is a 79 year old who presents for Preventive Visit.    Are you in the first 12 months of your Medicare coverage?  { :881088}    HPI      Have you ever done Advance Care Planning? (For example, a Health Directive, POLST, or a discussion with a medical provider or your loved ones about your wishes): { :272157}    {Hearing Test Done (Optional):538858}   Fall risk  { :175666}  {If any of the above assessments are answered yes, consider ordering appropriate referrals (Optional):547880}  Cognitive Screening { :481192}    {Do you have sleep apnea, excessive snoring or daytime drowsiness? (Optional):373300}    Reviewed and updated as needed this visit by clinical staff    Allergies  Meds              Reviewed and updated as needed this visit by Provider                  Social History     Tobacco Use    Smoking status: Former     Current packs/day: 0.00     Types: Cigarettes     Start date: 1962     Quit date: 1985     Years since quittin.9    Smokeless tobacco: Never   Substance Use Topics    Alcohol use: Yes     Alcohol/week: 1.0 standard drink of alcohol     Types: 1 Standard drinks or equivalent per week     {Rooming staff  Click this link to complete the Prescreen if response below is not for today's visit  Alcohol Use Prescreen >3 drinks/day or > 7 drinks/week.  If the prescreen question answer is YES, complete the full AUDIT  :892320}         No data to display            {add AUDIT responses (Optional) (A score of 7 for adult men is an indication of hazardous drinking; a score of 8 or more is an indication of an alcohol use disorder.  A score of 7 or more for adult women is an indication of hazardous drinking or an alchohol use disorder):306057}  Do you have a  "current opioid prescription? { :538205}  Do you use any other controlled substances or medications that are not prescribed by a provider? {Substance Use :028949::\"None\"}  {Provider  If there are gaps in the social history shown above, please follow the link and refresh the note Link to Social and Substance History :804949}    {Outside tests to abstract? :887120}    {additional problems to add (Optional):882956}    Current providers sharing in care for this patient include: {Rooming staff:  Please update Care Team from storyboard, refresh this note and then delete this statement}  Patient Care Team:  Love Lucas APRN CNP as PCP - General (Geriatric Medicine)  Kiley Paul MD as MD (Internal Medicine)  Erica Abrams MD PhD as MD (Family Practice)  [facility]  Love Lucas APRN CNP as Assigned PCP    The following health maintenance items are reviewed in Epic and correct as of today:  Health Maintenance   Topic Date Due    URINE DRUG SCREEN  Never done    LIPID  Never done    ZOSTER IMMUNIZATION (2 of 3) 02/25/2009    DTAP/TDAP/TD IMMUNIZATION (3 - Td or Tdap) 04/19/2020    RSV VACCINE (1 - 1-dose 75+ series) Never done    MEDICARE ANNUAL WELLNESS VISIT  11/23/2021    INFLUENZA VACCINE (1) 09/01/2024    COVID-19 Vaccine (7 - 2024-25 season) 09/01/2024    BMP  10/03/2024    FALL RISK ASSESSMENT  11/25/2025    GLUCOSE  10/03/2026    ADVANCE CARE PLANNING  12/16/2026    DEXA  03/14/2038    HEPATITIS C SCREENING  Completed    PHQ-2 (once per calendar year)  Completed    Pneumococcal Vaccine: 65+ Years  Completed    HPV IMMUNIZATION  Aged Out    MENINGITIS IMMUNIZATION  Aged Out    RSV MONOCLONAL ANTIBODY  Aged Out     {Chronicprobdata (optional):295405}  {Decision Support (Optional):035299}    {Mammo DS 75+ :170445}  Pertinent mammograms are reviewed under the imaging tab.    Review of Systems  {ROS COMP (Optional):729351}    OBJECTIVE:   /85   " "Pulse 77   Temp 97  F (36.1  C)   Resp 18   Wt 121.6 kg (268 lb)   SpO2 97%   BMI 39.58 kg/m   Estimated body mass index is 39.58 kg/m  as calculated from the following:    Height as of 22: 1.753 m (5' 9\").    Weight as of this encounter: 121.6 kg (268 lb).  Physical Exam  {Exam (Optional) :275467}    {Diagnostic Test Results (Optional):172844}    ASSESSMENT / PLAN:   {Diag Picklist:868775}    {Patient advised of split billing (Optional):317967}      Orders:  Next lab day:  CBC, BMP and Vitamin D def for MS and Osteoporosis    COUNSELING:  {Medicare Counselin}        She reports that she quit smoking about 39 years ago. Her smoking use included cigarettes. She started smoking about 62 years ago. She has never used smokeless tobacco.      Appropriate preventive services were discussed with this patient, including applicable screening as appropriate for cardiovascular disease, diabetes, osteopenia/osteoporosis, and glaucoma.  As appropriate for age/gender, discussed screening for colorectal cancer, prostate cancer, breast cancer, and cervical cancer. Checklist reviewing preventive services available has been given to the patient.    Reviewed patients plan of care and provided an AVS. The {CarePlan:056051} for Cristina meets the Care Plan requirement. This Care Plan has been established and reviewed with the {PATIENT, FAMILY MEMBER, CAREGIVER:008243}.    {Counseling Resources  [place] Preventive Services Task Force  Cholesterol Screening  Health diet/nutrition  Pooled Cohorts Equation Calculator  USDA's MyPlate  ASA Prophylaxis  Lung CA Screening  Osteoporosis prevention/bone health :907601}  {Breast Cancer Risk Calculator  BRCA-Related Cancer Risk Assessment FHS-7 Tool :209910}    CAROL Rodríguez [facility] GERIATRICS    Identified Health Risks:  {Medicare required documentation of substance and opioid use disorders screening :904770}    Electronically signed by:  Sandrita Kimball " ***

## 2024-11-25 NOTE — LETTER
11/25/2024      Cristina Stevens  [address]  [place] [address]        No notes on file      Sincerely,        Love Lucas, CAROL CNP

## 2024-11-27 ENCOUNTER — LAB REQUISITION (OUTPATIENT)
Dept: LAB | Facility: CLINIC | Age: 79
End: 2024-11-27
Payer: COMMERCIAL

## 2024-11-27 DIAGNOSIS — G35 MULTIPLE SCLEROSIS (H): ICD-10-CM

## 2024-11-27 DIAGNOSIS — M81.0 AGE-RELATED OSTEOPOROSIS WITHOUT CURRENT PATHOLOGICAL FRACTURE: ICD-10-CM

## 2024-12-02 LAB
ANION GAP SERPL CALCULATED.3IONS-SCNC: 9 MMOL/L (ref 7–15)
BUN SERPL-MCNC: 23.9 MG/DL (ref 8–23)
CALCIUM SERPL-MCNC: 10.3 MG/DL (ref 8.8–10.4)
CHLORIDE SERPL-SCNC: 105 MMOL/L (ref 98–107)
CREAT SERPL-MCNC: 0.95 MG/DL (ref 0.51–0.95)
EGFRCR SERPLBLD CKD-EPI 2021: 61 ML/MIN/1.73M2
ERYTHROCYTE [DISTWIDTH] IN BLOOD BY AUTOMATED COUNT: 16.3 % (ref 10–15)
HCO3 SERPL-SCNC: 26 MMOL/L (ref 22–29)
HCT VFR BLD AUTO: 39 % (ref 35–47)
HGB BLD-MCNC: 12.2 G/DL (ref 11.7–15.7)
MCH RBC QN AUTO: 29.6 PG (ref 26.5–33)
MCHC RBC AUTO-ENTMCNC: 31.3 G/DL (ref 31.5–36.5)
MCV RBC AUTO: 95 FL (ref 78–100)
PLATELET # BLD AUTO: 184 10E3/UL (ref 150–450)
POTASSIUM SERPL-SCNC: 4.7 MMOL/L (ref 3.4–5.3)
RBC # BLD AUTO: 4.12 10E6/UL (ref 3.8–5.2)
SODIUM SERPL-SCNC: 140 MMOL/L (ref 135–145)
VIT D+METAB SERPL-MCNC: 101 NG/ML (ref 20–50)
WBC # BLD AUTO: 5.4 10E3/UL (ref 4–11)

## 2024-12-02 PROCEDURE — P9604 ONE-WAY ALLOW PRORATED TRIP: HCPCS | Mod: ORL | Performed by: NURSE PRACTITIONER

## 2024-12-02 PROCEDURE — 36415 COLL VENOUS BLD VENIPUNCTURE: CPT | Mod: ORL | Performed by: NURSE PRACTITIONER

## 2024-12-02 PROCEDURE — 82306 VITAMIN D 25 HYDROXY: CPT | Mod: ORL | Performed by: NURSE PRACTITIONER

## 2024-12-02 PROCEDURE — 80048 BASIC METABOLIC PNL TOTAL CA: CPT | Mod: ORL | Performed by: NURSE PRACTITIONER

## 2024-12-02 PROCEDURE — 85027 COMPLETE CBC AUTOMATED: CPT | Mod: ORL | Performed by: NURSE PRACTITIONER

## 2024-12-03 LAB — GLUCOSE SERPL-MCNC: 91 MG/DL (ref 70–99)

## 2024-12-12 ENCOUNTER — TELEPHONE (OUTPATIENT)
Dept: GERIATRICS | Facility: CLINIC | Age: 79
End: 2024-12-12
Payer: COMMERCIAL

## 2024-12-12 DIAGNOSIS — K11.7 DROOLING: Primary | ICD-10-CM

## 2024-12-12 RX ORDER — SCOLOPAMINE TRANSDERMAL SYSTEM 1 MG/1
1 PATCH, EXTENDED RELEASE TRANSDERMAL
Qty: 30 PATCH | Refills: 4 | Status: SHIPPED | OUTPATIENT
Start: 2024-12-12

## 2024-12-12 NOTE — PROGRESS NOTES
Muna requesting medication for increase drooling due to her MS.    Offered Atropine drops or Scopolamine patch.  She would like to try the scopolamine patch first.    Orders sent to [facility]     Scopolamine patch 1mg/3 days - change patch topically on skin every 72 hours  DX: drooling    CAROL Rodríguez CNP

## 2024-12-12 NOTE — TELEPHONE ENCOUNTER
Muna's friend and muna contacted this NP to ask for a referral back to her neurologist whom is through [facility] and gave a fax number.  This is being asked from the clinic she would go too.  In a separate note will write up a generic referral and fax to the clinic on behalf of Muna due to her MS.    CAROL Rodríguez CNP

## 2024-12-12 NOTE — TELEPHONE ENCOUNTER
Cristina Stevens  : 1945    On behalf of Cristina Stevens, this is a referral to her Neurology Clinic through [facility].  Cristina is requesting to see her Neurologist.    She has not seen her Neurologist in over 3 years and so it is being requested for a referral to be faxed to [phone].    Cristina has chronic diagnosis of Multiple Sclerosis, Spasticity, Tetraparesis, and Neuromuscular respiratory weakness.      Please call Muna at [phone] (cell) or her friend Rubi Hansen at [phone] to make appointment.    If they do not hear back in 2 business days, then they will call clinic to see what the status of appointment.    Sincerely,    Electronically signed by Love Lucas RN, [address]   [place] [address]    Fax:  [phone]  Phone:  [phone] - triage line

## 2024-12-17 ENCOUNTER — TELEPHONE (OUTPATIENT)
Dept: GERIATRICS | Facility: CLINIC | Age: 79
End: 2024-12-17
Payer: COMMERCIAL

## 2024-12-17 DIAGNOSIS — K11.7 DROOLING: ICD-10-CM

## 2024-12-17 RX ORDER — SCOLOPAMINE TRANSDERMAL SYSTEM 1 MG/1
PATCH, EXTENDED RELEASE TRANSDERMAL
Qty: 34 PATCH | Refills: 4 | Status: SHIPPED | OUTPATIENT
Start: 2024-12-17

## 2024-12-17 NOTE — TELEPHONE ENCOUNTER
Muna texted this NP to report she likes the Scopolamine patch and works well but has fallen off after bathing etc.  Has had to get it replaced and was not allowed.  Muna is upset as she knows her MS well and how her body responds.    Told her that her insurance may not allow more than 30 patches in a month.  She understood.  Asked if she showers daily and she does not.    Asked if changing it every other day may help and willing to do a prior auth if need be.  Muna likes that idea.      New orders:  Scopolamine Patch 1mg/3 days  apply topically every other day as falls off before 3rd day.   MS/drooling    Will put order to pharmacy    CAROL Rodríguez CNP

## 2024-12-23 ENCOUNTER — ASSISTED LIVING VISIT (OUTPATIENT)
Dept: GERIATRICS | Facility: CLINIC | Age: 79
End: 2024-12-23
Payer: COMMERCIAL

## 2024-12-23 VITALS
SYSTOLIC BLOOD PRESSURE: 120 MMHG | TEMPERATURE: 97 F | WEIGHT: 268 LBS | DIASTOLIC BLOOD PRESSURE: 85 MMHG | HEART RATE: 77 BPM | RESPIRATION RATE: 18 BRPM | BODY MASS INDEX: 39.58 KG/M2

## 2024-12-23 DIAGNOSIS — Z99.3 WHEELCHAIR DEPENDENCE: ICD-10-CM

## 2024-12-23 DIAGNOSIS — K11.7 DROOLING: Primary | ICD-10-CM

## 2024-12-23 DIAGNOSIS — G35 MS (MULTIPLE SCLEROSIS) (H): ICD-10-CM

## 2024-12-23 DIAGNOSIS — G70.9 NEUROMUSCULAR RESPIRATORY WEAKNESS (H): ICD-10-CM

## 2024-12-23 DIAGNOSIS — J99 NEUROMUSCULAR RESPIRATORY WEAKNESS (H): ICD-10-CM

## 2024-12-23 DIAGNOSIS — G82.50 TETRAPARESIS (H): ICD-10-CM

## 2024-12-23 PROCEDURE — 99349 HOME/RES VST EST MOD MDM 40: CPT | Performed by: NURSE PRACTITIONER

## 2024-12-23 RX ORDER — GLYCOPYRROLATE 1 MG/1
1 TABLET ORAL 2 TIMES DAILY
Qty: 60 TABLET | Refills: 5 | Status: SHIPPED | OUTPATIENT
Start: 2024-12-23

## 2024-12-23 NOTE — LETTER
12/23/2024      Cristina Stevens  [address]  [facility] [address]        [facility] GERIATRICS  ACUTE/EPISODIC VISIT    [facility] Medical Record Number:  [id number]  Place of Service where encounter took place:  [facility] ([facility]) [[id number]]    Chief Complaint   Patient presents with     RECHECK       HPI:    Cristina Stevens is a 79 year old  (1945), who is being seen today for an episodic care visit.  HPI information obtained from: facility chart records, facility staff, patient report, [facility] chart review, and family/first contact caregiver report.    Today's concern is:    Diagnoses         Codes Comments    Drooling    -  Primary K11.7     Neuromuscular respiratory weakness (H)     G70.9, J99     MS (multiple sclerosis) (H)     G35     Tetraparesis (H)     G82.50     Wheelchair dependence     Z99.3           Came to see Muna today at her request of her caregiver that comes to help her get ready most mornings, changes her catheter, helps take care of other types of business for her.  This morning received a text from both of them together stating that Muna seem to be more confused and her speech was affected that it was very hard to understand her, plus she had very dry mouth.    Most recently started her on a scopolamine patch per Muna's request due to significant drooling because of her progressing MS  Found Muna laying in bed which is somewhat unusual.  Had her tablet in her hands and moving it around non-stop until she asked this NP to put it on the bedside table.  Could tell she was not herself.  Staff thought she was confused and talking non-sense.  Asked or told her this and she denied it.  Cognitively she thought she was fine.  Saw her mouth looked different as well.  Speech altered more than it usually is due to her MS.    ALLERGIES:    Allergies   Allergen Reactions     Nitrofuran Derivatives Visual Disturbance     Hallucinations     Tetracyclines &  Related GI Disturbance     Amlodipine Other (See Comments)     Bloating/edema     Ampicillin GI Disturbance     Cefadroxil Muscle Pain (Myalgia)     Cephalexin Diarrhea and GI Disturbance     Levofloxacin Other (See Comments)     Phlebitis with IV infusion     Sulfa Antibiotics Nausea and Vomiting, GI Disturbance and Unknown     Reaction occurred as a child     Sulfasalazine Nausea and Vomiting        MEDICATIONS:  Post Discharge Medication Reconciliation Status: patient was not discharged from an inpatient facility or TCU.     Current Outpatient Medications   Medication Sig Dispense Refill     glycopyrrolate (ROBINUL) 1 MG tablet Take 1 tablet (1 mg) by mouth 2 times daily. 60 tablet 5     acetaminophen (TYLENOL) 500 MG tablet 2 TABLETS (1000MG) ORALLY DAILY AS NEEDED (MAX 4GM TYLENOL OR ACETAMINOPHEN/24 HRS) 60 tablet 11     amoxicillin (AMOXIL) 250 MG chewable tablet CHEW AND SWALLOW 8 TABLETS (2000 MG) BY MOUTH 1 HOUR PRIOR TO DENTAL 8 tablet 97     ANTACID CALCIUM 500 MG chewable tablet 1 TABLET ORALLY DAILY  (DX: NUTRITIONAL SUPPLEMENT ) 30 tablet 11     armodafinil (NUVIGIL) 150 MG TABS tablet 1 TABLET ORALLY EVERY MORNING (DX: NARCOLEPSY) CONTROLLED MEDICATION ? TO BE STORED IN DOUBLE LOCKED STORAGE 30 tablet 5     ASPIRIN LOW DOSE 81 MG chewable tablet 1 TABLET ORALLY DAILY 28 tablet 11     Bacillus Coagulans-Inulin (PROBIOTIC FORMULA) 1-250 BILLION-MG CAPS 1 CAPSULE ORALLY DAILY 30 capsule 11     Baclofen (LIORESAL) 5 MG tablet Take 1 tablet (5 mg) by mouth 3 times daily as needed for muscle spasms 20 tablet 3     clopidogrel (PLAVIX) 75 MG tablet Take 1 tablet by mouth daily       CO2-Releasing (-TWO) SUPP 1 supp daily prn and if not effective or falls out, may repeat again until results achieved.  May keep at bedside       Cranberry-Vitamin C-Probiotic (AZO CRANBERRY) 250-30 MG TABS 2 TABLETS (500MG) ORALLY 2 TIMES DAILY  (DX: URINARY TRACT INFECTION) 112 tablet 11     ELIQUIS ANTICOAGULANT 5 MG  tablet 1 TABLET ORALLY 2 TIMES DAILY. (DX: VENOUS THROMBOEMBOLISM) 62 tablet 11     ibuprofen (ADVIL/MOTRIN) 400 MG tablet TAKE 1 TABLET BY MOUTH TWICE DAILY AS NEEDED FOR PAIN 30 tablet 97     losartan (COZAAR) 25 MG tablet 1/2 TABLET (12.5MG) ORALLY EVERY EVENING 16 tablet 11     medical cannabis (Patient's own supply) Take 1 Dose by mouth See Admin Instructions (The purpose of this order is to document that the patient reports taking medical cannabis.  This is not a prescription, and is not used to certify that the patient has a qualifying medical condition.)       methenamine hippurate (HIPREX) 1 g tablet 1 TABLET ORALLY 2 TIMES DAILY  (DX: URINARY TRACT INFECTION) 56 tablet 11     Multiple Vitamin (TAB-A-DAWSON) TABS Take 1 tablet by mouth daily 28 tablet 97     nystatin (MYCOSTATIN) 296084 UNIT/GM external powder APPLY TOPICALLY BETWEEN TOES EVERY EVENING UNTIL HEALED;THEN APPLY TOPICALLY BETWEEN TOES 2 TIMES DAILY AS NEEDED UNTIL HEALED 60 g 11     polyethylene glycol (MIRALAX/GLYCOLAX) powder MIX 17GM OF POWDER IN 8OZ OF WATER UNTIL COMPLETELY DISSOLVED. DRINK SOLUTION DAILY AS NEEDED 527 g 98     Probiotic Product (PROBIOTIC DAILY) CAPS Take 1 capsule by mouth daily 28 capsule 98     senna-docusate (SENEXON-S) 8.6-50 MG tablet Take 2 tablets by mouth daily as needed for constipation 62 tablet 12     simethicone (MYLICON) 125 MG chewable tablet 125mg po 4 times a day PRN for gas (may keep at bedside per request) 60 tablet 11     sterile water, bottle, (WATER FOR IRRIGATION, STERILE) irrigation 60ml  prn to irrigate ruby catheter when plugged 500 mL 11     vitamin C (ASCORBIC ACID) 500 MG tablet 1 TABLET ORALLY 2 TIMES DAILY (DX: CHRONIC CYSTITIS) 56 tablet 11     Zinc Oxide (DESITIN) 13 % CREA Apply topically 3 times daily as needed (chaffing) 113 g 11     Medications reviewed:  Medications reconciled to facility chart and changes were made to reflect current medications as identified as above med list. Below  are the changes that were made:   Medications stopped since last EPIC medication reconciliation:   Medications Discontinued During This Encounter   Medication Reason     scopolamine (TRANSDERM) 1 MG/3DAYS 72 hr patch Med Rec(No AVS / No eCancel)     Cranberry 250 MG TABS Med Rec(No AVS / No eCancel)     scopolamine (TRANSDERM) 1 MG/3DAYS 72 hr patch Med Rec(No AVS / No eCancel)     Cranberry 250 MG TABS Med Rec(No AVS / No eCancel)     menthol-zinc oxide (CALMOSEPTINE) 0.44-20.6 % OINT ointment Med Rec(No AVS / No eCancel)     scopolamine (TRANSDERM) 1 MG/3DAYS 72 hr patch Med Rec(No AVS / No eCancel)       Medications started since last [facility] medication reconciliation:  Orders Placed This Encounter   Medications     glycopyrrolate (ROBINUL) 1 MG tablet     Sig: Take 1 tablet (1 mg) by mouth 2 times daily.     Dispense:  60 tablet     Refill:  5         REVIEW OF SYSTEMS:  4 point ROS neg other than the symptoms noted above in the HPI.      PHYSICAL EXAM:  /85   Pulse 77   Temp 97  F (36.1  C)   Resp 18   Wt 121.6 kg (268 lb)   BMI 39.58 kg/m    Obese female laying in her bed that has a mattress overlay to help with pressure relief.  Making eye contact.    Talking but even hard to understand her due to very dry mouth and has neuromuscular airway weakness.    Corners of her mouth bright red.  Tongue bright red.  No sores in mouth but tongue looks dry.  Heart rate regular and strong.  No edema.  Skin is pink warm and dry.    Lungs are clear.  No cough.  Abdomen is round, soft and non-tender.      Component      Latest Ref Rn 12/2/2024  1:02 PM   WBC      4.0 - 11.0 10e3/uL 5.4    RBC Count      3.80 - 5.20 10e6/uL 4.12    Hemoglobin      11.7 - 15.7 g/dL 12.2    Hematocrit      35.0 - 47.0 % 39.0    MCV      78 - 100 fL 95    MCH      26.5 - 33.0 pg 29.6    MCHC      31.5 - 36.5 g/dL 31.3 (L)    RDW      10.0 - 15.0 % 16.3 (H)    Platelet Count      150 - 450 10e3/uL 184    Sodium      135 - 145 mmol/L 140     Potassium      3.4 - 5.3 mmol/L 4.7    Chloride      98 - 107 mmol/L 105    Carbon Dioxide (CO2)      22 - 29 mmol/L 26    Anion Gap      7 - 15 mmol/L 9    Urea Nitrogen      8.0 - 23.0 mg/dL 23.9 (H)    Creatinine      0.51 - 0.95 mg/dL 0.95    GFR Estimate      >60 mL/min/1.73m2 61    Calcium      8.8 - 10.4 mg/dL 10.3    Vitamin D, Total (25-Hydroxy)      20 - 50 ng/mL 101 (H)    Glucose      70 - 99 mg/dL 91         ASSESSMENT / PLAN:  (K11.7) Drooling  (primary encounter diagnosis)  (G70.9,  J99) Neuromuscular respiratory weakness (H)  (G35) MS (multiple sclerosis) (H)  Comment: Scopolamine patch is not working for Muna.  Making her confused, extreme dry mouth and harder to understand.  Muna is the one who initiated medication to help control the drooling.  Looked up what other options:  Atropine drops and there is glycopyrrolate.  Levsin could be an options.  Consulted with a pharmacist if Robinul is even used anymore.  Have seen it in the past for others who drool.  Pharmacist and this NP reviewed glycopyrrolate information.  Feel this would be ideal next step to try for Muna.  This way it is scheduled and she would not need to ask frequently to give her drops as she would not be able to do herself.    Will order glycopyrrolate 1mg by mouth twice a day.  Discontinue Scopolamine patch.  Plan: glycopyrrolate (ROBINUL) 1 MG tablet    (G82.50) Tetraparesis (H)  (Z99.3) Wheelchair dependence  Comment: with Muna's advanced MS, she is dependent on others for many things that include personal cares, transfers and mobility, manage of catheter, meal prep, medication management.  Eats in her apartment and not very much.  Often see her before lunch.  Does go out twice a week for therapy maintenance.  No changes.    Orders:  Glycopyrrolate 1mg by mouth twice a day for MS, drooling  Discontinue scopolamine patch when other medication comes in.    Electronically signed by  CAROL Rodríguez CNP              Sincerely,        Love Lucas APRN CNP    Electronically signed

## 2024-12-23 NOTE — PROGRESS NOTES
[facility] GERIATRICS  ACUTE/EPISODIC VISIT    [facility] Medical Record Number:  [id number]  Place of Service where encounter took place:  [facility] ([facility]) [[id number]]    Chief Complaint   Patient presents with    RECHECK       HPI:    Cristina Stevens is a 79 year old  (1945), who is being seen today for an episodic care visit.  HPI information obtained from: facility chart records, facility staff, patient report, [facility] chart review, and family/first contact caregiver report.    Today's concern is:    Diagnoses         Codes Comments    Drooling    -  Primary K11.7     Neuromuscular respiratory weakness (H)     G70.9, J99     MS (multiple sclerosis) (H)     G35     Tetraparesis (H)     G82.50     Wheelchair dependence     Z99.3           Came to see Muna today at her request of her caregiver that comes to help her get ready most mornings, changes her catheter, helps take care of other types of business for her.  This morning received a text from both of them together stating that Muna seem to be more confused and her speech was affected that it was very hard to understand her, plus she had very dry mouth.    Most recently started her on a scopolamine patch per Muna's request due to significant drooling because of her progressing MS  Found Muna laying in bed which is somewhat unusual.  Had her tablet in her hands and moving it around non-stop until she asked this NP to put it on the bedside table.  Could tell she was not herself.  Staff thought she was confused and talking non-sense.  Asked or told her this and she denied it.  Cognitively she thought she was fine.  Saw her mouth looked different as well.  Speech altered more than it usually is due to her MS.    ALLERGIES:    Allergies   Allergen Reactions    Nitrofuran Derivatives Visual Disturbance     Hallucinations    Tetracyclines & Related GI Disturbance    Amlodipine Other (See Comments)     Bloating/edema    Ampicillin  GI Disturbance    Cefadroxil Muscle Pain (Myalgia)    Cephalexin Diarrhea and GI Disturbance    Levofloxacin Other (See Comments)     Phlebitis with IV infusion    Sulfa Antibiotics Nausea and Vomiting, GI Disturbance and Unknown     Reaction occurred as a child    Sulfasalazine Nausea and Vomiting        MEDICATIONS:  Post Discharge Medication Reconciliation Status: patient was not discharged from an inpatient facility or TCU.     Current Outpatient Medications   Medication Sig Dispense Refill    glycopyrrolate (ROBINUL) 1 MG tablet Take 1 tablet (1 mg) by mouth 2 times daily. 60 tablet 5    acetaminophen (TYLENOL) 500 MG tablet 2 TABLETS (1000MG) ORALLY DAILY AS NEEDED (MAX 4GM TYLENOL OR ACETAMINOPHEN/24 HRS) 60 tablet 11    amoxicillin (AMOXIL) 250 MG chewable tablet CHEW AND SWALLOW 8 TABLETS (2000 MG) BY MOUTH 1 HOUR PRIOR TO DENTAL 8 tablet 97    ANTACID CALCIUM 500 MG chewable tablet 1 TABLET ORALLY DAILY  (DX: NUTRITIONAL SUPPLEMENT ) 30 tablet 11    armodafinil (NUVIGIL) 150 MG TABS tablet 1 TABLET ORALLY EVERY MORNING (DX: NARCOLEPSY) CONTROLLED MEDICATION ? TO BE STORED IN DOUBLE LOCKED STORAGE 30 tablet 5    ASPIRIN LOW DOSE 81 MG chewable tablet 1 TABLET ORALLY DAILY 28 tablet 11    Bacillus Coagulans-Inulin (PROBIOTIC FORMULA) 1-250 BILLION-MG CAPS 1 CAPSULE ORALLY DAILY 30 capsule 11    Baclofen (LIORESAL) 5 MG tablet Take 1 tablet (5 mg) by mouth 3 times daily as needed for muscle spasms 20 tablet 3    clopidogrel (PLAVIX) 75 MG tablet Take 1 tablet by mouth daily      CO2-Releasing (-TWO) SUPP 1 supp daily prn and if not effective or falls out, may repeat again until results achieved.  May keep at bedside      Cranberry-Vitamin C-Probiotic (AZO CRANBERRY) 250-30 MG TABS 2 TABLETS (500MG) ORALLY 2 TIMES DAILY  (DX: URINARY TRACT INFECTION) 112 tablet 11    ELIQUIS ANTICOAGULANT 5 MG tablet 1 TABLET ORALLY 2 TIMES DAILY. (DX: VENOUS THROMBOEMBOLISM) 62 tablet 11    ibuprofen (ADVIL/MOTRIN) 400 MG  tablet TAKE 1 TABLET BY MOUTH TWICE DAILY AS NEEDED FOR PAIN 30 tablet 97    losartan (COZAAR) 25 MG tablet 1/2 TABLET (12.5MG) ORALLY EVERY EVENING 16 tablet 11    medical cannabis (Patient's own supply) Take 1 Dose by mouth See Admin Instructions (The purpose of this order is to document that the patient reports taking medical cannabis.  This is not a prescription, and is not used to certify that the patient has a qualifying medical condition.)      methenamine hippurate (HIPREX) 1 g tablet 1 TABLET ORALLY 2 TIMES DAILY  (DX: URINARY TRACT INFECTION) 56 tablet 11    Multiple Vitamin (TAB-A-DAWSON) TABS Take 1 tablet by mouth daily 28 tablet 97    nystatin (MYCOSTATIN) 571581 UNIT/GM external powder APPLY TOPICALLY BETWEEN TOES EVERY EVENING UNTIL HEALED;THEN APPLY TOPICALLY BETWEEN TOES 2 TIMES DAILY AS NEEDED UNTIL HEALED 60 g 11    polyethylene glycol (MIRALAX/GLYCOLAX) powder MIX 17GM OF POWDER IN 8OZ OF WATER UNTIL COMPLETELY DISSOLVED. DRINK SOLUTION DAILY AS NEEDED 527 g 98    Probiotic Product (PROBIOTIC DAILY) CAPS Take 1 capsule by mouth daily 28 capsule 98    senna-docusate (SENEXON-S) 8.6-50 MG tablet Take 2 tablets by mouth daily as needed for constipation 62 tablet 12    simethicone (MYLICON) 125 MG chewable tablet 125mg po 4 times a day PRN for gas (may keep at bedside per request) 60 tablet 11    sterile water, bottle, (WATER FOR IRRIGATION, STERILE) irrigation 60ml  prn to irrigate ruby catheter when plugged 500 mL 11    vitamin C (ASCORBIC ACID) 500 MG tablet 1 TABLET ORALLY 2 TIMES DAILY (DX: CHRONIC CYSTITIS) 56 tablet 11    Zinc Oxide (DESITIN) 13 % CREA Apply topically 3 times daily as needed (chaffing) 113 g 11     Medications reviewed:  Medications reconciled to facility chart and changes were made to reflect current medications as identified as above med list. Below are the changes that were made:   Medications stopped since last EPIC medication reconciliation:   Medications Discontinued  During This Encounter   Medication Reason    scopolamine (TRANSDERM) 1 MG/3DAYS 72 hr patch Med Rec(No AVS / No eCancel)    Cranberry 250 MG TABS Med Rec(No AVS / No eCancel)    scopolamine (TRANSDERM) 1 MG/3DAYS 72 hr patch Med Rec(No AVS / No eCancel)    Cranberry 250 MG TABS Med Rec(No AVS / No eCancel)    menthol-zinc oxide (CALMOSEPTINE) 0.44-20.6 % OINT ointment Med Rec(No AVS / No eCancel)    scopolamine (TRANSDERM) 1 MG/3DAYS 72 hr patch Med Rec(No AVS / No eCancel)       Medications started since last [facility] medication reconciliation:  Orders Placed This Encounter   Medications    glycopyrrolate (ROBINUL) 1 MG tablet     Sig: Take 1 tablet (1 mg) by mouth 2 times daily.     Dispense:  60 tablet     Refill:  5         REVIEW OF SYSTEMS:  4 point ROS neg other than the symptoms noted above in the HPI.      PHYSICAL EXAM:  /85   Pulse 77   Temp 97  F (36.1  C)   Resp 18   Wt 121.6 kg (268 lb)   BMI 39.58 kg/m    Obese female laying in her bed that has a mattress overlay to help with pressure relief.  Making eye contact.    Talking but even hard to understand her due to very dry mouth and has neuromuscular airway weakness.    Corners of her mouth bright red.  Tongue bright red.  No sores in mouth but tongue looks dry.  Heart rate regular and strong.  No edema.  Skin is pink warm and dry.    Lungs are clear.  No cough.  Abdomen is round, soft and non-tender.      Component      Latest Ref Rn 12/2/2024  1:02 PM   WBC      4.0 - 11.0 10e3/uL 5.4    RBC Count      3.80 - 5.20 10e6/uL 4.12    Hemoglobin      11.7 - 15.7 g/dL 12.2    Hematocrit      35.0 - 47.0 % 39.0    MCV      78 - 100 fL 95    MCH      26.5 - 33.0 pg 29.6    MCHC      31.5 - 36.5 g/dL 31.3 (L)    RDW      10.0 - 15.0 % 16.3 (H)    Platelet Count      150 - 450 10e3/uL 184    Sodium      135 - 145 mmol/L 140    Potassium      3.4 - 5.3 mmol/L 4.7    Chloride      98 - 107 mmol/L 105    Carbon Dioxide (CO2)      22 - 29 mmol/L 26    Anion  Gap      7 - 15 mmol/L 9    Urea Nitrogen      8.0 - 23.0 mg/dL 23.9 (H)    Creatinine      0.51 - 0.95 mg/dL 0.95    GFR Estimate      >60 mL/min/1.73m2 61    Calcium      8.8 - 10.4 mg/dL 10.3    Vitamin D, Total (25-Hydroxy)      20 - 50 ng/mL 101 (H)    Glucose      70 - 99 mg/dL 91         ASSESSMENT / PLAN:  (K11.7) Drooling  (primary encounter diagnosis)  (G70.9,  J99) Neuromuscular respiratory weakness (H)  (G35) MS (multiple sclerosis) (H)  Comment: Scopolamine patch is not working for Muna.  Making her confused, extreme dry mouth and harder to understand.  Muna is the one who initiated medication to help control the drooling.  Looked up what other options:  Atropine drops and there is glycopyrrolate.  Levsin could be an options.  Consulted with a pharmacist if Robinul is even used anymore.  Have seen it in the past for others who drool.  Pharmacist and this NP reviewed glycopyrrolate information.  Feel this would be ideal next step to try for Muna.  This way it is scheduled and she would not need to ask frequently to give her drops as she would not be able to do herself.    Will order glycopyrrolate 1mg by mouth twice a day.  Discontinue Scopolamine patch.  Plan: glycopyrrolate (ROBINUL) 1 MG tablet    (G82.50) Tetraparesis (H)  (Z99.3) Wheelchair dependence  Comment: with Muna's advanced MS, she is dependent on others for many things that include personal cares, transfers and mobility, manage of catheter, meal prep, medication management.  Eats in her apartment and not very much.  Often see her before lunch.  Does go out twice a week for therapy maintenance.  No changes.    Orders:  Glycopyrrolate 1mg by mouth twice a day for MS, drooling  Discontinue scopolamine patch when other medication comes in.    Electronically signed by  CAROL Rodríguez CNP

## 2024-12-24 ENCOUNTER — TELEPHONE (OUTPATIENT)
Dept: GERIATRICS | Facility: CLINIC | Age: 79
End: 2024-12-24
Payer: COMMERCIAL

## 2024-12-24 NOTE — TELEPHONE ENCOUNTER
Prior Authorization Retail Medication Request    Medication/Dose: Glycopyrrolate 1MG Tablets  Diagnosis and ICD code (if different than what is on RX):    New/renewal/insurance change PA/secondary ins. PA: NEW  Previously Tried and Failed:  NA  Rationale:  NA    Insurance   Primary: [employer]   Insurance ID:  [id number]     Secondary (if applicable):  Insurance ID:      Pharmacy Information (if different than what is on RX)  Name:  [facility]  Phone:  [phone]  Fax:[phone]    Clinic Information  Preferred routing pool for dept communication:

## 2024-12-26 NOTE — TELEPHONE ENCOUNTER
Central Prior Authorization Team   Phone: [phone]    PA Initiation    Medication: Glycopyrrolate 1MG Tablets  Insurance Company: [employer] - Phone [phone] Fax [phone]  Pharmacy Filling the Rx: [facility] - [place], [place] - [address]  Filling Pharmacy Phone: [phone]  Filling Pharmacy Fax:    Start Date: 12/26/2024

## 2024-12-30 NOTE — TELEPHONE ENCOUNTER
Prior Authorization Approval    Authorization Effective Date: 12/30/2024  Authorization Expiration Date: 12/31/2025  Medication: Glycopyrrolate 1MG Tablets  Reference #:     Insurance Company: [employer] Part [employer] - Phone [phone] Fax [phone]  Which Pharmacy is filling the prescription (Not needed for infusion/clinic administered): [place], [place] - [address] [place] [address]  Pharmacy Notified: Yes  Patient Notified: Instructed pharmacy to notify patient when script is ready to /ship.

## 2024-12-31 ENCOUNTER — TRANSFERRED RECORDS (OUTPATIENT)
Dept: HEALTH INFORMATION MANAGEMENT | Facility: CLINIC | Age: 79
End: 2024-12-31
Payer: COMMERCIAL

## 2025-01-02 ENCOUNTER — DOCUMENTATION ONLY (OUTPATIENT)
Dept: NEUROLOGY | Facility: CLINIC | Age: 80
End: 2025-01-02
Payer: COMMERCIAL

## 2025-01-02 NOTE — PROGRESS NOTES
Plan of care has been received from [facility], orders placed in Dr. Macario's folder for review and signature.   Deni Lizarraga EMT January 2, 2025

## 2025-01-06 ENCOUNTER — TELEPHONE (OUTPATIENT)
Dept: GERIATRICS | Facility: CLINIC | Age: 80
End: 2025-01-06
Payer: COMMERCIAL

## 2025-01-06 DIAGNOSIS — K11.7 DROOLING: Primary | ICD-10-CM

## 2025-01-06 NOTE — TELEPHONE ENCOUNTER
Muna contacted this NP today to state she needs something different for her drooling.  Right now using glycopyrrolate 1mg twice a day.  She states she is dabbing her mouth too often.    Scopolamine patch dried her mouth out too much and confused her.    Going to try:  hyoscyamine 0.125mg SL twice a day and every 6 hours PRN for drooling.  Discontinue the glycopyrrolate when hyoscyamine arrives.    CAROL Rodríguez CNP

## 2025-01-09 ENCOUNTER — LAB REQUISITION (OUTPATIENT)
Dept: LAB | Facility: CLINIC | Age: 80
End: 2025-01-09
Payer: COMMERCIAL

## 2025-01-09 DIAGNOSIS — R53.83 OTHER FATIGUE: ICD-10-CM

## 2025-01-11 ENCOUNTER — TELEPHONE (OUTPATIENT)
Dept: GERIATRICS | Facility: CLINIC | Age: 80
End: 2025-01-11
Payer: COMMERCIAL

## 2025-01-11 NOTE — TELEPHONE ENCOUNTER
Nursing communicated with this NP via text that Muna is not feeling so well.  This NP knows she has been battling respiratory issues and more drooling.    Advised to send to hospital and she was willing but waiting for her friend to come and assess her too.    CAROL Rodríguez CNP

## 2025-01-27 ENCOUNTER — ASSISTED LIVING VISIT (OUTPATIENT)
Dept: GERIATRICS | Facility: CLINIC | Age: 80
End: 2025-01-27
Payer: COMMERCIAL

## 2025-01-27 VITALS
RESPIRATION RATE: 18 BRPM | WEIGHT: 268 LBS | TEMPERATURE: 97 F | OXYGEN SATURATION: 97 % | BODY MASS INDEX: 39.58 KG/M2 | HEART RATE: 77 BPM | SYSTOLIC BLOOD PRESSURE: 120 MMHG | DIASTOLIC BLOOD PRESSURE: 85 MMHG

## 2025-01-27 DIAGNOSIS — R52 GENERALIZED PAIN: ICD-10-CM

## 2025-01-27 DIAGNOSIS — G35 MS (MULTIPLE SCLEROSIS) (H): Primary | ICD-10-CM

## 2025-01-27 DIAGNOSIS — Z99.3 WHEELCHAIR DEPENDENCE: ICD-10-CM

## 2025-01-27 DIAGNOSIS — E66.01 MORBID OBESITY (H): ICD-10-CM

## 2025-01-27 DIAGNOSIS — D62 ANEMIA DUE TO BLOOD LOSS, ACUTE: ICD-10-CM

## 2025-01-27 DIAGNOSIS — G82.50 SPASTIC QUADRIPLEGIA (H): ICD-10-CM

## 2025-01-27 PROCEDURE — 99349 HOME/RES VST EST MOD MDM 40: CPT | Performed by: NURSE PRACTITIONER

## 2025-01-27 RX ORDER — HYDROCODONE BITARTRATE AND ACETAMINOPHEN 5; 325 MG/1; MG/1
TABLET ORAL
Qty: 30 TABLET | Refills: 0 | Status: SHIPPED | OUTPATIENT
Start: 2025-01-29 | End: 2025-01-30

## 2025-01-27 NOTE — PROGRESS NOTES
[facility] GERIATRICS  ACUTE/EPISODIC VISIT    [facility] Medical Record Number:  [id number]  Place of Service where encounter took place:  [facility] [place] ([facility]) [[id number]]    Chief Complaint   Patient presents with    RECHECK       HPI:    Cristina Stevens is a 79 year old  (1945), who is being seen today for an episodic care visit.  HPI information obtained from: {FGS HPI:051169}.    Today's concern is:      ALLERGIES:    Allergies   Allergen Reactions    Nitrofuran Derivatives Visual Disturbance     Hallucinations    Tetracyclines & Related GI Disturbance    Amlodipine Other (See Comments)     Bloating/edema    Ampicillin GI Disturbance    Cefadroxil Muscle Pain (Myalgia)    Cephalexin Diarrhea and GI Disturbance    Levofloxacin Other (See Comments)     Phlebitis with IV infusion    Sulfa Antibiotics Nausea and Vomiting, GI Disturbance and Unknown     Reaction occurred as a child    Sulfasalazine Nausea and Vomiting        MEDICATIONS:  Post Discharge Medication Reconciliation Status: {ACO Med Rec (Provider):316556}. ***    Current Outpatient Medications   Medication Sig Dispense Refill    acetaminophen (TYLENOL) 500 MG tablet 2 TABLETS (1000MG) ORALLY DAILY AS NEEDED (MAX 4GM TYLENOL OR ACETAMINOPHEN/24 HRS) 60 tablet 11    amoxicillin (AMOXIL) 250 MG chewable tablet CHEW AND SWALLOW 8 TABLETS (2000 MG) BY MOUTH 1 HOUR PRIOR TO DENTAL 8 tablet 97    ANTACID CALCIUM 500 MG chewable tablet 1 TABLET ORALLY DAILY  (DX: NUTRITIONAL SUPPLEMENT ) 30 tablet 11    armodafinil (NUVIGIL) 150 MG TABS tablet 1 TABLET ORALLY EVERY MORNING (DX: NARCOLEPSY) ***CONTROLLED MEDICATION ? TO BE STORED IN DOUBLE LOCKED STORAGE*** 30 tablet 5    ASPIRIN LOW DOSE 81 MG chewable tablet 1 TABLET ORALLY DAILY 28 tablet 11    Bacillus Coagulans-Inulin (PROBIOTIC FORMULA) 1-250 BILLION-MG CAPS 1 CAPSULE ORALLY DAILY 30 capsule 11    Baclofen (LIORESAL) 5 MG tablet Take 1 tablet (5 mg) by mouth 3 times daily as needed  for muscle spasms 20 tablet 3    clopidogrel (PLAVIX) 75 MG tablet Take 1 tablet by mouth daily      CO2-Releasing (-TWO) SUPP 1 supp daily prn and if not effective or falls out, may repeat again until results achieved.  May keep at bedside      Cranberry-Vitamin C-Probiotic (AZO CRANBERRY) 250-30 MG TABS 2 TABLETS (500MG) ORALLY 2 TIMES DAILY  (DX: URINARY TRACT INFECTION) 112 tablet 11    ELIQUIS ANTICOAGULANT 5 MG tablet 1 TABLET ORALLY 2 TIMES DAILY. (DX: VENOUS THROMBOEMBOLISM) 62 tablet 11    hyoscyamine (LEVSIN/SL) 0.125 MG sublingual tablet 0.125mg SL twice a day and every 6 hours PRN 60 tablet 3    ibuprofen (ADVIL/MOTRIN) 400 MG tablet TAKE 1 TABLET BY MOUTH TWICE DAILY AS NEEDED FOR PAIN 30 tablet 97    losartan (COZAAR) 25 MG tablet 1/2 TABLET (12.5MG) ORALLY EVERY EVENING 16 tablet 11    medical cannabis (Patient's own supply) Take 1 Dose by mouth See Admin Instructions (The purpose of this order is to document that the patient reports taking medical cannabis.  This is not a prescription, and is not used to certify that the patient has a qualifying medical condition.)      methenamine hippurate (HIPREX) 1 g tablet 1 TABLET ORALLY 2 TIMES DAILY  (DX: URINARY TRACT INFECTION) 56 tablet 11    Multiple Vitamin (TAB-A-DAWSON) TABS Take 1 tablet by mouth daily 28 tablet 97    nystatin (MYCOSTATIN) 114960 UNIT/GM external powder APPLY TOPICALLY BETWEEN TOES EVERY EVENING UNTIL HEALED;THEN APPLY TOPICALLY BETWEEN TOES 2 TIMES DAILY AS NEEDED UNTIL HEALED 60 g 11    polyethylene glycol (MIRALAX/GLYCOLAX) powder MIX 17GM OF POWDER IN 8OZ OF WATER UNTIL COMPLETELY DISSOLVED. DRINK SOLUTION DAILY AS NEEDED 527 g 98    Probiotic Product (PROBIOTIC DAILY) CAPS Take 1 capsule by mouth daily 28 capsule 98    senna-docusate (SENEXON-S) 8.6-50 MG tablet Take 2 tablets by mouth daily as needed for constipation 62 tablet 12    simethicone (MYLICON) 125 MG chewable tablet 125mg po 4 times a day PRN for gas (may keep at  bedside per request) 60 tablet 11    sterile water, bottle, (WATER FOR IRRIGATION, STERILE) irrigation 60ml  prn to irrigate ruby catheter when plugged 500 mL 11    vitamin C (ASCORBIC ACID) 500 MG tablet 1 TABLET ORALLY 2 TIMES DAILY (DX: CHRONIC CYSTITIS) 56 tablet 11    Zinc Oxide (DESITIN) 13 % CREA Apply topically 3 times daily as needed (chaffing) 113 g 11     Medications reviewed:  Medications reconciled to facility chart and changes were made to reflect current medications as identified as above med list. Below are the changes that were made:   Medications stopped since last EPIC medication reconciliation:   There are no discontinued medications.    Medications started since last [facility] medication reconciliation:  No orders of the defined types were placed in this encounter.    ***    REVIEW OF SYSTEMS:  4 point ROS neg other than the symptoms noted above in the HPI.***  Unable to be obtained due to cognitive impairment or aphasia.   ROS    PHYSICAL EXAM:  /85   Pulse 77   Temp 97  F (36.1  C)   Resp 18   Wt 121.6 kg (268 lb)   SpO2 97%   BMI 39.58 kg/m    Physical Exam      ASSESSMENT / PLAN:  {FGS DX:663514}    Orders:  Norco 5/325mg 1 tab po every AM  between 5 - 6am at least 30 minutes before she gets up for the day.  Norco 5/325mg po 1 tab every 6 hours as needed for pain.    Electronically signed by  CAROL Rodríguez CNP         (DESITIN) 13 % CREA Apply topically 3 times daily as needed (chaffing) 113 g 11         REVIEW OF SYSTEMS:  4 point ROS neg other than the symptoms noted above in the HPI.      PHYSICAL EXAM:  /85   Pulse 77   Temp 97  F (36.1  C)   Resp 18   Wt 121.6 kg (268 lb)   SpO2 97%   BMI 39.58 kg/m    Muna is an obese female who is wheelchair dependent after the 4 point lift is used to transfer her from surface to surface.  In her wheelchair she does have shoulder harnesses to help keep her back versus folding forward where she does not have very good core strength.    Heart rate is regular and strong.  No pitting edema in lower extremities.  Lung bases are diminished but also difficult to hear her lung sounds because of being strapped into her wheelchair.  No coughing was noted during the visit.  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  Will discontinue her as needed ibuprofen order.  Will start with Norco 5/325 mg 1 tablet every morning between 5 and 6 AM but 30 minutes prior to her getting out of bed.  She also can have 1 tablet every 6 hours as needed for breakthrough pain.  Did explain this plan of care to her few times so hopefully she understands it.  Plan: HYDROcodone-acetaminophen (NORCO)        5-325 MG tablet    (R52) Generalized pain  Comment: Will keep the as needed Tylenol order that she has at this time and she can choose if she wants to take a Norco versus Tylenol extra strength for any breakthrough pain.  I have not really known  Muna to have much pain and so it is new hearing about the discomfort she feels in the morning  Plan: : HYDROcodone-acetaminophen (NORCO)        5-325 MG tablet    (D62) Anemia due to blood loss, acute  Comment:   Lab Results   Component Value Date    HGB 12.2 12/02/2024     Unfortunately her lab values are not posted in care everywhere from her hospital stay.  Will order a hemoglobin for follow-up in the future.  More concerned about her trying the Norco and see how effective it is at this point.  She denies any acute bleeding.    Orders:  Norco 5/325mg 1 tab po every AM  between 5 - 6am at least 30 minutes before she gets up for the day.  Norco 5/325mg po 1 tab every 6 hours as needed for pain.    Electronically signed by  CAROL Rodríguez CNP

## 2025-01-27 NOTE — LETTER
1/27/2025      Cristina Stevens  [address]  [place] [address]        No notes on file      Sincerely,        CAROL Rodríguez CNP    Electronically signed   of pain to the point that she feels like she will pass out.  Spoke about all the different levels of pain medicine.  Tylenol extra strength, tramadol, Norco, oxycodone, morphine.  Settled on trying Norco with 1 tablet between 5 and 6 AM in the morning which should be prior to her getting up for the day.      ALLERGIES:    Allergies   Allergen Reactions     Nitrofuran Derivatives Visual Disturbance     Hallucinations     Tetracyclines & Related GI Disturbance     Amlodipine Other (See Comments)     Bloating/edema     Ampicillin GI Disturbance     Cefadroxil Muscle Pain (Myalgia)     Cephalexin Diarrhea and GI Disturbance     Levofloxacin Other (See Comments)     Phlebitis with IV infusion     Sulfa Antibiotics Nausea and Vomiting, GI Disturbance and Unknown     Reaction occurred as a child     Sulfasalazine Nausea and Vomiting        MEDICATIONS:  Post Discharge Medication Reconciliation Status: discharge medications reconciled and changed, per note/orders.     Current Outpatient Medications   Medication Sig Dispense Refill     acetaminophen (TYLENOL) 500 MG tablet 2 TABLETS (1000MG) ORALLY DAILY AS NEEDED (MAX 4GM TYLENOL OR ACETAMINOPHEN/24 HRS) 60 tablet 11     amoxicillin (AMOXIL) 250 MG chewable tablet CHEW AND SWALLOW 8 TABLETS (2000 MG) BY MOUTH 1 HOUR PRIOR TO DENTAL 8 tablet 97     ANTACID CALCIUM 500 MG chewable tablet 1 TABLET ORALLY DAILY  (DX: NUTRITIONAL SUPPLEMENT ) 30 tablet 11     armodafinil (NUVIGIL) 150 MG TABS tablet 1 TABLET ORALLY EVERY MORNING (DX: NARCOLEPSY) CONTROLLED MEDICATION ? TO BE STORED IN DOUBLE LOCKED STORAGE 30 tablet 5     Bacillus Coagulans-Inulin (PROBIOTIC FORMULA) 1-250 BILLION-MG CAPS 1 CAPSULE ORALLY DAILY 30 capsule 11     Baclofen (LIORESAL) 5 MG tablet Take 1 tablet (5 mg) by mouth 3 times daily as needed for muscle spasms 20 tablet 3     clopidogrel (PLAVIX) 75 MG tablet Take 1 tablet by mouth daily       CO2-Releasing (-TWO) SUPP 1 supp daily prn and if not  effective or falls out, may repeat again until results achieved.  May keep at bedside       Cranberry-Vitamin C-Probiotic (AZO CRANBERRY) 250-30 MG TABS 2 TABLETS (500MG) ORALLY 2 TIMES DAILY  (DX: URINARY TRACT INFECTION) 112 tablet 11     ELIQUIS ANTICOAGULANT 5 MG tablet 1 TABLET ORALLY 2 TIMES DAILY. (DX: VENOUS THROMBOEMBOLISM) 62 tablet 11     HYDROcodone-acetaminophen (NORCO) 5-325 MG tablet Take 1 tablet by mouth every morning. May also take 1 tablets every 6 hours as needed for pain. 60 tablet 0     hyoscyamine (LEVSIN/SL) 0.125 MG sublingual tablet 0.125mg SL twice a day and every 6 hours PRN 60 tablet 3     losartan (COZAAR) 25 MG tablet 1/2 TABLET (12.5MG) ORALLY EVERY EVENING 16 tablet 11     medical cannabis (Patient's own supply) Take 1 Dose by mouth See Admin Instructions (The purpose of this order is to document that the patient reports taking medical cannabis.  This is not a prescription, and is not used to certify that the patient has a qualifying medical condition.)       methenamine hippurate (HIPREX) 1 g tablet 1 TABLET ORALLY 2 TIMES DAILY  (DX: URINARY TRACT INFECTION) 56 tablet 11     Multiple Vitamin (TAB-A-DAWSON) TABS Take 1 tablet by mouth daily 28 tablet 97     nystatin (MYCOSTATIN) 341522 UNIT/GM external powder APPLY TOPICALLY BETWEEN TOES EVERY EVENING UNTIL HEALED;THEN APPLY TOPICALLY BETWEEN TOES 2 TIMES DAILY AS NEEDED UNTIL HEALED 60 g 11     polyethylene glycol (MIRALAX/GLYCOLAX) powder MIX 17GM OF POWDER IN 8OZ OF WATER UNTIL COMPLETELY DISSOLVED. DRINK SOLUTION DAILY AS NEEDED 527 g 98     senna-docusate (SENEXON-S) 8.6-50 MG tablet Take 2 tablets by mouth daily as needed for constipation 62 tablet 12     simethicone (MYLICON) 125 MG chewable tablet 125mg po 4 times a day PRN for gas (may keep at bedside per request) 60 tablet 11     sterile water, bottle, (WATER FOR IRRIGATION, STERILE) irrigation 60ml  prn to irrigate ruby catheter when plugged 500 mL 11     vitamin C  (ASCORBIC ACID) 500 MG tablet 1 TABLET ORALLY 2 TIMES DAILY (DX: CHRONIC CYSTITIS) 56 tablet 11     Zinc Oxide (DESITIN) 13 % CREA Apply topically 3 times daily as needed (chaffing) 113 g 11         REVIEW OF SYSTEMS:  4 point ROS neg other than the symptoms noted above in the HPI.      PHYSICAL EXAM:  /85   Pulse 77   Temp 97  F (36.1  C)   Resp 18   Wt 121.6 kg (268 lb)   SpO2 97%   BMI 39.58 kg/m    Muna is an obese female who is wheelchair dependent after the 4 point lift is used to transfer her from surface to surface.  In her wheelchair she does have shoulder harnesses to help keep her back versus folding forward where she does not have very good core strength.    Heart rate is regular and strong.  No pitting edema in lower extremities.  Lung bases are diminished but also difficult to hear her lung sounds because of being strapped into her wheelchair.  No coughing was noted during the visit.  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  Will discontinue her as needed ibuprofen order.  Will start with Norco 5/325 mg 1 tablet every morning between 5 and 6 AM but 30 minutes prior to her getting out of bed.  She also can have 1 tablet every 6 hours as needed for breakthrough pain.  Did explain this plan of care to her few times so hopefully she understands it.  Plan: HYDROcodone-acetaminophen (NORCO)        5-325 MG tablet    (R52) Generalized pain  Comment: Will keep the as needed Tylenol order that she has at this time and she can choose  if she wants to take a Norco versus Tylenol extra strength for any breakthrough pain.  I have not really known Muna to have much pain and so it is new hearing about the discomfort she feels in the morning  Plan: : HYDROcodone-acetaminophen (NORCO)        5-325 MG tablet    (D62) Anemia due to blood loss, acute  Comment:   Lab Results   Component Value Date    HGB 12.2 12/02/2024     Unfortunately her lab values are not posted in care everywhere from her hospital stay.  Will order a hemoglobin for follow-up in the future.  More concerned about her trying the Norco and see how effective it is at this point.  She denies any acute bleeding.    Orders:  Norco 5/325mg 1 tab po every AM  between 5 - 6am at least 30 minutes before she gets up for the day.  Norco 5/325mg po 1 tab every 6 hours as needed for pain.    Electronically signed by  CAROL Rodríguez CNP            Sincerely,        CAROL Rodríguez CNP    Electronically signed

## 2025-01-28 ENCOUNTER — TRANSFERRED RECORDS (OUTPATIENT)
Dept: HEALTH INFORMATION MANAGEMENT | Facility: CLINIC | Age: 80
End: 2025-01-28
Payer: COMMERCIAL

## 2025-01-29 ENCOUNTER — DOCUMENTATION ONLY (OUTPATIENT)
Dept: NEUROLOGY | Facility: CLINIC | Age: 80
End: 2025-01-29
Payer: COMMERCIAL

## 2025-01-29 NOTE — PROGRESS NOTES
Recertification note has been received from [facility], orders placed in Dr. Macario's folder for review and signature.   Deni Lizarraga EMT January 29, 2025

## 2025-01-30 ENCOUNTER — TELEPHONE (OUTPATIENT)
Dept: GERIATRICS | Facility: CLINIC | Age: 80
End: 2025-01-30
Payer: COMMERCIAL

## 2025-01-30 DIAGNOSIS — R52 GENERALIZED PAIN: ICD-10-CM

## 2025-01-30 DIAGNOSIS — J18.9 PNEUMONIA OF LEFT LOWER LOBE DUE TO INFECTIOUS ORGANISM: Primary | ICD-10-CM

## 2025-01-30 DIAGNOSIS — G35 MS (MULTIPLE SCLEROSIS) (H): ICD-10-CM

## 2025-01-30 RX ORDER — DOXYCYCLINE 100 MG/1
100 CAPSULE ORAL 2 TIMES DAILY
Qty: 20 CAPSULE | Refills: 0 | Status: SHIPPED | OUTPATIENT
Start: 2025-01-30 | End: 2025-02-09

## 2025-01-30 RX ORDER — HYDROCODONE BITARTRATE AND ACETAMINOPHEN 5; 325 MG/1; MG/1
TABLET ORAL
Qty: 60 TABLET | Refills: 0 | Status: SHIPPED | OUTPATIENT
Start: 2025-01-30

## 2025-01-30 NOTE — TELEPHONE ENCOUNTER
CXR done yesterday late afternoon due to chest heaviness and feeling like still having Pneumonia.    CXR showed left basilar pneumonia.  Mild patchy densities.      Orders:  Doxycycline 100mg po BID for 10 days for pneumonia.  Aware of tetracycline allergy.  Monitor for GI disturbances.    CAROL Rodríguez CNP

## 2025-01-31 ENCOUNTER — TELEPHONE (OUTPATIENT)
Dept: GERIATRICS | Facility: CLINIC | Age: 80
End: 2025-01-31

## 2025-01-31 NOTE — TELEPHONE ENCOUNTER
Prior Authorization Retail Medication Request    Medication/Dose: HYDROcodone-Acetaminophen 5-325MG  Diagnosis and ICD code (if different than what is on RX):    New/renewal/insurance change PA/secondary ins. PA: NEW  Previously Tried and Failed:  NA  Rationale:  NA    Insurance   Primary: [employer]  Insurance ID:  [id number]     Secondary (if applicable):  Insurance ID:      Pharmacy Information (if different than what is on RX)  Name:  [facility]  Phone:  [phone]  Fax:[phone]    Clinic Information  Preferred routing pool for dept communication:

## 2025-01-31 NOTE — TELEPHONE ENCOUNTER
"Have been in contact with muna on and off all day via text about her results of CXR and her pain control with having Norco 5/325mg 1 tab this morning.    Muna felt like she still had pneumonia with heaviness in her chest and so a CXR was done.  Today results returned and left basilar pneumonia.    Had asked muna if she wanted to try 2 tabs of Norco in the AM?  See if that helps with her pain she feels when she is turned and repositioned early AM.  Otherwise stays in electric w/c during the day.    ORDERS:  Doxycycline 100mg po twice a day for 10 days for pneumonia.    Sent script to pharmacy.  Aware of her tetracycline \"allergy\" which was GI distress.  Will monitor for tolerance.    Will send a script over to pharmacy for Norco 5/325mg 2 tabs po every AM between 5-6am at least 30 minutes before she gets up.  Norco 5/325mg 2 tabs po every 8 hours PRN.      Discontinue previous Norco order    Asked staff to give her a PRN dose of 1 tab with her 1 tab scheduled in AM so she gets 2 tabs tomorrow AM.    Staff will process new order in AM.    Love Luacs, APRN CNP    "

## 2025-02-03 ENCOUNTER — ASSISTED LIVING VISIT (OUTPATIENT)
Dept: GERIATRICS | Facility: CLINIC | Age: 80
End: 2025-02-03
Payer: COMMERCIAL

## 2025-02-03 VITALS
WEIGHT: 268 LBS | OXYGEN SATURATION: 97 % | HEART RATE: 77 BPM | DIASTOLIC BLOOD PRESSURE: 85 MMHG | RESPIRATION RATE: 18 BRPM | TEMPERATURE: 97 F | SYSTOLIC BLOOD PRESSURE: 120 MMHG | BODY MASS INDEX: 39.58 KG/M2

## 2025-02-03 NOTE — PROGRESS NOTES
[facility] GERIATRICS  ACUTE/EPISODIC VISIT    [facility] Medical Record Number:  [id number]  Place of Service where encounter took place:  [facility] [place] ([facility]) [[id number]]    Chief Complaint   Patient presents with    RECHECK       HPI:    Cristina Stevens is a 79 year old  (1945), who is being seen today for an episodic care visit.  HPI information obtained from: {FGS HPI:923310}.    Today's concern is:      ALLERGIES:    Allergies   Allergen Reactions    Nitrofuran Derivatives Visual Disturbance     Hallucinations    Tetracyclines & Related GI Disturbance    Amlodipine Other (See Comments)     Bloating/edema    Ampicillin GI Disturbance    Cefadroxil Muscle Pain (Myalgia)    Cephalexin Diarrhea and GI Disturbance    Levofloxacin Other (See Comments)     Phlebitis with IV infusion    Sulfa Antibiotics Nausea and Vomiting, GI Disturbance and Unknown     Reaction occurred as a child    Sulfasalazine Nausea and Vomiting        MEDICATIONS:  Post Discharge Medication Reconciliation Status: {ACO Med Rec (Provider):445894}. ***    Current Outpatient Medications   Medication Sig Dispense Refill    acetaminophen (TYLENOL) 500 MG tablet 2 TABLETS (1000MG) ORALLY DAILY AS NEEDED (MAX 4GM TYLENOL OR ACETAMINOPHEN/24 HRS) 60 tablet 11    amoxicillin (AMOXIL) 250 MG chewable tablet CHEW AND SWALLOW 8 TABLETS (2000 MG) BY MOUTH 1 HOUR PRIOR TO DENTAL 8 tablet 97    ANTACID CALCIUM 500 MG chewable tablet 1 TABLET ORALLY DAILY  (DX: NUTRITIONAL SUPPLEMENT ) 30 tablet 11    armodafinil (NUVIGIL) 150 MG TABS tablet 1 TABLET ORALLY EVERY MORNING (DX: NARCOLEPSY) ***CONTROLLED MEDICATION ? TO BE STORED IN DOUBLE LOCKED STORAGE*** 30 tablet 5    ASPIRIN LOW DOSE 81 MG chewable tablet 1 TABLET ORALLY DAILY 28 tablet 11    Bacillus Coagulans-Inulin (PROBIOTIC FORMULA) 1-250 BILLION-MG CAPS 1 CAPSULE ORALLY DAILY 30 capsule 11    Baclofen (LIORESAL) 5 MG tablet Take 1 tablet (5 mg) by mouth 3 times daily as needed  for muscle spasms 20 tablet 3    clopidogrel (PLAVIX) 75 MG tablet Take 1 tablet by mouth daily      CO2-Releasing (-TWO) SUPP 1 supp daily prn and if not effective or falls out, may repeat again until results achieved.  May keep at bedside      Cranberry-Vitamin C-Probiotic (AZO CRANBERRY) 250-30 MG TABS 2 TABLETS (500MG) ORALLY 2 TIMES DAILY  (DX: URINARY TRACT INFECTION) 112 tablet 11    doxycycline hyclate (VIBRAMYCIN) 100 MG capsule Take 1 capsule (100 mg) by mouth 2 times daily for 10 days. 20 capsule 0    ELIQUIS ANTICOAGULANT 5 MG tablet 1 TABLET ORALLY 2 TIMES DAILY. (DX: VENOUS THROMBOEMBOLISM) 62 tablet 11    HYDROcodone-acetaminophen (NORCO) 5-325 MG tablet Take 2 tablets by mouth every morning. May also take 2 tablets every 8 hours as needed for pain. 60 tablet 0    hyoscyamine (LEVSIN/SL) 0.125 MG sublingual tablet 0.125mg SL twice a day and every 6 hours PRN 60 tablet 3    ibuprofen (ADVIL/MOTRIN) 400 MG tablet TAKE 1 TABLET BY MOUTH TWICE DAILY AS NEEDED FOR PAIN 30 tablet 97    losartan (COZAAR) 25 MG tablet 1/2 TABLET (12.5MG) ORALLY EVERY EVENING 16 tablet 11    medical cannabis (Patient's own supply) Take 1 Dose by mouth See Admin Instructions (The purpose of this order is to document that the patient reports taking medical cannabis.  This is not a prescription, and is not used to certify that the patient has a qualifying medical condition.)      methenamine hippurate (HIPREX) 1 g tablet 1 TABLET ORALLY 2 TIMES DAILY  (DX: URINARY TRACT INFECTION) 56 tablet 11    Multiple Vitamin (TAB-A-DAWSON) TABS Take 1 tablet by mouth daily 28 tablet 97    nystatin (MYCOSTATIN) 424998 UNIT/GM external powder APPLY TOPICALLY BETWEEN TOES EVERY EVENING UNTIL HEALED;THEN APPLY TOPICALLY BETWEEN TOES 2 TIMES DAILY AS NEEDED UNTIL HEALED 60 g 11    polyethylene glycol (MIRALAX/GLYCOLAX) powder MIX 17GM OF POWDER IN 8OZ OF WATER UNTIL COMPLETELY DISSOLVED. DRINK SOLUTION DAILY AS NEEDED 527 g 98    Probiotic  Product (PROBIOTIC DAILY) CAPS Take 1 capsule by mouth daily 28 capsule 98    senna-docusate (SENEXON-S) 8.6-50 MG tablet Take 2 tablets by mouth daily as needed for constipation 62 tablet 12    simethicone (MYLICON) 125 MG chewable tablet 125mg po 4 times a day PRN for gas (may keep at bedside per request) 60 tablet 11    sterile water, bottle, (WATER FOR IRRIGATION, STERILE) irrigation 60ml  prn to irrigate ruby catheter when plugged 500 mL 11    vitamin C (ASCORBIC ACID) 500 MG tablet 1 TABLET ORALLY 2 TIMES DAILY (DX: CHRONIC CYSTITIS) 56 tablet 11    Zinc Oxide (DESITIN) 13 % CREA Apply topically 3 times daily as needed (chaffing) 113 g 11     Medications reviewed:  Medications reconciled to facility chart and changes were made to reflect current medications as identified as above med list. Below are the changes that were made:   Medications stopped since last EPIC medication reconciliation:   There are no discontinued medications.    Medications started since last [facility] medication reconciliation:  No orders of the defined types were placed in this encounter.    ***    REVIEW OF SYSTEMS:  4 point ROS neg other than the symptoms noted above in the HPI.***  Unable to be obtained due to cognitive impairment or aphasia.   ROS    PHYSICAL EXAM:  /85   Pulse 77   Temp 97  F (36.1  C)   Resp 18   Wt 121.6 kg (268 lb)   SpO2 97%   BMI 39.58 kg/m    Physical Exam      ASSESSMENT / PLAN:  {FGS DX:889232}    Orders:  No new orders    Electronically signed by  CAROL Rodríguez CNP

## 2025-02-03 NOTE — LETTER
2/3/2025      Cristina Stevens  [address]  [address]  [place] [address]        No notes on file      Sincerely,        CAROL Rodríguez CNP    Electronically signed

## 2025-02-05 NOTE — TELEPHONE ENCOUNTER
PRIOR AUTHORIZATION DENIED    Medication: HYDROCODONE-ACETAMINOPHEN 5-325 MG PO TABS  Insurance Company: [employer] Part D - Phone [phone] Fax [phone]  Denial Date: 2/5/2025    Denial Reason(s):   HYDROCO/APAP TAB 5-325MG is denied for not meeting the plan's cumulative acetaminophen dose  exceeding 4 grams per day limit. Medication authorization requires that the cumulative total daily dose  of acetaminophen in excess of 4 grams per day is supported by one of the two Medicare approved  compendia:  (1) [facility] Formulary Service Drug Information.  (2) [employer] DRUGDEX Information System.  Reviewed by: Rodrigue    Appeal Information: If provider would like to appeal please provide a letter of medical necessity.

## 2025-02-05 NOTE — TELEPHONE ENCOUNTER
Central Prior Authorization Team  Phone: [phone]    PA Initiation    Medication: HYDROCODONE-ACETAMINOPHEN 5-325 MG PO TABS  Insurance Company: [employer] - Phone [phone] Fax [phone]  Pharmacy Filling the Rx: [facility] - [place], [place] - [address]  Filling Pharmacy Phone: [phone]  Filling Pharmacy Fax:    Start Date: 2/5/2025

## 2025-02-06 DIAGNOSIS — G47.429 NARCOLEPSY DUE TO UNDERLYING CONDITION WITHOUT CATAPLEXY: ICD-10-CM

## 2025-02-06 RX ORDER — ARMODAFINIL 150 MG/1
TABLET ORAL
Qty: 30 TABLET | Refills: 5 | Status: SHIPPED | OUTPATIENT
Start: 2025-02-06

## 2025-02-07 ENCOUNTER — LAB REQUISITION (OUTPATIENT)
Dept: LAB | Facility: CLINIC | Age: 80
End: 2025-02-07
Payer: COMMERCIAL

## 2025-02-07 DIAGNOSIS — D64.9 ANEMIA, UNSPECIFIED: ICD-10-CM

## 2025-02-10 ENCOUNTER — LAB REQUISITION (OUTPATIENT)
Dept: LAB | Facility: CLINIC | Age: 80
End: 2025-02-10
Payer: COMMERCIAL

## 2025-02-10 DIAGNOSIS — R30.0 DYSURIA: ICD-10-CM

## 2025-02-10 DIAGNOSIS — R52 GENERALIZED PAIN: ICD-10-CM

## 2025-02-10 DIAGNOSIS — G35 MS (MULTIPLE SCLEROSIS) (H): ICD-10-CM

## 2025-02-10 LAB
ALBUMIN UR-MCNC: 100 MG/DL
ANION GAP SERPL CALCULATED.3IONS-SCNC: 12 MMOL/L (ref 7–15)
APPEARANCE UR: ABNORMAL
BACTERIA #/AREA URNS HPF: ABNORMAL /HPF
BILIRUB UR QL STRIP: NEGATIVE
BUN SERPL-MCNC: 45 MG/DL (ref 8–23)
CALCIUM SERPL-MCNC: 9.7 MG/DL (ref 8.8–10.4)
CHLORIDE SERPL-SCNC: 110 MMOL/L (ref 98–107)
COLOR UR AUTO: ABNORMAL
CREAT SERPL-MCNC: 1.02 MG/DL (ref 0.51–0.95)
EGFRCR SERPLBLD CKD-EPI 2021: 56 ML/MIN/1.73M2
ERYTHROCYTE [DISTWIDTH] IN BLOOD BY AUTOMATED COUNT: 18.7 % (ref 10–15)
GLUCOSE SERPL-MCNC: 63 MG/DL (ref 70–99)
GLUCOSE UR STRIP-MCNC: NEGATIVE MG/DL
HCO3 SERPL-SCNC: 20 MMOL/L (ref 22–29)
HCT VFR BLD AUTO: 32.6 % (ref 35–47)
HGB BLD-MCNC: 10.1 G/DL (ref 11.7–15.7)
HGB UR QL STRIP: ABNORMAL
KETONES UR STRIP-MCNC: NEGATIVE MG/DL
LEUKOCYTE ESTERASE UR QL STRIP: ABNORMAL
MCH RBC QN AUTO: 29.4 PG (ref 26.5–33)
MCHC RBC AUTO-ENTMCNC: 31 G/DL (ref 31.5–36.5)
MCV RBC AUTO: 95 FL (ref 78–100)
NITRATE UR QL: NEGATIVE
PH UR STRIP: 5.5 [PH] (ref 5–7)
PLATELET # BLD AUTO: 146 10E3/UL (ref 150–450)
POTASSIUM SERPL-SCNC: 4.6 MMOL/L (ref 3.4–5.3)
RBC # BLD AUTO: 3.44 10E6/UL (ref 3.8–5.2)
RBC URINE: >182 /HPF
SODIUM SERPL-SCNC: 142 MMOL/L (ref 135–145)
SP GR UR STRIP: 1.02 (ref 1–1.03)
SQUAMOUS EPITHELIAL: 9 /HPF
UROBILINOGEN UR STRIP-MCNC: NORMAL MG/DL
WBC # BLD AUTO: 7.4 10E3/UL (ref 4–11)
WBC CLUMPS #/AREA URNS HPF: PRESENT /HPF
WBC URINE: >182 /HPF

## 2025-02-10 PROCEDURE — 81001 URINALYSIS AUTO W/SCOPE: CPT | Mod: ORL | Performed by: NURSE PRACTITIONER

## 2025-02-10 PROCEDURE — 87086 URINE CULTURE/COLONY COUNT: CPT | Mod: ORL | Performed by: NURSE PRACTITIONER

## 2025-02-10 RX ORDER — HYDROCODONE BITARTRATE AND ACETAMINOPHEN 5; 325 MG/1; MG/1
TABLET ORAL
Qty: 60 TABLET | Refills: 0 | Status: SHIPPED | OUTPATIENT
Start: 2025-02-10

## 2025-02-11 ENCOUNTER — TELEPHONE (OUTPATIENT)
Dept: GERIATRICS | Facility: CLINIC | Age: 80
End: 2025-02-11
Payer: COMMERCIAL

## 2025-02-11 DIAGNOSIS — R82.90 ABNORMAL FINDING ON URINALYSIS: Primary | ICD-10-CM

## 2025-02-11 RX ORDER — CIPROFLOXACIN 500 MG/1
500 TABLET, FILM COATED ORAL 2 TIMES DAILY
Qty: 14 TABLET | Refills: 0 | Status: SHIPPED | OUTPATIENT
Start: 2025-02-11 | End: 2025-02-18

## 2025-02-11 NOTE — TELEPHONE ENCOUNTER
Contacted by Muna's private aide last night that she is feeling urinary symptoms.  Malaise, dark urine, discomfort that she asked the aides to reposition her many times in her electric wheelchair.    Urine was sent in and results are back.  UA positive.      Component      Latest Ref Rng 2/10/2025  7:40 PM   Color Urine      Colorless, Straw, Light Yellow, Yellow  Dark Brown !    Appearance Urine      Clear  Cloudy !    Glucose Urine      Negative mg/dL Negative    Bilirubin Urine      Negative  Negative    Ketones Urine      Negative mg/dL Negative    Specific Gravity Urine      1.003 - 1.035  1.022    Blood Urine      Negative  Large !    pH Urine      5.0 - 7.0  5.5    Protein Albumin Urine      Negative mg/dL 100 !    Urobilinogen mg/dL      Normal, 2.0 mg/dL Normal    Nitrite Urine      Negative  Negative    Leukocyte Esterase Urine      Negative  Moderate !    Bacteria Urine      None Seen /HPF Few !    WBC Clumps      None Seen /HPF Present !    RBC Urine      <=2 /HPF >182 (H)    WBC Urine      <=5 /HPF >182 (H)    Squamous Epithelial /HPF Urine      <=1 /HPF 9 (H)         Orders:  Cipro 500mg po BID x7 days for abnormal UA    Script sent to pharmacy.  Hx of sensitivity to levofloxacin and tolerated cipro in past.    Love Lucas, APRN CNP

## 2025-02-12 LAB — BACTERIA UR CULT: NORMAL

## 2025-02-16 ENCOUNTER — LAB REQUISITION (OUTPATIENT)
Dept: LAB | Facility: CLINIC | Age: 80
End: 2025-02-16
Payer: COMMERCIAL

## 2025-02-16 DIAGNOSIS — D64.9 ANEMIA, UNSPECIFIED: ICD-10-CM

## 2025-02-17 ENCOUNTER — ASSISTED LIVING VISIT (OUTPATIENT)
Dept: GERIATRICS | Facility: CLINIC | Age: 80
End: 2025-02-17
Payer: COMMERCIAL

## 2025-02-17 VITALS
HEART RATE: 77 BPM | BODY MASS INDEX: 39.58 KG/M2 | SYSTOLIC BLOOD PRESSURE: 120 MMHG | WEIGHT: 268 LBS | RESPIRATION RATE: 18 BRPM | DIASTOLIC BLOOD PRESSURE: 82 MMHG | TEMPERATURE: 97 F

## 2025-02-17 DIAGNOSIS — N31.9 NEUROGENIC BLADDER: ICD-10-CM

## 2025-02-17 DIAGNOSIS — Z97.8 FOLEY CATHETER PRESENT: ICD-10-CM

## 2025-02-17 DIAGNOSIS — N39.0 URINARY TRACT INFECTION ASSOCIATED WITH INDWELLING URETHRAL CATHETER, SUBSEQUENT ENCOUNTER: ICD-10-CM

## 2025-02-17 DIAGNOSIS — D62 ANEMIA DUE TO BLOOD LOSS, ACUTE: Primary | ICD-10-CM

## 2025-02-17 DIAGNOSIS — T83.511D URINARY TRACT INFECTION ASSOCIATED WITH INDWELLING URETHRAL CATHETER, SUBSEQUENT ENCOUNTER: ICD-10-CM

## 2025-02-17 LAB
BASOPHILS # BLD AUTO: 0 10E3/UL (ref 0–0.2)
BASOPHILS NFR BLD AUTO: 0 %
EOSINOPHIL # BLD AUTO: 0.2 10E3/UL (ref 0–0.7)
EOSINOPHIL NFR BLD AUTO: 4 %
ERYTHROCYTE [DISTWIDTH] IN BLOOD BY AUTOMATED COUNT: 19 % (ref 10–15)
HCT VFR BLD AUTO: 28.5 % (ref 35–47)
HGB BLD-MCNC: 8.6 G/DL (ref 11.7–15.7)
IMM GRANULOCYTES # BLD: 0 10E3/UL
IMM GRANULOCYTES NFR BLD: 1 %
LYMPHOCYTES # BLD AUTO: 0.9 10E3/UL (ref 0.8–5.3)
LYMPHOCYTES NFR BLD AUTO: 15 %
MCH RBC QN AUTO: 29.2 PG (ref 26.5–33)
MCHC RBC AUTO-ENTMCNC: 30.2 G/DL (ref 31.5–36.5)
MCV RBC AUTO: 97 FL (ref 78–100)
MONOCYTES # BLD AUTO: 0.5 10E3/UL (ref 0–1.3)
MONOCYTES NFR BLD AUTO: 9 %
NEUTROPHILS # BLD AUTO: 4.3 10E3/UL (ref 1.6–8.3)
NEUTROPHILS NFR BLD AUTO: 71 %
NRBC # BLD AUTO: 0 10E3/UL
NRBC BLD AUTO-RTO: 0 /100
PLATELET # BLD AUTO: 119 10E3/UL (ref 150–450)
RBC # BLD AUTO: 2.95 10E6/UL (ref 3.8–5.2)
WBC # BLD AUTO: 6 10E3/UL (ref 4–11)

## 2025-02-17 PROCEDURE — P9604 ONE-WAY ALLOW PRORATED TRIP: HCPCS | Mod: ORL | Performed by: NURSE PRACTITIONER

## 2025-02-17 PROCEDURE — 36415 COLL VENOUS BLD VENIPUNCTURE: CPT | Mod: ORL | Performed by: NURSE PRACTITIONER

## 2025-02-17 PROCEDURE — 99349 HOME/RES VST EST MOD MDM 40: CPT | Performed by: NURSE PRACTITIONER

## 2025-02-17 PROCEDURE — 85025 COMPLETE CBC W/AUTO DIFF WBC: CPT | Mod: ORL | Performed by: NURSE PRACTITIONER

## 2025-02-17 NOTE — LETTER
2/17/2025      Cristina Stevens  [address]  [place] [address]        No notes on file      Sincerely,        CAROL Rodríguez CNP    Electronically signed

## 2025-02-17 NOTE — LETTER
2/17/2025      Cristina Stevens  [address]   [address]  [place] [address]        [facility] GERIATRICS  ACUTE/EPISODIC VISIT    [facility] Medical Record Number:  [id number]  Place of Service where encounter took place:  [facility] [place] ([facility]) [[id number]]    Chief Complaint   Patient presents with     RECHECK       HPI:    Cristina Stevens is a 79 year old  (1945), who is being seen today for an episodic care visit.  HPI information obtained from: facility chart records, facility staff, patient report, and [facility] chart review.    Today's concern is:    Diagnoses         Codes Comments    Anemia due to blood loss, acute    -  Primary D62     Urinary tract infection associated with indwelling urethral catheter, subsequent encounter     T83.511D, N39.0     Neurogenic bladder     N31.9     Edwards catheter present     Z97.8           Came to see Muna today to discuss her abrupt change with her HgB level from last week.  Had a CBC drawn today and waiting on results.      Muna has no complaints of shortness of breath, chest pain, or seeing any blood loss or bloody stool.  Discussed a plan of care if her HgB drops lower than last week.      Last week, urine was sent in to be tested as her private aide changes her catheter and pending on symptoms, a sample is sent in.  Came back negative for organism but the UA was positive.  Based on that, left her on cipro and hopefully avoid a hospitalization.    ALLERGIES:    Allergies   Allergen Reactions     Nitrofuran Derivatives Visual Disturbance     Hallucinations     Tetracyclines & Related GI Disturbance     Amlodipine Other (See Comments)     Bloating/edema     Ampicillin GI Disturbance     Cefadroxil Muscle Pain (Myalgia)     Cephalexin Diarrhea and GI Disturbance     Levofloxacin Other (See Comments)     Phlebitis with IV infusion     Sulfa Antibiotics Nausea and Vomiting, GI Disturbance and Unknown     Reaction occurred as a child      Sulfasalazine Nausea and Vomiting        MEDICATIONS:  Post Discharge Medication Reconciliation Status: medications reconciled.     Current Outpatient Medications   Medication Sig Dispense Refill     acetaminophen (TYLENOL) 500 MG tablet 2 TABLETS (1000MG) ORALLY DAILY AS NEEDED (MAX 4GM TYLENOL OR ACETAMINOPHEN/24 HRS) 60 tablet 11     amoxicillin (AMOXIL) 250 MG chewable tablet CHEW AND SWALLOW 8 TABLETS (2000 MG) BY MOUTH 1 HOUR PRIOR TO DENTAL 8 tablet 97     ANTACID CALCIUM 500 MG chewable tablet 1 TABLET ORALLY DAILY  (DX: NUTRITIONAL SUPPLEMENT ) 30 tablet 11     armodafinil (NUVIGIL) 150 MG TABS tablet 1 TABLET ORALLY EVERY MORNING (DX: NARCOLEPSY) CONTROLLED MEDICATION ? TO BE STORED IN DOUBLE LOCKED STORAGE 30 tablet 5     Bacillus Coagulans-Inulin (PROBIOTIC FORMULA) 1-250 BILLION-MG CAPS 1 CAPSULE ORALLY DAILY 30 capsule 11     Baclofen (LIORESAL) 5 MG tablet Take 1 tablet (5 mg) by mouth 3 times daily as needed for muscle spasms 20 tablet 3     ciprofloxacin (CIPRO) 500 MG tablet Take 1 tablet (500 mg) by mouth 2 times daily for 7 days. 14 tablet 0     clopidogrel (PLAVIX) 75 MG tablet Take 1 tablet by mouth daily       CO2-Releasing (-TWO) SUPP 1 supp daily prn and if not effective or falls out, may repeat again until results achieved.  May keep at bedside       Cranberry-Vitamin C-Probiotic (AZO CRANBERRY) 250-30 MG TABS 2 TABLETS (500MG) ORALLY 2 TIMES DAILY  (DX: URINARY TRACT INFECTION) 112 tablet 11     ELIQUIS ANTICOAGULANT 5 MG tablet 1 TABLET ORALLY 2 TIMES DAILY. (DX: VENOUS THROMBOEMBOLISM) 62 tablet 11     HYDROcodone-acetaminophen (NORCO) 5-325 MG tablet Take 2 tablets by mouth every morning. May also take 2 tablets every 8 hours as needed for pain. 60 tablet 0     losartan (COZAAR) 25 MG tablet 1/2 TABLET (12.5MG) ORALLY EVERY EVENING 16 tablet 11     medical cannabis (Patient's own supply) Take 1 Dose by mouth See Admin Instructions (The purpose of this order is to document that  the patient reports taking medical cannabis.  This is not a prescription, and is not used to certify that the patient has a qualifying medical condition.)       methenamine hippurate (HIPREX) 1 g tablet 1 TABLET ORALLY 2 TIMES DAILY  (DX: URINARY TRACT INFECTION) 56 tablet 11     Multiple Vitamin (TAB-A-DAWSON) TABS Take 1 tablet by mouth daily 28 tablet 97     nystatin (MYCOSTATIN) 874113 UNIT/GM external powder APPLY TOPICALLY BETWEEN TOES EVERY EVENING UNTIL HEALED;THEN APPLY TOPICALLY BETWEEN TOES 2 TIMES DAILY AS NEEDED UNTIL HEALED 60 g 11     polyethylene glycol (MIRALAX/GLYCOLAX) powder MIX 17GM OF POWDER IN 8OZ OF WATER UNTIL COMPLETELY DISSOLVED. DRINK SOLUTION DAILY AS NEEDED 527 g 98     senna-docusate (SENEXON-S) 8.6-50 MG tablet Take 2 tablets by mouth daily as needed for constipation 62 tablet 12     simethicone (MYLICON) 125 MG chewable tablet 125mg po 4 times a day PRN for gas (may keep at bedside per request) 60 tablet 11     sterile water, bottle, (WATER FOR IRRIGATION, STERILE) irrigation 60ml  prn to irrigate ruby catheter when plugged 500 mL 11     vitamin C (ASCORBIC ACID) 500 MG tablet 1 TABLET ORALLY 2 TIMES DAILY (DX: CHRONIC CYSTITIS) 56 tablet 11     Zinc Oxide (DESITIN) 13 % CREA Apply topically 3 times daily as needed (chaffing) 113 g 11         REVIEW OF SYSTEMS:  4 point ROS neg other than the symptoms noted above in the HPI. No chest pain, no SOB.  Denies any active bleeding seen either rectal, vomiting, or in catheter      PHYSICAL EXAM:  /82   Pulse 77   Temp 97  F (36.1  C)   Resp 18   Wt 121.6 kg (268 lb)   BMI 39.58 kg/m    Muna was up in her electric wheelchair with a tray in front of her.  Alert and oriented x3.   Color of skin is pale, dry and warm.    Heart rate regular/irregular.  Trace edema in lower legs.  No respiratory distress.  Has muscular weakness with her voice and so speaks softly and more she speaks it can get quieter.  Abdomen is round, soft, and  non-tender.    Component      Latest Ref Rng 2/10/2025  7:40 PM   Color Urine      Colorless, Straw, Light Yellow, Yellow  Dark Brown !    Appearance Urine      Clear  Cloudy !    Glucose Urine      Negative mg/dL Negative    Bilirubin Urine      Negative  Negative    Ketones Urine      Negative mg/dL Negative    Specific Gravity Urine      1.003 - 1.035  1.022    Blood Urine      Negative  Large !    pH Urine      5.0 - 7.0  5.5    Protein Albumin Urine      Negative mg/dL 100 !    Urobilinogen mg/dL      Normal, 2.0 mg/dL Normal    Nitrite Urine      Negative  Negative    Leukocyte Esterase Urine      Negative  Moderate !    Bacteria Urine      None Seen /HPF Few !    WBC Clumps      None Seen /HPF Present !    RBC Urine      <=2 /HPF >182 (H)    WBC Urine      <=5 /HPF >182 (H)    Squamous Epithelial /HPF Urine      <=1 /HPF 9 (H)           Component      Latest Ref Rng 2/10/2025  11:28 AM   WBC      4.0 - 11.0 10e3/uL 7.4    RBC Count      3.80 - 5.20 10e6/uL 3.44 (L)    Hemoglobin      11.7 - 15.7 g/dL 10.1 (L)    Hematocrit      35.0 - 47.0 % 32.6 (L)    MCV      78 - 100 fL 95    MCH      26.5 - 33.0 pg 29.4    MCHC      31.5 - 36.5 g/dL 31.0 (L)    RDW      10.0 - 15.0 % 18.7 (H)    Platelet Count      150 - 450 10e3/uL 146 (L)           ASSESSMENT / PLAN:  (D62) Anemia due to blood loss, acute  (primary encounter diagnosis)  Comment: plan if HgB is lower than last week in results will be to start her on Protonix BID for 4 weeks then down to 40mg daily for 4 weeks then 20mg po daily for maintenance.  Will determine as well when to check HgB next.    Guiac stool if need be?  If she was positive, would she want to go further to do a colonoscopy?  All those for another discussion.  In meantime, Muna knows what to look for with symptoms of anemia if she had to go in.    (T83.511D,  N39.0) Urinary tract infection associated with indwelling urethral catheter, subsequent encounter  (N31.9) Neurogenic  bladder  (Z97.8) Edwards catheter present  Comment: remains on Cipro 500mg po BID for 7 days starting 2/12 most likely as order faxed over on 2/11/25.  Catheter is changed every 2 weeks due to frequent UTIs and sediment build up.  Work with her private aide to get paperwork done to order extra supplies beyond what Medicare pays for.     Orders:  No new orders before leaving facility.  Awaiting still on CBC that was done today.    Electronically signed by  CAROL Rodríguez CNP             Sincerely,        CAROL Rodríguez CNP    Electronically signed

## 2025-02-17 NOTE — PROGRESS NOTES
[facility] GERIATRICS  ACUTE/EPISODIC VISIT    [facility] Medical Record Number:  [id number]  Place of Service where encounter took place:  [facility] [place] ([facility]) [[id number]]    Chief Complaint   Patient presents with    RECHECK       HPI:    Cristina Stevens is a 79 year old  (1945), who is being seen today for an episodic care visit.  HPI information obtained from: {[facility] HPI:114547}.    Today's concern is:      ALLERGIES:    Allergies   Allergen Reactions    Nitrofuran Derivatives Visual Disturbance     Hallucinations    Tetracyclines & Related GI Disturbance    Amlodipine Other (See Comments)     Bloating/edema    Ampicillin GI Disturbance    Cefadroxil Muscle Pain (Myalgia)    Cephalexin Diarrhea and GI Disturbance    Levofloxacin Other (See Comments)     Phlebitis with IV infusion    Sulfa Antibiotics Nausea and Vomiting, GI Disturbance and Unknown     Reaction occurred as a child    Sulfasalazine Nausea and Vomiting        MEDICATIONS:  Post Discharge Medication Reconciliation Status: {ACO Med Rec (Provider):858615}. ***    Current Outpatient Medications   Medication Sig Dispense Refill    acetaminophen (TYLENOL) 500 MG tablet 2 TABLETS (1000MG) ORALLY DAILY AS NEEDED (MAX 4GM TYLENOL OR ACETAMINOPHEN/24 HRS) 60 tablet 11    amoxicillin (AMOXIL) 250 MG chewable tablet CHEW AND SWALLOW 8 TABLETS (2000 MG) BY MOUTH 1 HOUR PRIOR TO DENTAL 8 tablet 97    ANTACID CALCIUM 500 MG chewable tablet 1 TABLET ORALLY DAILY  (DX: NUTRITIONAL SUPPLEMENT ) 30 tablet 11    armodafinil (NUVIGIL) 150 MG TABS tablet 1 TABLET ORALLY EVERY MORNING (DX: NARCOLEPSY) ***CONTROLLED MEDICATION ? TO BE STORED IN DOUBLE LOCKED STORAGE*** 30 tablet 5    Bacillus Coagulans-Inulin (PROBIOTIC FORMULA) 1-250 BILLION-MG CAPS 1 CAPSULE ORALLY DAILY 30 capsule 11    Baclofen (LIORESAL) 5 MG tablet Take 1 tablet (5 mg) by mouth 3 times daily as needed for muscle spasms 20 tablet 3    ciprofloxacin (CIPRO) 500 MG tablet Take 1  tablet (500 mg) by mouth 2 times daily for 7 days. 14 tablet 0    clopidogrel (PLAVIX) 75 MG tablet Take 1 tablet by mouth daily      CO2-Releasing (-TWO) SUPP 1 supp daily prn and if not effective or falls out, may repeat again until results achieved.  May keep at bedside      Cranberry-Vitamin C-Probiotic (AZO CRANBERRY) 250-30 MG TABS 2 TABLETS (500MG) ORALLY 2 TIMES DAILY  (DX: URINARY TRACT INFECTION) 112 tablet 11    ELIQUIS ANTICOAGULANT 5 MG tablet 1 TABLET ORALLY 2 TIMES DAILY. (DX: VENOUS THROMBOEMBOLISM) 62 tablet 11    HYDROcodone-acetaminophen (NORCO) 5-325 MG tablet Take 2 tablets by mouth every morning. May also take 2 tablets every 8 hours as needed for pain. 60 tablet 0    losartan (COZAAR) 25 MG tablet 1/2 TABLET (12.5MG) ORALLY EVERY EVENING 16 tablet 11    medical cannabis (Patient's own supply) Take 1 Dose by mouth See Admin Instructions (The purpose of this order is to document that the patient reports taking medical cannabis.  This is not a prescription, and is not used to certify that the patient has a qualifying medical condition.)      methenamine hippurate (HIPREX) 1 g tablet 1 TABLET ORALLY 2 TIMES DAILY  (DX: URINARY TRACT INFECTION) 56 tablet 11    Multiple Vitamin (TAB-A-DAWSON) TABS Take 1 tablet by mouth daily 28 tablet 97    nystatin (MYCOSTATIN) 829765 UNIT/GM external powder APPLY TOPICALLY BETWEEN TOES EVERY EVENING UNTIL HEALED;THEN APPLY TOPICALLY BETWEEN TOES 2 TIMES DAILY AS NEEDED UNTIL HEALED 60 g 11    polyethylene glycol (MIRALAX/GLYCOLAX) powder MIX 17GM OF POWDER IN 8OZ OF WATER UNTIL COMPLETELY DISSOLVED. DRINK SOLUTION DAILY AS NEEDED 527 g 98    senna-docusate (SENEXON-S) 8.6-50 MG tablet Take 2 tablets by mouth daily as needed for constipation 62 tablet 12    simethicone (MYLICON) 125 MG chewable tablet 125mg po 4 times a day PRN for gas (may keep at bedside per request) 60 tablet 11    sterile water, bottle, (WATER FOR IRRIGATION, STERILE) irrigation 60ml  prn to  irrigate ruby catheter when plugged 500 mL 11    vitamin C (ASCORBIC ACID) 500 MG tablet 1 TABLET ORALLY 2 TIMES DAILY (DX: CHRONIC CYSTITIS) 56 tablet 11    Zinc Oxide (DESITIN) 13 % CREA Apply topically 3 times daily as needed (chaffing) 113 g 11     Medications reviewed:  Medications reconciled to facility chart and changes were made to reflect current medications as identified as above med list. Below are the changes that were made:   Medications stopped since last EPIC medication reconciliation:   There are no discontinued medications.    Medications started since last [facility] medication reconciliation:  No orders of the defined types were placed in this encounter.    ***    REVIEW OF SYSTEMS:  4 point ROS neg other than the symptoms noted above in the HPI.***  Unable to be obtained due to cognitive impairment or aphasia.   ROS    PHYSICAL EXAM:  /82   Pulse 77   Temp 97  F (36.1  C)   Resp 18   Wt 121.6 kg (268 lb)   BMI 39.58 kg/m    Physical Exam      ASSESSMENT / PLAN:  {FGS DX:469707}    Orders:  ***    Electronically signed by  Bill Waldrop          Dark Brown !    Appearance Urine      Clear  Cloudy !    Glucose Urine      Negative mg/dL Negative    Bilirubin Urine      Negative  Negative    Ketones Urine      Negative mg/dL Negative    Specific Gravity Urine      1.003 - 1.035  1.022    Blood Urine      Negative  Large !    pH Urine      5.0 - 7.0  5.5    Protein Albumin Urine      Negative mg/dL 100 !    Urobilinogen mg/dL      Normal, 2.0 mg/dL Normal    Nitrite Urine      Negative  Negative    Leukocyte Esterase Urine      Negative  Moderate !    Bacteria Urine      None Seen /HPF Few !    WBC Clumps      None Seen /HPF Present !    RBC Urine      <=2 /HPF >182 (H)    WBC Urine      <=5 /HPF >182 (H)    Squamous Epithelial /HPF Urine      <=1 /HPF 9 (H)           Component      Latest Ref Rn 2/10/2025  11:28 AM   WBC      4.0 - 11.0 10e3/uL 7.4    RBC Count      3.80 - 5.20 10e6/uL 3.44 (L)    Hemoglobin      11.7 - 15.7 g/dL 10.1 (L)    Hematocrit      35.0 - 47.0 % 32.6 (L)    MCV      78 - 100 fL 95    MCH      26.5 - 33.0 pg 29.4    MCHC      31.5 - 36.5 g/dL 31.0 (L)    RDW      10.0 - 15.0 % 18.7 (H)    Platelet Count      150 - 450 10e3/uL 146 (L)           ASSESSMENT / PLAN:  (D62) Anemia due to blood loss, acute  (primary encounter diagnosis)  Comment: plan if HgB is lower than last week in results will be to start her on Protonix BID for 4 weeks then down to 40mg daily for 4 weeks then 20mg po daily for maintenance.  Will determine as well when to check HgB next.    Guiac stool if need be?  If she was positive, would she want to go further to do a colonoscopy?  All those for another discussion.  In meantime, Muna knows what to look for with symptoms of anemia if she had to go in.    (T83.511D,  N39.0) Urinary tract infection associated with indwelling urethral catheter, subsequent encounter  (N31.9) Neurogenic bladder  (Z97.8) Edwards catheter present  Comment: remains on Cipro 500mg po BID for 7 days starting 2/12 most likely as order faxed over  on 2/11/25.  Catheter is changed every 2 weeks due to frequent UTIs and sediment build up.  Work with her private aide to get paperwork done to order extra supplies beyond what Medicare pays for.     Orders:  No new orders before leaving facility.  Awaiting still on CBC that was done today.    Electronically signed by  CAROL Rodríguez CNP

## 2025-02-18 ENCOUNTER — ASSISTED LIVING VISIT (OUTPATIENT)
Dept: GERIATRICS | Facility: CLINIC | Age: 80
End: 2025-02-18
Payer: COMMERCIAL

## 2025-02-18 VITALS
DIASTOLIC BLOOD PRESSURE: 85 MMHG | BODY MASS INDEX: 39.58 KG/M2 | TEMPERATURE: 97 F | SYSTOLIC BLOOD PRESSURE: 120 MMHG | HEART RATE: 77 BPM | WEIGHT: 268 LBS | RESPIRATION RATE: 18 BRPM

## 2025-02-18 DIAGNOSIS — J99 NEUROMUSCULAR RESPIRATORY WEAKNESS (H): ICD-10-CM

## 2025-02-18 DIAGNOSIS — G70.9 NEUROMUSCULAR RESPIRATORY WEAKNESS (H): ICD-10-CM

## 2025-02-18 DIAGNOSIS — I74.9 CHRONIC THROMBOEMBOLIC DISEASE (H): ICD-10-CM

## 2025-02-18 DIAGNOSIS — D62 ANEMIA DUE TO BLOOD LOSS, ACUTE: Primary | ICD-10-CM

## 2025-02-18 DIAGNOSIS — G35 MS (MULTIPLE SCLEROSIS) (H): ICD-10-CM

## 2025-02-18 DIAGNOSIS — G82.50 SPASTIC QUADRIPLEGIA (H): ICD-10-CM

## 2025-02-18 PROCEDURE — 99349 HOME/RES VST EST MOD MDM 40: CPT | Performed by: NURSE PRACTITIONER

## 2025-02-18 RX ORDER — PANTOPRAZOLE SODIUM 40 MG/1
TABLET, DELAYED RELEASE ORAL
Qty: 56 TABLET | Refills: 0 | Status: SHIPPED | OUTPATIENT
Start: 2025-02-18 | End: 2025-04-01

## 2025-02-18 RX ORDER — PANTOPRAZOLE SODIUM 20 MG/1
20 TABLET, DELAYED RELEASE ORAL DAILY
Qty: 30 TABLET | Refills: 5 | Status: SHIPPED | OUTPATIENT
Start: 2025-04-02

## 2025-02-18 NOTE — LETTER
2/18/2025      Cristina Stevens  [address]  [facility] [address]        [facility] GERIATRICS  ACUTE/EPISODIC VISIT    [facility] Medical Record Number:  [id number]  Place of Service where encounter took place:  [facility] [place] ([facility]) [[id number]]    Chief Complaint   Patient presents with     RECHECK       HPI:    Cristina Stevens is a 79 year old  (1945), who is being seen today for an episodic care visit.  HPI information obtained from: facility chart records, facility staff, patient report, and [facility] chart review.    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  CBC was done to to complaints of feeling tired.      Was in [facility] hospital 1/11/25 to 1/18/25 due to acute encephalopathy, MS, general weakness.  Could not find anything to lead to why her HgB had dropped but they suggested to her not to take Ibuprofen anymore due to the potential of bleeding with the use of eliquis and Plavix.  With no more ibuprofen, have been using Norco 5/325mg 2 tabs every AM.  It has been effective but also wondering if it was contributing to her feeling tired.    Today Muna's private aide/friend was present as muna had gone this morning to outpatient therapies which is ongoing for her.    Alert and oriented x3.  Able to make own decisions.  She listened carefully to directions given to her today as this NP will not be present for a 10 day span.      Denied any active blood in stools. Urine is free from hematuria.      ALLERGIES:    Allergies   Allergen Reactions     Nitrofuran Derivatives Visual Disturbance     Hallucinations     Tetracyclines & Related  GI Disturbance     Amlodipine Other (See Comments)     Bloating/edema     Ampicillin GI Disturbance     Cefadroxil Muscle Pain (Myalgia)     Cephalexin Diarrhea and GI Disturbance     Levofloxacin Other (See Comments)     Phlebitis with IV infusion     Sulfa Antibiotics Nausea and Vomiting, GI Disturbance and Unknown     Reaction occurred as a child     Sulfasalazine Nausea and Vomiting        MEDICATIONS:  Post Discharge Medication Reconciliation Status: patient was not discharged from an inpatient facility or TCU.     Current Outpatient Medications   Medication Sig Dispense Refill     [START ON 4/2/2025] pantoprazole (PROTONIX) 20 MG EC tablet Take 1 tablet (20 mg) by mouth daily. 30 tablet 5     pantoprazole (PROTONIX) 40 MG EC tablet 40mg by mouth twice a day x 2 weeks, then 40mg by mouth every AM x 4 weeks. 56 tablet 0     acetaminophen (TYLENOL) 500 MG tablet 2 TABLETS (1000MG) ORALLY DAILY AS NEEDED (MAX 4GM TYLENOL OR ACETAMINOPHEN/24 HRS) 60 tablet 11     amoxicillin (AMOXIL) 250 MG chewable tablet CHEW AND SWALLOW 8 TABLETS (2000 MG) BY MOUTH 1 HOUR PRIOR TO DENTAL 8 tablet 97     ANTACID CALCIUM 500 MG chewable tablet 1 TABLET ORALLY DAILY  (DX: NUTRITIONAL SUPPLEMENT ) 30 tablet 11     armodafinil (NUVIGIL) 150 MG TABS tablet 1 TABLET ORALLY EVERY MORNING (DX: NARCOLEPSY) CONTROLLED MEDICATION ? TO BE STORED IN DOUBLE LOCKED STORAGE 30 tablet 5     Bacillus Coagulans-Inulin (PROBIOTIC FORMULA) 1-250 BILLION-MG CAPS 1 CAPSULE ORALLY DAILY 30 capsule 11     Baclofen (LIORESAL) 5 MG tablet Take 1 tablet (5 mg) by mouth 3 times daily as needed for muscle spasms 20 tablet 3     clopidogrel (PLAVIX) 75 MG tablet Take 1 tablet by mouth daily       CO2-Releasing (-TWO) SUPP 1 supp daily prn and if not effective or falls out, may repeat again until results achieved.  May keep at bedside       Cranberry-Vitamin C-Probiotic (AZO CRANBERRY) 250-30 MG TABS 2 TABLETS (500MG) ORALLY 2 TIMES DAILY  (DX: URINARY TRACT  INFECTION) 112 tablet 11     ELIQUIS ANTICOAGULANT 5 MG tablet 1 TABLET ORALLY 2 TIMES DAILY. (DX: VENOUS THROMBOEMBOLISM) 62 tablet 11     HYDROcodone-acetaminophen (NORCO) 5-325 MG tablet Take 2 tablets by mouth every morning. May also take 2 tablets every 8 hours as needed for pain. 60 tablet 0     losartan (COZAAR) 25 MG tablet 1/2 TABLET (12.5MG) ORALLY EVERY EVENING 16 tablet 11     medical cannabis (Patient's own supply) Take 1 Dose by mouth See Admin Instructions (The purpose of this order is to document that the patient reports taking medical cannabis.  This is not a prescription, and is not used to certify that the patient has a qualifying medical condition.)       methenamine hippurate (HIPREX) 1 g tablet 1 TABLET ORALLY 2 TIMES DAILY  (DX: URINARY TRACT INFECTION) 56 tablet 11     Multiple Vitamin (TAB-A-DAWSON) TABS Take 1 tablet by mouth daily 28 tablet 97     nystatin (MYCOSTATIN) 955943 UNIT/GM external powder APPLY TOPICALLY BETWEEN TOES EVERY EVENING UNTIL HEALED;THEN APPLY TOPICALLY BETWEEN TOES 2 TIMES DAILY AS NEEDED UNTIL HEALED 60 g 11     polyethylene glycol (MIRALAX/GLYCOLAX) powder MIX 17GM OF POWDER IN 8OZ OF WATER UNTIL COMPLETELY DISSOLVED. DRINK SOLUTION DAILY AS NEEDED 527 g 98     senna-docusate (SENEXON-S) 8.6-50 MG tablet Take 2 tablets by mouth daily as needed for constipation 62 tablet 12     simethicone (MYLICON) 125 MG chewable tablet 125mg po 4 times a day PRN for gas (may keep at bedside per request) 60 tablet 11     sterile water, bottle, (WATER FOR IRRIGATION, STERILE) irrigation 60ml  prn to irrigate ruby catheter when plugged 500 mL 11     vitamin C (ASCORBIC ACID) 500 MG tablet 1 TABLET ORALLY 2 TIMES DAILY (DX: CHRONIC CYSTITIS) 56 tablet 11     Zinc Oxide (DESITIN) 13 % CREA Apply topically 3 times daily as needed (chaffing) 113 g 11       REVIEW OF SYSTEMS:  4 point ROS neg other than the symptoms noted above in the HPI.  No acute SOB or chest pain.  Denies any active  bleeding.        PHYSICAL EXAM:  /85   Pulse 77   Temp 97  F (36.1  C)   Resp 18   Wt 121.6 kg (268 lb)   BMI 39.58 kg/m    Muna was up in her electric wheelchair.  In no acute distress.  Has shoulder harnesses to keep her from tipping forward.  Skin is her normal tone of pale/pink.    She is typically cold and so not uncommon she has a scarf draped over her shoulders.  Her voice is affected already but her MS by muscular weakness but even then do not see her being SOB.    Heart rate regular and strong.    Abdomen is round, soft and non-tender.  Non-ambulatory.  4 point lift to transfer from surface to surface.  Catheter in place that urine is clear yellow.      Lab Results   Component Value Date    HGB 8.6 02/17/2025    HGB 10.1 02/10/2025       ASSESSMENT / PLAN:  (D62) Anemia due to blood loss, acute  (primary encounter diagnosis)  Comment: today will start muna on Protonix 40mg BID x2 weeks, then 40mg po daily for 4 weeks and 20mg daily there after.  Can always modify this order.  Not going to order any guiacs.    Spoke about symptoms that can come with anemia.  Can not do repeat labs until Monday.  If she becomes SOB, or very lethargic, she is to go to the ED for evaluation.  Her aide mentioned that Muna has an appointment this coming Friday with vascular and will talk about the use of Plavix and Elquis.  Will not remove one or the other.  Possible that the combination of the two is giving her troubles and want vascular to make that decision.  Plan: pantoprazole (PROTONIX) 40 MG EC tablet,         pantoprazole (PROTONIX) 20 MG EC tablet    (I74.9) Chronic thromboembolic disease (H)  Comment: remains on the Eliquis 5mg BID for chronic thrombus.  Has tolerated with for some time.      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  No SOB noted with her conversation today.  Cares as  usual.  Muna resides in an extended care unit in the [facility] due to amount of cares she needs.  Able to still direct her own cares as well.     Orders:  Protonix 40mg po BID x2 weeks, then 40mg po daily x 4 weeks then 20mg po daily due to acute blood loss anemia  CBC, TIBC, ferritin and iron saturation level next Monday due to acute blood loss anemia - may benefit from iron supplement if she would be willing to try up.      Electronically signed by  CAROL Rodríguez CNP           Sincerely,        CAROL Rodríguez CNP    Electronically signed

## 2025-02-18 NOTE — PROGRESS NOTES
[facility] GERIATRICS  ACUTE/EPISODIC VISIT    [facility] Medical Record Number:  [id number]  Place of Service where encounter took place:  [facility] ([facility]) [[id number]]    Chief Complaint   Patient presents with    RECHECK       HPI:    Cristina Stevens is a 79 year old  (1945), who is being seen today for an episodic care visit.  HPI information obtained from: facility chart records, facility staff, patient report, and [facility] chart review.    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  CBC was done to to complaints of feeling tired.      Was in [facility] 1/11/25 to 1/18/25 due to acute encephalopathy, MS, general weakness.  Could not find anything to lead to why her HgB had dropped but they suggested to her not to take Ibuprofen anymore due to the potential of bleeding with the use of eliquis and Plavix.  With no more ibuprofen, have been using Norco 5/325mg 2 tabs every AM.  It has been effective but also wondering if it was contributing to her feeling tired.    Today Muna's private aide/friend was present as muna had gone this morning to outpatient therapies which is ongoing for her.    Alert and oriented x3.  Able to make own decisions.  She listened carefully to directions given to her today as this NP will not be present for a 10 day span.      Denied any active blood in stools. Urine is free from hematuria.      ALLERGIES:    Allergies   Allergen Reactions    Nitrofuran Derivatives Visual Disturbance     Hallucinations    Tetracyclines & Related GI Disturbance    Amlodipine Other (See Comments)     Bloating/edema    Ampicillin GI  Disturbance    Cefadroxil Muscle Pain (Myalgia)    Cephalexin Diarrhea and GI Disturbance    Levofloxacin Other (See Comments)     Phlebitis with IV infusion    Sulfa Antibiotics Nausea and Vomiting, GI Disturbance and Unknown     Reaction occurred as a child    Sulfasalazine Nausea and Vomiting        MEDICATIONS:  Post Discharge Medication Reconciliation Status: patient was not discharged from an inpatient facility or TCU.     Current Outpatient Medications   Medication Sig Dispense Refill    [START ON 4/2/2025] pantoprazole (PROTONIX) 20 MG EC tablet Take 1 tablet (20 mg) by mouth daily. 30 tablet 5    pantoprazole (PROTONIX) 40 MG EC tablet 40mg by mouth twice a day x 2 weeks, then 40mg by mouth every AM x 4 weeks. 56 tablet 0    acetaminophen (TYLENOL) 500 MG tablet 2 TABLETS (1000MG) ORALLY DAILY AS NEEDED (MAX 4GM TYLENOL OR ACETAMINOPHEN/24 HRS) 60 tablet 11    amoxicillin (AMOXIL) 250 MG chewable tablet CHEW AND SWALLOW 8 TABLETS (2000 MG) BY MOUTH 1 HOUR PRIOR TO DENTAL 8 tablet 97    ANTACID CALCIUM 500 MG chewable tablet 1 TABLET ORALLY DAILY  (DX: NUTRITIONAL SUPPLEMENT ) 30 tablet 11    armodafinil (NUVIGIL) 150 MG TABS tablet 1 TABLET ORALLY EVERY MORNING (DX: NARCOLEPSY) CONTROLLED MEDICATION ? TO BE STORED IN DOUBLE LOCKED STORAGE 30 tablet 5    Bacillus Coagulans-Inulin (PROBIOTIC FORMULA) 1-250 BILLION-MG CAPS 1 CAPSULE ORALLY DAILY 30 capsule 11    Baclofen (LIORESAL) 5 MG tablet Take 1 tablet (5 mg) by mouth 3 times daily as needed for muscle spasms 20 tablet 3    clopidogrel (PLAVIX) 75 MG tablet Take 1 tablet by mouth daily      CO2-Releasing (-TWO) SUPP 1 supp daily prn and if not effective or falls out, may repeat again until results achieved.  May keep at bedside      Cranberry-Vitamin C-Probiotic (AZO CRANBERRY) 250-30 MG TABS 2 TABLETS (500MG) ORALLY 2 TIMES DAILY  (DX: URINARY TRACT INFECTION) 112 tablet 11    ELIQUIS ANTICOAGULANT 5 MG tablet 1 TABLET ORALLY 2 TIMES DAILY. (DX:  VENOUS THROMBOEMBOLISM) 62 tablet 11    HYDROcodone-acetaminophen (NORCO) 5-325 MG tablet Take 2 tablets by mouth every morning. May also take 2 tablets every 8 hours as needed for pain. 60 tablet 0    losartan (COZAAR) 25 MG tablet 1/2 TABLET (12.5MG) ORALLY EVERY EVENING 16 tablet 11    medical cannabis (Patient's own supply) Take 1 Dose by mouth See Admin Instructions (The purpose of this order is to document that the patient reports taking medical cannabis.  This is not a prescription, and is not used to certify that the patient has a qualifying medical condition.)      methenamine hippurate (HIPREX) 1 g tablet 1 TABLET ORALLY 2 TIMES DAILY  (DX: URINARY TRACT INFECTION) 56 tablet 11    Multiple Vitamin (TAB-A-DAWSON) TABS Take 1 tablet by mouth daily 28 tablet 97    nystatin (MYCOSTATIN) 111917 UNIT/GM external powder APPLY TOPICALLY BETWEEN TOES EVERY EVENING UNTIL HEALED;THEN APPLY TOPICALLY BETWEEN TOES 2 TIMES DAILY AS NEEDED UNTIL HEALED 60 g 11    polyethylene glycol (MIRALAX/GLYCOLAX) powder MIX 17GM OF POWDER IN 8OZ OF WATER UNTIL COMPLETELY DISSOLVED. DRINK SOLUTION DAILY AS NEEDED 527 g 98    senna-docusate (SENEXON-S) 8.6-50 MG tablet Take 2 tablets by mouth daily as needed for constipation 62 tablet 12    simethicone (MYLICON) 125 MG chewable tablet 125mg po 4 times a day PRN for gas (may keep at bedside per request) 60 tablet 11    sterile water, bottle, (WATER FOR IRRIGATION, STERILE) irrigation 60ml  prn to irrigate ruby catheter when plugged 500 mL 11    vitamin C (ASCORBIC ACID) 500 MG tablet 1 TABLET ORALLY 2 TIMES DAILY (DX: CHRONIC CYSTITIS) 56 tablet 11    Zinc Oxide (DESITIN) 13 % CREA Apply topically 3 times daily as needed (chaffing) 113 g 11       REVIEW OF SYSTEMS:  4 point ROS neg other than the symptoms noted above in the HPI.  No acute SOB or chest pain.  Denies any active bleeding.        PHYSICAL EXAM:  /85   Pulse 77   Temp 97  F (36.1  C)   Resp 18   Wt 121.6 kg (268  lb)   BMI 39.58 kg/m    Muna was up in her electric wheelchair.  In no acute distress.  Has shoulder harnesses to keep her from tipping forward.  Skin is her normal tone of pale/pink.    She is typically cold and so not uncommon she has a scarf draped over her shoulders.  Her voice is affected already but her MS by muscular weakness but even then do not see her being SOB.    Heart rate regular and strong.    Abdomen is round, soft and non-tender.  Non-ambulatory.  4 point lift to transfer from surface to surface.  Catheter in place that urine is clear yellow.      Lab Results   Component Value Date    HGB 8.6 02/17/2025    HGB 10.1 02/10/2025       ASSESSMENT / PLAN:  (D62) Anemia due to blood loss, acute  (primary encounter diagnosis)  Comment: today will start muna on Protonix 40mg BID x2 weeks, then 40mg po daily for 4 weeks and 20mg daily there after.  Can always modify this order.  Not going to order any guiacs.    Spoke about symptoms that can come with anemia.  Can not do repeat labs until Monday.  If she becomes SOB, or very lethargic, she is to go to the ED for evaluation.  Her aide mentioned that Muna has an appointment this coming Friday with vascular and will talk about the use of Plavix and Elquis.  Will not remove one or the other.  Possible that the combination of the two is giving her troubles and want vascular to make that decision.  Plan: pantoprazole (PROTONIX) 40 MG EC tablet,         pantoprazole (PROTONIX) 20 MG EC tablet    (I74.9) Chronic thromboembolic disease (H)  Comment: remains on the Eliquis 5mg BID for chronic thrombus.  Has tolerated with for some time.      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  No SOB noted with her conversation today.  Cares as usual.  Muna resides in an extended care unit in the [facility] due to amount of cares she needs.  Able to still direct  her own cares as well.     Orders:  Protonix 40mg po BID x2 weeks, then 40mg po daily x 4 weeks then 20mg po daily due to acute blood loss anemia  CBC, TIBC, ferritin and iron saturation level next Monday due to acute blood loss anemia - may benefit from iron supplement if she would be willing to try up.      Electronically signed by  CAROL Rodríguez CNP

## 2025-02-19 ENCOUNTER — LAB REQUISITION (OUTPATIENT)
Dept: LAB | Facility: CLINIC | Age: 80
End: 2025-02-19
Payer: COMMERCIAL

## 2025-02-19 ENCOUNTER — TELEPHONE (OUTPATIENT)
Dept: GERIATRICS | Facility: CLINIC | Age: 80
End: 2025-02-19
Payer: COMMERCIAL

## 2025-02-19 DIAGNOSIS — D62 ACUTE POSTHEMORRHAGIC ANEMIA: ICD-10-CM

## 2025-02-19 NOTE — TELEPHONE ENCOUNTER
[facility] Triage Nurse Telephone Encounter    Provider: CAROL Johnson CNP  Facility: [facility] Facility Type:  AL    Caller: Diana  Call Back Number: [phone]    Allergies:    Allergies   Allergen Reactions    Nitrofuran Derivatives Visual Disturbance     Hallucinations    Tetracyclines & Related GI Disturbance    Amlodipine Other (See Comments)     Bloating/edema    Ampicillin GI Disturbance    Cefadroxil Muscle Pain (Myalgia)    Cephalexin Diarrhea and GI Disturbance    Levofloxacin Other (See Comments)     Phlebitis with IV infusion    Sulfa Antibiotics Nausea and Vomiting, GI Disturbance and Unknown     Reaction occurred as a child    Sulfasalazine Nausea and Vomiting        Reason for call: Nurse is calling to clarify whether patient needs to be on both Omeprazole and Pantoprazole.      Verbal Order/Direction given by Provider: Discontinue Omeprazole.  Continue Pantoprazole as ordered.      Provider giving Order:  Kiley Paul MD    Verbal Order given to: Diana Denny RN

## 2025-03-01 ENCOUNTER — LAB REQUISITION (OUTPATIENT)
Dept: LAB | Facility: CLINIC | Age: 80
End: 2025-03-01
Payer: COMMERCIAL

## 2025-03-01 DIAGNOSIS — D62 ACUTE POSTHEMORRHAGIC ANEMIA: ICD-10-CM

## 2025-03-20 DIAGNOSIS — Z53.9 DIAGNOSIS NOT YET DEFINED: Primary | ICD-10-CM

## 2025-03-20 PROCEDURE — G0180 MD CERTIFICATION HHA PATIENT: HCPCS | Performed by: NURSE PRACTITIONER

## 2025-03-31 ENCOUNTER — TELEPHONE (OUTPATIENT)
Dept: FAMILY MEDICINE | Facility: CLINIC | Age: 80
End: 2025-03-31
Payer: COMMERCIAL

## 2025-03-31 NOTE — TELEPHONE ENCOUNTER
Spoke with Alyssia Toro needs DXA, and then visit with Dr Abrams to discuss and review prolia.  Assisted to schedule with Dr Abrams, and provided phone number for radiology scheduling

## 2025-03-31 NOTE — TELEPHONE ENCOUNTER
Health Call Center    Phone Message    May a detailed message be left on voicemail: yes     Reason for Call: Other: . Pts caregiver Rubi is calling, she does have consent on file, she is calling to schedule pts next prolia injection, she is hoping for 4/21/25, she is also wondering if the Dexa scan needs to be done two weeks prior to her injection, please call Rubi, thank you!    Action Taken: Message routed to:  Other: obgyn    Travel Screening: Not Applicable     Date of Service:

## 2025-04-08 DIAGNOSIS — G35 MS (MULTIPLE SCLEROSIS) (H): ICD-10-CM

## 2025-04-08 DIAGNOSIS — R52 GENERALIZED PAIN: ICD-10-CM

## 2025-04-08 RX ORDER — HYDROCODONE BITARTRATE AND ACETAMINOPHEN 5; 325 MG/1; MG/1
TABLET ORAL
Qty: 60 TABLET | Refills: 0 | Status: SHIPPED | OUTPATIENT
Start: 2025-04-08

## 2025-04-11 ENCOUNTER — TRANSFERRED RECORDS (OUTPATIENT)
Dept: HEALTH INFORMATION MANAGEMENT | Facility: CLINIC | Age: 80
End: 2025-04-11
Payer: COMMERCIAL

## 2025-04-14 ENCOUNTER — ASSISTED LIVING VISIT (OUTPATIENT)
Dept: GERIATRICS | Facility: CLINIC | Age: 80
End: 2025-04-14
Payer: COMMERCIAL

## 2025-04-14 VITALS
DIASTOLIC BLOOD PRESSURE: 92 MMHG | WEIGHT: 268 LBS | HEART RATE: 55 BPM | SYSTOLIC BLOOD PRESSURE: 177 MMHG | TEMPERATURE: 95.5 F | RESPIRATION RATE: 16 BRPM | BODY MASS INDEX: 39.58 KG/M2

## 2025-04-14 DIAGNOSIS — N39.0 URINARY TRACT INFECTION ASSOCIATED WITH INDWELLING URETHRAL CATHETER, SUBSEQUENT ENCOUNTER: ICD-10-CM

## 2025-04-14 DIAGNOSIS — Z71.1 CONCERN ABOUT END OF LIFE: ICD-10-CM

## 2025-04-14 DIAGNOSIS — R53.81 MALAISE: ICD-10-CM

## 2025-04-14 DIAGNOSIS — T83.511D URINARY TRACT INFECTION ASSOCIATED WITH INDWELLING URETHRAL CATHETER, SUBSEQUENT ENCOUNTER: ICD-10-CM

## 2025-04-14 DIAGNOSIS — G35 MS (MULTIPLE SCLEROSIS) (H): Primary | ICD-10-CM

## 2025-04-14 PROCEDURE — 99350 HOME/RES VST EST HIGH MDM 60: CPT | Performed by: NURSE PRACTITIONER

## 2025-04-14 RX ORDER — NITROFURANTOIN MACROCRYSTALS 50 MG/1
50 CAPSULE ORAL 4 TIMES DAILY
Status: SHIPPED
Start: 2025-04-13 | End: 2025-04-18

## 2025-04-14 NOTE — LETTER
4/14/2025      Cristina Stevens  [address]   [address]  [place] [address]        [facility] GERIATRICS  ACUTE/EPISODIC VISIT    [facility] Medical Record Number:  [id number]  Place of Service where encounter took place:  [facility] [place] ([facility]) [[id number]]    Chief Complaint   Patient presents with     RECHECK       HPI:    Cristina Stevens is a 79 year old  (1945), who is being seen today for an episodic care visit.  HPI information obtained from: facility chart records, facility staff, patient report, and family/first contact Rubi - caregiver report.    Today's concern is:    Diagnoses         Codes Comments    MS (multiple sclerosis) (H)    -  Primary G35     Malaise     R53.81     Urinary tract infection associated with indwelling urethral catheter, subsequent encounter     T83.511D, N39.0     Concern about end of life     Z71.1           Came to see Muna today as over the weekend she had a status change, not at her baseline, sent to [facility] for evaluation and then came back.  Her private caregiver most likely sent her in as staff now aware of decision making to send her.      Staff stated she came back with Macrodantin for a UTI.  See she is on 50mg 4x a day for 5 days.      After seeing Muna, placed a call to her private caregiver whom muna has made her a decision maker on her behalf if unable to communicate and terminal.    Had a nice conversation with Rubi about this NPs opinion of what stage of live muna is proceeding down.  Currently she is barely communicating, not eating, barely opening her eyes but looks comfortable in her bed that has alternating air pressure overlay? Basically most likely will not have to move her for positioning.  Pillows tucked around her and the HOB elevated more than 30 degrees.      Rubi stated that at the ER they were referred to Palliative Care but as not safe present anymore for that team, Muna stated she wanted to go home.  " Today she attempted to answer a few questions for this NP.  Attempted to open her eyes but they were so heavy that she did not open them fully.  She answered \"No' softly when asked if hungry.  In no acute pain.  Felt she knew this NP was visiting with her but slept more than awake.    Explained to Rubi that here there is no Palliative here in the building.  Either Hospice Care or comfort care focus.  Rubi understood and would like to see what tomorrow brings if the ABX changes her around.  If not then she knows to speak to nursing about a referral to hospice.      ALLERGIES:    Allergies   Allergen Reactions     Nitrofuran Derivatives Visual Disturbance     Hallucinations     Tetracyclines & Related GI Disturbance     Amlodipine Other (See Comments)     Bloating/edema     Ampicillin GI Disturbance     Cefadroxil Muscle Pain (Myalgia)     Cephalexin Diarrhea and GI Disturbance     Levofloxacin Other (See Comments)     Phlebitis with IV infusion     Sulfa Antibiotics Nausea and Vomiting, GI Disturbance and Unknown     Reaction occurred as a child     Sulfasalazine Nausea and Vomiting        MEDICATIONS:  Post Discharge Medication Reconciliation Status: Medication reconciled.     Current Outpatient Medications   Medication Sig Dispense Refill     nitroFURantoin macrocrystal (MACRODANTIN) 50 MG capsule Take 1 capsule (50 mg) by mouth 4 times daily for 5 days.       acetaminophen (TYLENOL) 500 MG tablet 2 TABLETS (1000MG) ORALLY DAILY AS NEEDED (MAX 4GM TYLENOL OR ACETAMINOPHEN/24 HRS) 60 tablet 11     amoxicillin (AMOXIL) 250 MG chewable tablet CHEW AND SWALLOW 8 TABLETS (2000 MG) BY MOUTH 1 HOUR PRIOR TO DENTAL 8 tablet 97     ANTACID CALCIUM 500 MG chewable tablet 1 TABLET ORALLY DAILY  (DX: NUTRITIONAL SUPPLEMENT ) 30 tablet 11     armodafinil (NUVIGIL) 150 MG TABS tablet 1 TABLET ORALLY EVERY MORNING (DX: NARCOLEPSY) CONTROLLED MEDICATION ? TO BE STORED IN DOUBLE LOCKED STORAGE 30 tablet 5     Bacillus " Coagulans-Inulin (PROBIOTIC FORMULA) 1-250 BILLION-MG CAPS 1 CAPSULE ORALLY DAILY 30 capsule 11     Baclofen (LIORESAL) 5 MG tablet Take 1 tablet (5 mg) by mouth 3 times daily as needed for muscle spasms 20 tablet 3     clopidogrel (PLAVIX) 75 MG tablet Take 1 tablet by mouth daily       CO2-Releasing (-TWO) SUPP 1 supp daily prn and if not effective or falls out, may repeat again until results achieved.  May keep at bedside       Cranberry-Vitamin C-Probiotic (AZO CRANBERRY) 250-30 MG TABS 2 TABLETS (500MG) ORALLY 2 TIMES DAILY  (DX: URINARY TRACT INFECTION) 112 tablet 11     ELIQUIS ANTICOAGULANT 5 MG tablet 1 TABLET ORALLY 2 TIMES DAILY. (DX: VENOUS THROMBOEMBOLISM) 62 tablet 11     HYDROcodone-acetaminophen (NORCO) 5-325 MG tablet Take 2 tablets by mouth every morning. May also take 2 tablets every 8 hours as needed for pain. 60 tablet 0     losartan (COZAAR) 25 MG tablet 1/2 TABLET (12.5MG) ORALLY EVERY EVENING 16 tablet 11     medical cannabis (Patient's own supply) Take 1 Dose by mouth See Admin Instructions (The purpose of this order is to document that the patient reports taking medical cannabis.  This is not a prescription, and is not used to certify that the patient has a qualifying medical condition.)       methenamine hippurate (HIPREX) 1 g tablet 1 TABLET ORALLY 2 TIMES DAILY  (DX: URINARY TRACT INFECTION) 56 tablet 11     Multiple Vitamin (TAB-A-DAWSON) TABS Take 1 tablet by mouth daily 28 tablet 97     nystatin (MYCOSTATIN) 594270 UNIT/GM external powder APPLY TOPICALLY BETWEEN TOES EVERY EVENING UNTIL HEALED;THEN APPLY TOPICALLY BETWEEN TOES 2 TIMES DAILY AS NEEDED UNTIL HEALED 60 g 11     pantoprazole (PROTONIX) 20 MG EC tablet Take 1 tablet (20 mg) by mouth daily. 30 tablet 5     polyethylene glycol (MIRALAX/GLYCOLAX) powder MIX 17GM OF POWDER IN 8OZ OF WATER UNTIL COMPLETELY DISSOLVED. DRINK SOLUTION DAILY AS NEEDED 527 g 98     senna-docusate (SENEXON-S) 8.6-50 MG tablet Take 2 tablets by mouth  daily as needed for constipation 62 tablet 12     simethicone (MYLICON) 125 MG chewable tablet 125mg po 4 times a day PRN for gas (may keep at bedside per request) 60 tablet 11     sterile water, bottle, (WATER FOR IRRIGATION, STERILE) irrigation 60ml  prn to irrigate ruby catheter when plugged 500 mL 11     vitamin C (ASCORBIC ACID) 500 MG tablet 1 TABLET ORALLY 2 TIMES DAILY (DX: CHRONIC CYSTITIS) 56 tablet 11     Zinc Oxide (DESITIN) 13 % CREA Apply topically 3 times daily as needed (chaffing) 113 g 11         REVIEW OF SYSTEMS:  Unable as very sleepy    PHYSICAL EXAM:  BP (!) 177/92   Pulse 55   Temp (!) 95.5  F (35.3  C)   Resp 16   Wt 121.6 kg (268 lb)   BMI 39.58 kg/m    Muna is appearing comfortable on her back with the head elevated and pillows around her.    Light sheet covering her and seeing her feet.  No edema at this time.  Feet are pale in color.    Skin is pale, dry and warm.  Radial pulse is regular and strong.  No oxygen used.  Respirations regular.  At times would open mouth to breath and would hear a little noise at that point.    Lifted eyes 1/4 open and tried to turn neck to look at this NP.      Catheter in place draining concentrated yellow urine.      Unable to see any tests or images from ED visit yesterday.      ASSESSMENT / PLAN:  1. MS (multiple sclerosis) (H)    2. Malaise    3. Urinary tract infection associated with indwelling urethral catheter, subsequent encounter    4. Concern about end of life      Care giver decided to give the Macrodantin another day or so to see if makes a difference.  She would see her and decide then if hospice would be right.  There was a copy of her advance directive out on the bedside table in the living room and read what it said.  If her life was deemed terminal, she would not want heroic measures.      Rubi the care giver is aware that Hospice would be the next step if she remains here.  She can talk with nursing and then they can ask for the  order for hospice.  Will see what brings next for rocky and follow her cues.  Did let Rubi know that forcing food/fluid would only make her uncomfortable and her body not process what it took in.    Do feel strongly that her MS has/is progressing that this may hasten her life.    Orders:  No new orders but did write out a FYI statement pertaining that this NP spoke with caregiver/decision maker and for now treat for comfort.  Ok if she does not take in fluids/food.        Electronically signed by  CAROL Rodríguez CNP           Sincerely,        CAROL Rodríguez CNP    Electronically signed

## 2025-04-14 NOTE — PROGRESS NOTES
[facility] GERIATRICS  ACUTE/EPISODIC VISIT    [facility] Medical Record Number:  [id number]  Place of Service where encounter took place:  [facility] ([facility]) [[id number]]    Chief Complaint   Patient presents with    RECHECK       HPI:    Cristina Stevens is a 79 year old  (1945), who is being seen today for an episodic care visit.  HPI information obtained from: facility chart records, facility staff, patient report, and family/first contact Rubi - caregiver report.    Today's concern is:    Diagnoses         Codes Comments    MS (multiple sclerosis) (H)    -  Primary G35     Malaise     R53.81     Urinary tract infection associated with indwelling urethral catheter, subsequent encounter     T83.511D, N39.0     Concern about end of life     Z71.1           Came to see Muna today as over the weekend she had a status change, not at her baseline, sent to [facility] for evaluation and then came back.  Her private caregiver most likely sent her in as staff now aware of decision making to send her.      Staff stated she came back with Macrodantin for a UTI.  See she is on 50mg 4x a day for 5 days.      After seeing Muna, placed a call to her private caregiver whom muna has made her a decision maker on her behalf if unable to communicate and terminal.    Had a nice conversation with Rubi about this NPs opinion of what stage of live muna is proceeding down.  Currently she is barely communicating, not eating, barely opening her eyes but looks comfortable in her bed that has alternating air pressure overlay? Basically most likely will not have to move her for positioning.  Pillows tucked around her and the HOB elevated more than 30 degrees.      Rubi stated that at the ER they were referred to Palliative Care but as not safe present anymore for that team, Muna stated she wanted to go home.  Today she attempted to answer a few questions for this NP.  Attempted to open her eyes but  "they were so heavy that she did not open them fully.  She answered \"No' softly when asked if hungry.  In no acute pain.  Felt she knew this NP was visiting with her but slept more than awake.    Explained to Rubi that here there is no Palliative here in the building.  Either Hospice Care or comfort care focus.  Rubi understood and would like to see what tomorrow brings if the ABX changes her around.  If not then she knows to speak to nursing about a referral to hospice.      ALLERGIES:    Allergies   Allergen Reactions    Nitrofuran Derivatives Visual Disturbance     Hallucinations    Tetracyclines & Related GI Disturbance    Amlodipine Other (See Comments)     Bloating/edema    Ampicillin GI Disturbance    Cefadroxil Muscle Pain (Myalgia)    Cephalexin Diarrhea and GI Disturbance    Levofloxacin Other (See Comments)     Phlebitis with IV infusion    Sulfa Antibiotics Nausea and Vomiting, GI Disturbance and Unknown     Reaction occurred as a child    Sulfasalazine Nausea and Vomiting        MEDICATIONS:  Post Discharge Medication Reconciliation Status: Medication reconciled.     Current Outpatient Medications   Medication Sig Dispense Refill    nitroFURantoin macrocrystal (MACRODANTIN) 50 MG capsule Take 1 capsule (50 mg) by mouth 4 times daily for 5 days.      acetaminophen (TYLENOL) 500 MG tablet 2 TABLETS (1000MG) ORALLY DAILY AS NEEDED (MAX 4GM TYLENOL OR ACETAMINOPHEN/24 HRS) 60 tablet 11    amoxicillin (AMOXIL) 250 MG chewable tablet CHEW AND SWALLOW 8 TABLETS (2000 MG) BY MOUTH 1 HOUR PRIOR TO DENTAL 8 tablet 97    ANTACID CALCIUM 500 MG chewable tablet 1 TABLET ORALLY DAILY  (DX: NUTRITIONAL SUPPLEMENT ) 30 tablet 11    armodafinil (NUVIGIL) 150 MG TABS tablet 1 TABLET ORALLY EVERY MORNING (DX: NARCOLEPSY) CONTROLLED MEDICATION ? TO BE STORED IN DOUBLE LOCKED STORAGE 30 tablet 5    Bacillus Coagulans-Inulin (PROBIOTIC FORMULA) 1-250 BILLION-MG CAPS 1 CAPSULE ORALLY DAILY 30 capsule 11    Baclofen " (LIORESAL) 5 MG tablet Take 1 tablet (5 mg) by mouth 3 times daily as needed for muscle spasms 20 tablet 3    clopidogrel (PLAVIX) 75 MG tablet Take 1 tablet by mouth daily      CO2-Releasing (-TWO) SUPP 1 supp daily prn and if not effective or falls out, may repeat again until results achieved.  May keep at bedside      Cranberry-Vitamin C-Probiotic (AZO CRANBERRY) 250-30 MG TABS 2 TABLETS (500MG) ORALLY 2 TIMES DAILY  (DX: URINARY TRACT INFECTION) 112 tablet 11    ELIQUIS ANTICOAGULANT 5 MG tablet 1 TABLET ORALLY 2 TIMES DAILY. (DX: VENOUS THROMBOEMBOLISM) 62 tablet 11    HYDROcodone-acetaminophen (NORCO) 5-325 MG tablet Take 2 tablets by mouth every morning. May also take 2 tablets every 8 hours as needed for pain. 60 tablet 0    losartan (COZAAR) 25 MG tablet 1/2 TABLET (12.5MG) ORALLY EVERY EVENING 16 tablet 11    medical cannabis (Patient's own supply) Take 1 Dose by mouth See Admin Instructions (The purpose of this order is to document that the patient reports taking medical cannabis.  This is not a prescription, and is not used to certify that the patient has a qualifying medical condition.)      methenamine hippurate (HIPREX) 1 g tablet 1 TABLET ORALLY 2 TIMES DAILY  (DX: URINARY TRACT INFECTION) 56 tablet 11    Multiple Vitamin (TAB-A-DAWSON) TABS Take 1 tablet by mouth daily 28 tablet 97    nystatin (MYCOSTATIN) 196410 UNIT/GM external powder APPLY TOPICALLY BETWEEN TOES EVERY EVENING UNTIL HEALED;THEN APPLY TOPICALLY BETWEEN TOES 2 TIMES DAILY AS NEEDED UNTIL HEALED 60 g 11    pantoprazole (PROTONIX) 20 MG EC tablet Take 1 tablet (20 mg) by mouth daily. 30 tablet 5    polyethylene glycol (MIRALAX/GLYCOLAX) powder MIX 17GM OF POWDER IN 8OZ OF WATER UNTIL COMPLETELY DISSOLVED. DRINK SOLUTION DAILY AS NEEDED 527 g 98    senna-docusate (SENEXON-S) 8.6-50 MG tablet Take 2 tablets by mouth daily as needed for constipation 62 tablet 12    simethicone (MYLICON) 125 MG chewable tablet 125mg po 4 times a day PRN  for gas (may keep at bedside per request) 60 tablet 11    sterile water, bottle, (WATER FOR IRRIGATION, STERILE) irrigation 60ml  prn to irrigate ruby catheter when plugged 500 mL 11    vitamin C (ASCORBIC ACID) 500 MG tablet 1 TABLET ORALLY 2 TIMES DAILY (DX: CHRONIC CYSTITIS) 56 tablet 11    Zinc Oxide (DESITIN) 13 % CREA Apply topically 3 times daily as needed (chaffing) 113 g 11         REVIEW OF SYSTEMS:  Unable as very sleepy    PHYSICAL EXAM:  BP (!) 177/92   Pulse 55   Temp (!) 95.5  F (35.3  C)   Resp 16   Wt 121.6 kg (268 lb)   BMI 39.58 kg/m    Muna is appearing comfortable on her back with the head elevated and pillows around her.    Light sheet covering her and seeing her feet.  No edema at this time.  Feet are pale in color.    Skin is pale, dry and warm.  Radial pulse is regular and strong.  No oxygen used.  Respirations regular.  At times would open mouth to breath and would hear a little noise at that point.    Lifted eyes 1/4 open and tried to turn neck to look at this NP.      Catheter in place draining concentrated yellow urine.      Unable to see any tests or images from ED visit yesterday.      ASSESSMENT / PLAN:  1. MS (multiple sclerosis) (H)    2. Malaise    3. Urinary tract infection associated with indwelling urethral catheter, subsequent encounter    4. Concern about end of life      Care giver decided to give the Macrodantin another day or so to see if makes a difference.  She would see her and decide then if hospice would be right.  There was a copy of her advance directive out on the bedside table in the living room and read what it said.  If her life was deemed terminal, she would not want heroic measures.      Rubi the care giver is aware that Hospice would be the next step if she remains here.  She can talk with nursing and then they can ask for the order for hospice.  Will see what brings next for muna and follow her cues.  Did let Rubi know that forcing food/fluid  would only make her uncomfortable and her body not process what it took in.    Do feel strongly that her MS has/is progressing that this may hasten her life.    Orders:  No new orders but did write out a FYI statement pertaining that this NP spoke with caregiver/decision maker and for now treat for comfort.  Ok if she does not take in fluids/food.        Electronically signed by  CAROL Rodríguez CNP

## 2025-04-15 ENCOUNTER — MEDICAL CORRESPONDENCE (OUTPATIENT)
Dept: HEALTH INFORMATION MANAGEMENT | Facility: CLINIC | Age: 80
End: 2025-04-15
Payer: COMMERCIAL

## 2025-04-28 ENCOUNTER — ASSISTED LIVING VISIT (OUTPATIENT)
Dept: GERIATRICS | Facility: CLINIC | Age: 80
End: 2025-04-28
Payer: MEDICARE

## 2025-04-28 VITALS
TEMPERATURE: 95.5 F | WEIGHT: 268 LBS | OXYGEN SATURATION: 94 % | HEART RATE: 55 BPM | DIASTOLIC BLOOD PRESSURE: 92 MMHG | BODY MASS INDEX: 39.58 KG/M2 | RESPIRATION RATE: 16 BRPM | SYSTOLIC BLOOD PRESSURE: 177 MMHG

## 2025-04-28 DIAGNOSIS — G70.9 NEUROMUSCULAR RESPIRATORY WEAKNESS (H): ICD-10-CM

## 2025-04-28 DIAGNOSIS — J99 NEUROMUSCULAR RESPIRATORY WEAKNESS (H): ICD-10-CM

## 2025-04-28 DIAGNOSIS — Z51.5 HOSPICE CARE PATIENT: ICD-10-CM

## 2025-04-28 DIAGNOSIS — G82.50 SPASTIC QUADRIPLEGIA (H): ICD-10-CM

## 2025-04-28 DIAGNOSIS — G35 MS (MULTIPLE SCLEROSIS) (H): Primary | ICD-10-CM

## 2025-04-28 PROCEDURE — 99349 HOME/RES VST EST MOD MDM 40: CPT | Mod: GV | Performed by: NURSE PRACTITIONER

## 2025-04-28 NOTE — PROGRESS NOTES
[facility] GERIATRICS  ACUTE/EPISODIC VISIT    [facility] Medical Record Number:  [id number]  Place of Service where encounter took place:  [facility] [place] ([facility]) [[id number]]    Chief Complaint   Patient presents with    RECHECK       HPI:    Cristina Stevens is a 79 year old  (1945), who is being seen today for an episodic care visit.  HPI information obtained from: {FGS HPI:[id number]}.    Today's concern is:      ALLERGIES:    Allergies   Allergen Reactions    Nitrofuran Derivatives Visual Disturbance     Hallucinations    Tetracyclines & Related GI Disturbance    Amlodipine Other (See Comments)     Bloating/edema    Ampicillin GI Disturbance    Cefadroxil Muscle Pain (Myalgia)    Cephalexin Diarrhea and GI Disturbance    Levofloxacin Other (See Comments)     Phlebitis with IV infusion    Sulfa Antibiotics Nausea and Vomiting, GI Disturbance and Unknown     Reaction occurred as a child    Sulfasalazine Nausea and Vomiting        MEDICATIONS:  Post Discharge Medication Reconciliation Status: {ACO Med Rec (Provider):[id number]}. ***    Current Outpatient Medications   Medication Sig Dispense Refill    acetaminophen (TYLENOL) 500 MG tablet 2 TABLETS (1000MG) ORALLY DAILY AS NEEDED (MAX 4GM TYLENOL OR ACETAMINOPHEN/24 HRS) 60 tablet 11    amoxicillin (AMOXIL) 250 MG chewable tablet CHEW AND SWALLOW 8 TABLETS (2000 MG) BY MOUTH 1 HOUR PRIOR TO DENTAL 8 tablet 97    ANTACID CALCIUM 500 MG chewable tablet 1 TABLET ORALLY DAILY  (DX: NUTRITIONAL SUPPLEMENT ) 30 tablet 11    armodafinil (NUVIGIL) 150 MG TABS tablet 1 TABLET ORALLY EVERY MORNING (DX: NARCOLEPSY) ***CONTROLLED MEDICATION ? TO BE STORED IN DOUBLE LOCKED STORAGE*** 30 tablet 5    Bacillus Coagulans-Inulin (PROBIOTIC FORMULA) 1-250 BILLION-MG CAPS 1 CAPSULE ORALLY DAILY 30 capsule 11    Baclofen (LIORESAL) 5 MG tablet Take 1 tablet (5 mg) by mouth 3 times daily as needed for muscle spasms 20 tablet 3    clopidogrel (PLAVIX) 75 MG tablet Take 1  tablet by mouth daily      CO2-Releasing (-TWO) SUPP 1 supp daily prn and if not effective or falls out, may repeat again until results achieved.  May keep at bedside      Cranberry-Vitamin C-Probiotic (AZO CRANBERRY) 250-30 MG TABS 2 TABLETS (500MG) ORALLY 2 TIMES DAILY  (DX: URINARY TRACT INFECTION) 112 tablet 11    ELIQUIS ANTICOAGULANT 5 MG tablet 1 TABLET ORALLY 2 TIMES DAILY. (DX: VENOUS THROMBOEMBOLISM) 62 tablet 11    HYDROcodone-acetaminophen (NORCO) 5-325 MG tablet Take 2 tablets by mouth every morning. May also take 2 tablets every 8 hours as needed for pain. 60 tablet 0    losartan (COZAAR) 25 MG tablet 1/2 TABLET (12.5MG) ORALLY EVERY EVENING 16 tablet 11    medical cannabis (Patient's own supply) Take 1 Dose by mouth See Admin Instructions (The purpose of this order is to document that the patient reports taking medical cannabis.  This is not a prescription, and is not used to certify that the patient has a qualifying medical condition.)      methenamine hippurate (HIPREX) 1 g tablet 1 TABLET ORALLY 2 TIMES DAILY  (DX: URINARY TRACT INFECTION) 56 tablet 11    Multiple Vitamin (TAB-A-DAWSON) TABS Take 1 tablet by mouth daily 28 tablet 97    nystatin (MYCOSTATIN) 282974 UNIT/GM external powder APPLY TOPICALLY BETWEEN TOES EVERY EVENING UNTIL HEALED;THEN APPLY TOPICALLY BETWEEN TOES 2 TIMES DAILY AS NEEDED UNTIL HEALED 60 g 11    pantoprazole (PROTONIX) 20 MG EC tablet Take 1 tablet (20 mg) by mouth daily. 30 tablet 5    polyethylene glycol (MIRALAX/GLYCOLAX) powder MIX 17GM OF POWDER IN 8OZ OF WATER UNTIL COMPLETELY DISSOLVED. DRINK SOLUTION DAILY AS NEEDED 527 g 98    senna-docusate (SENEXON-S) 8.6-50 MG tablet Take 2 tablets by mouth daily as needed for constipation 62 tablet 12    simethicone (MYLICON) 125 MG chewable tablet 125mg po 4 times a day PRN for gas (may keep at bedside per request) 60 tablet 11    sterile water, bottle, (WATER FOR IRRIGATION, STERILE) irrigation 60ml  prn to irrigate ruby  catheter when plugged 500 mL 11    vitamin C (ASCORBIC ACID) 500 MG tablet 1 TABLET ORALLY 2 TIMES DAILY (DX: CHRONIC CYSTITIS) 56 tablet 11    Zinc Oxide (DESITIN) 13 % CREA Apply topically 3 times daily as needed (chaffing) 113 g 11     Medications reviewed:  Medications reconciled to facility chart and changes were made to reflect current medications as identified as above med list. Below are the changes that were made:   Medications stopped since last EPIC medication reconciliation:   There are no discontinued medications.    Medications started since last [facility] medication reconciliation:  No orders of the defined types were placed in this encounter.    ***    REVIEW OF SYSTEMS:  4 point ROS neg other than the symptoms noted above in the HPI.***  Unable to be obtained due to cognitive impairment or aphasia.   ROS    PHYSICAL EXAM:  BP (!) 177/92   Pulse 55   Temp (!) 95.5  F (35.3  C)   Resp 16   Wt 121.6 kg (268 lb)   SpO2 94%   BMI 39.58 kg/m    Physical Exam      ASSESSMENT / PLAN:  {FGS DX:256359}    Orders:  ***    Electronically signed by  Sandrita Kimball         like to take that.  Will let hospice figure out that as they are in control of the morphine.  She use to take Norco early AM to help with pain when turing.  Most of her medications have been discontinued.    Will give an order today for hospice therapy to assess the appropriateness of the electric wheelchair.  Mnua has shoulder harness and a seat belt for added safety.    Someways feel the OT is a mutual person and all partied to agree with recommendation.    Orders:  Hospice OT evaluate appropriateness use of wheelchair.      Electronically signed by  CAROL Rodríguez CNP

## 2025-04-28 NOTE — LETTER
4/28/2025      Cristina Stevens  [address]   [address]  [facility] [address]        No notes on file      Sincerely,        CAROL Rodríguez CNP    Electronically signed   written for hospice OT to evaluate Muna's ability to drive the wheelchair.  Think the concern is that later on in the day as she gets tired, she may not be safe in the electric wheelchair.  Nurse manager for hospice stated she agreed and would advocate for OT to evaluate her.    ALLERGIES:    Allergies   Allergen Reactions     Nitrofuran Derivatives Visual Disturbance     Hallucinations     Tetracyclines & Related GI Disturbance     Amlodipine Other (See Comments)     Bloating/edema     Ampicillin GI Disturbance     Cefadroxil Muscle Pain (Myalgia)     Cephalexin Diarrhea and GI Disturbance     Levofloxacin Other (See Comments)     Phlebitis with IV infusion     Sulfa Antibiotics Nausea and Vomiting, GI Disturbance and Unknown     Reaction occurred as a child     Sulfasalazine Nausea and Vomiting        MEDICATIONS:  Post Discharge Medication Reconciliation Status: patient was not discharged from an inpatient facility or TCU.     Current Outpatient Medications   Medication Sig Dispense Refill     acetaminophen (TYLENOL) 650 MG suppository Place 1 suppository (650 mg) rectally every 6 hours as needed for fever or mild pain.       LORazepam (ATIVAN) 0.5 MG tablet Take 1 tablet (0.5 mg) by mouth every 2 hours as needed for anxiety.       medical cannabis (Patient's own supply) Take 1 each (1 Dose) by mouth See Admin Instructions. (The purpose of this order is to document that the patient reports taking medical cannabis.  This is not a prescription, and is not used to certify that the patient has a qualifying medical condition.)       morphine 2.5 MG solu-tab Take 1 tablet (2.5 mg) by mouth 2 times daily. May also take 1 tablet (2.5 mg) every 2 hours as needed for shortness of breath or moderate to severe pain.       amoxicillin (AMOXIL) 250 MG chewable tablet CHEW AND SWALLOW 8 TABLETS (2000 MG) BY MOUTH 1 HOUR PRIOR TO DENTAL 8 tablet 97     Bacillus Coagulans-Inulin (PROBIOTIC FORMULA) 1-250 BILLION-MG CAPS 1  CAPSULE ORALLY DAILY 30 capsule 11     Baclofen (LIORESAL) 5 MG tablet Take 1 tablet (5 mg) by mouth 3 times daily as needed for muscle spasms 20 tablet 3     clopidogrel (PLAVIX) 75 MG tablet Take 1 tablet by mouth daily       CO2-Releasing (-TWO) SUPP 1 supp daily prn and if not effective or falls out, may repeat again until results achieved.  May keep at bedside       nystatin (MYCOSTATIN) 671130 UNIT/GM external powder APPLY TOPICALLY BETWEEN TOES EVERY EVENING UNTIL HEALED;THEN APPLY TOPICALLY BETWEEN TOES 2 TIMES DAILY AS NEEDED UNTIL HEALED 60 g 11     polyethylene glycol (MIRALAX/GLYCOLAX) powder MIX 17GM OF POWDER IN 8OZ OF WATER UNTIL COMPLETELY DISSOLVED. DRINK SOLUTION DAILY AS NEEDED 527 g 98     senna-docusate (SENEXON-S) 8.6-50 MG tablet Take 2 tablets by mouth daily as needed for constipation 62 tablet 12     simethicone (MYLICON) 125 MG chewable tablet 125mg po 4 times a day PRN for gas (may keep at bedside per request) 60 tablet 11     sterile water, bottle, (WATER FOR IRRIGATION, STERILE) irrigation 60ml  prn to irrigate ruby catheter when plugged 500 mL 11     Zinc Oxide (DESITIN) 13 % CREA Apply topically 3 times daily as needed (chaffing) 113 g 11     REVIEW OF SYSTEMS:  4 point ROS neg other than the symptoms noted above in the HPI.  No chest pain, no SOB      PHYSICAL EXAM:  BP (!) 177/92   Pulse 55   Temp (!) 95.5  F (35.3  C)   Resp 16   Wt 121.6 kg (268 lb)   SpO2 94%   BMI 39.58 kg/m    Muna is in her bed and has a nice alternating air mattress.    Ruby catheter present and draining yellow urine.  Alert and oriented x3.  Quiet voice and had to ask her to repeat things.    Skin is pale/warm and dry per her baseline.  Makes eye contact.  Not wearing glasses.  Heart rate regular today and strong.  Trace edema in ankles/top of feet.    Labs reviewed from hospital.      ASSESSMENT / PLAN:  1. MS (multiple sclerosis) (H)    2. Neuromuscular respiratory weakness (H)    3. Spastic  quadriplegia (H)    4. Hospice care patient      Today reviewing medications.  Is on twice a day MS solutab 2.5mg and does not like to take that.  Will let hospice figure out that as they are in control of the morphine.  She use to take Norco early AM to help with pain when turing.  Most of her medications have been discontinued.    Will give an order today for hospice therapy to assess the appropriateness of the electric wheelchair.  Muna has shoulder harness and a seat belt for added safety.    Someways feel the OT is a mutual person and all partied to agree with recommendation.    Orders:  Hospice OT evaluate appropriateness use of wheelchair.      Electronically signed by  CAROL Rodríguez CNP            Sincerely,        CAROL Rodríguez CNP    Electronically signed

## 2025-04-30 ENCOUNTER — TELEPHONE (OUTPATIENT)
Dept: FAMILY MEDICINE | Facility: CLINIC | Age: 80
End: 2025-04-30
Payer: COMMERCIAL

## 2025-04-30 DIAGNOSIS — M81.0 SENILE OSTEOPOROSIS: Primary | ICD-10-CM

## 2025-04-30 NOTE — TELEPHONE ENCOUNTER
[facility] Call Center    Phone Message    May a detailed message be left on voicemail: yes     Reason for Call: Other: Rubi is wondering if pt will need labs before her upcoming visit on 5/19. And would also like to know if pt's prolia injection can be scheduled at that appt. Please review and call to discuss.     Action Taken: Other: OBGYN    Travel Screening: Not Applicable     Date of Service:

## 2025-04-30 NOTE — TELEPHONE ENCOUNTER
"Patient inquiring on Prolia injection and labs before next appt with Dr. Abrams.     She is scheduled 5/19/2025 with Dr. Abrams. Has DEXA scheduled 5/12.     Last Prolia received 10/21/2024. Provider visited with patient same day and noted: \"Her next DEXA will be due in early April 2025 and I will see her then to review the DXA and decide on prolia. She is aware of the rebound effect and that the Prolia should be timely.\"    Patient will need calcium labs before appointment. Routed to Dr. Abrams to inquire if she would like any additional labs ordered. Will contact patient informing her of plan once provider responds back about labs. RN team may able to assist with Prolia injection same day as provider visit pending provider decision.  "

## 2025-05-01 NOTE — TELEPHONE ENCOUNTER
Relayed update and need for calcium lab to Rubi, Caregiver.     Rubi reports patient is currently on hospice following ER visit. States this was needed to allow her facility to continue care for her. With hospice admission, all medications were discontinued. Rubi feels she is back to baseline but states the future is unknown.     Rubi requests message be sent to Dr. Abrams to inquire on Dex/Prolia and how long this can be prolonged in case Muna is not quite ready to return. Rubi states she will talk more to hospice about this as well.

## 2025-05-04 RX ORDER — MORPHINE SULFATE 30 MG/1
TABLET ORAL
Status: SHIPPED
Start: 2025-04-23

## 2025-05-04 RX ORDER — ACETAMINOPHEN 650 MG/1
650 SUPPOSITORY RECTAL EVERY 6 HOURS PRN
Status: SHIPPED
Start: 2025-04-24

## 2025-05-04 RX ORDER — LORAZEPAM 0.5 MG/1
0.5 TABLET ORAL
Status: SHIPPED
Start: 2025-04-23

## 2025-05-05 NOTE — TELEPHONE ENCOUNTER
Spoke with caregiver, Rubi.  She is asking if the reclast would need to be worked on prior to her scheduled visit on 5/19?.  She says that Muna is hoping to come off of hospice, and if she does, an injection would be easier than getting in for an infusion.

## 2025-05-07 ENCOUNTER — TELEPHONE (OUTPATIENT)
Dept: FAMILY MEDICINE | Facility: CLINIC | Age: 80
End: 2025-05-07
Payer: COMMERCIAL

## 2025-05-07 NOTE — TELEPHONE ENCOUNTER
Tried to reach Rubi, but received voicemail.  Left message that her appointments are fine as scheduled.  Asked to provide a fax number for lab order

## 2025-05-07 NOTE — TELEPHONE ENCOUNTER
[facility] Call Center    Phone Message    May a detailed message be left on voicemail: yes     Reason for Call: Other: The Pt friend Rubi is wondering if she would be able to get an order faxed over to her hospice company for them to be able to draw the calcium lab at her home or if she would have to come into the clinic to get this done? Writer is also wondering if the 5 min overlap on 5/19 is okay due to the prolia injection still pending due to the Pt decision. Please advise.     Action Taken: Other: [facility]    Travel Screening: Not Applicable     Date of Service: 5/07/25

## 2025-05-07 NOTE — TELEPHONE ENCOUNTER
[facility] Call Center    Phone Message    May a detailed message be left on voicemail: yes     Reason for Call: Per Rubi, they received fax and she received call that this actually needs to be done at facility where pt is ([facility]) so they now need faxed to: [phone]. Attention Nurse. Labs there are only done Mondays so needs sent before 5/12  Thank you    Action Taken: Message routed to:  Other: [facility]    Travel Screening: Not Applicable

## 2025-05-07 NOTE — TELEPHONE ENCOUNTER
[facility] Call Center    Phone Message    May a detailed message be left on voicemail: yes     Reason for Call: Other: Pt's friend Rubi was returning a call about appts and needing a fax. Fax number foe the hospice care is [phone]. Please advise     Action Taken: Other: OBGYN    Travel Screening: Not Applicable     Date of Service:

## 2025-05-10 ENCOUNTER — LAB REQUISITION (OUTPATIENT)
Dept: LAB | Facility: CLINIC | Age: 80
End: 2025-05-10
Payer: COMMERCIAL

## 2025-05-10 DIAGNOSIS — M81.0 AGE-RELATED OSTEOPOROSIS WITHOUT CURRENT PATHOLOGICAL FRACTURE: ICD-10-CM

## 2025-05-12 ENCOUNTER — ANCILLARY PROCEDURE (OUTPATIENT)
Dept: BONE DENSITY | Facility: CLINIC | Age: 80
End: 2025-05-12
Attending: FAMILY MEDICINE
Payer: COMMERCIAL

## 2025-05-12 DIAGNOSIS — M81.0 SENILE OSTEOPOROSIS: ICD-10-CM

## 2025-05-12 LAB — CALCIUM SERPL-MCNC: 9.7 MG/DL (ref 8.8–10.4)

## 2025-05-12 PROCEDURE — 36415 COLL VENOUS BLD VENIPUNCTURE: CPT | Mod: ORL | Performed by: FAMILY MEDICINE

## 2025-05-12 PROCEDURE — 82310 ASSAY OF CALCIUM: CPT | Mod: ORL | Performed by: FAMILY MEDICINE

## 2025-05-12 PROCEDURE — 77080 DXA BONE DENSITY AXIAL: CPT | Performed by: INTERNAL MEDICINE

## 2025-05-12 PROCEDURE — P9604 ONE-WAY ALLOW PRORATED TRIP: HCPCS | Mod: ORL | Performed by: FAMILY MEDICINE

## 2025-05-12 NOTE — TELEPHONE ENCOUNTER
Spoke with Rubi.  Per Dr Abrams, she will discuss options with Muna on 5/19.  Would like her to get her DXA prior to that, which is scheduled for this afternoon.

## 2025-05-13 ENCOUNTER — RESULTS FOLLOW-UP (OUTPATIENT)
Dept: FAMILY MEDICINE | Facility: CLINIC | Age: 80
End: 2025-05-13

## 2025-05-16 ENCOUNTER — RESULTS FOLLOW-UP (OUTPATIENT)
Dept: INTERNAL MEDICINE | Facility: CLINIC | Age: 80
End: 2025-05-16

## 2025-05-16 NOTE — PROGRESS NOTES
ASSESSMENT:  This is a 79-year-old postmenopausal female who has osteoporosis by T-scores.  She is wheelchair-bound and has MS.  She completed a course of Tymlos in April 2020 and started Prolia at that time.  She missed several shots of Prolia because of COVID and surgery and again started Prolia in April 2023.  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  She had some question about whether to continue with Prolia but decided she does want to continue and will give her prolia  today.  She will then be due in October.    PLAN:  Patient should take 1200mg of calcium/day in divided doses and vitamin D3 1000IU/day.  Try simple hand weights  Continue PT   Prolia today   Blood work in 2 wks  Next prolia is due in 6 months    Next DXA is due in 5/2027      Thank you for allowing me to participate in the care of your patient.  Please do not hesitate to call with questions or concerns.    Sincerely,    Erica Abrams MD, PhD  CC: Princess Lucas NP    Time note (e4, 30'): The total of my time (on the date of service) for this service was 37 minutes, including discussion/face-to-face, chart review, interpretation not otherwise reported, documentation, and updating of the computerized record.          Cristina is a  79 year old female /post menopausal] [GR0, P0] that presents today for osteoporosis follow up patient was last seen 10/2024. She has MS and is wheel chair bound.   She completed a course of Tymlos for 18 months and stopped around the end of April 2020.  She then was started on Prolia. She had surgery in 2022 and with COVID missed prolia.  She restarted prolia in April 2023  last shot was Oct 2024.  She does want to stay on prolia.    Referring Physician: Princess Lucas NP       HPI     Have you ever had a bone density test? Yes  Where = [facility]  When = 5/2025    Lumbar  Spine  T-score -2.3 (L1-2), BMD is 0.884 g/cm2.         Radius (1/3 distal): T-score -5.0, BMD 0.356 g/cm2     Interval change 3  Bone density compared to the prior study has increased at L1-2 by16.3%     Have you received any x-ray dye or contrast in the last ten days? No  How many servings of dairy products do you consume per day? Minimal Type: lactose intolerant   Do you take a multi-vitamin daily? Yes  Do you take a vitamin D supplement? Yes 2000iU - 2/day   Do you take a calcium supplement daily?  Calcium carbonate 500mg with 200mg vit D  1 bid   Do you take a supplement containing strontium? No  Are you exposed to natural sunlight at least 20 minutes three times a week? No     Social History   reports that she quit smoking about 39 years ago. She started smoking about 62 years ago. She has never used smokeless tobacco. She reports current alcohol use of about 1.0 standard drink of alcohol per week. She reports that she does not use drugs.  Do you smoke cigarettes? Reformed smoker   Do you exercise? Yes. Details: wheel chair bound  Do you drink alcohol? No     Medication History  Have you used any of the following medications?              Actonel (Risedronate): No              Aredia (Pamidronate): No              Boniva (Ibindronate): No              Didronil (Etidronate): No              Evista (Raloxifene): No              Fosamax (Alendronate): No              Forteo (Parathyroid hormone) injections: No              tymlos  - 10/2018-4-2020              HCTZ (Thiazide): No              Calcitonin nasal spray: No              Reclast or Zometa (Zolendronate): No              Prolia (Denosumab): yes started 10/2020  - had 2 shots  - then a gap because of COVID - restart in 4/2023 and last shot  10/2024   - due today       Current Outpatient Medications   Medication Sig Dispense Refill    acetaminophen (TYLENOL) 650 MG suppository Place 1 suppository (650 mg) rectally every 6 hours as needed for fever or mild  pain.      amoxicillin (AMOXIL) 250 MG chewable tablet CHEW AND SWALLOW 8 TABLETS (2000 MG) BY MOUTH 1 HOUR PRIOR TO DENTAL 8 tablet 97    Bacillus Coagulans-Inulin (PROBIOTIC FORMULA) 1-250 BILLION-MG CAPS 1 CAPSULE ORALLY DAILY 30 capsule 11    Baclofen (LIORESAL) 5 MG tablet Take 1 tablet (5 mg) by mouth 3 times daily as needed for muscle spasms 20 tablet 3    clopidogrel (PLAVIX) 75 MG tablet Take 1 tablet by mouth daily      CO2-Releasing (-TWO) SUPP 1 supp daily prn and if not effective or falls out, may repeat again until results achieved.  May keep at bedside      LORazepam (ATIVAN) 0.5 MG tablet Take 1 tablet (0.5 mg) by mouth every 2 hours as needed for anxiety.      medical cannabis (Patient's own supply) Take 1 each (1 Dose) by mouth See Admin Instructions. (The purpose of this order is to document that the patient reports taking medical cannabis.  This is not a prescription, and is not used to certify that the patient has a qualifying medical condition.)      morphine 2.5 MG solu-tab Take 1 tablet (2.5 mg) by mouth 2 times daily. May also take 1 tablet (2.5 mg) every 2 hours as needed for shortness of breath or moderate to severe pain.      nystatin (MYCOSTATIN) 849180 UNIT/GM external powder APPLY TOPICALLY BETWEEN TOES EVERY EVENING UNTIL HEALED;THEN APPLY TOPICALLY BETWEEN TOES 2 TIMES DAILY AS NEEDED UNTIL HEALED 60 g 11    polyethylene glycol (MIRALAX/GLYCOLAX) powder MIX 17GM OF POWDER IN 8OZ OF WATER UNTIL COMPLETELY DISSOLVED. DRINK SOLUTION DAILY AS NEEDED 527 g 98    senna-docusate (SENEXON-S) 8.6-50 MG tablet Take 2 tablets by mouth daily as needed for constipation 62 tablet 12    simethicone (MYLICON) 125 MG chewable tablet 125mg po 4 times a day PRN for gas (may keep at bedside per request) 60 tablet 11    sterile water, bottle, (WATER FOR IRRIGATION, STERILE) irrigation 60ml  prn to irrigate ruby catheter when plugged 500 mL 11    Zinc Oxide (DESITIN) 13 % CREA Apply topically 3 times  daily as needed (chaffing) 113 g 11          Allergies   Allergen Reactions    Nitrofuran Derivatives Visual Disturbance     Hallucinations    Tetracyclines & Related GI Disturbance    Amlodipine Other (See Comments)     Bloating/edema    Ampicillin GI Disturbance    Cefadroxil Muscle Pain (Myalgia)    Cephalexin Diarrhea and GI Disturbance    Levofloxacin Other (See Comments)     Phlebitis with IV infusion    Sulfa Antibiotics Nausea and Vomiting, GI Disturbance and Unknown     Reaction occurred as a child    Sulfasalazine Nausea and Vomiting       Past Medical History    Family History   Problem Relation Age of Onset    Hypertension Mother     Cerebrovascular Disease Mother     Coronary Artery Disease Father        ROS:  General: none  Head/Eyes: none  Ears/Nose/Throat: has problems with aspiration   Cardiovascular: none  Respiratory: hospitalized 3x this past spring for respiratory problems   Gastrointestinal: none  Breast: none  Genitourinary: none  Sexual Function: none  Musculoskeletal: generalized weakness   Skin: none  Neurological: none  Mental Health: none  Endocrine: none    Clinic Measurements  Vitals: /84   Pulse 69   LMP  (LMP Unknown)   BMI= There is no height or weight on file to calculate BMI.    Physical exam  Constitutional: chronically ill  appearing woman in no acute distress. Here with her aide - is wheel chair bound   Psychological: appropriate mood.  Neck: No thyroidmegaly.    Cardiovascular: regular rate and rhythm, normal S1 and S2, no murmurs, rubs or gallops,   Respiratory: clear to auscultation, no wheezes or crackles, normal breath sounds.  Musculoskeletal: no edema    Spine: Curved, not tender,  unable to test ROM of the spine , Skin: no concerning lesions, no jaundice.  Neurological: cranial nerves intact, wheel chair bound - can't move legs  can move arms and hands      LAB  Vertebra; Fracture Assessment: NA   Dexa Scan: 5/2025    FRAX Assessment Tool: [N/A,   Risk Factors:    T scores, MS, inactivity, hx of fracture, reformed smoker,            The following steps were completed to comply with the REMS program for Prolia:  Reviewed the serious risks of Prolia  and the symptoms of each risk.  Advised patient to seek prompt medical attention if they have signs or symptoms of any of the serious risks.  Patient will be provided a copy of the Medication Guide and Patient Brochure prior to first injection.    Erica Abrams MD PhD

## 2025-05-19 ENCOUNTER — OFFICE VISIT (OUTPATIENT)
Dept: FAMILY MEDICINE | Facility: CLINIC | Age: 80
End: 2025-05-19
Attending: FAMILY MEDICINE
Payer: COMMERCIAL

## 2025-05-19 ENCOUNTER — ASSISTED LIVING VISIT (OUTPATIENT)
Dept: GERIATRICS | Facility: CLINIC | Age: 80
End: 2025-05-19
Payer: COMMERCIAL

## 2025-05-19 ENCOUNTER — ALLIED HEALTH/NURSE VISIT (OUTPATIENT)
Dept: OBGYN | Facility: CLINIC | Age: 80
End: 2025-05-19
Payer: COMMERCIAL

## 2025-05-19 VITALS
RESPIRATION RATE: 16 BRPM | OXYGEN SATURATION: 92 % | HEART RATE: 78 BPM | TEMPERATURE: 97.8 F | BODY MASS INDEX: 43.42 KG/M2 | WEIGHT: 293 LBS | SYSTOLIC BLOOD PRESSURE: 177 MMHG | DIASTOLIC BLOOD PRESSURE: 92 MMHG

## 2025-05-19 VITALS — DIASTOLIC BLOOD PRESSURE: 84 MMHG | HEART RATE: 69 BPM | SYSTOLIC BLOOD PRESSURE: 134 MMHG

## 2025-05-19 DIAGNOSIS — R10.9 STOMACH DISCOMFORT: ICD-10-CM

## 2025-05-19 DIAGNOSIS — I73.9 PAD (PERIPHERAL ARTERY DISEASE): ICD-10-CM

## 2025-05-19 DIAGNOSIS — I10 PRIMARY HYPERTENSION: ICD-10-CM

## 2025-05-19 DIAGNOSIS — M81.0 SENILE OSTEOPOROSIS: Primary | ICD-10-CM

## 2025-05-19 DIAGNOSIS — N30.20 CHRONIC CYSTITIS: ICD-10-CM

## 2025-05-19 DIAGNOSIS — Z86.718 HISTORY OF VENOUS THROMBOEMBOLISM: ICD-10-CM

## 2025-05-19 DIAGNOSIS — Z92.29 PERSONAL HISTORY OF OTHER DRUG THERAPY: ICD-10-CM

## 2025-05-19 DIAGNOSIS — D62 ACUTE BLOOD LOSS ANEMIA: ICD-10-CM

## 2025-05-19 DIAGNOSIS — B37.2 CANDIDIASIS OF SKIN: ICD-10-CM

## 2025-05-19 DIAGNOSIS — M81.0 AGE-RELATED OSTEOPOROSIS WITHOUT CURRENT PATHOLOGICAL FRACTURE: Primary | ICD-10-CM

## 2025-05-19 DIAGNOSIS — G35 MS (MULTIPLE SCLEROSIS) (H): Primary | ICD-10-CM

## 2025-05-19 PROCEDURE — 3079F DIAST BP 80-89 MM HG: CPT | Performed by: FAMILY MEDICINE

## 2025-05-19 PROCEDURE — 99214 OFFICE O/P EST MOD 30 MIN: CPT | Performed by: FAMILY MEDICINE

## 2025-05-19 PROCEDURE — 96372 THER/PROPH/DIAG INJ SC/IM: CPT | Performed by: FAMILY MEDICINE

## 2025-05-19 PROCEDURE — G0463 HOSPITAL OUTPT CLINIC VISIT: HCPCS | Mod: 25 | Performed by: FAMILY MEDICINE

## 2025-05-19 PROCEDURE — 3075F SYST BP GE 130 - 139MM HG: CPT | Performed by: FAMILY MEDICINE

## 2025-05-19 PROCEDURE — 250N000011 HC RX IP 250 OP 636: Mod: JZ | Performed by: FAMILY MEDICINE

## 2025-05-19 PROCEDURE — 99349 HOME/RES VST EST MOD MDM 40: CPT | Performed by: NURSE PRACTITIONER

## 2025-05-19 RX ORDER — MULTIVITAMIN
1 TABLET ORAL DAILY
Qty: 30 TABLET | Refills: 11 | Status: SHIPPED | OUTPATIENT
Start: 2025-05-19

## 2025-05-19 RX ORDER — CLOPIDOGREL BISULFATE 75 MG/1
75 TABLET ORAL DAILY
Qty: 30 TABLET | Refills: 11 | Status: SHIPPED | OUTPATIENT
Start: 2025-05-19

## 2025-05-19 RX ORDER — ARMODAFINIL 150 MG/1
150 TABLET ORAL EVERY MORNING
Qty: 30 TABLET | Refills: 5 | Status: SHIPPED | OUTPATIENT
Start: 2025-05-19

## 2025-05-19 RX ORDER — NYSTATIN 100000 [USP'U]/G
POWDER TOPICAL
Qty: 60 G | Refills: 11 | Status: SHIPPED | OUTPATIENT
Start: 2025-05-19

## 2025-05-19 RX ORDER — BACITRACIN ZINC AND POLYMYXIN B SULFATE 500; 10000 [USP'U]/G; [USP'U]/G
2 OINTMENT TOPICAL DAILY
Qty: 60 CAPSULE | Refills: 11 | Status: SHIPPED | OUTPATIENT
Start: 2025-05-19

## 2025-05-19 RX ORDER — METHENAMINE HIPPURATE 1000 MG/1
1 TABLET ORAL 2 TIMES DAILY
Qty: 60 TABLET | Refills: 11 | Status: SHIPPED | OUTPATIENT
Start: 2025-05-19

## 2025-05-19 RX ORDER — PANTOPRAZOLE SODIUM 40 MG/1
40 TABLET, DELAYED RELEASE ORAL DAILY
Qty: 30 TABLET | Refills: 11 | Status: SHIPPED | OUTPATIENT
Start: 2025-05-19

## 2025-05-19 RX ORDER — LOSARTAN POTASSIUM 25 MG/1
12.5 TABLET ORAL DAILY
Qty: 15 TABLET | Refills: 11 | Status: SHIPPED | OUTPATIENT
Start: 2025-05-19

## 2025-05-19 RX ADMIN — DENOSUMAB 60 MG: 60 INJECTION SUBCUTANEOUS at 13:34

## 2025-05-19 NOTE — PROGRESS NOTES
"[facility] GERIATRICS  ACUTE/EPISODIC VISIT    [facility] Medical Record Number:  [id number]  Place of Service where encounter took place:  [facility]) [[id number]]    Chief Complaint   Patient presents with    RECHECK       HPI:    Cristina Stevens is a 79 year old  (1945), who is being seen today for an episodic care visit.  HPI information obtained from: facility chart records, facility staff, patient report, and [facility] chart review.    Today's concern is:    Diagnoses         Codes Comments      MS (multiple sclerosis) (H)    -  Primary G35       PAD (peripheral artery disease)     I73.9       History of venous thromboembolism     Z86.718       Primary hypertension     I10       Acute blood loss anemia     D62       Stomach discomfort     R10.9       Chronic cystitis     N30.20       Candidiasis of skin     B37.2           Per request of Muna, came to see her before 11am today.  She was going out to see a specialist and wanted to request her medications get restarted.    Muna withdrew from hospice as she has recovered from her \"near death\" episode.  All thought this was it for her and her MS advancing.  Last time this NP saw her, had given orders for hospice OT to see her for evaluation if able to drive the wheelchair safely.      Today finding muna up in her electric wheelchair and aide helping her finish up personal cares of teeth brushing and combing hair.      Had a nice visit with muna.  Voice is stronger now and she stated her friend from yesterday said the same thing.    Found an old AVS with her medications on there prior to discontinuing most of them except for comfort.  Reviewed this with her and will reinstate most of them.    Spoke about pain but right now will leave the Tylenol as PRN.  No Norco restarted.  No morphine on board.      ALLERGIES:    Allergies   Allergen Reactions    Nitrofuran Derivatives Visual Disturbance     Hallucinations    Tetracyclines " & Related GI Disturbance    Amlodipine Other (See Comments)     Bloating/edema    Ampicillin GI Disturbance    Cefadroxil Muscle Pain (Myalgia)    Cephalexin Diarrhea and GI Disturbance    Levofloxacin Other (See Comments)     Phlebitis with IV infusion    Sulfa Antibiotics Nausea and Vomiting, GI Disturbance and Unknown     Reaction occurred as a child    Sulfasalazine Nausea and Vomiting        MEDICATIONS:  Post Discharge Medication Reconciliation Status: patient was not discharged from an inpatient facility or TCU.   Medications reconciled and reordered.    Current Outpatient Medications   Medication Sig Dispense Refill    apixaban ANTICOAGULANT (ELIQUIS) 5 MG tablet Take 1 tablet (5 mg) by mouth 2 times daily. 60 tablet 11    armodafinil (NUVIGIL) 150 MG TABS tablet Take 1 tablet (150 mg) by mouth every morning. 30 tablet 5    Bacillus Coagulans-Inulin (PROBIOTIC FORMULA) 1-250 BILLION-MG CAPS Take 2 capsules by mouth daily. 60 capsule 11    clopidogrel (PLAVIX) 75 MG tablet Take 1 tablet (75 mg) by mouth daily. 30 tablet 11    losartan (COZAAR) 25 MG tablet Take 0.5 tablets (12.5 mg) by mouth daily. 15 tablet 11    methenamine hippurate (HIPREX) 1 g tablet Take 1 tablet (1 g) by mouth 2 times daily. 60 tablet 11    Multiple Vitamin (DAILY DAWSON) TABS Take 1 tablet by mouth daily. 30 tablet 11    nystatin (MYCOSTATIN) 673669 UNIT/GM external powder Topically between toes every evening 60 g 11    pantoprazole (PROTONIX) 40 MG EC tablet Take 1 tablet (40 mg) by mouth daily. 30 tablet 11    vitamin C w/EDWIN HIPS 500 MG tablet Take 1 tablet (500 mg) by mouth 2 times daily. 60 tablet 11    acetaminophen (TYLENOL) 650 MG suppository Place 1 suppository (650 mg) rectally every 6 hours as needed for fever or mild pain.      amoxicillin (AMOXIL) 250 MG chewable tablet CHEW AND SWALLOW 8 TABLETS (2000 MG) BY MOUTH 1 HOUR PRIOR TO DENTAL 8 tablet 97    CO2-Releasing (-TWO) SUPP 1 supp daily prn and if not effective or  falls out, may repeat again until results achieved.  May keep at bedside      polyethylene glycol (MIRALAX/GLYCOLAX) powder MIX 17GM OF POWDER IN 8OZ OF WATER UNTIL COMPLETELY DISSOLVED. DRINK SOLUTION DAILY AS NEEDED 527 g 98    senna-docusate (SENEXON-S) 8.6-50 MG tablet Take 2 tablets by mouth daily as needed for constipation 62 tablet 12    simethicone (MYLICON) 125 MG chewable tablet 125mg po 4 times a day PRN for gas (may keep at bedside per request) 60 tablet 11    sterile water, bottle, (WATER FOR IRRIGATION, STERILE) irrigation 60ml  prn to irrigate ruby catheter when plugged 500 mL 11    Zinc Oxide (DESITIN) 13 % CREA Apply topically 3 times daily as needed (chaffing) 113 g 11         REVIEW OF SYSTEMS:  4 point ROS neg other than the symptoms noted above in the HPI.  No chest pain, no SOB.  Bowels moving she states.  Catheter intact.      PHYSICAL EXAM:  BP (!) 177/92   Pulse 78   Temp 97.8  F (36.6  C)   Resp 16   Wt 133.4 kg (294 lb)   SpO2 92%   BMI 43.42 kg/m    Alert and oriented x3.  Neatly groomed.    Makes eye contact.  Not wearing glasses but has some.  Able to control her electric wheelchair with controls.    Voice is louder as can hear her across the room.   Heart rate regular and strong.   Trace to +1 edema with compression socks.  Catheter bag strapped to leg with no urine present.  No respiratory distress as she is strapped into w/c with shoulder straps.  She hopes to get a new cushion from [facility].        ASSESSMENT / PLAN:  1. MS (multiple sclerosis) (H)    2. PAD (peripheral artery disease)    3. History of venous thromboembolism    4. Primary hypertension    5. Acute blood loss anemia    6. Stomach discomfort    7. Chronic cystitis    8. Candidiasis of skin      Above diagnoses have medications associated with them that are being restarted or modified with a script being sent to [facility] pharmacy so they can be delivered and restarted.    Appears to be back to her  baseline with her MS.  Restarting on her Nuvigil as she states it keeps her awake.    Back on Eliquis and Plavix for hx of VTE and severe PAD.  Will monitor labs to make sure her HgB does not indicate a bleed.  No NSAIDS.      Muna appreciative of the visit and reviewing her medications.    Orders:   Discontinue Levsin order.  In 3 weeks check CBC, and BMP due to HTN, and MS  Restart the following medications:  Nuvigil, Hiprex, Vitamin C, Losartan, PRN Amoxicillin for dental appointments, Probiotic, Simethicone, MVI, protonix, nystatin clarified, Apixaban and plavix. See above medication list in note for orders.    Electronically signed by  CAROL Rodríguez CNP

## 2025-05-19 NOTE — LETTER
" 5/19/2025      Cristina Stevens  [address]   [address]  [facility] [address]        [facility] GERIATRICS  ACUTE/EPISODIC VISIT    [facility] Medical Record Number:  [id number]  Place of Service where encounter took place:  [facility] [place] ([facility]) [[id number]]    Chief Complaint   Patient presents with     RECHECK       HPI:    Cristina Stevens is a 79 year old  (1945), who is being seen today for an episodic care visit.  HPI information obtained from: facility chart records, facility staff, patient report, and [facility] chart review.    Today's concern is:    Diagnoses         Codes Comments      MS (multiple sclerosis) (H)    -  Primary G35       PAD (peripheral artery disease)     I73.9       History of venous thromboembolism     Z86.718       Primary hypertension     I10       Acute blood loss anemia     D62       Stomach discomfort     R10.9       Chronic cystitis     N30.20       Candidiasis of skin     B37.2           Per request of Muna, came to see her before 11am today.  She was going out to see a specialist and wanted to request her medications get restarted.    Muna withdrew from hospice as she has recovered from her \"near death\" episode.  All thought this was it for her and her MS advancing.  Last time this NP saw her, had given orders for hospice OT to see her for evaluation if able to drive the wheelchair safely.      Today finding muna up in her electric wheelchair and aide helping her finish up personal cares of teeth brushing and combing hair.      Had a nice visit with muna.  Voice is stronger now and she stated her friend from yesterday said the same thing.    Found an old AVS with her medications on there prior to discontinuing most of them except for comfort.  Reviewed this with her and will reinstate most of them.    Spoke about pain but right now will leave the Tylenol as PRN.  No Norco restarted.  No morphine on board.      ALLERGIES:    Allergies "   Allergen Reactions     Nitrofuran Derivatives Visual Disturbance     Hallucinations     Tetracyclines & Related GI Disturbance     Amlodipine Other (See Comments)     Bloating/edema     Ampicillin GI Disturbance     Cefadroxil Muscle Pain (Myalgia)     Cephalexin Diarrhea and GI Disturbance     Levofloxacin Other (See Comments)     Phlebitis with IV infusion     Sulfa Antibiotics Nausea and Vomiting, GI Disturbance and Unknown     Reaction occurred as a child     Sulfasalazine Nausea and Vomiting        MEDICATIONS:  Post Discharge Medication Reconciliation Status: patient was not discharged from an inpatient facility or TCU.   Medications reconciled and reordered.    Current Outpatient Medications   Medication Sig Dispense Refill     apixaban ANTICOAGULANT (ELIQUIS) 5 MG tablet Take 1 tablet (5 mg) by mouth 2 times daily. 60 tablet 11     armodafinil (NUVIGIL) 150 MG TABS tablet Take 1 tablet (150 mg) by mouth every morning. 30 tablet 5     Bacillus Coagulans-Inulin (PROBIOTIC FORMULA) 1-250 BILLION-MG CAPS Take 2 capsules by mouth daily. 60 capsule 11     clopidogrel (PLAVIX) 75 MG tablet Take 1 tablet (75 mg) by mouth daily. 30 tablet 11     losartan (COZAAR) 25 MG tablet Take 0.5 tablets (12.5 mg) by mouth daily. 15 tablet 11     methenamine hippurate (HIPREX) 1 g tablet Take 1 tablet (1 g) by mouth 2 times daily. 60 tablet 11     Multiple Vitamin (DAILY DAWSON) TABS Take 1 tablet by mouth daily. 30 tablet 11     nystatin (MYCOSTATIN) 630395 UNIT/GM external powder Topically between toes every evening 60 g 11     pantoprazole (PROTONIX) 40 MG EC tablet Take 1 tablet (40 mg) by mouth daily. 30 tablet 11     vitamin C w/EDWIN HIPS 500 MG tablet Take 1 tablet (500 mg) by mouth 2 times daily. 60 tablet 11     acetaminophen (TYLENOL) 650 MG suppository Place 1 suppository (650 mg) rectally every 6 hours as needed for fever or mild pain.       amoxicillin (AMOXIL) 250 MG chewable tablet CHEW AND SWALLOW 8 TABLETS (2000  MG) BY MOUTH 1 HOUR PRIOR TO DENTAL 8 tablet 97     CO2-Releasing (-TWO) SUPP 1 supp daily prn and if not effective or falls out, may repeat again until results achieved.  May keep at bedside       polyethylene glycol (MIRALAX/GLYCOLAX) powder MIX 17GM OF POWDER IN 8OZ OF WATER UNTIL COMPLETELY DISSOLVED. DRINK SOLUTION DAILY AS NEEDED 527 g 98     senna-docusate (SENEXON-S) 8.6-50 MG tablet Take 2 tablets by mouth daily as needed for constipation 62 tablet 12     simethicone (MYLICON) 125 MG chewable tablet 125mg po 4 times a day PRN for gas (may keep at bedside per request) 60 tablet 11     sterile water, bottle, (WATER FOR IRRIGATION, STERILE) irrigation 60ml  prn to irrigate ruby catheter when plugged 500 mL 11     Zinc Oxide (DESITIN) 13 % CREA Apply topically 3 times daily as needed (chaffing) 113 g 11         REVIEW OF SYSTEMS:  4 point ROS neg other than the symptoms noted above in the HPI.  No chest pain, no SOB.  Bowels moving she states.  Catheter intact.      PHYSICAL EXAM:  BP (!) 177/92   Pulse 78   Temp 97.8  F (36.6  C)   Resp 16   Wt 133.4 kg (294 lb)   SpO2 92%   BMI 43.42 kg/m    Alert and oriented x3.  Neatly groomed.    Makes eye contact.  Not wearing glasses but has some.  Able to control her electric wheelchair with controls.    Voice is louder as can hear her across the room.   Heart rate regular and strong.   Trace to +1 edema with compression socks.  Catheter bag strapped to leg with no urine present.  No respiratory distress as she is strapped into w/c with shoulder straps.  She hopes to get a new cushion from [facility].        ASSESSMENT / PLAN:  1. MS (multiple sclerosis) (H)    2. PAD (peripheral artery disease)    3. History of venous thromboembolism    4. Primary hypertension    5. Acute blood loss anemia    6. Stomach discomfort    7. Chronic cystitis    8. Candidiasis of skin      Above diagnoses have medications associated with them that are being restarted or  modified with a script being sent to [facility] pharmacy so they can be delivered and restarted.    Appears to be back to her baseline with her MS.  Restarting on her Nuvigil as she states it keeps her awake.    Back on Eliquis and Plavix for hx of VTE and severe PAD.  Will monitor labs to make sure her HgB does not indicate a bleed.  No NSAIDS.      Muna appreciative of the visit and reviewing her medications.    Orders:   Discontinue Levsin order.  In 3 weeks check CBC, and BMP due to HTN, and MS  Restart the following medications:  Nuvigil, Hiprex, Vitamin C, Losartan, PRN Amoxicillin for dental appointments, Probiotic, Simethicone, MVI, protonix, nystatin clarified, Apixaban and plavix. See above medication list in note for orders.    Electronically signed by  CAROL Rodríguez CNP            Sincerely,        CAROL Rodríguez CNP    Electronically signed

## 2025-05-19 NOTE — PATIENT INSTRUCTIONS
Patient should take 1200mg of calcium/day in divided doses and vitamin D3 1000IU/day.  Try simple hand weights  Continue PT   Prolia today   Blood work in 2 wks  Next prolia is due in 6 months    Next DXA is due in 5/2027

## 2025-05-19 NOTE — PROGRESS NOTES
Clinic Administered Medication Documentation      Prolia Documentation    Indication: Prolia  (denosumab) is a prescription medicine used to treat osteoporosis in patients who:   Are at high risk for fracture, meaning patients who have had a fracture related to osteoporosis, or who have multiple risk factors for fracture.  Cannot use another osteoporosis medicine or other osteoporosis medicines did not work well.  The timeline for early/late injections would be 4 weeks early and any time after the 6 month peace. If a patient receives their injection late, then the subsequent injection would be 6 months from the date that they actually received the injection.    When was the last injection?  10/2024  Was the last injection at least 6 months ago? Yes  Has the prior authorization been completed?  Yes  Is there an active order (written within the past 365 days, with administrations remaining, not ) in the chart?  Yes   GFR Estimate   Date Value Ref Range Status   02/10/2025 56 (L) >60 mL/min/1.73m2 Final   2018 78 >60 mL/min/1.7m2 Final     Comment:     Non  GFR Calc     Has patient had a GFR within the last 12 months? Yes   Is GFR under 30, or patient has a diagnosis of CKD4 or CKD5? Yes   Calcium   Date Value Ref Range Status   2025 9.7 8.8 - 10.4 mg/dL Final   2020 9.4 8.5 - 10.1 mg/dL Final     Is the calcium results 8.8 or above? Yes   Was the calcium done after last Prolia injection? Yes   Is there a calcium order for 1 month post Prolia injection? Yes. Schedule patient for Calcium lab in next month.   Patient denies gastric bypass or parathyroid surgery in past 6 months? Yes - patient denies.   Patient denies undergoing any dental procedures involving drilling into the bone, such as implants, extractions, or oral surgery, within the past two months that have not yet healed?  Yes - patient denies  Patient denies plans for an emergency tooth extraction within the next week?  Yes    The following steps were completed to comply with the REMS program for Prolia:  Reviewed information in the Medication Guide, including the serious risks of Prolia  and the symptoms of each risk.  Advised patient to seek prompt medical attention if they have signs or symptoms of any of the serious risks.  Provided each patient a copy of the Medication Guide and Patient Guide.    Prior to injection, verified patient identity using patient's name and date of birth. Medication was administered. Please see MAR and medication order for additional information. Patient instructed to remain in clinic for 15 minutes and report any adverse reaction to staff immediately.    Vial/Syringe: Multi dose vial  Was this medication supplied by the patient? No  Verified that the patient has administrations remaining in their prescription.

## 2025-05-29 ENCOUNTER — TELEPHONE (OUTPATIENT)
Dept: GERIATRICS | Facility: CLINIC | Age: 80
End: 2025-05-29
Payer: COMMERCIAL

## 2025-05-29 ENCOUNTER — TELEPHONE (OUTPATIENT)
Dept: FAMILY MEDICINE | Facility: CLINIC | Age: 80
End: 2025-05-29
Payer: COMMERCIAL

## 2025-05-29 ENCOUNTER — LAB REQUISITION (OUTPATIENT)
Dept: LAB | Facility: CLINIC | Age: 80
End: 2025-05-29
Payer: COMMERCIAL

## 2025-05-29 DIAGNOSIS — M81.0 AGE-RELATED OSTEOPOROSIS WITHOUT CURRENT PATHOLOGICAL FRACTURE: ICD-10-CM

## 2025-05-29 NOTE — TELEPHONE ENCOUNTER
Received word from Muna's friend that she was injured in the shower a few days ago and developed a hematoma on her leg.  Now it opened and drained.  She felt someone needs to see it.  Informed her that nursing needs to see it and then can go from there.  This NP text to nursing asking them to check this out and can send picture if needed.  Most likely homecare to be involved minimally.      Blister open.  Can see the paperthin skin that appeared to be a large fluid filled blister.  Surrounding skin is pink.      The friend thought it needed an ABX because of the redness around it.  Asked nursing if it was warm to touch or swollen?  Neither.      Only orders this time will [facility] to see and evaluate for wound care.  If area changes, can add a ABX later on.    ORDERS:  Refer to [facility] for evaluation and treat of a opened hematoma from an injury.      Love Lucas, CAROL CNP

## 2025-05-29 NOTE — TELEPHONE ENCOUNTER
[facility] Call Center    Phone Message    May a detailed message be left on voicemail: yes     Reason for Call: Order(s): Other:   Reason for requested: Blood draw, requesting the order to be faxed to care facility at [phone]  Date needed: ASAP  Provider name: Dr. Abrams    Action Taken: Message routed to:  Other: [facility]    Travel Screening: Not Applicable     Date of Service:

## 2025-06-02 ENCOUNTER — ASSISTED LIVING VISIT (OUTPATIENT)
Dept: GERIATRICS | Facility: CLINIC | Age: 80
End: 2025-06-02
Payer: COMMERCIAL

## 2025-06-02 VITALS
HEART RATE: 55 BPM | WEIGHT: 268 LBS | BODY MASS INDEX: 39.58 KG/M2 | TEMPERATURE: 95.5 F | DIASTOLIC BLOOD PRESSURE: 92 MMHG | RESPIRATION RATE: 16 BRPM | SYSTOLIC BLOOD PRESSURE: 177 MMHG

## 2025-06-02 DIAGNOSIS — N31.9 NEUROGENIC BLADDER: ICD-10-CM

## 2025-06-02 DIAGNOSIS — T14.8XXA BLOOD BLISTER: Primary | ICD-10-CM

## 2025-06-02 DIAGNOSIS — G82.50 SPASTIC QUADRIPLEGIA (H): ICD-10-CM

## 2025-06-02 DIAGNOSIS — Z97.8 FOLEY CATHETER IN PLACE: ICD-10-CM

## 2025-06-02 DIAGNOSIS — G35 MS (MULTIPLE SCLEROSIS) (H): ICD-10-CM

## 2025-06-02 DIAGNOSIS — G70.9 NEUROMUSCULAR RESPIRATORY WEAKNESS (H): ICD-10-CM

## 2025-06-02 DIAGNOSIS — J99 NEUROMUSCULAR RESPIRATORY WEAKNESS (H): ICD-10-CM

## 2025-06-02 LAB — CALCIUM SERPL-MCNC: 9.8 MG/DL (ref 8.8–10.4)

## 2025-06-02 PROCEDURE — P9604 ONE-WAY ALLOW PRORATED TRIP: HCPCS | Mod: ORL | Performed by: FAMILY MEDICINE

## 2025-06-02 PROCEDURE — 99349 HOME/RES VST EST MOD MDM 40: CPT | Performed by: NURSE PRACTITIONER

## 2025-06-02 PROCEDURE — 36415 COLL VENOUS BLD VENIPUNCTURE: CPT | Mod: ORL | Performed by: FAMILY MEDICINE

## 2025-06-02 PROCEDURE — 82310 ASSAY OF CALCIUM: CPT | Mod: ORL | Performed by: FAMILY MEDICINE

## 2025-06-02 NOTE — PROGRESS NOTES
[facility] GERIATRICS  ACUTE/EPISODIC VISIT    [facility] Medical Record Number:  [id number]  Place of Service where encounter took place:  [facility] [place] ([facility]) [[id number]]    Chief Complaint   Patient presents with    RECHECK       HPI:    Cristina Stevens is a 79 year old  (1945), who is being seen today for an episodic care visit.  HPI information obtained from: {FGS HPI:329378}.    Today's concern is:      ALLERGIES:    Allergies   Allergen Reactions    Nitrofuran Derivatives Visual Disturbance     Hallucinations    Tetracyclines & Related GI Disturbance    Amlodipine Other (See Comments)     Bloating/edema    Ampicillin GI Disturbance    Cefadroxil Muscle Pain (Myalgia)    Cephalexin Diarrhea and GI Disturbance    Levofloxacin Other (See Comments)     Phlebitis with IV infusion    Sulfa Antibiotics Nausea and Vomiting, GI Disturbance and Unknown     Reaction occurred as a child    Sulfasalazine Nausea and Vomiting        MEDICATIONS:  Post Discharge Medication Reconciliation Status: {ACO Med Rec (Provider):625487}. ***    Current Outpatient Medications   Medication Sig Dispense Refill    acetaminophen (TYLENOL) 650 MG suppository Place 1 suppository (650 mg) rectally every 6 hours as needed for fever or mild pain.      amoxicillin (AMOXIL) 250 MG chewable tablet CHEW AND SWALLOW 8 TABLETS (2000 MG) BY MOUTH 1 HOUR PRIOR TO DENTAL 8 tablet 97    apixaban ANTICOAGULANT (ELIQUIS) 5 MG tablet Take 1 tablet (5 mg) by mouth 2 times daily. 60 tablet 11    armodafinil (NUVIGIL) 150 MG TABS tablet Take 1 tablet (150 mg) by mouth every morning. 30 tablet 5    Bacillus Coagulans-Inulin (PROBIOTIC FORMULA) 1-250 BILLION-MG CAPS Take 2 capsules by mouth daily. 60 capsule 11    clopidogrel (PLAVIX) 75 MG tablet Take 1 tablet (75 mg) by mouth daily. 30 tablet 11    CO2-Releasing (-TWO) SUPP 1 supp daily prn and if not effective or falls out, may repeat again until results achieved.  May keep at  bedside      losartan (COZAAR) 25 MG tablet Take 0.5 tablets (12.5 mg) by mouth daily. 15 tablet 11    methenamine hippurate (HIPREX) 1 g tablet Take 1 tablet (1 g) by mouth 2 times daily. 60 tablet 11    Multiple Vitamin (DAILY DAWSON) TABS Take 1 tablet by mouth daily. 30 tablet 11    nystatin (MYCOSTATIN) 436804 UNIT/GM external powder Topically between toes every evening 60 g 11    pantoprazole (PROTONIX) 40 MG EC tablet Take 1 tablet (40 mg) by mouth daily. 30 tablet 11    polyethylene glycol (MIRALAX/GLYCOLAX) powder MIX 17GM OF POWDER IN 8OZ OF WATER UNTIL COMPLETELY DISSOLVED. DRINK SOLUTION DAILY AS NEEDED 527 g 98    senna-docusate (SENEXON-S) 8.6-50 MG tablet Take 2 tablets by mouth daily as needed for constipation 62 tablet 12    simethicone (MYLICON) 125 MG chewable tablet 125mg po 4 times a day PRN for gas (may keep at bedside per request) 60 tablet 11    sterile water, bottle, (WATER FOR IRRIGATION, STERILE) irrigation 60ml  prn to irrigate ruby catheter when plugged 500 mL 11    vitamin C w/EDWIN HIPS 500 MG tablet Take 1 tablet (500 mg) by mouth 2 times daily. 60 tablet 11    Zinc Oxide (DESITIN) 13 % CREA Apply topically 3 times daily as needed (chaffing) 113 g 11     Medications reviewed:  Medications reconciled to facility chart and changes were made to reflect current medications as identified as above med list. Below are the changes that were made:   Medications stopped since last EPIC medication reconciliation:   There are no discontinued medications.    Medications started since last [facility] medication reconciliation:  No orders of the defined types were placed in this encounter.    ***    REVIEW OF SYSTEMS:  4 point ROS neg other than the symptoms noted above in the HPI.***  Unable to be obtained due to cognitive impairment or aphasia.   ROS    PHYSICAL EXAM:  BP (!) 177/92   Pulse 55   Temp (!) 95.5  F (35.3  C)   Resp 16   Wt 121.6 kg (268 lb)   LMP  (LMP Unknown)   BMI 39.58 kg/m     Physical Exam      ASSESSMENT / PLAN:  {FGS DX:653384}    Orders:  Cipro 500mg po BID x3 days to start evening on Wednesday for UTI prevention    Electronically signed by  CAROL Rodríguez CNP          diagnosis)  Comment: Did write orders for staff to initially cleanse the wound with normal saline, pat dry, apply piece of Telfa over the open area, wrapped with Kerlix and secure with tape staff could do this 3 times a week and as needed.  Staff will go talk to Muna about what it would cost for them to manage the wound and let her decide if she is going to allow them to take care of the wound since she chose not to have home care come see her.  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  She came out of her unresponsiveness and regained her strength at her baseline that she has chosen not to be on hospice anymore.  Had already reinstated most of her medications.  No specific new neurological symptoms.    (N31.9) Neurogenic bladder  (Z97.8) Edwards catheter in place  Comment: Muna requested an antibiotic to be started due to getting her catheter changed on Thursday.  She is prone to having a infection.  Agreed with her to do 3 days of Cipro as this is 1 medication she is not allergic to.  Will start at the night before her scheduled catheter change.    Orders:  Cipro 500mg po BID x3 days to start evening on Wednesday for UTI prevention  2.  Cleanse wound with NS, pat dry, apply non-stick dressing, wrap with kerlix and secure with tape.  Change 3x week and PRN for blood blister.    Electronically signed by  CAROL Rodríguez CNP

## 2025-06-02 NOTE — LETTER
6/2/2025      Cristina Stevens  [address]   [address]  [facility] [address]        No notes on file      Sincerely,        CAROL Rodríguez CNP    Electronically signed    Muna chose the physical therapy.  Did not get into it with her about pricing with nursing having to do this treatment.  Nursing asked that an order be written first and then they would go talk to Muna about how much it would cost for them to manage this open blister.    ALLERGIES:    Allergies   Allergen Reactions     Nitrofuran Derivatives Visual Disturbance     Hallucinations     Tetracyclines & Related GI Disturbance     Amlodipine Other (See Comments)     Bloating/edema     Ampicillin GI Disturbance     Cefadroxil Muscle Pain (Myalgia)     Cephalexin Diarrhea and GI Disturbance     Levofloxacin Other (See Comments)     Phlebitis with IV infusion     Sulfa Antibiotics Nausea and Vomiting, GI Disturbance and Unknown     Reaction occurred as a child     Sulfasalazine Nausea and Vomiting        MEDICATIONS:  Post Discharge Medication Reconciliation Status: patient was not discharged from an inpatient facility or TCU.     Current Outpatient Medications   Medication Sig Dispense Refill     acetaminophen (TYLENOL) 650 MG suppository Place 1 suppository (650 mg) rectally every 6 hours as needed for fever or mild pain.       amoxicillin (AMOXIL) 250 MG chewable tablet CHEW AND SWALLOW 8 TABLETS (2000 MG) BY MOUTH 1 HOUR PRIOR TO DENTAL 8 tablet 97     apixaban ANTICOAGULANT (ELIQUIS) 5 MG tablet Take 1 tablet (5 mg) by mouth 2 times daily. 60 tablet 11     armodafinil (NUVIGIL) 150 MG TABS tablet Take 1 tablet (150 mg) by mouth every morning. 30 tablet 5     Bacillus Coagulans-Inulin (PROBIOTIC FORMULA) 1-250 BILLION-MG CAPS Take 2 capsules by mouth daily. 60 capsule 11     clopidogrel (PLAVIX) 75 MG tablet Take 1 tablet (75 mg) by mouth daily. 30 tablet 11     CO2-Releasing (-TWO) SUPP 1 supp daily prn and if not effective or falls out, may repeat again until results achieved.  May keep at bedside       losartan (COZAAR) 25 MG tablet Take 0.5 tablets (12.5 mg) by mouth daily. 15 tablet 11     methenamine hippurate  (HIPREX) 1 g tablet Take 1 tablet (1 g) by mouth 2 times daily. 60 tablet 11     Multiple Vitamin (DAILY DAWSON) TABS Take 1 tablet by mouth daily. 30 tablet 11     nystatin (MYCOSTATIN) 371338 UNIT/GM external powder Topically between toes every evening 60 g 11     pantoprazole (PROTONIX) 40 MG EC tablet Take 1 tablet (40 mg) by mouth daily. 30 tablet 11     polyethylene glycol (MIRALAX/GLYCOLAX) powder MIX 17GM OF POWDER IN 8OZ OF WATER UNTIL COMPLETELY DISSOLVED. DRINK SOLUTION DAILY AS NEEDED 527 g 98     senna-docusate (SENEXON-S) 8.6-50 MG tablet Take 2 tablets by mouth daily as needed for constipation 62 tablet 12     simethicone (MYLICON) 125 MG chewable tablet 125mg po 4 times a day PRN for gas (may keep at bedside per request) 60 tablet 11     sterile water, bottle, (WATER FOR IRRIGATION, STERILE) irrigation 60ml  prn to irrigate ruby catheter when plugged 500 mL 11     vitamin C w/EDWIN HIPS 500 MG tablet Take 1 tablet (500 mg) by mouth 2 times daily. 60 tablet 11     Zinc Oxide (DESITIN) 13 % CREA Apply topically 3 times daily as needed (chaffing) 113 g 11         REVIEW OF SYSTEMS:  4 point ROS neg other than the symptoms noted above in the HPI.      PHYSICAL EXAM:  BP (!) 177/92   Pulse 55   Temp (!) 95.5  F (35.3  C)   Resp 16   Wt 121.6 kg (268 lb)   LMP  (LMP Unknown)   BMI 39.58 kg/m    Peeled back the foam dressing and underneath was opened blister with skin flap still present.  The dressing included a piece of Telfa and then the foam dressing over that.  Does not appear to be infected.  Scant amount of blood on the Telfa pad    Otherwise skin is pink, dry, warm to touch  rocky was already taking care of by the aides and just laying in bed prior to breakfast as it takes 2 aides to transfer her with the lift    Heart rate was regular with S1 and S2 heard.  Lungs were clear in the bases.  Voice was fairly strong enough to able to hear without asking her to repeat the questions    Abdomen was  soft, round, bowel sounds present      ASSESSMENT / PLAN:  (T14.8XXA) Blood blister  (primary encounter diagnosis)  Comment: Did write orders for staff to initially cleanse the wound with normal saline, pat dry, apply piece of Telfa over the open area, wrapped with Kerlix and secure with tape staff could do this 3 times a week and as needed.  Staff will go talk to Muna about what it would cost for them to manage the wound and let her decide if she is going to allow them to take care of the wound since she chose not to have home care come see her.  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  She came out of her unresponsiveness and regained her strength at her baseline that she has chosen not to be on hospice anymore.  Had already reinstated most of her medications.  No specific new neurological symptoms.    (N31.9) Neurogenic bladder  (Z97.8) Edwards catheter in place  Comment: Muna requested an antibiotic to be started due to getting her catheter changed on Thursday.  She is prone to having a infection.  Agreed with her to do 3 days of Cipro as this is 1 medication she is not allergic to.  Will start at the night before her scheduled catheter change.    Orders:  Cipro 500mg po BID x3 days to start evening on Wednesday for UTI prevention  2.  Cleanse wound with NS, pat dry, apply non-stick dressing, wrap with kerlix and secure with tape.  Change 3x week and PRN for blood blister.    Electronically signed by  CAROL Rodríguez CNP           Sincerely,        CAROL Rodríguez CNP    Electronically signed

## 2025-06-04 ENCOUNTER — LAB REQUISITION (OUTPATIENT)
Dept: LAB | Facility: CLINIC | Age: 80
End: 2025-06-04
Payer: COMMERCIAL

## 2025-06-04 DIAGNOSIS — I10 ESSENTIAL (PRIMARY) HYPERTENSION: ICD-10-CM

## 2025-06-04 DIAGNOSIS — G35 MULTIPLE SCLEROSIS (H): ICD-10-CM

## 2025-06-05 ENCOUNTER — RESULTS FOLLOW-UP (OUTPATIENT)
Dept: INTERNAL MEDICINE | Facility: CLINIC | Age: 80
End: 2025-06-05

## 2025-06-09 ENCOUNTER — RESULTS FOLLOW-UP (OUTPATIENT)
Dept: GERIATRICS | Facility: CLINIC | Age: 80
End: 2025-06-09

## 2025-06-09 LAB
ANION GAP SERPL CALCULATED.3IONS-SCNC: 10 MMOL/L (ref 7–15)
BUN SERPL-MCNC: 23 MG/DL (ref 8–23)
CALCIUM SERPL-MCNC: 10.5 MG/DL (ref 8.8–10.4)
CHLORIDE SERPL-SCNC: 106 MMOL/L (ref 98–107)
CREAT SERPL-MCNC: 0.99 MG/DL (ref 0.51–0.95)
EGFRCR SERPLBLD CKD-EPI 2021: 58 ML/MIN/1.73M2
ERYTHROCYTE [DISTWIDTH] IN BLOOD BY AUTOMATED COUNT: 19.4 % (ref 10–15)
GLUCOSE SERPL-MCNC: 92 MG/DL (ref 70–99)
HCO3 SERPL-SCNC: 24 MMOL/L (ref 22–29)
HCT VFR BLD AUTO: 34.3 % (ref 35–47)
HGB BLD-MCNC: 10.1 G/DL (ref 11.7–15.7)
MCH RBC QN AUTO: 27 PG (ref 26.5–33)
MCHC RBC AUTO-ENTMCNC: 29.4 G/DL (ref 31.5–36.5)
MCV RBC AUTO: 92 FL (ref 78–100)
PLATELET # BLD AUTO: 345 10E3/UL (ref 150–450)
POTASSIUM SERPL-SCNC: 4.2 MMOL/L (ref 3.4–5.3)
RBC # BLD AUTO: 3.74 10E6/UL (ref 3.8–5.2)
SODIUM SERPL-SCNC: 140 MMOL/L (ref 135–145)
WBC # BLD AUTO: 6.4 10E3/UL (ref 4–11)

## 2025-06-09 PROCEDURE — P9604 ONE-WAY ALLOW PRORATED TRIP: HCPCS | Mod: ORL | Performed by: NURSE PRACTITIONER

## 2025-06-09 PROCEDURE — 36415 COLL VENOUS BLD VENIPUNCTURE: CPT | Mod: ORL | Performed by: NURSE PRACTITIONER

## 2025-06-09 PROCEDURE — 80048 BASIC METABOLIC PNL TOTAL CA: CPT | Mod: ORL | Performed by: NURSE PRACTITIONER

## 2025-06-09 PROCEDURE — 85027 COMPLETE CBC AUTOMATED: CPT | Mod: ORL | Performed by: NURSE PRACTITIONER

## 2025-06-16 ENCOUNTER — ASSISTED LIVING VISIT (OUTPATIENT)
Dept: GERIATRICS | Facility: CLINIC | Age: 80
End: 2025-06-16
Payer: COMMERCIAL

## 2025-06-16 VITALS
TEMPERATURE: 95.5 F | OXYGEN SATURATION: 94 % | WEIGHT: 268 LBS | RESPIRATION RATE: 16 BRPM | HEART RATE: 55 BPM | BODY MASS INDEX: 39.58 KG/M2 | DIASTOLIC BLOOD PRESSURE: 92 MMHG | SYSTOLIC BLOOD PRESSURE: 177 MMHG

## 2025-06-16 DIAGNOSIS — L03.116 CELLULITIS OF LEFT LOWER EXTREMITY: Primary | ICD-10-CM

## 2025-06-16 DIAGNOSIS — G35 MS (MULTIPLE SCLEROSIS) (H): ICD-10-CM

## 2025-06-16 DIAGNOSIS — G70.9 NEUROMUSCULAR RESPIRATORY WEAKNESS (H): ICD-10-CM

## 2025-06-16 DIAGNOSIS — T14.8XXA BLOOD BLISTER: ICD-10-CM

## 2025-06-16 DIAGNOSIS — G82.50 SPASTIC QUADRIPLEGIA (H): ICD-10-CM

## 2025-06-16 DIAGNOSIS — J99 NEUROMUSCULAR RESPIRATORY WEAKNESS (H): ICD-10-CM

## 2025-06-16 RX ORDER — DOXYCYCLINE 100 MG/1
100 CAPSULE ORAL 2 TIMES DAILY
Qty: 14 CAPSULE | Refills: 0 | Status: SHIPPED | OUTPATIENT
Start: 2025-06-16 | End: 2025-06-23

## 2025-06-16 NOTE — LETTER
6/16/2025      Cristina Stevens  [address]   [address]  [facility] [address]        No notes on file      Sincerely,        CAROL Rodríguez CNP    Electronically signed     Allergies   Allergen Reactions     Nitrofuran Derivatives Visual Disturbance     Hallucinations     Tetracyclines & Related GI Disturbance     Amlodipine Other (See Comments)     Bloating/edema     Ampicillin GI Disturbance     Cefadroxil Muscle Pain (Myalgia)     Cephalexin Diarrhea and GI Disturbance     Levofloxacin Other (See Comments)     Phlebitis with IV infusion     Sulfa Antibiotics Nausea and Vomiting, GI Disturbance and Unknown     Reaction occurred as a child     Sulfasalazine Nausea and Vomiting        MEDICATIONS:  Post Discharge Medication Reconciliation Status: patient was not discharged from an inpatient facility or TCU.     Current Outpatient Medications   Medication Sig Dispense Refill     doxycycline hyclate (VIBRAMYCIN) 100 MG capsule Take 1 capsule (100 mg) by mouth 2 times daily for 7 days. 14 capsule 0     acetaminophen (TYLENOL) 650 MG suppository Place 1 suppository (650 mg) rectally every 6 hours as needed for fever or mild pain.       amoxicillin (AMOXIL) 250 MG chewable tablet CHEW AND SWALLOW 8 TABLETS (2000 MG) BY MOUTH 1 HOUR PRIOR TO DENTAL 8 tablet 97     apixaban ANTICOAGULANT (ELIQUIS) 5 MG tablet Take 1 tablet (5 mg) by mouth 2 times daily. 60 tablet 11     armodafinil (NUVIGIL) 150 MG TABS tablet Take 1 tablet (150 mg) by mouth every morning. 30 tablet 5     Bacillus Coagulans-Inulin (PROBIOTIC FORMULA) 1-250 BILLION-MG CAPS Take 2 capsules by mouth daily. 60 capsule 11     clopidogrel (PLAVIX) 75 MG tablet Take 1 tablet (75 mg) by mouth daily. 30 tablet 11     CO2-Releasing (-TWO) SUPP 1 supp daily prn and if not effective or falls out, may repeat again until results achieved.  May keep at bedside       losartan (COZAAR) 25 MG tablet Take 0.5 tablets (12.5 mg) by mouth daily. 15 tablet 11     methenamine hippurate (HIPREX) 1 g tablet Take 1 tablet (1 g) by mouth 2 times daily. 60 tablet 11     Multiple Vitamin (DAILY DAWSON) TABS Take 1 tablet by mouth daily. 30 tablet 11      nystatin (MYCOSTATIN) 342315 UNIT/GM external powder Topically between toes every evening 60 g 11     pantoprazole (PROTONIX) 40 MG EC tablet Take 1 tablet (40 mg) by mouth daily. 30 tablet 11     polyethylene glycol (MIRALAX/GLYCOLAX) powder MIX 17GM OF POWDER IN 8OZ OF WATER UNTIL COMPLETELY DISSOLVED. DRINK SOLUTION DAILY AS NEEDED 527 g 98     senna-docusate (SENEXON-S) 8.6-50 MG tablet Take 2 tablets by mouth daily as needed for constipation 62 tablet 12     simethicone (MYLICON) 125 MG chewable tablet 125mg po 4 times a day PRN for gas (may keep at bedside per request) 60 tablet 11     sterile water, bottle, (WATER FOR IRRIGATION, STERILE) irrigation 60ml  prn to irrigate ruby catheter when plugged 500 mL 11     vitamin C w/EDWIN HIPS 500 MG tablet Take 1 tablet (500 mg) by mouth 2 times daily. 60 tablet 11     Zinc Oxide (DESITIN) 13 % CREA Apply topically 3 times daily as needed (chaffing) 113 g 11     Medications reviewed:  Medications reconciled to facility chart and changes were made to reflect current medications as identified as above med list. Below are the changes that were made:   Medications stopped since last EPIC medication reconciliation:   There are no discontinued medications.    Medications started since last [facility] medication reconciliation:  Orders Placed This Encounter   Medications     doxycycline hyclate (VIBRAMYCIN) 100 MG capsule     Sig: Take 1 capsule (100 mg) by mouth 2 times daily for 7 days.     Dispense:  14 capsule     Refill:  0         REVIEW OF SYSTEMS:  4 point ROS neg other than the symptoms noted above in the HPI.  No chest pain, no SOB.      PHYSICAL EXAM:  BP (!) 177/92   Pulse 55   Temp (!) 95.5  F (35.3  C)   Resp 16   Wt 121.6 kg (268 lb)   LMP  (LMP Unknown)   SpO2 94%   BMI 39.58 kg/m      Area in observance is 5cm in length and 2.3cm Width with no depth.  Edges demarcated.  The healthy skin is deep pink around the edges.    The wound appears like sluff from  11 o'clock to 2 o'clock which is 10%  The other 90% is smooth and faded blood color.      Alert and oriented x3.  No respiratory distress.  Heart rate regular and strong.    Voice is soft due to her MS muscle weakness.  Non-ambulatory.  Lift is used for transfers.  Able to control her electric wheelchair.      ASSESSMENT / PLAN:  (L03.116) Cellulitis of left lower extremity  (primary encounter diagnosis)  Comment: to be safe will order a ABX for 7 days given the area's appearance.  Will order Doxycycline 100mg BID for 7 days.    Plan: doxycycline hyclate (VIBRAMYCIN) 100 MG capsule    (T14.8XXA) Blood blister  Comment: placed a call to wound/vascular surgery through [facility].  They will call this NP later with an appointment time.    Feel she needs a different treatment and possible debridement.    Will keep in touch with Muna to communicate about appointment with [facility] and stop in to see her next week.    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  Dependent on staff to meet many of her needs.     Muna feels everything is stable with her MS right now.      Orders:  Doxycycline 100mg po BID for 7 days cellulitis of left lower leg    Electronically signed by  CAROL Rodríguez CNP            Sincerely,        CAROL Rodríguez CNP    Electronically signed

## 2025-06-16 NOTE — PROGRESS NOTES
[facility] GERIATRICS  ACUTE/EPISODIC VISIT    [facility] Medical Record Number:  [id number]  Place of Service where encounter took place:  [facility] [place] ([facility]) [[id number]]    Chief Complaint   Patient presents with    RECHECK       HPI:    Cristina Stevens is a 79 year old  (1945), who is being seen today for an episodic care visit.  HPI information obtained from: {FGS HPI:620996}.    Today's concern is:      ALLERGIES:    Allergies   Allergen Reactions    Nitrofuran Derivatives Visual Disturbance     Hallucinations    Tetracyclines & Related GI Disturbance    Amlodipine Other (See Comments)     Bloating/edema    Ampicillin GI Disturbance    Cefadroxil Muscle Pain (Myalgia)    Cephalexin Diarrhea and GI Disturbance    Levofloxacin Other (See Comments)     Phlebitis with IV infusion    Sulfa Antibiotics Nausea and Vomiting, GI Disturbance and Unknown     Reaction occurred as a child    Sulfasalazine Nausea and Vomiting        MEDICATIONS:  Post Discharge Medication Reconciliation Status: {ACO Med Rec (Provider):491150}. ***    Current Outpatient Medications   Medication Sig Dispense Refill    doxycycline hyclate (VIBRAMYCIN) 100 MG capsule Take 1 capsule (100 mg) by mouth 2 times daily for 7 days. 14 capsule 0    acetaminophen (TYLENOL) 650 MG suppository Place 1 suppository (650 mg) rectally every 6 hours as needed for fever or mild pain.      amoxicillin (AMOXIL) 250 MG chewable tablet CHEW AND SWALLOW 8 TABLETS (2000 MG) BY MOUTH 1 HOUR PRIOR TO DENTAL 8 tablet 97    apixaban ANTICOAGULANT (ELIQUIS) 5 MG tablet Take 1 tablet (5 mg) by mouth 2 times daily. 60 tablet 11    armodafinil (NUVIGIL) 150 MG TABS tablet Take 1 tablet (150 mg) by mouth every morning. 30 tablet 5    Bacillus Coagulans-Inulin (PROBIOTIC FORMULA) 1-250 BILLION-MG CAPS Take 2 capsules by mouth daily. 60 capsule 11    clopidogrel (PLAVIX) 75 MG tablet Take 1 tablet (75 mg) by mouth daily. 30 tablet 11    CO2-Releasing  (-TWO) SUPP 1 supp daily prn and if not effective or falls out, may repeat again until results achieved.  May keep at bedside      losartan (COZAAR) 25 MG tablet Take 0.5 tablets (12.5 mg) by mouth daily. 15 tablet 11    methenamine hippurate (HIPREX) 1 g tablet Take 1 tablet (1 g) by mouth 2 times daily. 60 tablet 11    Multiple Vitamin (DAILY DAWSON) TABS Take 1 tablet by mouth daily. 30 tablet 11    nystatin (MYCOSTATIN) 878048 UNIT/GM external powder Topically between toes every evening 60 g 11    pantoprazole (PROTONIX) 40 MG EC tablet Take 1 tablet (40 mg) by mouth daily. 30 tablet 11    polyethylene glycol (MIRALAX/GLYCOLAX) powder MIX 17GM OF POWDER IN 8OZ OF WATER UNTIL COMPLETELY DISSOLVED. DRINK SOLUTION DAILY AS NEEDED 527 g 98    senna-docusate (SENEXON-S) 8.6-50 MG tablet Take 2 tablets by mouth daily as needed for constipation 62 tablet 12    simethicone (MYLICON) 125 MG chewable tablet 125mg po 4 times a day PRN for gas (may keep at bedside per request) 60 tablet 11    sterile water, bottle, (WATER FOR IRRIGATION, STERILE) irrigation 60ml  prn to irrigate ruby catheter when plugged 500 mL 11    vitamin C w/EDWIN HIPS 500 MG tablet Take 1 tablet (500 mg) by mouth 2 times daily. 60 tablet 11    Zinc Oxide (DESITIN) 13 % CREA Apply topically 3 times daily as needed (chaffing) 113 g 11     Medications reviewed:  Medications reconciled to facility chart and changes were made to reflect current medications as identified as above med list. Below are the changes that were made:   Medications stopped since last EPIC medication reconciliation:   There are no discontinued medications.    Medications started since last [facility] medication reconciliation:  Orders Placed This Encounter   Medications    doxycycline hyclate (VIBRAMYCIN) 100 MG capsule     Sig: Take 1 capsule (100 mg) by mouth 2 times daily for 7 days.     Dispense:  14 capsule     Refill:  0     ***    REVIEW OF SYSTEMS:  4 point ROS neg other than the  symptoms noted above in the HPI.***  Unable to be obtained due to cognitive impairment or aphasia.   ROS    PHYSICAL EXAM:  BP (!) 177/92   Pulse 55   Temp (!) 95.5  F (35.3  C)   Resp 16   Wt 121.6 kg (268 lb)   LMP  (LMP Unknown)   SpO2 94%   BMI 39.58 kg/m    Physical Exam      ASSESSMENT / PLAN:  {FGS DX:224776}    Orders:  ***    Electronically signed by  Sandrita Kimball         respiratory distress.  Heart rate regular and strong.    Voice is soft due to her MS muscle weakness.  Non-ambulatory.  Lift is used for transfers.  Able to control her electric wheelchair.      ASSESSMENT / PLAN:  (L03.116) Cellulitis of left lower extremity  (primary encounter diagnosis)  Comment: to be safe will order a ABX for 7 days given the area's appearance.  Will order Doxycycline 100mg BID for 7 days.    Plan: doxycycline hyclate (VIBRAMYCIN) 100 MG capsule    (T14.8XXA) Blood blister  Comment: placed a call to wound/vascular surgery through [facility].  They will call this NP later with an appointment time.    Feel she needs a different treatment and possible debridement.    Will keep in touch with Muna to communicate about appointment with [facility] and stop in to see her next week.    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  Dependent on staff to meet many of her needs.     Muna feels everything is stable with her MS right now.      Orders:  Doxycycline 100mg po BID for 7 days cellulitis of left lower leg    Electronically signed by  CAROL Rodríguez CNP

## 2025-06-23 ENCOUNTER — ASSISTED LIVING VISIT (OUTPATIENT)
Dept: GERIATRICS | Facility: CLINIC | Age: 80
End: 2025-06-23
Payer: COMMERCIAL

## 2025-06-23 VITALS
HEART RATE: 55 BPM | TEMPERATURE: 95.5 F | WEIGHT: 268 LBS | BODY MASS INDEX: 39.58 KG/M2 | OXYGEN SATURATION: 94 % | RESPIRATION RATE: 16 BRPM | SYSTOLIC BLOOD PRESSURE: 177 MMHG | DIASTOLIC BLOOD PRESSURE: 92 MMHG

## 2025-06-23 DIAGNOSIS — N18.31 CHRONIC KIDNEY DISEASE, STAGE 3A (H): ICD-10-CM

## 2025-06-23 DIAGNOSIS — S81.802D OPEN WOUND OF LEFT LOWER EXTREMITY, SUBSEQUENT ENCOUNTER: Primary | ICD-10-CM

## 2025-06-23 DIAGNOSIS — J99 NEUROMUSCULAR RESPIRATORY WEAKNESS (H): ICD-10-CM

## 2025-06-23 DIAGNOSIS — G82.50 SPASTIC QUADRIPLEGIA (H): ICD-10-CM

## 2025-06-23 DIAGNOSIS — G35 MS (MULTIPLE SCLEROSIS) (H): ICD-10-CM

## 2025-06-23 DIAGNOSIS — G70.9 NEUROMUSCULAR RESPIRATORY WEAKNESS (H): ICD-10-CM

## 2025-06-23 PROCEDURE — 99349 HOME/RES VST EST MOD MDM 40: CPT | Performed by: NURSE PRACTITIONER

## 2025-06-23 NOTE — LETTER
6/23/2025      Cristina Stevens  [address]   [address]  [facility] [address]        No notes on file      Sincerely,        CAROL Rodríguez CNP    Electronically signed   moistened gauze and silicone dressing.  To RTC in one month.      ALLERGIES:    Allergies   Allergen Reactions     Nitrofuran Derivatives Visual Disturbance     Hallucinations     Tetracyclines & Related GI Disturbance     Amlodipine Other (See Comments)     Bloating/edema     Ampicillin GI Disturbance     Cefadroxil Muscle Pain (Myalgia)     Cephalexin Diarrhea and GI Disturbance     Levofloxacin Other (See Comments)     Phlebitis with IV infusion     Sulfa Antibiotics Nausea and Vomiting, GI Disturbance and Unknown     Reaction occurred as a child     Sulfasalazine Nausea and Vomiting        MEDICATIONS:  Post Discharge Medication Reconciliation Status: patient was not discharged from an inpatient facility or TCU. Medications reviewed today    Current Outpatient Medications   Medication Sig Dispense Refill     acetaminophen (TYLENOL) 650 MG suppository Place 1 suppository (650 mg) rectally every 6 hours as needed for fever or mild pain.       amoxicillin (AMOXIL) 250 MG chewable tablet CHEW AND SWALLOW 8 TABLETS (2000 MG) BY MOUTH 1 HOUR PRIOR TO DENTAL 8 tablet 97     apixaban ANTICOAGULANT (ELIQUIS) 5 MG tablet Take 1 tablet (5 mg) by mouth 2 times daily. 60 tablet 11     armodafinil (NUVIGIL) 150 MG TABS tablet Take 1 tablet (150 mg) by mouth every morning. 30 tablet 5     Bacillus Coagulans-Inulin (PROBIOTIC FORMULA) 1-250 BILLION-MG CAPS Take 2 capsules by mouth daily. 60 capsule 11     clopidogrel (PLAVIX) 75 MG tablet Take 1 tablet (75 mg) by mouth daily. 30 tablet 11     CO2-Releasing (-TWO) SUPP 1 supp daily prn and if not effective or falls out, may repeat again until results achieved.  May keep at bedside       doxycycline hyclate (VIBRAMYCIN) 100 MG capsule Take 1 capsule (100 mg) by mouth 2 times daily for 7 days. 14 capsule 0     losartan (COZAAR) 25 MG tablet Take 0.5 tablets (12.5 mg) by mouth daily. 15 tablet 11     methenamine hippurate (HIPREX) 1 g tablet Take 1 tablet (1 g) by mouth 2 times  daily. 60 tablet 11     Multiple Vitamin (DAILY DAWSON) TABS Take 1 tablet by mouth daily. 30 tablet 11     nystatin (MYCOSTATIN) 174088 UNIT/GM external powder Topically between toes every evening 60 g 11     pantoprazole (PROTONIX) 40 MG EC tablet Take 1 tablet (40 mg) by mouth daily. 30 tablet 11     polyethylene glycol (MIRALAX/GLYCOLAX) powder MIX 17GM OF POWDER IN 8OZ OF WATER UNTIL COMPLETELY DISSOLVED. DRINK SOLUTION DAILY AS NEEDED 527 g 98     senna-docusate (SENEXON-S) 8.6-50 MG tablet Take 2 tablets by mouth daily as needed for constipation 62 tablet 12     simethicone (MYLICON) 125 MG chewable tablet 125mg po 4 times a day PRN for gas (may keep at bedside per request) 60 tablet 11     sterile water, bottle, (WATER FOR IRRIGATION, STERILE) irrigation 60ml  prn to irrigate ruby catheter when plugged 500 mL 11     vitamin C w/EDWIN HIPS 500 MG tablet Take 1 tablet (500 mg) by mouth 2 times daily. 60 tablet 11     Zinc Oxide (DESITIN) 13 % CREA Apply topically 3 times daily as needed (chaffing) 113 g 11         REVIEW OF SYSTEMS:  4 point ROS neg other than the symptoms noted above in the HPI.      PHYSICAL EXAM:  BP (!) 177/92   Pulse 55   Temp (!) 95.5  F (35.3  C)   Resp 16   Wt 121.6 kg (268 lb)   LMP  (LMP Unknown)   SpO2 94%   BMI 39.58 kg/m      Lifted up her left pant leg and saw dressing present.  Unclear how often a change.  Not going to look at wound without proper directions on changing dressing.    Do not see any redness beyond the dressing.    Heart rate regular and strong.  No breathing issues.  Is strapped into her electric wheelchair to keep her shoulders back and has a tray table around her middle.    Abdomen is soft, and non-tender.  No recent weight.    Trace edema in lower legs.    Leg catheter bag present and yellow urine in bag.      Component      Latest Ref Rng 6/9/2025  8:49 AM   Sodium      135 - 145 mmol/L 140    Potassium      3.4 - 5.3 mmol/L 4.2    Chloride      98 - 107  mmol/L 106    Carbon Dioxide (CO2)      22 - 29 mmol/L 24    Anion Gap      7 - 15 mmol/L 10    Urea Nitrogen      8.0 - 23.0 mg/dL 23.0    Creatinine      0.51 - 0.95 mg/dL 0.99 (H)    GFR Estimate      >60 mL/min/1.73m2 58 (L)    Calcium      8.8 - 10.4 mg/dL 10.5 (H)    Glucose      70 - 99 mg/dL 92         ASSESSMENT / PLAN:  (S81.632D) Open wound of left lower extremity, subsequent encounter  (primary encounter diagnosis)  Comment: will be managed with vascular/wound clinic through [facility] as they have a history with her.  The friend will do the dressing changes.  Probably will not hear from the friend on how she is doing if all is stable.  Usually only notified if in crisis.  No antibiotics felt needed with the 6/20 visit.    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  Not on hospice anymore as she had a spell where not responding for a few days.  [facility]  Lift is used for transfers.  Able to feed self and does better with softer foods or things easily held.  Eats in her apartment.  No changes with her medications.      (N18.31) Chronic kidney disease, stage 3a (H)  Comment: alerted that there is a suspicion for stage 3a CKD.  She does have two BMP's done within last 6 months with GFR in the 50's.  Otherwise other GFRs have been above 60.  Notice that her GFRs are different pending acute illness.    Orders:  No new orders today.    Electronically signed by  CAROL Rodríguez CNP            Sincerely,        CAROL Rodríguez CNP    Electronically signed

## 2025-06-23 NOTE — PROGRESS NOTES
[facility] GERIATRICS  ACUTE/EPISODIC VISIT    [facility] Medical Record Number:  [id number]  Place of Service where encounter took place:  [facility] [place] ([facility]) [[id number]]    Chief Complaint   Patient presents with    RECHECK       HPI:    Cristina Stevens is a 80 year old  (1945), who is being seen today for an episodic care visit.  HPI information obtained from: {FGS HPI:654894}.    Today's concern is:      ALLERGIES:    Allergies   Allergen Reactions    Nitrofuran Derivatives Visual Disturbance     Hallucinations    Tetracyclines & Related GI Disturbance    Amlodipine Other (See Comments)     Bloating/edema    Ampicillin GI Disturbance    Cefadroxil Muscle Pain (Myalgia)    Cephalexin Diarrhea and GI Disturbance    Levofloxacin Other (See Comments)     Phlebitis with IV infusion    Sulfa Antibiotics Nausea and Vomiting, GI Disturbance and Unknown     Reaction occurred as a child    Sulfasalazine Nausea and Vomiting        MEDICATIONS:  Post Discharge Medication Reconciliation Status: {ACO Med Rec (Provider):823559}. ***    Current Outpatient Medications   Medication Sig Dispense Refill    acetaminophen (TYLENOL) 650 MG suppository Place 1 suppository (650 mg) rectally every 6 hours as needed for fever or mild pain.      amoxicillin (AMOXIL) 250 MG chewable tablet CHEW AND SWALLOW 8 TABLETS (2000 MG) BY MOUTH 1 HOUR PRIOR TO DENTAL 8 tablet 97    apixaban ANTICOAGULANT (ELIQUIS) 5 MG tablet Take 1 tablet (5 mg) by mouth 2 times daily. 60 tablet 11    armodafinil (NUVIGIL) 150 MG TABS tablet Take 1 tablet (150 mg) by mouth every morning. 30 tablet 5    Bacillus Coagulans-Inulin (PROBIOTIC FORMULA) 1-250 BILLION-MG CAPS Take 2 capsules by mouth daily. 60 capsule 11    clopidogrel (PLAVIX) 75 MG tablet Take 1 tablet (75 mg) by mouth daily. 30 tablet 11    CO2-Releasing (-TWO) SUPP 1 supp daily prn and if not effective or falls out, may repeat again until results achieved.  May keep at  bedside      doxycycline hyclate (VIBRAMYCIN) 100 MG capsule Take 1 capsule (100 mg) by mouth 2 times daily for 7 days. 14 capsule 0    losartan (COZAAR) 25 MG tablet Take 0.5 tablets (12.5 mg) by mouth daily. 15 tablet 11    methenamine hippurate (HIPREX) 1 g tablet Take 1 tablet (1 g) by mouth 2 times daily. 60 tablet 11    Multiple Vitamin (DAILY DAWSON) TABS Take 1 tablet by mouth daily. 30 tablet 11    nystatin (MYCOSTATIN) 769386 UNIT/GM external powder Topically between toes every evening 60 g 11    pantoprazole (PROTONIX) 40 MG EC tablet Take 1 tablet (40 mg) by mouth daily. 30 tablet 11    polyethylene glycol (MIRALAX/GLYCOLAX) powder MIX 17GM OF POWDER IN 8OZ OF WATER UNTIL COMPLETELY DISSOLVED. DRINK SOLUTION DAILY AS NEEDED 527 g 98    senna-docusate (SENEXON-S) 8.6-50 MG tablet Take 2 tablets by mouth daily as needed for constipation 62 tablet 12    simethicone (MYLICON) 125 MG chewable tablet 125mg po 4 times a day PRN for gas (may keep at bedside per request) 60 tablet 11    sterile water, bottle, (WATER FOR IRRIGATION, STERILE) irrigation 60ml  prn to irrigate ruby catheter when plugged 500 mL 11    vitamin C w/EDWIN HIPS 500 MG tablet Take 1 tablet (500 mg) by mouth 2 times daily. 60 tablet 11    Zinc Oxide (DESITIN) 13 % CREA Apply topically 3 times daily as needed (chaffing) 113 g 11     Medications reviewed:  Medications reconciled to facility chart and changes were made to reflect current medications as identified as above med list. Below are the changes that were made:   Medications stopped since last EPIC medication reconciliation:   There are no discontinued medications.    Medications started since last [facility] medication reconciliation:  No orders of the defined types were placed in this encounter.    ***    REVIEW OF SYSTEMS:  4 point ROS neg other than the symptoms noted above in the HPI.***  Unable to be obtained due to cognitive impairment or aphasia.   ROS    PHYSICAL EXAM:  BP (!) 177/92    Pulse 55   Temp (!) 95.5  F (35.3  C)   Resp 16   Wt 121.6 kg (268 lb)   LMP  (LMP Unknown)   SpO2 94%   BMI 39.58 kg/m    Physical Exam      ASSESSMENT / PLAN:  {FGS DX:883295}    Orders:  ***    Electronically signed by  Sandrita Kimball          8.0 - 23.0 mg/dL 23.0    Creatinine      0.51 - 0.95 mg/dL 0.99 (H)    GFR Estimate      >60 mL/min/1.73m2 58 (L)    Calcium      8.8 - 10.4 mg/dL 10.5 (H)    Glucose      70 - 99 mg/dL 92         ASSESSMENT / PLAN:  (S88.195J) Open wound of left lower extremity, subsequent encounter  (primary encounter diagnosis)  Comment: will be managed with vascular/wound clinic through [facility] as they have a history with her.  The friend will do the dressing changes.  Probably will not hear from the friend on how she is doing if all is stable.  Usually only notified if in crisis.  No antibiotics felt needed with the 6/20 visit.    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  Not on hospice anymore as she had a spell where not responding for a few days.  Muna is living in extended care of the [facility].  Lift is used for transfers.  Able to feed self and does better with softer foods or things easily held.  Eats in her apartment.  No changes with her medications.      (N18.31) Chronic kidney disease, stage 3a (H)  Comment: alerted that there is a suspicion for stage 3a CKD.  She does have two BMP's done within last 6 months with GFR in the 50's.  Otherwise other GFRs have been above 60.  Notice that her GFRs are different pending acute illness.    Orders:  No new orders today.    Electronically signed by  CAROL Rodríguez CNP

## 2025-07-08 PROBLEM — N18.31 CHRONIC KIDNEY DISEASE, STAGE 3A (H): Status: ACTIVE | Noted: 2025-07-08

## 2025-07-12 ENCOUNTER — HEALTH MAINTENANCE LETTER (OUTPATIENT)
Age: 80
End: 2025-07-12

## 2025-07-16 ENCOUNTER — TELEPHONE (OUTPATIENT)
Dept: GERIATRICS | Facility: CLINIC | Age: 80
End: 2025-07-16
Payer: COMMERCIAL

## 2025-07-16 NOTE — TELEPHONE ENCOUNTER
Muna's friend called and stated Jayna needed an order for mapping of wheelchair cushion  and bottom and lateral pieces adjustment.    Fax number to send to included:  [phone]      Orders:  New mapping for wheelchair cushion to include buttocks and lateral pieces adjustment.      Dx:  Multiple Sclerosis (G35)    Electronically signed by Love Lucas RN, CNP

## 2025-07-28 ENCOUNTER — ASSISTED LIVING VISIT (OUTPATIENT)
Dept: GERIATRICS | Facility: CLINIC | Age: 80
End: 2025-07-28
Payer: COMMERCIAL

## 2025-07-28 VITALS
WEIGHT: 268 LBS | RESPIRATION RATE: 16 BRPM | BODY MASS INDEX: 39.69 KG/M2 | TEMPERATURE: 95.5 F | SYSTOLIC BLOOD PRESSURE: 177 MMHG | HEIGHT: 69 IN | DIASTOLIC BLOOD PRESSURE: 92 MMHG | OXYGEN SATURATION: 94 % | HEART RATE: 55 BPM

## 2025-07-28 DIAGNOSIS — M79.662 PAIN OF LEFT LOWER LEG: Primary | ICD-10-CM

## 2025-07-28 DIAGNOSIS — S81.802S OPEN WOUND OF LEFT LOWER EXTREMITY, SEQUELA: ICD-10-CM

## 2025-07-28 DIAGNOSIS — G82.50 SPASTIC QUADRIPLEGIA (H): ICD-10-CM

## 2025-07-28 DIAGNOSIS — G35 MS (MULTIPLE SCLEROSIS) (H): ICD-10-CM

## 2025-07-28 PROCEDURE — 99349 HOME/RES VST EST MOD MDM 40: CPT | Performed by: NURSE PRACTITIONER

## 2025-07-28 RX ORDER — GABAPENTIN 100 MG/1
CAPSULE ORAL
Qty: 60 CAPSULE | Refills: 2 | Status: SHIPPED | OUTPATIENT
Start: 2025-07-28

## 2025-07-28 RX ORDER — LIDOCAINE 4 G/G
PATCH TOPICAL
Qty: 30 PATCH | Refills: 0 | Status: SHIPPED | OUTPATIENT
Start: 2025-07-29 | End: 2025-08-25

## 2025-07-28 NOTE — LETTER
7/28/2025      Cristina Stevens  [address]   [address]  [facility] [address]        No notes on file      Sincerely,        CAROL Rodríguez CNP    Electronically signed   distress  CV:  regular rate and rhythm, no murmur, rub, or gallop, trace edema  ABDOMEN:  normal bowel sounds, soft, nontender, no hepatosplenomegaly or other masses  M/S:   Gait and station abnormal non-ambulatory, lift is used for transfers to electric chair.  No movement in legs.  Limited movement in hands and arms.  SKIN:  open wound on lateral side of lower left leg - recently seen by vascular clinic [facility] and debridement occurred.  Her friend dose the dressing changes per direction of clinic.  PSYCH:  oriented X 3, normal insight, judgement and memory, affect and mood normal    Recent labs in EPIC reviewed by me today.     Assessment/Plan:  (M27.932) Pain of left lower leg  (primary encounter diagnosis)  Comment: will order Gabapentin 100mg at HS x3 days and then to 200mg at HS.  She wants a pain patch applied by the wound as well.  Lidocaine 4% patch applied by the left leg wound - on at HS and off in AM.  Told Muna to update this NP on how she responds to the Gabapentin  Plan: gabapentin (NEURONTIN) 100 MG capsule,         Lidocaine (LIDOCARE) 4 % Patch    (S81.802S) Open wound of left lower extremity, sequela  Comment: managed with Vascular Surgery and recently debrided.  Did not see the wound as do not have directions of dressing.  Told Muna that sometime she could take pics and this NP will stop and see them.    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  Hopefully the Gabapentin will help in more ways then just pain.     MED REC REQUIRED  Post Medication Reconciliation Status: patient was not discharged from an inpatient facility or TCU    Current Outpatient Medications   Medication Sig Dispense Refill     gabapentin (NEURONTIN) 100 MG capsule 100mg po every HS x3 days then increase to 200mg po HS 60 capsule 2     Lidocaine (LIDOCARE) 4 % Patch Place one patch by left leg wound at HS and off  in AM 30 patch 0     acetaminophen (TYLENOL) 650 MG suppository Place 1 suppository (650 mg) rectally every 6 hours as needed for fever or mild pain.       amoxicillin (AMOXIL) 250 MG chewable tablet CHEW AND SWALLOW 8 TABLETS (2000 MG) BY MOUTH 1 HOUR PRIOR TO DENTAL 8 tablet 97     apixaban ANTICOAGULANT (ELIQUIS) 5 MG tablet Take 1 tablet (5 mg) by mouth 2 times daily. 60 tablet 11     armodafinil (NUVIGIL) 150 MG TABS tablet Take 1 tablet (150 mg) by mouth every morning. 30 tablet 5     Bacillus Coagulans-Inulin (PROBIOTIC FORMULA) 1-250 BILLION-MG CAPS Take 2 capsules by mouth daily. 60 capsule 11     clopidogrel (PLAVIX) 75 MG tablet Take 1 tablet (75 mg) by mouth daily. 30 tablet 11     CO2-Releasing (-TWO) SUPP 1 supp daily prn and if not effective or falls out, may repeat again until results achieved.  May keep at bedside       gabapentin (NEURONTIN) 100 MG capsule Take 1 capsule (100 mg) by mouth every 6 hours as needed for neuropathic pain. 30 capsule 4     losartan (COZAAR) 25 MG tablet Take 0.5 tablets (12.5 mg) by mouth daily. 15 tablet 11     methenamine hippurate (HIPREX) 1 g tablet Take 1 tablet (1 g) by mouth 2 times daily. 60 tablet 11     Multiple Vitamin (DAILY DAWSON) TABS Take 1 tablet by mouth daily. 30 tablet 11     nystatin (MYCOSTATIN) 618775 UNIT/GM external powder Topically between toes every evening 60 g 11     pantoprazole (PROTONIX) 40 MG EC tablet Take 1 tablet (40 mg) by mouth daily. 30 tablet 11     polyethylene glycol (MIRALAX/GLYCOLAX) powder MIX 17GM OF POWDER IN 8OZ OF WATER UNTIL COMPLETELY DISSOLVED. DRINK SOLUTION DAILY AS NEEDED 527 g 98     senna-docusate (SENEXON-S) 8.6-50 MG tablet Take 2 tablets by mouth daily as needed for constipation 62 tablet 12     simethicone (MYLICON) 125 MG chewable tablet 125mg po 4 times a day PRN for gas (may keep at bedside per request) 60 tablet 11     sterile water, bottle, (WATER FOR IRRIGATION, STERILE) irrigation 60ml  prn to irrigate  ruby catheter when plugged 500 mL 11     vitamin C w/EDWIN HIPS 500 MG tablet Take 1 tablet (500 mg) by mouth 2 times daily. 60 tablet 11     Zinc Oxide (DESITIN) 13 % CREA Apply topically 3 times daily as needed (chaffing) 113 g 11       Orders:  Orders written by NP:   Gabapentin 100mg po at HS x3 days then 200mg po at HS for left leg pain  Lidocaine patch 4% 1 patch applied to skin by left wound on at HS off in AM    Electronically signed by: CAROL Rodríguez CNP          Sincerely,        CAROL Rodríguez CNP    Electronically signed

## 2025-07-28 NOTE — PROGRESS NOTES
"[facility] GERIATRICS    Chief Complaint   Patient presents with    RECHECK     HPI:  Cristina Stevens is a 80 year old  (1945), who is being seen today for an episodic care visit at: [facility]) [[id number]]. Today's concern is: ***    Allergies, and PMH/PSH reviewed in [facility] today.  REVIEW OF SYSTEMS:  {pnnmjh20:003353}    Objective:   BP (!) 177/92   Pulse 55   Temp (!) 95.5  F (35.3  C)   Resp 16   Ht 1.753 m (5' 9\")   Wt 121.6 kg (268 lb)   SpO2 94%   BMI 39.58 kg/m    {Nursing home physical exam :198910}    {fgslab:101198}    Assessment/Plan:  {S DX2:163396}    MED REC REQUIRED{TIP  Click the link below to document or use med rec list, use list to pull in response :[id number]}  Post Medication Reconciliation Status: {MED REC LIST:818257}      Orders:  {fgsorders:837817}  ***    Electronically signed by: Mely Bermudez MA ***     " normal bowel sounds, soft, nontender, no hepatosplenomegaly or other masses  M/S:   Gait and station abnormal non-ambulatory, lift is used for transfers to electric chair.  No movement in legs.  Limited movement in hands and arms.  SKIN:  open wound on lateral side of lower left leg - recently seen by vascular [facility] and debridement occurred.  Her friend dose the dressing changes per direction of clinic.  PSYCH:  oriented X 3, normal insight, judgement and memory, affect and mood normal    Recent labs in EPIC reviewed by me today.     Assessment/Plan:  (M79.662) Pain of left lower leg  (primary encounter diagnosis)  Comment: will order Gabapentin 100mg at HS x3 days and then to 200mg at HS.  She wants a pain patch applied by the wound as well.  Lidocaine 4% patch applied by the left leg wound - on at HS and off in AM.  Told Muna to update this NP on how she responds to the Gabapentin  Plan: gabapentin (NEURONTIN) 100 MG capsule,         Lidocaine (LIDOCARE) 4 % Patch    (S81.802S) Open wound of left lower extremity, sequela  Comment: managed with Vascular Surgery and recently debrided.  Did not see the wound as do not have directions of dressing.  Told Muna that sometime she could take pics and this NP will stop and see them.    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  Hopefully the Gabapentin will help in more ways then just pain.     MED REC REQUIRED  Post Medication Reconciliation Status: patient was not discharged from an inpatient facility or TCU    Current Outpatient Medications   Medication Sig Dispense Refill    gabapentin (NEURONTIN) 100 MG capsule 100mg po every HS x3 days then increase to 200mg po HS 60 capsule 2    Lidocaine (LIDOCARE) 4 % Patch Place one patch by left leg wound at HS and off in AM 30 patch 0    acetaminophen (TYLENOL) 650 MG suppository Place 1 suppository (650 mg)  rectally every 6 hours as needed for fever or mild pain.      amoxicillin (AMOXIL) 250 MG chewable tablet CHEW AND SWALLOW 8 TABLETS (2000 MG) BY MOUTH 1 HOUR PRIOR TO DENTAL 8 tablet 97    apixaban ANTICOAGULANT (ELIQUIS) 5 MG tablet Take 1 tablet (5 mg) by mouth 2 times daily. 60 tablet 11    armodafinil (NUVIGIL) 150 MG TABS tablet Take 1 tablet (150 mg) by mouth every morning. 30 tablet 5    Bacillus Coagulans-Inulin (PROBIOTIC FORMULA) 1-250 BILLION-MG CAPS Take 2 capsules by mouth daily. 60 capsule 11    clopidogrel (PLAVIX) 75 MG tablet Take 1 tablet (75 mg) by mouth daily. 30 tablet 11    CO2-Releasing (-TWO) SUPP 1 supp daily prn and if not effective or falls out, may repeat again until results achieved.  May keep at bedside      gabapentin (NEURONTIN) 100 MG capsule Take 1 capsule (100 mg) by mouth every 6 hours as needed for neuropathic pain. 30 capsule 4    losartan (COZAAR) 25 MG tablet Take 0.5 tablets (12.5 mg) by mouth daily. 15 tablet 11    methenamine hippurate (HIPREX) 1 g tablet Take 1 tablet (1 g) by mouth 2 times daily. 60 tablet 11    Multiple Vitamin (DAILY DAWSON) TABS Take 1 tablet by mouth daily. 30 tablet 11    nystatin (MYCOSTATIN) 305050 UNIT/GM external powder Topically between toes every evening 60 g 11    pantoprazole (PROTONIX) 40 MG EC tablet Take 1 tablet (40 mg) by mouth daily. 30 tablet 11    polyethylene glycol (MIRALAX/GLYCOLAX) powder MIX 17GM OF POWDER IN 8OZ OF WATER UNTIL COMPLETELY DISSOLVED. DRINK SOLUTION DAILY AS NEEDED 527 g 98    senna-docusate (SENEXON-S) 8.6-50 MG tablet Take 2 tablets by mouth daily as needed for constipation 62 tablet 12    simethicone (MYLICON) 125 MG chewable tablet 125mg po 4 times a day PRN for gas (may keep at bedside per request) 60 tablet 11    sterile water, bottle, (WATER FOR IRRIGATION, STERILE) irrigation 60ml  prn to irrigate ruby catheter when plugged 500 mL 11    vitamin C w/EDWIN HIPS 500 MG tablet Take 1 tablet (500 mg) by mouth  2 times daily. 60 tablet 11    Zinc Oxide (DESITIN) 13 % CREA Apply topically 3 times daily as needed (chaffing) 113 g 11       Orders:  Orders written by NP:   Gabapentin 100mg po at HS x3 days then 200mg po at HS for left leg pain  Lidocaine patch 4% 1 patch applied to skin by left wound on at HS off in AM    Electronically signed by: CAROL Rodríguez CNP

## 2025-08-11 ENCOUNTER — ASSISTED LIVING VISIT (OUTPATIENT)
Dept: GERIATRICS | Facility: CLINIC | Age: 80
End: 2025-08-11
Payer: COMMERCIAL

## 2025-08-11 VITALS
OXYGEN SATURATION: 94 % | WEIGHT: 268 LBS | TEMPERATURE: 95.5 F | SYSTOLIC BLOOD PRESSURE: 177 MMHG | BODY MASS INDEX: 39.58 KG/M2 | HEART RATE: 55 BPM | DIASTOLIC BLOOD PRESSURE: 92 MMHG | RESPIRATION RATE: 16 BRPM

## 2025-08-11 DIAGNOSIS — S81.802S OPEN WOUND OF LEFT LOWER EXTREMITY, SEQUELA: ICD-10-CM

## 2025-08-11 DIAGNOSIS — G89.29 CHRONIC PAIN: ICD-10-CM

## 2025-08-11 DIAGNOSIS — Z74.09 IMPAIRED MOBILITY AND ACTIVITIES OF DAILY LIVING: ICD-10-CM

## 2025-08-11 DIAGNOSIS — G70.9 NEUROMUSCULAR RESPIRATORY WEAKNESS (H): ICD-10-CM

## 2025-08-11 DIAGNOSIS — Z99.3 WHEELCHAIR DEPENDENCE: ICD-10-CM

## 2025-08-11 DIAGNOSIS — G89.4 CHRONIC PAIN SYNDROME: ICD-10-CM

## 2025-08-11 DIAGNOSIS — G35 MS (MULTIPLE SCLEROSIS) (H): Primary | ICD-10-CM

## 2025-08-11 DIAGNOSIS — G82.50 SPASTIC QUADRIPLEGIA (H): ICD-10-CM

## 2025-08-11 DIAGNOSIS — Z78.9 IMPAIRED MOBILITY AND ACTIVITIES OF DAILY LIVING: ICD-10-CM

## 2025-08-11 DIAGNOSIS — J99 NEUROMUSCULAR RESPIRATORY WEAKNESS (H): ICD-10-CM

## 2025-08-11 DIAGNOSIS — G89.29 CHRONIC PAIN: Primary | ICD-10-CM

## 2025-08-11 PROCEDURE — 99349 HOME/RES VST EST MOD MDM 40: CPT | Performed by: NURSE PRACTITIONER

## 2025-08-12 RX ORDER — GABAPENTIN 100 MG/1
CAPSULE ORAL
Qty: 180 CAPSULE | Refills: 3 | Status: SHIPPED | OUTPATIENT
Start: 2025-08-12

## 2025-08-14 RX ORDER — GABAPENTIN 100 MG/1
CAPSULE ORAL
Qty: 60 CAPSULE | Refills: 11 | OUTPATIENT
Start: 2025-08-14

## 2025-08-17 RX ORDER — GABAPENTIN 100 MG/1
CAPSULE ORAL
Status: SHIPPED
Start: 2025-08-17

## 2025-08-25 ENCOUNTER — OFFICE VISIT (OUTPATIENT)
Dept: GERIATRICS | Facility: CLINIC | Age: 80
End: 2025-08-25
Payer: COMMERCIAL

## 2025-08-25 VITALS
HEART RATE: 55 BPM | TEMPERATURE: 95.5 F | BODY MASS INDEX: 39.58 KG/M2 | WEIGHT: 268 LBS | RESPIRATION RATE: 16 BRPM | SYSTOLIC BLOOD PRESSURE: 177 MMHG | OXYGEN SATURATION: 94 % | DIASTOLIC BLOOD PRESSURE: 92 MMHG

## 2025-08-25 DIAGNOSIS — J99 NEUROMUSCULAR RESPIRATORY WEAKNESS (H): ICD-10-CM

## 2025-08-25 DIAGNOSIS — G70.9 NEUROMUSCULAR RESPIRATORY WEAKNESS (H): ICD-10-CM

## 2025-08-25 DIAGNOSIS — I74.9 CHRONIC THROMBOEMBOLIC DISEASE (H): ICD-10-CM

## 2025-08-25 DIAGNOSIS — Z01.818 PRE-OP EXAM: ICD-10-CM

## 2025-08-25 DIAGNOSIS — I10 PRIMARY HYPERTENSION: ICD-10-CM

## 2025-08-25 DIAGNOSIS — N31.9 NEUROGENIC BLADDER: ICD-10-CM

## 2025-08-25 DIAGNOSIS — J30.9 CHRONIC ALLERGIC RHINITIS: ICD-10-CM

## 2025-08-25 DIAGNOSIS — Z74.09 IMPAIRED MOBILITY AND ACTIVITIES OF DAILY LIVING: ICD-10-CM

## 2025-08-25 DIAGNOSIS — I73.9 PAD (PERIPHERAL ARTERY DISEASE): Primary | ICD-10-CM

## 2025-08-25 DIAGNOSIS — N18.31 CHRONIC KIDNEY DISEASE, STAGE 3A (H): ICD-10-CM

## 2025-08-25 DIAGNOSIS — Z97.8 FOLEY CATHETER IN PLACE: ICD-10-CM

## 2025-08-25 DIAGNOSIS — G82.50 SPASTIC QUADRIPLEGIA (H): ICD-10-CM

## 2025-08-25 DIAGNOSIS — M79.662 PAIN OF LEFT LOWER LEG: ICD-10-CM

## 2025-08-25 DIAGNOSIS — S81.802S OPEN WOUND OF LEFT LOWER EXTREMITY, SEQUELA: ICD-10-CM

## 2025-08-25 DIAGNOSIS — Z78.9 IMPAIRED MOBILITY AND ACTIVITIES OF DAILY LIVING: ICD-10-CM

## 2025-08-25 DIAGNOSIS — G35 MS (MULTIPLE SCLEROSIS) (H): ICD-10-CM

## 2025-08-25 PROCEDURE — 99349 HOME/RES VST EST MOD MDM 40: CPT | Performed by: NURSE PRACTITIONER

## 2025-08-25 PROCEDURE — 3080F DIAST BP >= 90 MM HG: CPT | Performed by: NURSE PRACTITIONER

## 2025-08-25 PROCEDURE — 3077F SYST BP >= 140 MM HG: CPT | Performed by: NURSE PRACTITIONER

## 2025-08-26 DIAGNOSIS — G89.4 CHRONIC PAIN SYNDROME: ICD-10-CM

## 2025-08-26 RX ORDER — LIDOCAINE PAIN RELIEF 40 MG/1000MG
PATCH TOPICAL
Qty: 30 PATCH | Status: SHIPPED | OUTPATIENT
Start: 2025-08-26

## 2025-08-26 RX ORDER — FEXOFENADINE HCL 180 MG/1
180 TABLET ORAL DAILY PRN
Status: SHIPPED
Start: 2025-06-20

## 2025-08-26 RX ORDER — ASPIRIN 81 MG/1
81 TABLET, CHEWABLE ORAL DAILY
Status: SHIPPED
Start: 2025-08-22

## 2025-08-26 RX ORDER — GABAPENTIN 100 MG/1
CAPSULE ORAL
Qty: 60 CAPSULE | Refills: 1 | Status: SHIPPED | OUTPATIENT
Start: 2025-08-26